# Patient Record
Sex: FEMALE | Race: WHITE | Employment: OTHER | URBAN - METROPOLITAN AREA
[De-identification: names, ages, dates, MRNs, and addresses within clinical notes are randomized per-mention and may not be internally consistent; named-entity substitution may affect disease eponyms.]

---

## 2017-03-25 ENCOUNTER — APPOINTMENT (OUTPATIENT)
Dept: GENERAL RADIOLOGY | Age: 77
End: 2017-03-25
Payer: COMMERCIAL

## 2017-03-25 ENCOUNTER — HOSPITAL ENCOUNTER (EMERGENCY)
Age: 77
Discharge: HOME | End: 2017-03-25
Attending: EMERGENCY MEDICINE
Payer: COMMERCIAL

## 2017-03-25 VITALS
RESPIRATION RATE: 18 BRPM | HEIGHT: 69 IN | WEIGHT: 200 LBS | OXYGEN SATURATION: 97 % | DIASTOLIC BLOOD PRESSURE: 83 MMHG | TEMPERATURE: 97.9 F | HEART RATE: 56 BPM | BODY MASS INDEX: 29.62 KG/M2 | SYSTOLIC BLOOD PRESSURE: 213 MMHG

## 2017-03-25 PROCEDURE — 2500000003 HC RX 250 WO HCPCS: Performed by: NURSE PRACTITIONER

## 2017-03-25 PROCEDURE — 12001 RPR S/N/AX/GEN/TRNK 2.5CM/<: CPT

## 2017-03-25 PROCEDURE — 99283 EMERGENCY DEPT VISIT LOW MDM: CPT

## 2017-03-25 PROCEDURE — 90471 IMMUNIZATION ADMIN: CPT | Performed by: NURSE PRACTITIONER

## 2017-03-25 PROCEDURE — 90715 TDAP VACCINE 7 YRS/> IM: CPT | Performed by: NURSE PRACTITIONER

## 2017-03-25 PROCEDURE — 6360000002 HC RX W HCPCS: Performed by: NURSE PRACTITIONER

## 2017-03-25 PROCEDURE — 73130 X-RAY EXAM OF HAND: CPT

## 2017-03-25 RX ORDER — CHLORAL HYDRATE 500 MG
3000 CAPSULE ORAL DAILY
COMMUNITY

## 2017-03-25 RX ORDER — AMLODIPINE BESYLATE 5 MG/1
5 TABLET ORAL DAILY
Status: ON HOLD | COMMUNITY
End: 2018-03-17 | Stop reason: HOSPADM

## 2017-03-25 RX ORDER — LOSARTAN POTASSIUM 100 MG/1
100 TABLET ORAL DAILY
Status: ON HOLD | COMMUNITY
End: 2018-03-17 | Stop reason: HOSPADM

## 2017-03-25 RX ORDER — ATENOLOL 50 MG/1
50 TABLET ORAL DAILY
Status: ON HOLD | COMMUNITY
End: 2018-03-17 | Stop reason: HOSPADM

## 2017-03-25 RX ORDER — FENOFIBRATE 48 MG/1
48 TABLET, COATED ORAL DAILY
COMMUNITY

## 2017-03-25 RX ORDER — LIDOCAINE HYDROCHLORIDE 10 MG/ML
20 INJECTION, SOLUTION INFILTRATION; PERINEURAL ONCE
Status: COMPLETED | OUTPATIENT
Start: 2017-03-25 | End: 2017-03-25

## 2017-03-25 RX ORDER — GLIMEPIRIDE 4 MG/1
4 TABLET ORAL 2 TIMES DAILY
COMMUNITY

## 2017-03-25 RX ADMIN — LIDOCAINE HYDROCHLORIDE 20 ML: 10 INJECTION, SOLUTION INFILTRATION; PERINEURAL at 17:34

## 2017-03-25 RX ADMIN — TETANUS TOXOID, REDUCED DIPHTHERIA TOXOID AND ACELLULAR PERTUSSIS VACCINE, ADSORBED 0.5 ML: 5; 2.5; 8; 8; 2.5 SUSPENSION INTRAMUSCULAR at 17:08

## 2017-03-25 ASSESSMENT — ENCOUNTER SYMPTOMS
NAUSEA: 0
TROUBLE SWALLOWING: 0
SHORTNESS OF BREATH: 0
VOMITING: 0
COUGH: 0

## 2017-03-25 ASSESSMENT — PAIN SCALES - GENERAL: PAINLEVEL_OUTOF10: 0

## 2017-10-21 ENCOUNTER — HOSPITAL ENCOUNTER (OUTPATIENT)
Age: 77
Discharge: HOME | End: 2017-10-21
Payer: MEDICARE

## 2017-10-21 LAB
-: ABNORMAL
ALBUMIN SERPL-MCNC: 3.7 G/DL (ref 3.5–5.2)
ALBUMIN/GLOBULIN RATIO: 1.3 (ref 1–2.5)
ALP BLD-CCNC: 36 U/L (ref 35–104)
ALT SERPL-CCNC: 12 U/L (ref 5–33)
AMORPHOUS: ABNORMAL
ANION GAP SERPL CALCULATED.3IONS-SCNC: 12 MMOL/L (ref 9–17)
AST SERPL-CCNC: 12 U/L
BACTERIA: ABNORMAL
BILIRUB SERPL-MCNC: 0.33 MG/DL (ref 0.3–1.2)
BILIRUBIN URINE: NEGATIVE
BUN BLDV-MCNC: 11 MG/DL (ref 8–23)
BUN/CREAT BLD: ABNORMAL (ref 9–20)
CALCIUM SERPL-MCNC: 9 MG/DL (ref 8.6–10.4)
CASTS UA: ABNORMAL /LPF (ref 0–8)
CHLORIDE BLD-SCNC: 105 MMOL/L (ref 98–107)
CHOLESTEROL/HDL RATIO: 7.4
CHOLESTEROL: 185 MG/DL
CO2: 25 MMOL/L (ref 20–31)
COLOR: YELLOW
COMMENT UA: ABNORMAL
CREAT SERPL-MCNC: 0.52 MG/DL (ref 0.5–0.9)
CREATININE URINE: 12.5 MG/DL (ref 28–217)
CRYSTALS, UA: ABNORMAL /HPF
EPITHELIAL CELLS UA: ABNORMAL /HPF (ref 0–5)
ESTIMATED AVERAGE GLUCOSE: 151 MG/DL
GFR AFRICAN AMERICAN: >60 ML/MIN
GFR NON-AFRICAN AMERICAN: >60 ML/MIN
GFR SERPL CREATININE-BSD FRML MDRD: ABNORMAL ML/MIN/{1.73_M2}
GFR SERPL CREATININE-BSD FRML MDRD: ABNORMAL ML/MIN/{1.73_M2}
GLUCOSE BLD-MCNC: 129 MG/DL (ref 70–99)
GLUCOSE URINE: NEGATIVE
HBA1C MFR BLD: 6.9 % (ref 4–6)
HDLC SERPL-MCNC: 25 MG/DL
KETONES, URINE: NEGATIVE
LDL CHOLESTEROL: 113 MG/DL (ref 0–130)
LEUKOCYTE ESTERASE, URINE: ABNORMAL
MICROALBUMIN/CREAT 24H UR: <12 MG/L
MICROALBUMIN/CREAT UR-RTO: 96 MCG/MG CREAT
MUCUS: ABNORMAL
NITRITE, URINE: POSITIVE
OTHER OBSERVATIONS UA: ABNORMAL
PH UA: 8.5 (ref 5–8)
POTASSIUM SERPL-SCNC: 4 MMOL/L (ref 3.7–5.3)
PROTEIN UA: NEGATIVE
RBC UA: ABNORMAL /HPF (ref 0–4)
RENAL EPITHELIAL, UA: ABNORMAL /HPF
SODIUM BLD-SCNC: 142 MMOL/L (ref 135–144)
SPECIFIC GRAVITY UA: 1.01 (ref 1–1.03)
TOTAL PROTEIN: 6.6 G/DL (ref 6.4–8.3)
TRICHOMONAS: ABNORMAL
TRIGL SERPL-MCNC: 235 MG/DL
TURBIDITY: CLEAR
URINE HGB: NEGATIVE
UROBILINOGEN, URINE: NORMAL
VLDLC SERPL CALC-MCNC: ABNORMAL MG/DL (ref 1–30)
WBC UA: ABNORMAL /HPF (ref 0–5)
YEAST: ABNORMAL

## 2017-10-21 PROCEDURE — 87086 URINE CULTURE/COLONY COUNT: CPT

## 2017-10-21 PROCEDURE — 82570 ASSAY OF URINE CREATININE: CPT

## 2017-10-21 PROCEDURE — 81001 URINALYSIS AUTO W/SCOPE: CPT

## 2017-10-21 PROCEDURE — 36415 COLL VENOUS BLD VENIPUNCTURE: CPT

## 2017-10-21 PROCEDURE — 82043 UR ALBUMIN QUANTITATIVE: CPT

## 2017-10-21 PROCEDURE — 80061 LIPID PANEL: CPT

## 2017-10-21 PROCEDURE — 80053 COMPREHEN METABOLIC PANEL: CPT

## 2017-10-21 PROCEDURE — 87077 CULTURE AEROBIC IDENTIFY: CPT

## 2017-10-21 PROCEDURE — 83036 HEMOGLOBIN GLYCOSYLATED A1C: CPT

## 2017-10-21 PROCEDURE — 87186 SC STD MICRODIL/AGAR DIL: CPT

## 2017-10-22 LAB
CULTURE: ABNORMAL
CULTURE: ABNORMAL
Lab: ABNORMAL
ORGANISM: ABNORMAL
SPECIMEN DESCRIPTION: ABNORMAL
STATUS: ABNORMAL

## 2018-01-10 ENCOUNTER — APPOINTMENT (OUTPATIENT)
Dept: CT IMAGING | Age: 78
End: 2018-01-10
Payer: MEDICARE

## 2018-01-10 ENCOUNTER — ANESTHESIA (OUTPATIENT)
Dept: INTERVENTIONAL RADIOLOGY/VASCULAR | Age: 78
DRG: 023 | End: 2018-01-10
Payer: MEDICARE

## 2018-01-10 ENCOUNTER — HOSPITAL ENCOUNTER (INPATIENT)
Dept: INTERVENTIONAL RADIOLOGY/VASCULAR | Age: 78
Discharge: HOME | DRG: 023 | End: 2018-01-10
Payer: MEDICARE

## 2018-01-10 ENCOUNTER — HOSPITAL ENCOUNTER (EMERGENCY)
Age: 78
Discharge: ANOTHER HOSPITAL | End: 2018-01-10
Attending: EMERGENCY MEDICINE
Payer: MEDICARE

## 2018-01-10 ENCOUNTER — APPOINTMENT (OUTPATIENT)
Dept: INTERVENTIONAL RADIOLOGY/VASCULAR | Age: 78
DRG: 023 | End: 2018-01-10
Payer: MEDICARE

## 2018-01-10 ENCOUNTER — HOSPITAL ENCOUNTER (INPATIENT)
Age: 78
LOS: 9 days | Discharge: NURSING, REHAB OR LONG-TERM CARE | DRG: 023 | End: 2018-01-19
Attending: EMERGENCY MEDICINE | Admitting: PSYCHIATRY & NEUROLOGY
Payer: MEDICARE

## 2018-01-10 ENCOUNTER — APPOINTMENT (OUTPATIENT)
Dept: CT IMAGING | Age: 78
DRG: 023 | End: 2018-01-10
Payer: MEDICARE

## 2018-01-10 ENCOUNTER — HOSPITAL ENCOUNTER (EMERGENCY)
Age: 78
Discharge: ANOTHER HOSPITAL | End: 2018-01-10
Payer: MEDICARE

## 2018-01-10 ENCOUNTER — ANESTHESIA EVENT (OUTPATIENT)
Dept: INTERVENTIONAL RADIOLOGY/VASCULAR | Age: 78
DRG: 023 | End: 2018-01-10
Payer: MEDICARE

## 2018-01-10 ENCOUNTER — APPOINTMENT (OUTPATIENT)
Dept: GENERAL RADIOLOGY | Age: 78
End: 2018-01-10
Payer: MEDICARE

## 2018-01-10 VITALS
DIASTOLIC BLOOD PRESSURE: 65 MMHG | HEIGHT: 68 IN | RESPIRATION RATE: 16 BRPM | TEMPERATURE: 97.7 F | WEIGHT: 190 LBS | BODY MASS INDEX: 28.79 KG/M2 | SYSTOLIC BLOOD PRESSURE: 160 MMHG | OXYGEN SATURATION: 95 % | HEART RATE: 61 BPM

## 2018-01-10 VITALS
OXYGEN SATURATION: 97 % | SYSTOLIC BLOOD PRESSURE: 199 MMHG | RESPIRATION RATE: 23 BRPM | DIASTOLIC BLOOD PRESSURE: 92 MMHG

## 2018-01-10 VITALS
HEART RATE: 72 BPM | RESPIRATION RATE: 20 BRPM | OXYGEN SATURATION: 100 % | SYSTOLIC BLOOD PRESSURE: 179 MMHG | DIASTOLIC BLOOD PRESSURE: 68 MMHG | TEMPERATURE: 98.2 F

## 2018-01-10 DIAGNOSIS — I63.9 CEREBROVASCULAR ACCIDENT (CVA), UNSPECIFIED MECHANISM (HCC): Primary | ICD-10-CM

## 2018-01-10 DIAGNOSIS — I63.511 ACUTE RIGHT MCA STROKE (HCC): Primary | ICD-10-CM

## 2018-01-10 DIAGNOSIS — R47.9 SPEECH DISTURBANCE, UNSPECIFIED TYPE: ICD-10-CM

## 2018-01-10 LAB
-: ABNORMAL
ABSOLUTE EOS #: 0.23 K/UL (ref 0–0.4)
ABSOLUTE IMMATURE GRANULOCYTE: ABNORMAL K/UL (ref 0–0.3)
ABSOLUTE LYMPH #: 4.68 K/UL (ref 1–4.8)
ABSOLUTE MONO #: 0.47 K/UL (ref 0.1–1.3)
ACTIVATED CLOTTING TIME: 135 SEC (ref 79–149)
ALBUMIN SERPL-MCNC: 4.4 G/DL (ref 3.5–5.2)
ALBUMIN/GLOBULIN RATIO: ABNORMAL (ref 1–2.5)
ALP BLD-CCNC: 46 U/L (ref 35–104)
ALT SERPL-CCNC: 13 U/L (ref 5–33)
AMORPHOUS: ABNORMAL
ANION GAP SERPL CALCULATED.3IONS-SCNC: 13 MMOL/L (ref 9–17)
AST SERPL-CCNC: 14 U/L
BACTERIA: ABNORMAL
BASOPHILS # BLD: 0 % (ref 0–2)
BASOPHILS ABSOLUTE: 0 K/UL (ref 0–0.2)
BILIRUB SERPL-MCNC: 0.46 MG/DL (ref 0.3–1.2)
BILIRUBIN URINE: NEGATIVE
BUN BLDV-MCNC: 14 MG/DL (ref 8–23)
BUN/CREAT BLD: ABNORMAL (ref 9–20)
CALCIUM SERPL-MCNC: 9.5 MG/DL (ref 8.6–10.4)
CASTS UA: ABNORMAL /LPF
CHLORIDE BLD-SCNC: 101 MMOL/L (ref 98–107)
CO2: 26 MMOL/L (ref 20–31)
COLOR: YELLOW
COMMENT UA: ABNORMAL
CREAT SERPL-MCNC: 0.55 MG/DL (ref 0.5–0.9)
CRYSTALS, UA: ABNORMAL /HPF
DIFFERENTIAL TYPE: ABNORMAL
EKG ATRIAL RATE: 65 BPM
EKG P-R INTERVAL: 184 MS
EKG Q-T INTERVAL: 440 MS
EKG QRS DURATION: 98 MS
EKG QTC CALCULATION (BAZETT): 457 MS
EKG R AXIS: -2 DEGREES
EKG T AXIS: -10 DEGREES
EKG VENTRICULAR RATE: 65 BPM
EOSINOPHILS RELATIVE PERCENT: 2 % (ref 0–4)
EPITHELIAL CELLS UA: ABNORMAL /HPF
GFR AFRICAN AMERICAN: >60 ML/MIN
GFR NON-AFRICAN AMERICAN: >60 ML/MIN
GFR SERPL CREATININE-BSD FRML MDRD: ABNORMAL ML/MIN/{1.73_M2}
GFR SERPL CREATININE-BSD FRML MDRD: ABNORMAL ML/MIN/{1.73_M2}
GLUCOSE BLD-MCNC: 232 MG/DL (ref 70–99)
GLUCOSE URINE: ABNORMAL
HCT VFR BLD CALC: 42.5 % (ref 36–46)
HEMOGLOBIN: 13.8 G/DL (ref 12–16)
IMMATURE GRANULOCYTES: ABNORMAL %
INR BLD: 1
KETONES, URINE: NEGATIVE
LEUKOCYTE ESTERASE, URINE: ABNORMAL
LIPASE: 25 U/L (ref 13–60)
LYMPHOCYTES # BLD: 40 % (ref 24–44)
MAGNESIUM: 2.1 MG/DL (ref 1.6–2.6)
MCH RBC QN AUTO: 27.9 PG (ref 26–34)
MCHC RBC AUTO-ENTMCNC: 32.5 G/DL (ref 31–37)
MCV RBC AUTO: 85.6 FL (ref 80–100)
MONOCYTES # BLD: 4 % (ref 1–7)
MORPHOLOGY: NORMAL
MUCUS: ABNORMAL
NITRITE, URINE: NEGATIVE
OTHER OBSERVATIONS UA: ABNORMAL
PARTIAL THROMBOPLASTIN TIME: 21.8 SEC (ref 23–31)
PDW BLD-RTO: 14.3 % (ref 11.5–14.9)
PH UA: 7.5 (ref 5–8)
PLATELET # BLD: 231 K/UL (ref 150–450)
PLATELET ESTIMATE: ABNORMAL
PMV BLD AUTO: 10.3 FL (ref 6–12)
POTASSIUM SERPL-SCNC: 3.7 MMOL/L (ref 3.7–5.3)
PROTEIN UA: NEGATIVE
PROTHROMBIN TIME: 10.3 SEC (ref 9.7–12)
RBC # BLD: 4.96 M/UL (ref 4–5.2)
RBC # BLD: ABNORMAL 10*6/UL
RBC UA: ABNORMAL /HPF
RENAL EPITHELIAL, UA: ABNORMAL /HPF
SEG NEUTROPHILS: 54 % (ref 36–66)
SEGMENTED NEUTROPHILS ABSOLUTE COUNT: 6.32 K/UL (ref 1.3–9.1)
SODIUM BLD-SCNC: 140 MMOL/L (ref 135–144)
SPECIFIC GRAVITY UA: 1.01 (ref 1–1.03)
TOTAL PROTEIN: 7.5 G/DL (ref 6.4–8.3)
TRICHOMONAS: ABNORMAL
TROPONIN INTERP: NORMAL
TROPONIN INTERP: NORMAL
TROPONIN T: <0.03 NG/ML
TROPONIN T: <0.03 NG/ML
TURBIDITY: CLEAR
URINE HGB: NEGATIVE
UROBILINOGEN, URINE: NORMAL
WBC # BLD: 11.7 K/UL (ref 3.5–11)
WBC # BLD: ABNORMAL 10*3/UL
WBC UA: ABNORMAL /HPF
YEAST: ABNORMAL

## 2018-01-10 PROCEDURE — B313YZZ FLUOROSCOPY OF RIGHT COMMON CAROTID ARTERY USING OTHER CONTRAST: ICD-10-PCS | Performed by: PSYCHIATRY & NEUROLOGY

## 2018-01-10 PROCEDURE — 2580000003 HC RX 258: Performed by: NURSE ANESTHETIST, CERTIFIED REGISTERED

## 2018-01-10 PROCEDURE — C1769 GUIDE WIRE: HCPCS

## 2018-01-10 PROCEDURE — 87086 URINE CULTURE/COLONY COUNT: CPT

## 2018-01-10 PROCEDURE — 99223 1ST HOSP IP/OBS HIGH 75: CPT | Performed by: PSYCHIATRY & NEUROLOGY

## 2018-01-10 PROCEDURE — 85730 THROMBOPLASTIN TIME PARTIAL: CPT

## 2018-01-10 PROCEDURE — 87077 CULTURE AEROBIC IDENTIFY: CPT

## 2018-01-10 PROCEDURE — 2580000003 HC RX 258: Performed by: EMERGENCY MEDICINE

## 2018-01-10 PROCEDURE — B316YZZ FLUOROSCOPY OF RIGHT INTERNAL CAROTID ARTERY USING OTHER CONTRAST: ICD-10-PCS | Performed by: PSYCHIATRY & NEUROLOGY

## 2018-01-10 PROCEDURE — 2500000003 HC RX 250 WO HCPCS: Performed by: PSYCHIATRY & NEUROLOGY

## 2018-01-10 PROCEDURE — 83735 ASSAY OF MAGNESIUM: CPT

## 2018-01-10 PROCEDURE — 70498 CT ANGIOGRAPHY NECK: CPT

## 2018-01-10 PROCEDURE — 70450 CT HEAD/BRAIN W/O DYE: CPT

## 2018-01-10 PROCEDURE — 85610 PROTHROMBIN TIME: CPT

## 2018-01-10 PROCEDURE — 96374 THER/PROPH/DIAG INJ IV PUSH: CPT

## 2018-01-10 PROCEDURE — 3700000000 HC ANESTHESIA ATTENDED CARE

## 2018-01-10 PROCEDURE — 71045 X-RAY EXAM CHEST 1 VIEW: CPT

## 2018-01-10 PROCEDURE — 80053 COMPREHEN METABOLIC PANEL: CPT

## 2018-01-10 PROCEDURE — 99291 CRITICAL CARE FIRST HOUR: CPT | Performed by: PSYCHIATRY & NEUROLOGY

## 2018-01-10 PROCEDURE — 87186 SC STD MICRODIL/AGAR DIL: CPT

## 2018-01-10 PROCEDURE — 93005 ELECTROCARDIOGRAM TRACING: CPT

## 2018-01-10 PROCEDURE — 6360000002 HC RX W HCPCS: Performed by: EMERGENCY MEDICINE

## 2018-01-10 PROCEDURE — 3700000001 HC ADD 15 MINUTES (ANESTHESIA)

## 2018-01-10 PROCEDURE — 85347 COAGULATION TIME ACTIVATED: CPT

## 2018-01-10 PROCEDURE — 84484 ASSAY OF TROPONIN QUANT: CPT

## 2018-01-10 PROCEDURE — 6360000004 HC RX CONTRAST MEDICATION: Performed by: EMERGENCY MEDICINE

## 2018-01-10 PROCEDURE — 70496 CT ANGIOGRAPHY HEAD: CPT

## 2018-01-10 PROCEDURE — B41FYZZ FLUOROSCOPY OF RIGHT LOWER EXTREMITY ARTERIES USING OTHER CONTRAST: ICD-10-PCS | Performed by: PSYCHIATRY & NEUROLOGY

## 2018-01-10 PROCEDURE — 99285 EMERGENCY DEPT VISIT HI MDM: CPT

## 2018-01-10 PROCEDURE — 83690 ASSAY OF LIPASE: CPT

## 2018-01-10 PROCEDURE — 03CG3ZZ EXTIRPATION OF MATTER FROM INTRACRANIAL ARTERY, PERCUTANEOUS APPROACH: ICD-10-PCS | Performed by: PSYCHIATRY & NEUROLOGY

## 2018-01-10 PROCEDURE — 61645 PERQ ART M-THROMBECT &/NFS: CPT | Performed by: PSYCHIATRY & NEUROLOGY

## 2018-01-10 PROCEDURE — B31RYZZ FLUOROSCOPY OF INTRACRANIAL ARTERIES USING OTHER CONTRAST: ICD-10-PCS | Performed by: PSYCHIATRY & NEUROLOGY

## 2018-01-10 PROCEDURE — 2000000003 HC NEURO ICU R&B

## 2018-01-10 PROCEDURE — 85025 COMPLETE CBC W/AUTO DIFF WBC: CPT

## 2018-01-10 PROCEDURE — 6360000002 HC RX W HCPCS: Performed by: NURSE ANESTHETIST, CERTIFIED REGISTERED

## 2018-01-10 PROCEDURE — 6360000002 HC RX W HCPCS

## 2018-01-10 PROCEDURE — 36415 COLL VENOUS BLD VENIPUNCTURE: CPT

## 2018-01-10 PROCEDURE — 81001 URINALYSIS AUTO W/SCOPE: CPT

## 2018-01-10 PROCEDURE — 6370000000 HC RX 637 (ALT 250 FOR IP): Performed by: EMERGENCY MEDICINE

## 2018-01-10 PROCEDURE — 6360000004 HC RX CONTRAST MEDICATION: Performed by: PSYCHIATRY & NEUROLOGY

## 2018-01-10 RX ORDER — HYDRALAZINE HYDROCHLORIDE 20 MG/ML
10 INJECTION INTRAMUSCULAR; INTRAVENOUS ONCE
Status: COMPLETED | OUTPATIENT
Start: 2018-01-10 | End: 2018-01-10

## 2018-01-10 RX ORDER — ASPIRIN 81 MG/1
324 TABLET, CHEWABLE ORAL ONCE
Status: COMPLETED | OUTPATIENT
Start: 2018-01-10 | End: 2018-01-10

## 2018-01-10 RX ORDER — IODIXANOL 270 MG/ML
200 INJECTION, SOLUTION INTRAVASCULAR
Status: COMPLETED | OUTPATIENT
Start: 2018-01-10 | End: 2018-01-10

## 2018-01-10 RX ORDER — ONDANSETRON 2 MG/ML
4 INJECTION INTRAMUSCULAR; INTRAVENOUS EVERY 6 HOURS PRN
Status: DISCONTINUED | OUTPATIENT
Start: 2018-01-10 | End: 2018-01-19 | Stop reason: HOSPADM

## 2018-01-10 RX ORDER — SODIUM CHLORIDE 0.9 % (FLUSH) 0.9 %
10 SYRINGE (ML) INJECTION EVERY 12 HOURS SCHEDULED
Status: DISCONTINUED | OUTPATIENT
Start: 2018-01-11 | End: 2018-01-19 | Stop reason: HOSPADM

## 2018-01-10 RX ORDER — SODIUM CHLORIDE 9 MG/ML
INJECTION, SOLUTION INTRAVENOUS CONTINUOUS
Status: DISCONTINUED | OUTPATIENT
Start: 2018-01-11 | End: 2018-01-15

## 2018-01-10 RX ORDER — CEFAZOLIN SODIUM 1 G/3ML
INJECTION, POWDER, FOR SOLUTION INTRAMUSCULAR; INTRAVENOUS PRN
Status: DISCONTINUED | OUTPATIENT
Start: 2018-01-10 | End: 2018-01-10 | Stop reason: SDUPTHER

## 2018-01-10 RX ORDER — HEPARIN SODIUM 1000 [USP'U]/ML
2000 INJECTION, SOLUTION INTRAVENOUS; SUBCUTANEOUS PRN
Status: DISCONTINUED | OUTPATIENT
Start: 2018-01-10 | End: 2018-01-10 | Stop reason: HOSPADM

## 2018-01-10 RX ORDER — LABETALOL HYDROCHLORIDE 5 MG/ML
10 INJECTION, SOLUTION INTRAVENOUS ONCE
Status: COMPLETED | OUTPATIENT
Start: 2018-01-10 | End: 2018-01-10

## 2018-01-10 RX ORDER — ACETAMINOPHEN 325 MG/1
650 TABLET ORAL EVERY 4 HOURS PRN
Status: DISCONTINUED | OUTPATIENT
Start: 2018-01-10 | End: 2018-01-19 | Stop reason: HOSPADM

## 2018-01-10 RX ORDER — ASPIRIN 81 MG/1
81 TABLET ORAL DAILY
Status: DISCONTINUED | OUTPATIENT
Start: 2018-01-11 | End: 2018-01-11

## 2018-01-10 RX ORDER — HEPARIN SODIUM 10000 [USP'U]/100ML
1000 INJECTION, SOLUTION INTRAVENOUS CONTINUOUS
Status: DISCONTINUED | OUTPATIENT
Start: 2018-01-10 | End: 2018-01-10 | Stop reason: HOSPADM

## 2018-01-10 RX ORDER — SODIUM CHLORIDE 9 MG/ML
INJECTION, SOLUTION INTRAVENOUS CONTINUOUS PRN
Status: DISCONTINUED | OUTPATIENT
Start: 2018-01-10 | End: 2018-01-10 | Stop reason: SDUPTHER

## 2018-01-10 RX ORDER — 0.9 % SODIUM CHLORIDE 0.9 %
100 INTRAVENOUS SOLUTION INTRAVENOUS ONCE
Status: COMPLETED | OUTPATIENT
Start: 2018-01-10 | End: 2018-01-10

## 2018-01-10 RX ORDER — HEPARIN SODIUM 1000 [USP'U]/ML
INJECTION, SOLUTION INTRAVENOUS; SUBCUTANEOUS PRN
Status: DISCONTINUED | OUTPATIENT
Start: 2018-01-10 | End: 2018-01-10 | Stop reason: SDUPTHER

## 2018-01-10 RX ORDER — SODIUM CHLORIDE 0.9 % (FLUSH) 0.9 %
10 SYRINGE (ML) INJECTION PRN
Status: DISCONTINUED | OUTPATIENT
Start: 2018-01-10 | End: 2018-01-10 | Stop reason: HOSPADM

## 2018-01-10 RX ORDER — HEPARIN SODIUM 1000 [USP'U]/ML
INJECTION, SOLUTION INTRAVENOUS; SUBCUTANEOUS
Status: COMPLETED
Start: 2018-01-10 | End: 2018-01-10

## 2018-01-10 RX ORDER — HEPARIN SODIUM 1000 [USP'U]/ML
4000 INJECTION, SOLUTION INTRAVENOUS; SUBCUTANEOUS ONCE
Status: COMPLETED | OUTPATIENT
Start: 2018-01-10 | End: 2018-01-10

## 2018-01-10 RX ORDER — FENTANYL CITRATE 50 UG/ML
25 INJECTION, SOLUTION INTRAMUSCULAR; INTRAVENOUS
Status: DISCONTINUED | OUTPATIENT
Start: 2018-01-10 | End: 2018-01-19 | Stop reason: HOSPADM

## 2018-01-10 RX ORDER — SIMVASTATIN 20 MG
20 TABLET ORAL NIGHTLY
Status: DISCONTINUED | OUTPATIENT
Start: 2018-01-11 | End: 2018-01-19 | Stop reason: HOSPADM

## 2018-01-10 RX ORDER — HEPARIN SODIUM 1000 [USP'U]/ML
4000 INJECTION, SOLUTION INTRAVENOUS; SUBCUTANEOUS PRN
Status: DISCONTINUED | OUTPATIENT
Start: 2018-01-10 | End: 2018-01-10 | Stop reason: HOSPADM

## 2018-01-10 RX ORDER — SODIUM CHLORIDE 0.9 % (FLUSH) 0.9 %
10 SYRINGE (ML) INJECTION PRN
Status: DISCONTINUED | OUTPATIENT
Start: 2018-01-10 | End: 2018-01-19 | Stop reason: HOSPADM

## 2018-01-10 RX ADMIN — LABETALOL HYDROCHLORIDE 10 MG: 5 INJECTION, SOLUTION INTRAVENOUS at 11:25

## 2018-01-10 RX ADMIN — HEPARIN SODIUM 2000 UNITS: 1000 INJECTION, SOLUTION INTRAVENOUS; SUBCUTANEOUS at 22:18

## 2018-01-10 RX ADMIN — IODIXANOL 160 ML: 270 INJECTION, SOLUTION INTRAVASCULAR at 23:15

## 2018-01-10 RX ADMIN — ASPIRIN 81 MG 324 MG: 81 TABLET ORAL at 16:11

## 2018-01-10 RX ADMIN — SODIUM CHLORIDE 100 ML: 9 INJECTION, SOLUTION INTRAVENOUS at 14:18

## 2018-01-10 RX ADMIN — HEPARIN SODIUM AND DEXTROSE 1000 UNITS/HR: 10000; 5 INJECTION INTRAVENOUS at 16:14

## 2018-01-10 RX ADMIN — IOPAMIDOL 100 ML: 755 INJECTION, SOLUTION INTRAVENOUS at 14:18

## 2018-01-10 RX ADMIN — SODIUM CHLORIDE: 9 INJECTION, SOLUTION INTRAVENOUS at 19:30

## 2018-01-10 RX ADMIN — HEPARIN SODIUM 4000 UNITS: 1000 INJECTION, SOLUTION INTRAVENOUS; SUBCUTANEOUS at 16:10

## 2018-01-10 RX ADMIN — CEFAZOLIN 2000 MG: 1 INJECTION, POWDER, FOR SOLUTION INTRAMUSCULAR; INTRAVENOUS at 19:50

## 2018-01-10 RX ADMIN — HYDRALAZINE HYDROCHLORIDE 10 MG: 20 INJECTION INTRAMUSCULAR; INTRAVENOUS at 13:05

## 2018-01-10 RX ADMIN — Medication 10 ML: at 14:18

## 2018-01-10 ASSESSMENT — PULMONARY FUNCTION TESTS
PIF_VALUE: 1
PIF_VALUE: 0
PIF_VALUE: 1
PIF_VALUE: 0
PIF_VALUE: 1
PIF_VALUE: 0
PIF_VALUE: 1
PIF_VALUE: 0
PIF_VALUE: 1

## 2018-01-10 ASSESSMENT — LIFESTYLE VARIABLES: SMOKING_STATUS: 0

## 2018-01-10 ASSESSMENT — ENCOUNTER SYMPTOMS
VOMITING: 0
NAUSEA: 0
SHORTNESS OF BREATH: 0
ABDOMINAL PAIN: 0

## 2018-01-10 NOTE — ED NOTES
weakness in legs. No gaze noted, pt follows commands. Slight drift noted to left leg. Slight left side facial droop noted. Decision to scan pt was made. Patient received the following medications prior to MSU arrival:NA  Patient has the following lines prior to [facility] arrival:[facility]  Patient has the following airway placed prior to MSU arrival:NA    Patient was transferred to cot via 6 person assist and secured with straps x3. Zoll ECG, SpO2, and NIBP applied and monitored throughout encounter. HOB maintained at 30 degrees. Patient was transferred to ambulance, cot secured in scan position. Non contrast CT scan preformed. After scan cot secured in transport position. Patient is being transported to [facility] . During transport patient rests comfortably. Cabin temp maintained at 70-72 °F throughout transport. Patient was transferred to bed 17 via 4 person sheet lift. Report, care, and CT disk turned over to  RN at bedside. Physical assessment performed:    Neuro: Alert and oriented, slight drifted noted to left leg, slight left side facial droop.     Resp: lungs clear to ascultation bilaterally, normal effort  Cardiac: regular rate, no murmur, 12 lead ECG shows SB  Muscular/skeletal/peripheral vascular: no edema, no redness or tenderness in the calves, pulses present in 4 extremities   Abd/GI/: abd round, soft, non tender, bowel sounds present x 4 quadrants   Skin: normal color, warm, dry      IV Lines:  Time Type Site/Route Size # Attempts Performed By   pta infusion R AC 18  ems                             Total Amount of IV Fluids Infused: 0    Meds / Electrical rx   Time Therapy Dosage Route Response Preformed By   1125 labetalol 10mg IV  Aubrey Mylar Administration   Time Bolus Dose Infusion Dose                 aIRWAY  Procedures   Time Size Oral Nasal # Attempts Preformed By      []  []        []  []        []  []        []  []

## 2018-01-10 NOTE — PROGRESS NOTES
Alegre inserted per Olamide Plate and Shanice GRIGGS. Hematuria noted. resident notified. Pedal pulses marked. Labs drawn and others reviewed.   Awaiting to go to lab

## 2018-01-10 NOTE — ED PROVIDER NOTES
right le - no drift; leg holds 30 degree position for full 5 seconds  7. Limb Ataxia:  0 - absent  8. Sensory:  0 - normal; no sensory loss  9. Best Language:  1 - mild to moderate aphasia; some obvious loss of fluency or facility of comprehension without significant limitation on ideas expressed or form of expression. Reduction of speech and/or comprehension, however, makes conversation about provided materials difficult or impossible. For example, in conversation about provided materials, examiner can identify picture or naming card content from patient's response. 10.  Dysarthria:  0 - normal  11. Extinction and Inattention:  0 - no abnormality    TOTAL:  2            DIFFERENTIAL DIAGNOSIS/MDM:   12:00 PM-patient seen and examined. Patient arrived via mobile stroke unit. NIH is 2 on my exam.  Patient does have stuttering and almost slurred speech and borderline left-sided facial droop however no other neurologic deficits on my exam.  Both of her lower extremities appear weak however she states they are chronically weak and they do not seem to show any lateralizing signs. She is afebrile and nontoxic in appearance. She actually appears very comfortable. NIH score seems to have improved without intervention since original presentation to first responders. Mobile stroke did give dose of antihypertensives as patient was initially hypertensive over 200. Spoke with Dr. Loretta Blum who requested CTA head and neck. Stroke panel labs ordered. Cardiac workup ordered as well.       DIAGNOSTIC RESULTS     EKG: All EKG's are interpreted by the Emergency Department Physician who either signs or Co-signs this chart in the absence of a cardiologist.    EKG Interpretation    Interpreted by me-12:21 PM    Rhythm: normal sinus   Rate: normal  Axis: normal  Ectopy: none  Conduction: normal  ST Segments: Nonspecific  T Waves: Nonspecific  Q Waves: none    Clinical Impression: Nonspecific EKG    RADIOLOGY:   I 1.  Further evaluation by thyroid Ultrasound recommended for these incidental   nodules:      Patient Age 25 years or less - Any nodule. Patient Age 21-27 years old - Nodule 1 cm in size or greater      Patient Age 28 years or more - Nodule 1.5 cm in size or greater      2. Follow up thyroid ultrasound also recommend in these scenarios      -Solitary nodule with high risk imaging features (locally invasive nodule or   suspicious lymph nodes)      -Any nodule in a heterogeneous enlarged thyroid gland      3. NO further imaging is recommended in the following scenarios      -No f/u imaging is recommended for ITNs not meeting the above criteria.      -No US or f/u recommended for ITNs without high risk features      in pts. with limited life expectancy or significant      co-morbidities, unless clinically warranted. Note: These recommendations do not apply to pts. w/ increased risk      for thyroid cancer or pts. with symptomatic thyroid disease.      ________________________________________________________________      Recommendations for f/u of Incidental Thyroid Nodules (ITN)      found on CT, MR, NM and Extrathyroidal US are based upon the ACR      white paper and [facility] 3-tiered system for managing ITNs:      J Am Artur Radiol. 2015 Feb;12(2): 143-50         XR CHEST PORTABLE   Final Result   No prior study available for comparison. No acute cardiopulmonary disease.                  ED BEDSIDE ULTRASOUND:      LABS:  Labs Reviewed   UA W/REFLEX CULTURE - Abnormal; Notable for the following:        Result Value    Glucose, Ur 1+ (*)     Leukocyte Esterase, Urine SMALL (*)     All other components within normal limits   COMPREHENSIVE METABOLIC PANEL - Abnormal; Notable for the following:     Glucose 232 (*)     All other components within normal limits   APTT - Abnormal; Notable for the following:     PTT 21.8 (*)     All other components within normal limits   CBC WITH AUTO DIFFERENTIAL - Abnormal;

## 2018-01-10 NOTE — ED NOTES
Bed: 17  Expected date: 1/10/18  Expected time:   Means of arrival:   Comments:     Maggie Phoenix RN  01/10/18 0089

## 2018-01-10 NOTE — ED NOTES
Pt arrives per [facility] transferred from [facility] with CVA. Pt with slurred speech and left sided facial droop that started earlier today. Pt LKW 1040. Pts with equal strength to BUE and BLE. Pt with clear speech, left sided facial droop. CT performed pta showing right MCA occlusion. Pt received 4000 unit heparin bolus, 324mg asa and heparin gtt at 10mL/hr. Pt denies any complaints at this time.       Pascale Matute RN  01/10/18 3536

## 2018-01-11 ENCOUNTER — APPOINTMENT (OUTPATIENT)
Dept: CT IMAGING | Age: 78
DRG: 023 | End: 2018-01-11
Payer: MEDICARE

## 2018-01-11 LAB
-: NORMAL
ABSOLUTE EOS #: <0.03 K/UL (ref 0–0.44)
ABSOLUTE IMMATURE GRANULOCYTE: 0.05 K/UL (ref 0–0.3)
ABSOLUTE LYMPH #: 1.25 K/UL (ref 1.1–3.7)
ABSOLUTE MONO #: 0.26 K/UL (ref 0.1–1.2)
AMORPHOUS: NORMAL
ANION GAP SERPL CALCULATED.3IONS-SCNC: 14 MMOL/L (ref 9–17)
BACTERIA: NORMAL
BASOPHILS # BLD: 0 % (ref 0–2)
BASOPHILS ABSOLUTE: 0.03 K/UL (ref 0–0.2)
BILIRUBIN URINE: NEGATIVE
BUN BLDV-MCNC: 10 MG/DL (ref 8–23)
BUN/CREAT BLD: ABNORMAL (ref 9–20)
CALCIUM IONIZED: 1.13 MMOL/L (ref 1.13–1.33)
CALCIUM SERPL-MCNC: 8 MG/DL (ref 8.6–10.4)
CASTS UA: NORMAL /LPF (ref 0–8)
CHLORIDE BLD-SCNC: 102 MMOL/L (ref 98–107)
CHOLESTEROL/HDL RATIO: 6.8
CHOLESTEROL: 171 MG/DL
CO2: 21 MMOL/L (ref 20–31)
COLOR: YELLOW
COMMENT UA: ABNORMAL
CREAT SERPL-MCNC: 0.53 MG/DL (ref 0.5–0.9)
CRYSTALS, UA: NORMAL /HPF
CULTURE: NORMAL
CULTURE: NORMAL
DIFFERENTIAL TYPE: ABNORMAL
EOSINOPHILS RELATIVE PERCENT: 0 % (ref 1–4)
EPITHELIAL CELLS UA: NORMAL /HPF (ref 0–5)
GFR AFRICAN AMERICAN: >60 ML/MIN
GFR NON-AFRICAN AMERICAN: >60 ML/MIN
GFR SERPL CREATININE-BSD FRML MDRD: ABNORMAL ML/MIN/{1.73_M2}
GFR SERPL CREATININE-BSD FRML MDRD: ABNORMAL ML/MIN/{1.73_M2}
GLUCOSE BLD-MCNC: 192 MG/DL (ref 65–105)
GLUCOSE BLD-MCNC: 205 MG/DL (ref 65–105)
GLUCOSE BLD-MCNC: 220 MG/DL (ref 65–105)
GLUCOSE BLD-MCNC: 273 MG/DL (ref 65–105)
GLUCOSE BLD-MCNC: 278 MG/DL (ref 65–105)
GLUCOSE BLD-MCNC: 288 MG/DL (ref 70–99)
GLUCOSE URINE: ABNORMAL
HCT VFR BLD CALC: 38.3 % (ref 36.3–47.1)
HDLC SERPL-MCNC: 25 MG/DL
HEMOGLOBIN: 12.6 G/DL (ref 11.9–15.1)
IMMATURE GRANULOCYTES: 0 %
KETONES, URINE: ABNORMAL
LDL CHOLESTEROL: 97 MG/DL (ref 0–130)
LEUKOCYTE ESTERASE, URINE: NEGATIVE
LYMPHOCYTES # BLD: 9 % (ref 24–43)
Lab: NORMAL
MAGNESIUM: 1.8 MG/DL (ref 1.6–2.6)
MCH RBC QN AUTO: 27.9 PG (ref 25.2–33.5)
MCHC RBC AUTO-ENTMCNC: 32.9 G/DL (ref 28.4–34.8)
MCV RBC AUTO: 84.9 FL (ref 82.6–102.9)
MONOCYTES # BLD: 2 % (ref 3–12)
MRSA, DNA, NASAL: NORMAL
MUCUS: NORMAL
NITRITE, URINE: NEGATIVE
OTHER OBSERVATIONS UA: NORMAL
PDW BLD-RTO: 13.2 % (ref 11.8–14.4)
PH UA: 5 (ref 5–8)
PHOSPHORUS: 2.7 MG/DL (ref 2.6–4.5)
PLATELET # BLD: 279 K/UL (ref 138–453)
PLATELET ESTIMATE: ABNORMAL
PMV BLD AUTO: 10.4 FL (ref 8.1–13.5)
POTASSIUM SERPL-SCNC: 3.2 MMOL/L (ref 3.7–5.3)
PROTEIN UA: NEGATIVE
RBC # BLD: 4.51 M/UL (ref 3.95–5.11)
RBC # BLD: ABNORMAL 10*6/UL
RBC UA: NORMAL /HPF (ref 0–4)
RENAL EPITHELIAL, UA: NORMAL /HPF
SEG NEUTROPHILS: 89 % (ref 36–65)
SEGMENTED NEUTROPHILS ABSOLUTE COUNT: 12.39 K/UL (ref 1.5–8.1)
SODIUM BLD-SCNC: 137 MMOL/L (ref 135–144)
SPECIFIC GRAVITY UA: 1.03 (ref 1–1.03)
SPECIMEN DESCRIPTION: NORMAL
SPECIMEN DESCRIPTION: NORMAL
STATUS: NORMAL
TRICHOMONAS: NORMAL
TRIGL SERPL-MCNC: 243 MG/DL
TSH SERPL DL<=0.05 MIU/L-ACNC: 0.97 MIU/L (ref 0.3–5)
TURBIDITY: CLEAR
URINE HGB: ABNORMAL
UROBILINOGEN, URINE: NORMAL
VLDLC SERPL CALC-MCNC: ABNORMAL MG/DL (ref 1–30)
WBC # BLD: 14 K/UL (ref 3.5–11.3)
WBC # BLD: ABNORMAL 10*3/UL
WBC UA: NORMAL /HPF (ref 0–5)
YEAST: NORMAL

## 2018-01-11 PROCEDURE — 99222 1ST HOSP IP/OBS MODERATE 55: CPT | Performed by: PSYCHIATRY & NEUROLOGY

## 2018-01-11 PROCEDURE — 99211 OFF/OP EST MAY X REQ PHY/QHP: CPT | Performed by: NURSE PRACTITIONER

## 2018-01-11 PROCEDURE — 84443 ASSAY THYROID STIM HORMONE: CPT

## 2018-01-11 PROCEDURE — 80048 BASIC METABOLIC PNL TOTAL CA: CPT

## 2018-01-11 PROCEDURE — 82330 ASSAY OF CALCIUM: CPT

## 2018-01-11 PROCEDURE — 2500000003 HC RX 250 WO HCPCS: Performed by: EMERGENCY MEDICINE

## 2018-01-11 PROCEDURE — 51701 INSERT BLADDER CATHETER: CPT

## 2018-01-11 PROCEDURE — 2580000003 HC RX 258: Performed by: EMERGENCY MEDICINE

## 2018-01-11 PROCEDURE — 6370000000 HC RX 637 (ALT 250 FOR IP): Performed by: EMERGENCY MEDICINE

## 2018-01-11 PROCEDURE — 82947 ASSAY GLUCOSE BLOOD QUANT: CPT

## 2018-01-11 PROCEDURE — G8988 SELF CARE GOAL STATUS: HCPCS

## 2018-01-11 PROCEDURE — 51798 US URINE CAPACITY MEASURE: CPT

## 2018-01-11 PROCEDURE — 36415 COLL VENOUS BLD VENIPUNCTURE: CPT

## 2018-01-11 PROCEDURE — 70450 CT HEAD/BRAIN W/O DYE: CPT

## 2018-01-11 PROCEDURE — G8978 MOBILITY CURRENT STATUS: HCPCS

## 2018-01-11 PROCEDURE — 97530 THERAPEUTIC ACTIVITIES: CPT

## 2018-01-11 PROCEDURE — 2580000003 HC RX 258: Performed by: PSYCHIATRY & NEUROLOGY

## 2018-01-11 PROCEDURE — 94762 N-INVAS EAR/PLS OXIMTRY CONT: CPT

## 2018-01-11 PROCEDURE — 80061 LIPID PANEL: CPT

## 2018-01-11 PROCEDURE — 6370000000 HC RX 637 (ALT 250 FOR IP): Performed by: PSYCHIATRY & NEUROLOGY

## 2018-01-11 PROCEDURE — 99233 SBSQ HOSP IP/OBS HIGH 50: CPT | Performed by: PSYCHIATRY & NEUROLOGY

## 2018-01-11 PROCEDURE — 87641 MR-STAPH DNA AMP PROBE: CPT

## 2018-01-11 PROCEDURE — 81001 URINALYSIS AUTO W/SCOPE: CPT

## 2018-01-11 PROCEDURE — 2000000003 HC NEURO ICU R&B

## 2018-01-11 PROCEDURE — 99221 1ST HOSP IP/OBS SF/LOW 40: CPT | Performed by: PHYSICAL MEDICINE & REHABILITATION

## 2018-01-11 PROCEDURE — 84100 ASSAY OF PHOSPHORUS: CPT

## 2018-01-11 PROCEDURE — 6360000002 HC RX W HCPCS: Performed by: EMERGENCY MEDICINE

## 2018-01-11 PROCEDURE — 85025 COMPLETE CBC W/AUTO DIFF WBC: CPT

## 2018-01-11 PROCEDURE — G8987 SELF CARE CURRENT STATUS: HCPCS

## 2018-01-11 PROCEDURE — 97166 OT EVAL MOD COMPLEX 45 MIN: CPT

## 2018-01-11 PROCEDURE — 83735 ASSAY OF MAGNESIUM: CPT

## 2018-01-11 PROCEDURE — G8979 MOBILITY GOAL STATUS: HCPCS

## 2018-01-11 PROCEDURE — 97162 PT EVAL MOD COMPLEX 30 MIN: CPT

## 2018-01-11 RX ORDER — DEXTROSE MONOHYDRATE 50 MG/ML
100 INJECTION, SOLUTION INTRAVENOUS PRN
Status: DISCONTINUED | OUTPATIENT
Start: 2018-01-11 | End: 2018-01-19 | Stop reason: HOSPADM

## 2018-01-11 RX ORDER — FLUOXETINE 10 MG/1
10 CAPSULE ORAL DAILY
Status: DISCONTINUED | OUTPATIENT
Start: 2018-01-11 | End: 2018-01-19 | Stop reason: HOSPADM

## 2018-01-11 RX ORDER — POTASSIUM CHLORIDE 7.45 MG/ML
40 INJECTION INTRAVENOUS ONCE
Status: DISCONTINUED | OUTPATIENT
Start: 2018-01-11 | End: 2018-01-11

## 2018-01-11 RX ORDER — NICOTINE POLACRILEX 4 MG
15 LOZENGE BUCCAL PRN
Status: DISCONTINUED | OUTPATIENT
Start: 2018-01-11 | End: 2018-01-19 | Stop reason: HOSPADM

## 2018-01-11 RX ORDER — DEXTROSE MONOHYDRATE 25 G/50ML
12.5 INJECTION, SOLUTION INTRAVENOUS PRN
Status: DISCONTINUED | OUTPATIENT
Start: 2018-01-11 | End: 2018-01-19 | Stop reason: HOSPADM

## 2018-01-11 RX ORDER — ASPIRIN 81 MG/1
81 TABLET ORAL DAILY
Status: ON HOLD | COMMUNITY
End: 2018-01-17 | Stop reason: HOSPADM

## 2018-01-11 RX ORDER — ASPIRIN 300 MG/1
300 SUPPOSITORY RECTAL DAILY
Status: DISCONTINUED | OUTPATIENT
Start: 2018-01-11 | End: 2018-01-11

## 2018-01-11 RX ORDER — LOSARTAN POTASSIUM 100 MG/1
100 TABLET ORAL DAILY
Status: ON HOLD | COMMUNITY
End: 2018-03-17 | Stop reason: HOSPADM

## 2018-01-11 RX ORDER — CHLORAL HYDRATE 500 MG
3000 CAPSULE ORAL 3 TIMES DAILY
Status: ON HOLD | COMMUNITY
End: 2018-03-17 | Stop reason: HOSPADM

## 2018-01-11 RX ORDER — FENOFIBRATE 48 MG/1
48 TABLET, COATED ORAL DAILY
Status: ON HOLD | COMMUNITY
End: 2018-01-17 | Stop reason: HOSPADM

## 2018-01-11 RX ORDER — ATENOLOL 50 MG/1
50 TABLET ORAL DAILY
Status: ON HOLD | COMMUNITY
End: 2018-01-17 | Stop reason: HOSPADM

## 2018-01-11 RX ORDER — AMLODIPINE BESYLATE 5 MG/1
5 TABLET ORAL DAILY
Status: ON HOLD | COMMUNITY
End: 2018-01-17 | Stop reason: HOSPADM

## 2018-01-11 RX ADMIN — SODIUM CHLORIDE: 9 INJECTION, SOLUTION INTRAVENOUS at 08:48

## 2018-01-11 RX ADMIN — NICARDIPINE HYDROCHLORIDE 10 MG/HR: 0.1 INJECTION, SOLUTION INTRAVENOUS at 22:09

## 2018-01-11 RX ADMIN — POTASSIUM CHLORIDE 40 MEQ: 149 INJECTION, SOLUTION, CONCENTRATE INTRAVENOUS at 08:48

## 2018-01-11 RX ADMIN — NICARDIPINE HYDROCHLORIDE 5 MG/HR: 0.1 INJECTION, SOLUTION INTRAVENOUS at 03:44

## 2018-01-11 RX ADMIN — NICARDIPINE HYDROCHLORIDE 7.5 MG/HR: 0.1 INJECTION, SOLUTION INTRAVENOUS at 11:08

## 2018-01-11 RX ADMIN — SODIUM CHLORIDE: 9 INJECTION, SOLUTION INTRAVENOUS at 01:03

## 2018-01-11 RX ADMIN — NICARDIPINE HYDROCHLORIDE 7.5 MG/HR: 0.1 INJECTION, SOLUTION INTRAVENOUS at 08:09

## 2018-01-11 RX ADMIN — INSULIN LISPRO 2 UNITS: 100 INJECTION, SOLUTION INTRAVENOUS; SUBCUTANEOUS at 13:13

## 2018-01-11 RX ADMIN — INSULIN LISPRO 2 UNITS: 100 INJECTION, SOLUTION INTRAVENOUS; SUBCUTANEOUS at 17:01

## 2018-01-11 RX ADMIN — NICARDIPINE HYDROCHLORIDE 2.5 MG/HR: 0.1 INJECTION, SOLUTION INTRAVENOUS at 01:54

## 2018-01-11 RX ADMIN — Medication 10 ML: at 10:31

## 2018-01-11 RX ADMIN — INSULIN LISPRO 1 UNITS: 100 INJECTION, SOLUTION INTRAVENOUS; SUBCUTANEOUS at 19:54

## 2018-01-11 RX ADMIN — INSULIN LISPRO 3 UNITS: 100 INJECTION, SOLUTION INTRAVENOUS; SUBCUTANEOUS at 09:24

## 2018-01-11 RX ADMIN — NICARDIPINE HYDROCHLORIDE 10 MG/HR: 0.1 INJECTION, SOLUTION INTRAVENOUS at 20:14

## 2018-01-11 RX ADMIN — NICARDIPINE HYDROCHLORIDE 5 MG/HR: 0.1 INJECTION, SOLUTION INTRAVENOUS at 15:26

## 2018-01-11 ASSESSMENT — PAIN SCALES - GENERAL: PAINLEVEL_OUTOF10: 0

## 2018-01-11 NOTE — SEDATION DOCUMENTATION
Dr Caitlin Pelayo  Anesthesiologist in attendance  [place] and [place], [address] in attendance

## 2018-01-11 NOTE — CARE COORDINATION
Case Management Initial Discharge Plan  Emily Jim,         Readmission Risk              Readmission Risk:        1       Age 72 or Greater:  1    Admitted from SNF or Requires Paid or Family Care:      Currently has CHF,COPD,ARF,CRI,or is on dialysis:      Takes more than 5 Prescription Medications:      Takes Digoxin,Insulin,Anticoagulants,Narcotics or ASA/Plavix:      Hospital Admit in Past 12 Months:      On Disability:      Patient Considers own Health:              Met with:family member patient, daughter and niece to discuss discharge plans. Information verified: address, contacts, phone number, , insurance Yes  PCP: Jamar Infante MD  Date of last visit:     Insurance Provider: [facility] Medicare    Discharge Planning  Current Residence:  Private Residence  Living Arrangements:  Children (daughter and TONI)   Home has 1 stories/ stairs to climb  Support Systems:  Children, Family Members  Current Services PTA:  Durable Medical Equipment Supplier:   Patient able to perform ADL's:Dependent  DME used to aid ambulation prior to admission: walker/during admission bed    Potential Assistance Needed:  [address], [facility]: Peggy on [employer]   Potential Assistance Purchasing Medications:     Does patient want to participate in local refill/ meds to beds program?       Patient agreeable to home care: TBD  [place] of choice provided:  n/a      Type of Home Care Services:  OT, PT, Nursing Services, [address]  Patient expects to be discharged to:  SNF vs [facility].    Prior SNF/Rehab Placement and Facility: none  Agreeable to SNF/Rehab: TBD  [place] of choice provided: n/a   Evaluation: no    Expected Discharge date: Follow Up Appointment: Best Day/ Time:      Transportation provider: family/[facility]  Transportation arrangements needed for discharge: TBD    Discharge Plan: Home w/HC vs SNF. Pt lives w/daughter Himanshu Grimm [facility]) and TONI.   Strong family

## 2018-01-11 NOTE — PROGRESS NOTES
Physical Therapy    Facility/Department: [address]  Initial Assessment    NAME: Allan Burr  : 1940  MRN: [id number]    Date of Service: 2018    Chief Complaint   Patient presents with    Cerebrovascular Accident     Pt transferred from [facility] with R MCA occlusion. Pt received 4000units heparin bolus, 10mL/hr heparin infusing and also given 324mg asa. LKW 1040 today. Pt with slurred speech and left sided facial droop pta to [facility], symptoms resolved upon arrival to [facility]. Patient Diagnosis(es): The encounter diagnosis was Cerebrovascular accident (CVA), unspecified mechanism ([facility] [address] has a past medical history of CIDP (chronic inflammatory demyelinating polyneuropathy) ([facility] [facility] Diabetes mellitus ([facility] [address] [facility] Hyperlipidemia; and Hypertension. has a past surgical history that includes Tonsillectomy; Hysterectomy; and Cholecystectomy. Restrictions  Restrictions/Precautions  Restrictions/Precautions: Fall Risk  Required Braces or Orthoses?: No  Position Activity Restriction  Other position/activity restrictions: Act as tolerated  Vision/Hearing  Vision: Within Functional Limits  Hearing: Within functional limits     Subjective  General  Patient assessed for rehabilitation services?: Yes  Family / Caregiver Present: No  Follows Commands: Impaired  Other (Comment): Pt squeezing hand on command, difficulty following more complex commands  Subjective  Subjective: Pt resting in bed, very lethargic and barely opening eyes throughout. Pain Screening  Patient Currently in Pain: Denies  Vital Signs  Patient Currently in Pain: Denies       Orientation  Orientation  Overall Orientation Status: Impaired  Orientation Level: Unable to assess (pt is non verbal this date)    Social/Functional History  Social/Functional History  Lives With: Daughter  Additional Comments: Unable to gather any home information d/t pt non verbal and not consistently following commands.  Pt lives with dtr per  notes  Objective          AROM RLE (degrees)  RLE AROM: WFL  PROM LLE (degrees)  LLE PROM: WFL  AROM LLE (degrees)  LLE AROM : WFL  PROM LUE (degrees)  LUE PROM: WFL  Strength RLE  Comment: Grossly 4-/5, difficulty following commands of MMT  Strength LLE  Comment: 0/5, no active movement observed  Strength RUE  Comment: Grossly 4-/5, difficulty following commands of MMT  Strength LUE  Comment: 0/5, L shoulder ext 3/5-observed     Sensation  Overall Sensation Status:  (DASHAWN)  Bed mobility  Supine to Sit: Maximum assistance (x2)  Sit to Supine: Maximum assistance (x2)           Balance  Posture: Poor  Sitting - Static: Poor  Sitting - Dynamic: Poor  Comments: Pt sat EOB approx 10', requiring Asuncion when able to wt bear through L elbow, however MaxA when pt sitting upright in midline. Pt with a R gaze preference throughout sitting. Assessment   Body structures, Functions, Activity limitations: Decreased functional mobility ; Decreased strength;Decreased ROM; Decreased balance;Decreased endurance  Assessment: Pt performed bed mobiltiy with MaxAx2, tolerated sitting on EOB with MaxA and difficulty remaining in midline d/t LUE and LLE weakness. Pt will benefit from continued acute PT and post acute PT; pt unsafe to return home at this time.  (co-eval with OT)  Prognosis: Good  Decision Making: Medium Complexity  Patient Education: PT POC  REQUIRES PT FOLLOW UP: Yes  Activity Tolerance  Activity Tolerance: Patient limited by fatigue;Patient limited by cognitive status       Plan   Plan  Times per week: 4-5x/wk  Current Treatment Recommendations: Strengthening, ROM, Functional Mobility Training, Balance Training, Endurance Training, Transfer Training, Neuromuscular Re-education  Safety Devices  Type of devices: Left in bed, Call light within reach, Nurse notified, Bed alarm in place    G-Code  PT G-Codes  Functional Assessment Tool Used: [place]  Score: CM  Functional Limitation: Mobility: Walking and moving around  Mobility: Walking and Moving Around Current Status (): At least 80 percent but less than 100 percent impaired, limited or restricted  Mobility: Walking and Moving Around Goal Status ():  At least 40 percent but less than 60 percent impaired, limited or restricted                                        AM-PAC Score     AM-PAC Inpatient Mobility without Stair Climbing Raw Score : 7  AM-PAC Inpatient without Stair Climbing T-Scale Score : 28.66  Mobility Inpatient CMS 0-100% Score: 86.29  Mobility Inpatient without Stair CMS G-Code Modifier : CM       Goals  Short term goals  Time Frame for Short term goals: 14 visits  Short term goal 1: Pt to perform bed mobilty with Asuncion  Short term goal 2: Pt to tolerate sitting on EOB with Asuncion for at least 10'  Short term goal 3: Pt to perform sit to stand with ModA  Short term goal 4: pt to tolerate standing for 5' with ModA  Short term goal 5: Pt to perform sit pivot transfer with ModA       Therapy Time   Individual Concurrent Group Co-treatment   Time In 0826         Time Out 0850         Minutes [address] [facility] [address] [address], PT

## 2018-01-11 NOTE — PROGRESS NOTES
Occupational Therapy   Occupational Therapy Initial Assessment  Date: 2018   Patient Name: Lidia Capone  MRN: [id number]     : 1940    Patient Diagnosis(es): The encounter diagnosis was Cerebrovascular accident (CVA), unspecified mechanism ([facility] [address] has a past medical history of CIDP (chronic inflammatory demyelinating polyneuropathy) ([facility] [address] [facility] Diabetes mellitus ([facility] Hyperlipidemia; and Hypertension. has a past surgical history that includes Tonsillectomy; Hysterectomy; and Cholecystectomy. Restrictions  Restrictions/Precautions  Restrictions/Precautions: Fall Risk  Required Braces or Orthoses?: No  Position Activity Restriction  Other position/activity restrictions: Act as tolerated    Subjective   General  Patient assessed for rehabilitation services?: Yes  Family / Caregiver Present: No  Diagnosis: R CVA  Pain Assessment  Patient Currently in Pain: Denies     Social/Functional History  Social/Functional History  Lives With: Daughter  Additional Comments: Unable to gather any home information d/t pt non verbal and not consistently following commands.  Pt lives with dtr per  notes       Objective   Vision: Impaired (L neglect)  Hearing: Within functional limits    Orientation  Overall Orientation Status: Impaired (DASHAWN however likely disoriented)     Balance  Sitting Balance: Maximum assistance (However min A at times, pt able to lean on L elbow and use RUE)  Standing Balance: Unable to assess(comment) (D/t fatigue, L deficits, cognition)  Standing Balance  Sit to stand: Unable to assess  Stand to sit: Unable to assess  ADL  Feeding: Dependent/Total  Grooming: Dependent/Total  UE Bathing: Dependent/Total  LE Bathing: Dependent/Total  UE Dressing: Dependent/Total  LE Dressing: Dependent/Total  Toileting: Dependent/Total  Tone RUE  RUE Tone: Normotonic  Tone LUE  LUE Tone: Hypotonic  LUE Modified Dhiraj Scale  LUE Modified Dhiraj Scale Completed: No  Coordination  Movements Are Fluid

## 2018-01-11 NOTE — CONSULTS
CKMB, CKMBINDEX, TROPONINT in the last 72 hours. Invalid input(s): CKTOTAL;3  FASTING LIPID PANEL:  Lab Results   Component Value Date    CHOL 171 01/11/2018    HDL 25 (L) 01/11/2018    TRIG 243 (H) 01/11/2018     LIVER PROFILE: No results for input(s): AST, ALT, ALB, BILIDIR, BILITOT, ALKPHOS in the last 72 hours. Physical Exam:  BP (!) 163/60   Pulse 64   Temp 97.7 °F (36.5 °C) (Oral)   Resp 18   Ht 5' 9\" (1.753 m)   Wt 190 lb 4.1 oz (86.3 kg)   SpO2 94%   BMI 28.10 kg/m²   Alert, no distress. Lethargic, arousable. Follows simple commands. Lungs clear, no wheezing. Heart regular. Abdomen non-distended, non-tender. No calf tenderness or edema. MSR- unable to elicit in all 4 extremities. Sensory: Unable to determine secondary to dysarthria  Motor: Muscle tone and bulk are normal bilaterally. MSunable to do manual muscle testing the patient moving right upper and lower extremity spontaneously and antigravity. Impression:  Patient is a 43-year-old female status post right MCA territory infarct status post thrombectomy    Recommendations:  1. The patient is on bed rest. The rehab team will continue to follow the patient once initiated in PT, OT, and ST will be able to make a determination for discharge. 2.  Neuro- status post thrombectomy. On aspirin and Zocor. 3.  DVT prophylaxisSCDs and ABDI hose in place. 4.  Discharge dispositionwill need to clarify if patient has support at home. Patient lives with daughter. It was my pleasure to evaluate Sandra Murphy today. Please call with questions. Malka Sebastian MD        This note was completed by using EMR and the assistance of a speech-recognition program. However, inadvertent computerized transcription errors may be present.  Although every effort was made to ensure accuracy, no guarantees can be provided that every mistake has been identified and corrected by editing

## 2018-01-11 NOTE — PROGRESS NOTES
BRAIN/VENTRICLES:  There is evolution of the hypodensity involving the right frontal, parietal and temporal lobes with disruption of the gray-white matter interface compatible with MCA territory infarction. There is similar degree of hyperdense blood products within the area of infarction. There is no new acute intracranial hemorrhage. No mass effect or midline shift. The remainder of the gray-white differentiation is maintained without evidence of a new acute infarct. There is no evidence of hydrocephalus. ORBITS: The visualized portion of the orbits demonstrate no acute abnormality. SINUSES: The visualized paranasal sinuses and mastoid air cells demonstrate no acute abnormality. SOFT TISSUES/SKULL:  No acute abnormality of the visualized skull or soft tissues. Evolution of the right MCA territory infarct with similar degree of blood products within the area of infarction. No additional area of acute infarction or acute intracranial blood products appreciated. Ct Head Wo Contrast    Result Date: 1/11/2018  EXAMINATION: CT OF THE HEAD WITHOUT CONTRAST  1/10/2018 11:45 pm TECHNIQUE: CT of the head was performed without the administration of intravenous contrast. Dose modulation, iterative reconstruction, and/or weight based adjustment of the mA/kV was utilized to reduce the radiation dose to as low as reasonably achievable. COMPARISON: None. HISTORY: ORDERING SYSTEM PROVIDED HISTORY: ro bleed post angio TECHNOLOGIST PROVIDED HISTORY: Has a \"code stroke\" or \"stroke alert\" been called? ->No FINDINGS: BRAIN/VENTRICLES: Subarachnoid hemorrhage throughout the right hemisphere, predominantly within the right sylvian fissure present. Loss of gray-white differentiation within right MCA territory. Hyperdensity involving the right MCA, M2 origin. Prominent vascular calcifications are present. No midline shift. No hydrocephalus. Patchy hypodensity within the mili is nonspecific.  ORBITS: The visualized portion of the orbits demonstrate no acute abnormality. SINUSES: Left maxillary sinus 1.5 cm mucous retention cyst or polyp. Trace ethmoid sinus mucosal thickening. SOFT TISSUES/SKULL:  No acute abnormality of the visualized skull or soft tissues. Acute right hemispheric subarachnoid hemorrhage, moderate volume. Right MCA territory acute infarction. Hyperdense right MCA concerning for thrombus or contrast staining. Pontine hypodensity likely representing age-indeterminate ischemia. Labs and Images reviewed with:  [x] Hong Wallace  [] Aracelis Will MD      [] [facility]  [] [facility]  [] there are no new interval images to review. ASSESSMENT AND PLAN:     ASSESSMENT:     This is a 69 y.o. female transferred from [facility] after presenting with left facial droop and aphasia, found to have a R MCA infarct. Taken to angio on 1/10 for thrombectomy, but post-treatment angio showed persistent occlusion. Subsequent decline in mentation and CT head showed a SAH        PLAN/MEDICAL DECISION MAKING:    NEUROLOGIC:  - CT head with SAH  - Repeat CT head on 1/12  - F/u MRI brain   - Endovascular recommends  mg rectally daily, will hold at this time. May start after CT head tomorrow  - Zocor 20 mg nightly   - Tylenol 650 mg q4h prn   - Fentanyl 25 mcg q2h prn   - Neuro checks per protocol     CARDIOVASCULAR:  - Goal -170  - Cardene gtt    PULMONARY:  - Maintaining well on room air  - Monitor    RENAL/FLUID/ELECTROLYTE:  - IVFs: NS @ 100 cc/hr  - I/O: 1783/2135 (-352)  - UOP: 1.88 cc/kg/hr  - Hypokalemic, potassium replacement ordered  - Monitor electrolytes, replace PRN     GI/NUTRITION:  - Diet:Diet NPO Effective Now  - GI PPx: None, will start today if no tolerating diet  - Bowel regimen: Milk of Magnesia  - Speech therapy, swallow study    HEME/ID:  - Tmax: 37.1  - Mild leukocytosis at 14  - UA at [facility] under previous MRN, questionable UTI.   - Send repeat UA  - Urine culture pending     ENDOCRINE:  - Recommend glucose <180  - Low dose ISS     OPTHALMOLOGY   - exophthalmos of the right eye  - MRI orbits w/ and w/o contrast to examine for structural abnormalities     OTHER:  - PT/OT Eval   - Speech    DVT Prophylaxis:  - SCD sleeves - Thigh High   - ABDI stockings - Thigh High      CODE STATUS: FULL    DISPOSITION:  [x] To remain ICU:  [] OK for out of ICU from Neuro Critical Care standpoint    For any changes in exam or patient status please contact Neuro Critical Care.       Konrad Olszewski, MD  Emergency Medicine Resident PGY2  Neuro Critical Care  Pager [phone]  1/11/2018     8:18 AM

## 2018-01-11 NOTE — SEDATION DOCUMENTATION
R groin closed with 6F Perclose of the 8 F sheath. R groin ccovered with antibiotic cream and 4 by 4 with Tegaderm per Dr. Krupa Figueroa.

## 2018-01-11 NOTE — ANESTHESIA PRE PROCEDURE
Department of Anesthesiology  Preprocedure Note       Name:  Emily Jim   Age:  69 y.o.  :  1940                                          MRN:  [id number]         Date:  1/10/2018      Procedure:     Department of Anesthesiology  Pre-Anesthesia Evaluation/Consultation         Name:  Emily Jim                                         Age:  69 y.o. MRN:  [id number]             Medications  No current outpatient prescriptions on file. No current facility-administered medications for this encounter. Allergies   Allergen Reactions    Tetracyclines & Related Shortness Of Breath     There is no problem list on file for this patient. Past Medical History:   Diagnosis Date    [facility]P [facility]ch[facility]uropathy) [facility])     Diabetes mellitus ([facility] [address]     Hyperlipidemia     Hypertension      Past Surgical History:   Procedure Laterality Date    CHOLECYSTECTOMY      HYSTERECTOMY      TONSILLECTOMY       Social History   Substance Use Topics    Smoking status: Never Smoker    Smokeless tobacco: Never Used    Alcohol use No         Vital Signs (Current) There were no vitals filed for this visit. Vital Signs Statistics (for past 48 hrs)     Temp  Av.8 °F (37.1 °C)  Min: 98.8 °F (37.1 °C)   Min taken time: 01/10/18 1744  Max: 98.8 °F (37.1 °C)   Max taken time: 01/10/18 1744  Pulse  Av.3  Min: 61   Min taken time: 01/10/18 1801  Max: 61   Max taken time: 01/10/18 1847  Resp  Av  Min: 14   Min taken time: 01/10/18 1817  Max: 18   Max taken time: 01/10/18 1801  BP  Min: 140/104   Min taken time: 01/10/18 1747  Max: 223/98   Max taken time: 01/10/18 1801  SpO2  Av.3 %  Min: 96 %   Min taken time: 01/10/18 1817  Max: 99 %   Max taken time: 01/10/18 1744  BP Readings from Last 3 Encounters:   01/10/18 (!) 196/81       BMI  There is no height or weight on file to calculate BMI.     CBC No results found for: WBC, RBC, HGB, HCT, MCV, RDW, PLT    CMP  No results found for: NA, K, CL, CO2, BUN, CREATININE, GFRAA, AGRATIO, LABGLOM, GLUCOSE, PROT, CALCIUM, BILITOT, ALKPHOS, AST, ALT    BMP  No results found for: NA, K, CL, CO2, BUN, CREATININE, CALCIUM, GFRAA, LABGLOM, GLUCOSE    POC Testing  No results for input(s): POCGLU, POCNA, POCK, POCCL, POCBUN, POCHEMO, POCHCT in the last 72 hours. Coags  No results found for: PROTIME, INR, APTT    HCG (If Applicable) No results found for: PREGTESTUR, PREGSERUM, HCG, HCGQUANT     ABGs No results found for: PHART, PO2ART, ALG4FXL, LHV3NZY, BEART, Q8UKTYOD     Type & Screen (If Applicable)  No results found for: [facility]    Radiology (If Applicable)    Cardiac Testing (If Applicable)     EKG (If Applicable)           Medications prior to admission:   Prior to Admission medications    Not on File       Current medications:    No current outpatient prescriptions on file. No current facility-administered medications for this encounter. Allergies: Allergies   Allergen Reactions    Tetracyclines & Related Shortness Of Breath       Problem List:  There is no problem list on file for this patient. Past Medical History:        Diagnosis Date    CIDP (chronic inflammatory demyelinating polyneuropathy) (HCC)     Diabetes mellitus ([facility] [address]     Hyperlipidemia     Hypertension        Past Surgical History:        Procedure Laterality Date    CHOLECYSTECTOMY      HYSTERECTOMY      TONSILLECTOMY         Social History:    Social History   Substance Use Topics    Smoking status: Never Smoker    Smokeless tobacco: Never Used    Alcohol use No                                Counseling given: Not Answered      Vital Signs (Current): There were no vitals filed for this visit.                                            BP Readings from Last 3 Encounters:   01/10/18 (!) 196/81       NPO Status:  11                                                                               BMI:   Wt Readings from Last 3 Encounters:   01/10/18 190 lb (86.2 kg)     There is no height or weight on file to calculate BMI.    CBC: No results found for: WBC, RBC, HGB, HCT, MCV, RDW, PLT    CMP: No results found for: NA, K, CL, CO2, BUN, CREATININE, GFRAA, AGRATIO, LABGLOM, GLUCOSE, PROT, CALCIUM, BILITOT, ALKPHOS, AST, ALT    POC Tests: No results for input(s): POCGLU, POCNA, POCK, POCCL, POCBUN, POCHEMO, POCHCT in the last 72 hours. Coags: No results found for: PROTIME, INR, APTT    HCG (If Applicable): No results found for: PREGTESTUR, PREGSERUM, HCG, HCGQUANT     ABGs: No results found for: PHART, PO2ART, PIP3MND, SGT1DBI, BEART, H1CENJLR     Type & Screen (If Applicable):  No results found for: LABABO, LABRH    Anesthesia Evaluation   no history of anesthetic complications:   Airway: Mallampati: III     Neck ROM: full   Dental:          Pulmonary:       (-) not a current smoker                           Cardiovascular:Negative CV ROS    (+) hypertension:,                   Neuro/Psych:   (+) CVA:,              ROS comment: CIDP GI/Hepatic/Renal:             Endo/Other:    (+) Type II DM, , .                 Abdominal:           Vascular:                                      Anesthesia Plan      MAC     ASA 4 - emergent     (ASA 4 E CVA)  Induction: intravenous. Anesthetic plan and risks discussed with Unable to obtain due to emergent nature.                       Neeta Hunt MD   1/10/2018

## 2018-01-11 NOTE — PROGRESS NOTES
not open to painful stimulation. Follow simple central commands. Pupils 2 mm reactive to light bilaterally  Right eye pterygium   Left facial droop  Intact cough/gag/corneals  Normal tone  Purposeful with right UE  Flicker to pain in LUE  Bilateral weakness of LLEs(chronic in nature secondary to CIDP)    NIH Stroke Scale Total:14 at 10:15 am(bilateral LE weakness are chronic in nature) (if not done complete detailed one below):    1a.  Level of consciousness:  1 - not alert but arousable by minor stimulation to obey, answer or respond  1b. Level of consciousness questions:  1 - answers one question correctly  1c. Level of consciousness questions:  0 - performs both tasks correctly  2. Best Gaze:  0 - normal  3. Visual:  1 - partial hemianopia  4. Facial Palsy:  2 - partial paralysis (total or near total paralysis of the lower face)  5a. Motor left arm:  3 - no effort against gravity, limb falls  5b. Motor right arm:  0 - no drift, limb holds 90 (or 45) degrees for full 10 seconds  6a. Motor left le - some effort against gravity; leg falls to bed by 5 seconds but has some effort against gravity  6b. Motor right le - some effort against gravity; leg falls to bed by 5 seconds but has some effort against gravity  7. Limb Ataxia:  0 - absent  8. Sensory:  1 - mild to moderate sensory loss; patient feels pinprick is less sharp or is dull on the affected side; there is a loss of superficial pain with pinprick but patient is aware of being touched   9. Best Language:  1 - mild to moderate aphasia; some obvious loss of fluency or facility of comprehension without significant limitation on ideas expressed or form of expression. Reduction of speech and/or comprehension, however, makes conversation about provided materials difficult or impossible. For example, in conversation about provided materials, examiner can identify picture or naming card content from patient's response.    10.

## 2018-01-11 NOTE — PROGRESS NOTES
[facility] Nurse  Consult Note       NAME:  Yan Jarrett [place] RECORD NUMBER:  [id number]  AGE: 69 y.o. GENDER: female  : 1940  TODAY'S DATE:  2018    Subjective   Reason for WOCN Evaluation and Assessment: redness to right buttock      Kiran Scott is a 69 y.o. female referred by:   [] Physician  [] Nursing  [] Other:     Wound Identification:  Wound Type: pressure. intertriginous skin breakdown to right groin  Contributing Factors: chronic pressure, decreased mobility, shear force, decreased tissue oxygenation, diabetes, and anticoagulation therapy      Patient Goal of Care:  [] Wound Healing  [] Odor Control  [] Palliative Care  [] Pain Control   [] Other:         PAST MEDICAL HISTORY        Diagnosis Date    C[facility]inating polyneuropathy) ([facility] [address]     Diabetes mellitus ([facility] [address]     Hyperlipidemia     Hypertension        PAST SURGICAL HISTORY    Past Surgical History:   Procedure Laterality Date    CHOLECYSTECTOMY      HYSTERECTOMY      TONSILLECTOMY         FAMILY HISTORY    History reviewed. No pertinent family history. SOCIAL HISTORY    Social History   Substance Use Topics    Smoking status: Never Smoker    Smokeless tobacco: Never Used    Alcohol use No       ALLERGIES    Allergies   Allergen Reactions    Tetracyclines & Related Shortness Of Breath       MEDICATIONS    No current facility-administered medications on file prior to encounter. No current outpatient prescriptions on file prior to encounter.        Objective    BP (!) 163/60   Pulse 64   Temp 97.7 °F (36.5 °C) (Oral)   Resp 18   Ht 5' 9\" (1.753 m)   Wt 190 lb 4.1 oz (86.3 kg)   SpO2 94%   BMI 28.10 kg/m²     LABS:  WBC:    Lab Results   Component Value Date    WBC 14.0 2018     H/H:    Lab Results   Component Value Date    HGB 12.6 2018    HCT 38.3 2018     PTT:  No results found for: APTT, PTT[APTT}  PT/INR:  No results found for: PROTIME, INR  HgBA1c:  No results found for: LABA1C    Assessment   Louie Risk Score: Louie Scale Score: 13    Patient Active Problem List   Diagnosis Code    Cerebral infarct ([facility]) I63.9    Acute ischemic right middle cerebral artery (MCA) stroke ([facility]) I63.511    Left arm weakness R29.898    Facial droop R29.810    Dysarthria R47.1    Cerebral edema ([facility]) G93.6       Measurements:  Wound 01/10/18 Catheter entry/exit site Groin Right (Active)   Wound Type Wound 1/10/2018 11:15 PM   Dressing Status Clean;Dry; Intact 1/10/2018 11:15 PM   Dressing/Treatment Dry dressing 1/10/2018 11:15 PM   Wound Assessment Clean;Dry; Intact 1/10/2018 11:15 PM   Drainage Amount None 1/10/2018 11:15 PM   Odor None 1/10/2018 11:15 PM   Tosin-wound Assessment DASHAWN 1/10/2018 11:15 PM   Time out Yes 1/10/2018  9:05 PM   Number of days: 0       Response to treatment:  Well tolerated by patient. Plan   Plan of Care: Wound 01/10/18 Catheter entry/exit site Groin Right-Dressing/Treatment: Dry dressing    · Encourage/assist with repositioning every 2 hours  · Float heels off of bed surface with pillows  · Interdry or dry pillow cases between skin folds. If Interdry used, leave 2 inches of fabric exposed to wick away moisture  · Mepilex sacrum dressing to sacrococcygeal area. Peel back dressing, inspect skin beneath, re-secure. Change every 72 hours and prn wrinkles, soilage. Discontinue Sacral Mepilex if repeatedly soiled by incontinence. · Routine incontinence care with Alex barrier cloths and zinc oxide cream. Apply zinc oxide cream BID and prn incontinence. · Covidien moisture wicking under pads vs cloth backed briefs  · When transferred out of critical care. Static air overlay. Check inflation each shift by sliding hand under the air overlay. Feel for the patient's heaviest/ most dependant body part. Ideally 1/2 to 1\" of air will be between your hand and the patient's body.  Add air prn.         Specialty Bed Required : Yes   [] Low

## 2018-01-11 NOTE — CONSULTS
Resp 18   Ht 5' 9\" (1.753 m)   Wt 190 lb 4.1 oz (86.3 kg)   SpO2 94%   BMI 28.10 kg/m²      PHYSICAL EXAMINATION    General appearance: partially cooperate,   Skin: red on back of trunk. HEENT: normocephalic  Optic Fundi: deferred  Neck: supple, no cervcical adenopathy or carotid bruit  Lungs: clear to auscultation  Heart: Regular rate and rhythm, normal S1, S2. No murmurs, clicks or gallops. Peripheral pulses: radial pulses palpable  Abdominal: BS present, soft, NT, ND    NEUROLOGICAL EXAMINATION    MS: drowsy, arousable briefly, oriented time/person/place, mild dysarthria  CNs: both eyes right preference, no nystagmus, VF could not be tested, sensation intact, face droop on left. hearing intact, soft palate rises on phonation, sternocleidomastoid and trapezius intact. Tongue midline, no fasciculations. Motor: spontaneous move right arm only. Reflexes: could not induced in LEs, trace on UEs. babinski not present. Coordination: not tested. Gait: not testable. Sensory: withdrawal to pains in all exts. Labs and Tests  Lab Results   Component Value Date    WBC 14.0 (H) 01/11/2018    HGB 12.6 01/11/2018    HCT 38.3 01/11/2018    MCV 84.9 01/11/2018     01/11/2018     CTH 1/11/2018  Evolution of the right MCA territory infarct with similar degree of blood   products within the area of infarction.  No additional area of acute   infarction or acute intracranial blood products appreciated. [facility] 1/10/2018  Acute right hemispheric subarachnoid hemorrhage, moderate volume. Right MCA territory acute infarction. Hyperdense right MCA concerning for thrombus or contrast staining. Pontine hypodensity likely representing age-indeterminate ischemia.     LDL 97  TSH WNL  UA 3+ glucose    A1c NA    ASSESSMENT/PLAN:  // Right MCA ischemic stroke  - shown on CT, pt was s/p thrombectomy, not success  - repeated CTH shown above, I think contrast postprocedure, not blood, continue monitor, pt has eyes

## 2018-01-11 NOTE — H&P
[facility]    Neurocritical Care Progress Note  Neuro ICU History & Physical      History Obtained From:  EMR, endovascular neuro     CHIEF COMPLAINT: aphasia, left facial droop    HISTORY OF PRESENT ILLNESS:    The patient is a 69 y.o. female with past medical history of hypertension, diabetes presented to the ED earlier today as a transfer from [facility] with CT showing right MCA occlusion. Patient received heparin bolus and infusion and was given aspirin prior to transfer. On arrival to the ED, patient reported difficulty swallowing, had slurred speech and left-sided facial droop, was taken to the angio lab by endovascular neurology. Thrombectomy was performed but posttreatment angiogram demonstrated persistent occlusion of the right distal MCA. Full and the procedure, patient appears more drowsy, with new gaze preference and left upper extremity weakness, getting stat head CT. Past Medical History:        Diagnosis Date    [facility]P [facility]ch[facility]inating [facility]urop[facility])     Diabetes mellitus ([facility] [address]     Hyperlipidemia     Hypertension        Past Surgical History:        Procedure Laterality Date    CHOLECYSTECTOMY      HYSTERECTOMY      TONSILLECTOMY         Medications Prior to Admission:    No prescriptions prior to admission. Allergies:    Tetracyclines & related    Social History:   TOBACCO:    ETOH:   DRUGS:     Family History:   History reviewed. No pertinent family history.     REVIEW OF SYSTEMS:  Unobtainable secondary to patient's clinical condition    Vitals:  BP (!) 196/81   Pulse 61   Temp 98.8 °F (37.1 °C) (Oral)   Resp 16   Wt 190 lb (86.2 kg)   SpO2 98%     PHYSICAL EXAM:  CONSTITUTIONAL:  awake, alert, cooperative, no apparent distress, and appears stated age  EYES:  Lids and lashes normal, pupils equal, round and reactive to light, extra ocular muscles intact, sclera clear, conjunctiva normal  NECK:  Supple, symmetrical, trachea

## 2018-01-12 ENCOUNTER — APPOINTMENT (OUTPATIENT)
Dept: CT IMAGING | Age: 78
DRG: 023 | End: 2018-01-12
Payer: MEDICARE

## 2018-01-12 ENCOUNTER — APPOINTMENT (OUTPATIENT)
Dept: MRI IMAGING | Age: 78
DRG: 023 | End: 2018-01-12
Payer: MEDICARE

## 2018-01-12 PROBLEM — G81.94 LEFT HEMIPARESIS (HCC): Status: ACTIVE | Noted: 2018-01-12

## 2018-01-12 PROBLEM — I63.311 CEREBRAL INFARCTION DUE TO THROMBOSIS OF RIGHT MIDDLE CEREBRAL ARTERY (HCC): Status: ACTIVE | Noted: 2018-01-10

## 2018-01-12 LAB
ABSOLUTE EOS #: <0.03 K/UL (ref 0–0.44)
ABSOLUTE IMMATURE GRANULOCYTE: 0.1 K/UL (ref 0–0.3)
ABSOLUTE LYMPH #: 2.24 K/UL (ref 1.1–3.7)
ABSOLUTE MONO #: 0.84 K/UL (ref 0.1–1.2)
ANION GAP SERPL CALCULATED.3IONS-SCNC: 19 MMOL/L (ref 9–17)
BASOPHILS # BLD: 0 % (ref 0–2)
BASOPHILS ABSOLUTE: 0.04 K/UL (ref 0–0.2)
BUN BLDV-MCNC: 15 MG/DL (ref 8–23)
BUN/CREAT BLD: ABNORMAL (ref 9–20)
CALCIUM SERPL-MCNC: 8 MG/DL (ref 8.6–10.4)
CHLORIDE BLD-SCNC: 109 MMOL/L (ref 98–107)
CO2: 18 MMOL/L (ref 20–31)
CREAT SERPL-MCNC: 0.58 MG/DL (ref 0.5–0.9)
CULTURE: ABNORMAL
CULTURE: ABNORMAL
DIFFERENTIAL TYPE: ABNORMAL
EOSINOPHILS RELATIVE PERCENT: 0 % (ref 1–4)
ESTIMATED AVERAGE GLUCOSE: 157 MG/DL
GFR AFRICAN AMERICAN: >60 ML/MIN
GFR NON-AFRICAN AMERICAN: >60 ML/MIN
GFR SERPL CREATININE-BSD FRML MDRD: ABNORMAL ML/MIN/{1.73_M2}
GFR SERPL CREATININE-BSD FRML MDRD: ABNORMAL ML/MIN/{1.73_M2}
GLUCOSE BLD-MCNC: 210 MG/DL (ref 65–105)
GLUCOSE BLD-MCNC: 217 MG/DL (ref 65–105)
GLUCOSE BLD-MCNC: 219 MG/DL (ref 65–105)
GLUCOSE BLD-MCNC: 227 MG/DL (ref 65–105)
GLUCOSE BLD-MCNC: 248 MG/DL (ref 70–99)
HBA1C MFR BLD: 7.1 % (ref 4–6)
HCT VFR BLD CALC: 36.2 % (ref 36.3–47.1)
HEMOGLOBIN: 11.3 G/DL (ref 11.9–15.1)
IMMATURE GRANULOCYTES: 1 %
LV EF: 55 %
LVEF MODALITY: NORMAL
LYMPHOCYTES # BLD: 16 % (ref 24–43)
Lab: ABNORMAL
MCH RBC QN AUTO: 27.5 PG (ref 25.2–33.5)
MCHC RBC AUTO-ENTMCNC: 31.2 G/DL (ref 28.4–34.8)
MCV RBC AUTO: 88.1 FL (ref 82.6–102.9)
MONOCYTES # BLD: 6 % (ref 3–12)
ORGANISM: ABNORMAL
PDW BLD-RTO: 13.7 % (ref 11.8–14.4)
PLATELET # BLD: 266 K/UL (ref 138–453)
PLATELET ESTIMATE: ABNORMAL
PMV BLD AUTO: 10.5 FL (ref 8.1–13.5)
POTASSIUM SERPL-SCNC: 3.7 MMOL/L (ref 3.7–5.3)
RBC # BLD: 4.11 M/UL (ref 3.95–5.11)
RBC # BLD: ABNORMAL 10*6/UL
SEG NEUTROPHILS: 77 % (ref 36–65)
SEGMENTED NEUTROPHILS ABSOLUTE COUNT: 10.78 K/UL (ref 1.5–8.1)
SODIUM BLD-SCNC: 146 MMOL/L (ref 135–144)
SPECIMEN DESCRIPTION: ABNORMAL
SPECIMEN DESCRIPTION: ABNORMAL
STATUS: ABNORMAL
WBC # BLD: 14 K/UL (ref 3.5–11.3)
WBC # BLD: ABNORMAL 10*3/UL

## 2018-01-12 PROCEDURE — 99233 SBSQ HOSP IP/OBS HIGH 50: CPT | Performed by: PSYCHIATRY & NEUROLOGY

## 2018-01-12 PROCEDURE — 6360000002 HC RX W HCPCS: Performed by: EMERGENCY MEDICINE

## 2018-01-12 PROCEDURE — 80048 BASIC METABOLIC PNL TOTAL CA: CPT

## 2018-01-12 PROCEDURE — 82947 ASSAY GLUCOSE BLOOD QUANT: CPT

## 2018-01-12 PROCEDURE — 2500000003 HC RX 250 WO HCPCS: Performed by: EMERGENCY MEDICINE

## 2018-01-12 PROCEDURE — 92610 EVALUATE SWALLOWING FUNCTION: CPT

## 2018-01-12 PROCEDURE — 92523 SPEECH SOUND LANG COMPREHEN: CPT

## 2018-01-12 PROCEDURE — G8997 SWALLOW GOAL STATUS: HCPCS

## 2018-01-12 PROCEDURE — 6370000000 HC RX 637 (ALT 250 FOR IP): Performed by: PSYCHIATRY & NEUROLOGY

## 2018-01-12 PROCEDURE — 94762 N-INVAS EAR/PLS OXIMTRY CONT: CPT

## 2018-01-12 PROCEDURE — 2580000003 HC RX 258: Performed by: EMERGENCY MEDICINE

## 2018-01-12 PROCEDURE — 70450 CT HEAD/BRAIN W/O DYE: CPT

## 2018-01-12 PROCEDURE — 6360000002 HC RX W HCPCS

## 2018-01-12 PROCEDURE — S0028 INJECTION, FAMOTIDINE, 20 MG: HCPCS | Performed by: EMERGENCY MEDICINE

## 2018-01-12 PROCEDURE — 70543 MRI ORBT/FAC/NCK W/O &W/DYE: CPT

## 2018-01-12 PROCEDURE — 6360000004 HC RX CONTRAST MEDICATION: Performed by: EMERGENCY MEDICINE

## 2018-01-12 PROCEDURE — 85025 COMPLETE CBC W/AUTO DIFF WBC: CPT

## 2018-01-12 PROCEDURE — 51701 INSERT BLADDER CATHETER: CPT

## 2018-01-12 PROCEDURE — A9579 GAD-BASE MR CONTRAST NOS,1ML: HCPCS | Performed by: EMERGENCY MEDICINE

## 2018-01-12 PROCEDURE — 93306 TTE W/DOPPLER COMPLETE: CPT

## 2018-01-12 PROCEDURE — 6370000000 HC RX 637 (ALT 250 FOR IP): Performed by: EMERGENCY MEDICINE

## 2018-01-12 PROCEDURE — 51798 US URINE CAPACITY MEASURE: CPT

## 2018-01-12 PROCEDURE — 2000000003 HC NEURO ICU R&B

## 2018-01-12 PROCEDURE — 36415 COLL VENOUS BLD VENIPUNCTURE: CPT

## 2018-01-12 PROCEDURE — 97530 THERAPEUTIC ACTIVITIES: CPT

## 2018-01-12 PROCEDURE — G8996 SWALLOW CURRENT STATUS: HCPCS

## 2018-01-12 PROCEDURE — 70551 MRI BRAIN STEM W/O DYE: CPT

## 2018-01-12 PROCEDURE — 97110 THERAPEUTIC EXERCISES: CPT

## 2018-01-12 PROCEDURE — 83036 HEMOGLOBIN GLYCOSYLATED A1C: CPT

## 2018-01-12 PROCEDURE — 99291 CRITICAL CARE FIRST HOUR: CPT | Performed by: PSYCHIATRY & NEUROLOGY

## 2018-01-12 RX ORDER — ASPIRIN 81 MG/1
81 TABLET ORAL DAILY
Status: DISCONTINUED | OUTPATIENT
Start: 2018-01-12 | End: 2018-01-15

## 2018-01-12 RX ORDER — PROPARACAINE HYDROCHLORIDE 5 MG/ML
1 SOLUTION/ DROPS OPHTHALMIC ONCE
Status: COMPLETED | OUTPATIENT
Start: 2018-01-12 | End: 2018-01-12

## 2018-01-12 RX ORDER — FENTANYL CITRATE 50 UG/ML
25 INJECTION, SOLUTION INTRAMUSCULAR; INTRAVENOUS
Status: DISCONTINUED | OUTPATIENT
Start: 2018-01-12 | End: 2018-01-12

## 2018-01-12 RX ORDER — POLYETHYLENE GLYCOL 3350 17 G/17G
17 POWDER, FOR SOLUTION ORAL DAILY
Status: DISCONTINUED | OUTPATIENT
Start: 2018-01-12 | End: 2018-01-19 | Stop reason: HOSPADM

## 2018-01-12 RX ORDER — SODIUM CHLORIDE 0.9 % (FLUSH) 0.9 %
10 SYRINGE (ML) INJECTION 2 TIMES DAILY
Status: DISCONTINUED | OUTPATIENT
Start: 2018-01-12 | End: 2018-01-19 | Stop reason: HOSPADM

## 2018-01-12 RX ORDER — LORAZEPAM 2 MG/ML
INJECTION INTRAMUSCULAR
Status: COMPLETED
Start: 2018-01-12 | End: 2018-01-12

## 2018-01-12 RX ORDER — LOSARTAN POTASSIUM 50 MG/1
100 TABLET ORAL DAILY
Status: DISCONTINUED | OUTPATIENT
Start: 2018-01-12 | End: 2018-01-19 | Stop reason: HOSPADM

## 2018-01-12 RX ORDER — TAMSULOSIN HYDROCHLORIDE 0.4 MG/1
0.4 CAPSULE ORAL DAILY
Status: DISCONTINUED | OUTPATIENT
Start: 2018-01-12 | End: 2018-01-19 | Stop reason: HOSPADM

## 2018-01-12 RX ORDER — HEPARIN SODIUM 5000 [USP'U]/ML
5000 INJECTION, SOLUTION INTRAVENOUS; SUBCUTANEOUS EVERY 8 HOURS SCHEDULED
Status: DISCONTINUED | OUTPATIENT
Start: 2018-01-12 | End: 2018-01-19 | Stop reason: HOSPADM

## 2018-01-12 RX ORDER — LORAZEPAM 2 MG/ML
0.5 INJECTION INTRAMUSCULAR ONCE
Status: DISCONTINUED | OUTPATIENT
Start: 2018-01-12 | End: 2018-01-14

## 2018-01-12 RX ORDER — INSULIN GLARGINE 100 [IU]/ML
10 INJECTION, SOLUTION SUBCUTANEOUS NIGHTLY
Status: DISCONTINUED | OUTPATIENT
Start: 2018-01-12 | End: 2018-01-19 | Stop reason: HOSPADM

## 2018-01-12 RX ORDER — DOCUSATE SODIUM 100 MG/1
100 CAPSULE, LIQUID FILLED ORAL DAILY
Status: DISCONTINUED | OUTPATIENT
Start: 2018-01-12 | End: 2018-01-19 | Stop reason: HOSPADM

## 2018-01-12 RX ORDER — AMLODIPINE BESYLATE 5 MG/1
5 TABLET ORAL DAILY
Status: DISCONTINUED | OUTPATIENT
Start: 2018-01-12 | End: 2018-01-14

## 2018-01-12 RX ORDER — FENOFIBRATE 54 MG/1
54 TABLET ORAL DAILY
Status: DISCONTINUED | OUTPATIENT
Start: 2018-01-12 | End: 2018-01-19 | Stop reason: HOSPADM

## 2018-01-12 RX ADMIN — FENOFIBRATE 54 MG: 54 TABLET ORAL at 10:42

## 2018-01-12 RX ADMIN — HEPARIN SODIUM 5000 UNITS: 5000 INJECTION, SOLUTION INTRAVENOUS; SUBCUTANEOUS at 16:12

## 2018-01-12 RX ADMIN — SIMVASTATIN 20 MG: 20 TABLET, FILM COATED ORAL at 19:59

## 2018-01-12 RX ADMIN — INSULIN LISPRO 2 UNITS: 100 INJECTION, SOLUTION INTRAVENOUS; SUBCUTANEOUS at 21:22

## 2018-01-12 RX ADMIN — FAMOTIDINE 20 MG: 10 INJECTION, SOLUTION INTRAVENOUS at 19:59

## 2018-01-12 RX ADMIN — FLUORESCEIN SODIUM 2 MG: 1 STRIP OPHTHALMIC at 12:13

## 2018-01-12 RX ADMIN — NICARDIPINE HYDROCHLORIDE 10 MG/HR: 0.1 INJECTION, SOLUTION INTRAVENOUS at 04:34

## 2018-01-12 RX ADMIN — HEPARIN SODIUM 5000 UNITS: 5000 INJECTION, SOLUTION INTRAVENOUS; SUBCUTANEOUS at 21:22

## 2018-01-12 RX ADMIN — INSULIN GLARGINE 10 UNITS: 100 INJECTION, SOLUTION SUBCUTANEOUS at 21:22

## 2018-01-12 RX ADMIN — LOSARTAN POTASSIUM 100 MG: 50 TABLET, FILM COATED ORAL at 10:41

## 2018-01-12 RX ADMIN — NICARDIPINE HYDROCHLORIDE 10 MG/HR: 0.1 INJECTION, SOLUTION INTRAVENOUS at 02:23

## 2018-01-12 RX ADMIN — Medication 10 ML: at 10:43

## 2018-01-12 RX ADMIN — POLYETHYLENE GLYCOL 3350 17 G: 17 POWDER, FOR SOLUTION ORAL at 16:12

## 2018-01-12 RX ADMIN — DOCUSATE SODIUM 100 MG: 100 CAPSULE ORAL at 16:12

## 2018-01-12 RX ADMIN — LORAZEPAM 0.5 MG: 2 INJECTION INTRAMUSCULAR at 06:30

## 2018-01-12 RX ADMIN — INSULIN LISPRO 4 UNITS: 100 INJECTION, SOLUTION INTRAVENOUS; SUBCUTANEOUS at 12:16

## 2018-01-12 RX ADMIN — AMLODIPINE BESYLATE 5 MG: 5 TABLET ORAL at 10:41

## 2018-01-12 RX ADMIN — INSULIN LISPRO 4 UNITS: 100 INJECTION, SOLUTION INTRAVENOUS; SUBCUTANEOUS at 08:57

## 2018-01-12 RX ADMIN — PROPARACAINE HYDROCHLORIDE 1 DROP: 5 SOLUTION/ DROPS OPHTHALMIC at 12:13

## 2018-01-12 RX ADMIN — NICARDIPINE HYDROCHLORIDE 10 MG/HR: 0.1 INJECTION, SOLUTION INTRAVENOUS at 09:06

## 2018-01-12 RX ADMIN — NICARDIPINE HYDROCHLORIDE 10 MG/HR: 0.1 INJECTION, SOLUTION INTRAVENOUS at 00:13

## 2018-01-12 RX ADMIN — FAMOTIDINE 20 MG: 10 INJECTION, SOLUTION INTRAVENOUS at 10:42

## 2018-01-12 RX ADMIN — TAMSULOSIN HYDROCHLORIDE 0.4 MG: 0.4 CAPSULE ORAL at 10:42

## 2018-01-12 RX ADMIN — GADOPENTETATE DIMEGLUMINE 17 ML: 469.01 INJECTION INTRAVENOUS at 06:48

## 2018-01-12 RX ADMIN — ASPIRIN 81 MG: 81 TABLET, COATED ORAL at 10:41

## 2018-01-12 RX ADMIN — NICARDIPINE HYDROCHLORIDE 7.5 MG/HR: 0.1 INJECTION, SOLUTION INTRAVENOUS at 11:10

## 2018-01-12 RX ADMIN — FLUOXETINE HYDROCHLORIDE 10 MG: 10 CAPSULE ORAL at 10:42

## 2018-01-12 RX ADMIN — INSULIN LISPRO 4 UNITS: 100 INJECTION, SOLUTION INTRAVENOUS; SUBCUTANEOUS at 17:12

## 2018-01-12 ASSESSMENT — PAIN SCALES - GENERAL: PAINLEVEL_OUTOF10: 0

## 2018-01-12 NOTE — PROGRESS NOTES
Active Problem right cerebral infarction with right MCA thrombosis undergoing thrombectomy . The condition is blood pressure parameter < 170 . She is stuporous with simple verbal response with only mild withdrawal of left arm and leg with right gaze preference with absent left visual threat . MRI of Head with acute  right frontal temporal parietal infarction with tiny left cerebellar lacune. Significant medications zocor 20 mg po qd, aspirin 81 mg po qd  . Testing CTA head and neck demonstrating a right distal M1 MCA occlusion with good collaterals. MRI of Head with acute right frontal temporal parietal infarction with tiny left cerebellar lacune . Cholesterol 171 , LDL 97 ,  , Hga1c 7,1      Past Medical History:   Diagnosis Date    [facility]P [facility]uropathy) ([facility])     [facility] mellitus ([facility] [address]     Hyperlipidemia     Hypertension        Past Surgical History:   Procedure Laterality Date    CHOLECYSTECTOMY      HYSTERECTOMY      TONSILLECTOMY         History reviewed. No pertinent family history. Social History     Social History    Marital status:       Spouse name: N/A    Number of children: N/A    Years of education: N/A     Social History Main Topics    Smoking status: Never Smoker    Smokeless tobacco: Never Used    Alcohol use No    Drug use: No    Sexual activity: Not Asked     Other Topics Concern    None     Social History Narrative    None       Current Facility-Administered Medications   Medication Dose Route Frequency Provider Last Rate Last Dose    LORazepam (ATIVAN) injection 0.5 mg  0.5 mg Intravenous Once Yaneth Gardner MD        sodium chloride flush 0.9 % injection 10 mL  10 mL Intravenous BID Yaneth Gardner MD   10 mL at 01/12/18 1043    insulin lispro (HUMALOG) injection vial 0-6 Units  0-6 Units Subcutaneous Nightly Yaneth Gardner MD        famotidine (PEPCID) injection 20 mg  20 mg Intravenous BID Yaneth Gardner MD   20 mg at 01/12/18 1042    aspirin EC tablet 81 mg  81 mg Oral Daily Neva Barajas MD   81 mg at 01/12/18 1041    amLODIPine (NORVASC) tablet 5 mg  5 mg Oral Daily Neva Barajas MD   5 mg at 01/12/18 1041    losartan (COZAAR) tablet 100 mg  100 mg Oral Daily Neva Barajas MD   100 mg at 01/12/18 1041    fenofibrate (TRICOR) tablet 54 mg  54 mg Oral Daily Essence Everett MD   54 mg at 01/12/18 1042    tamsulosin (FLOMAX) capsule 0.4 mg  0.4 mg Oral Daily Essence Everett MD   0.4 mg at 01/12/18 1042    insulin glargine (LANTUS) injection vial 10 Units  10 Units Subcutaneous Nightly Neva Barajas MD        insulin lispro (HUMALOG) injection vial 0-12 Units  0-12 Units Subcutaneous TID WC Neva Barajas MD   4 Units at 01/12/18 1216    niCARdipine (CARDENE) 20 mg in 0.9 % sodium chloride 200 mL infusion  5 mg/hr Intravenous Continuous Alissa Alamin, DO   Stopped at 01/12/18 1222    glucose (GLUTOSE) 40 % oral gel 15 g  15 g Oral PRN Alissa Alamin, DO        dextrose 50 % solution 12.5 g  12.5 g Intravenous PRN Parsih Jonathon, DO        glucagon (rDNA) injection 1 mg  1 mg Intramuscular PRN Parish Jonathon, DO        dextrose 5 % solution  100 mL/hr Intravenous PRN Parish Jonathon, DO        FLUoxetine (PROZAC) capsule 10 mg  10 mg Oral Daily Clay Snell MD   10 mg at 01/12/18 1042    sodium chloride flush 0.9 % injection 10 mL  10 mL Intravenous 2 times per day Essence Everett MD   10 mL at 01/11/18 1031    sodium chloride flush 0.9 % injection 10 mL  10 mL Intravenous PRN Essence Everett MD        acetaminophen (TYLENOL) tablet 650 mg  650 mg Oral Q4H PRN Essence Everett MD        magnesium hydroxide (MILK OF MAGNESIA) 400 MG/5ML suspension 30 mL  30 mL Oral Daily PRN Essence Everett MD        ondansetron [facility]) injection 4 mg  4 mg Intravenous Q6H PRN Essence Everett MD        0.9 % sodium chloride infusion   Intravenous Continuous Parish Jonathon,  mL/hr at 01/11/18 0848      simvastatin (ZOCOR) tablet 20 mg  20 mg Oral Nightly Apoorva Hensley MD        fentaNYL (SUBLIMAZE) injection 25 mcg  25 mcg Intravenous Q2H PRN Apoorva Hensley MD           Allergies   Allergen Reactions    Tetracyclines & Related Shortness Of Breath       ROS:   Constitutional                  Negative for fever and chills   HEENT                            Negative for ear discharge, ear pain, nosebleed  Eyes                                Negative for photophobia, pain and discharge  Respiratory                      Negative for hemoptysis and sputum  Cardiovascular                Negative for orthopnea, claudication and PND  Gastrointestinal               Negative for abdominal pain, diarrhea, blood in stool  Musculoskeletal               Negative for joint pain, negative for myalgia  Skin                                 Negative for rash or itching  Endo/heme/allergies       Negative for polydipsia, environmental allergy  Psychiatric                       Negative for suicidal ideation. Patient is not anxious    Vitals:    01/12/18 1041   BP: (!) 130/52   Pulse:    Resp:    Temp:    SpO2:      Admission weight: 190 lb (86.2 kg)    Neurological Examination  Constitutional .General exam well groomed   Head/ Ears /Nose/Throat/external ear . Normal exam  Neck and thyroid . Normal size. No bruits  Cardiovascular: Auscultation of heart with regular rate and rhythm   Musculoskeletal. Muscle tone reduced left arm and leg                                                          Muscle strength left arm and leg with mild withdrawal . Good withdrawal right arm and leg                                                                                 No dysmetria or dysdiadokinesis  No tremor   No left fine motor  Orientation Stuporous simple verbal response    Attention and concentration reduced  Language process simple verbal response .  Following commands   Cranial nerve 2 no visual threat on left   Cranial nerve 3, 4 and 6

## 2018-01-12 NOTE — PROGRESS NOTES
Speech Language Pathology  [address]   BEDSIDE SWALLOW EVALUATION    NAME: Ken Raza  : 1940  MRN: [id number]    ADMISSION DATE: 1/10/2018  ADMITTING DIAGNOSIS: has Cerebral infarct ([address] Acute ischemic right middle cerebral artery (MCA) stroke ([address] Left arm weakness; Facial droop; Dysarthria; and Cerebral edema ([address] on her problem list.      Date of Eval: 2018  Evaluating Therapist: Mary Noel    Current Diet level:  Current Diet : NPO  Current Liquid Diet : NPO      Primary Complaint: The patient is a 69 y.o. female with past medical history of hypertension, diabetes presented to the ED earlier today as a transfer from [facility] with CT showing right MCA occlusion. Patient received heparin bolus and infusion and was given aspirin prior to transfer. On arrival to the ED, patient reported difficulty swallowing, had slurred speech and left-sided facial droop, was taken to the angio lab by endovascular neurology. Thrombectomy was performed but posttreatment angiogram demonstrated persistent occlusion of the right distal MCA. Full and the procedure, patient appears more drowsy, with new gaze preference and left upper extremity weakness, getting stat head CT. Pain:  Pain Assessment  Patient Currently in Pain: No  Pain Assessment: 0-10  Pain Level: 0    Reason for Referral  Ken Raza was referred for a bedside swallow evaluation to assess the efficiency of her swallow function, identify signs and symptoms of aspiration and make recommendations regarding safe dietary consistencies, effective compensatory strategies, and safe eating environment. Impression: . Patient presents with probable safe swallow for Puree Level I diet with Nectar thick liquids as evidenced by no overt s/s of aspiration noted with consistencies tested.  + overt s/s of aspiration noted with thin.  Recommend small sips and bites, only feed when alert and awake and upright at 90 degrees for all PO intake. Recommend close monitoring for overt/clinical s/s of aspiration and D/C PO intake and complete Modified Barium Swallow Study should they occur. Results and recommendations reported to RN. Dysphagia Outcome Severity Scale: Level 4: Mild moderate dysphagia- Intermittent supervision/cueing. One - two diet consistencies restricted     Treatment Plan  Requires SLP Intervention: Yes  Duration/Frequency of Treatment: 3-5X/week          Recommended Diet and Intervention  Diet Solids Recommendation: Dysphagia I Pureed  Liquid Consistency Recommendation: Nectar  Recommended Form of Meds: Meds in puree       Compensatory Swallowing Strategies  Compensatory Swallowing Strategies: Alternate solids and liquids;Eat/Feed slowly;Upright as possible for all oral intake;Small bites/sips    Treatment/Goals  Short-term Goals  Goal 1: Repeat as appropriate for diet upgrade  Dysphagia Goals: The patient will tolerate recommended diet without observed clinical signs of aspiration    General  Chart Reviewed: Yes  Behavior/Cognition: Alert; Cooperative  Respiratory Status: Room air  Communication Observation: Functional  Dentition: Adequate  Patient Positioning: Upright in bed  Baseline Vocal Quality: Normal  Volitional Cough: Strong  Consistencies Administered: Puree;Nectar - teaspoon;Nectar - straw; Thin - straw           Vision/Hearing  Vision  Vision: Impaired    Oral Motor Deficits  Oral/Motor  Oral Motor: Exceptions to [facility]  Labial ROM: Reduced left  Labial Symmetry: Abnormal symmetry left  Lingual ROM: Reduced left  Lingual Symmetry: Abnormal symmetry left  Lingual Strength: Reduced  Lingual Coordination: Reduced    Oral Phase Dysfunction  Oral Phase  Oral Phase: Exceptions  Oral Phase  Oral Phase - Comment: Able to suck from straw.  + mild oral stasis and loss with Puree     Indicators of Pharyngeal Phase Dysfunction   Pharyngeal Phase  Pharyngeal Phase: WFL  Pharyngeal Phase   Pharyngeal: + overt s/s

## 2018-01-12 NOTE — PROGRESS NOTES
11.3 k/uL    RBC 4.11 3.95 - 5.11 m/uL    Hemoglobin 11.3 (L) 11.9 - 15.1 g/dL    Hematocrit 36.2 (L) 36.3 - 47.1 %    MCV 88.1 82.6 - 102.9 fL    MCH 27.5 25.2 - 33.5 pg    MCHC 31.2 28.4 - 34.8 g/dL    RDW 13.7 11.8 - 14.4 %    Platelets 522 841 - 043 k/uL    MPV 10.5 8.1 - 13.5 fL    Differential Type NOT REPORTED     WBC Morphology NOT REPORTED     RBC Morphology NOT REPORTED     Platelet Estimate NOT REPORTED     Seg Neutrophils 77 (H) 36 - 65 %    Lymphocytes 16 (L) 24 - 43 %    Monocytes 6 3 - 12 %    Eosinophils % 0 (L) 1 - 4 %    Basophils 0 0 - 2 %    Immature Granulocytes 1 (H) 0 %    Segs Absolute 10.78 (H) 1.50 - 8.10 k/uL    Absolute Lymph # 2.24 1.10 - 3.70 k/uL    Absolute Mono # 0.84 0.10 - 1.20 k/uL    Absolute Eos # <0.03 0.00 - 0.44 k/uL    Basophils # 0.04 0.00 - 0.20 k/uL    Absolute Immature Granulocyte 0.10 0.00 - 0.30 k/uL   BASIC METABOLIC PANEL    Collection Time: 01/12/18  7:09 AM   Result Value Ref Range    Glucose 248 (H) 70 - 99 mg/dL    BUN 15 8 - 23 mg/dL    CREATININE 0.58 0.50 - 0.90 mg/dL    Bun/Cre Ratio NOT REPORTED 9 - 20    Calcium 8.0 (L) 8.6 - 10.4 mg/dL    Sodium 146 (H) 135 - 144 mmol/L    Potassium 3.7 3.7 - 5.3 mmol/L    Chloride 109 (H) 98 - 107 mmol/L    CO2 18 (L) 20 - 31 mmol/L    Anion Gap 19 (H) 9 - 17 mmol/L    GFR Non-African American >60 >60 mL/min    GFR African American >60 >60 mL/min    GFR Comment          GFR Staging NOT REPORTED    Hemoglobin A1C    Collection Time: 01/12/18  7:09 AM   Result Value Ref Range    Hemoglobin A1C 7.1 (H) 4.0 - 6.0 %    Estimated Avg Glucose 157 mg/dL   POC Glucose Fingerstick    Collection Time: 01/12/18  8:38 AM   Result Value Ref Range    POC Glucose 227 (H) 65 - 105 mg/dL   POC Glucose Fingerstick    Collection Time: 01/12/18 12:02 PM   Result Value Ref Range    POC Glucose 217 (H) 65 - 105 mg/dL           RADIOLOGY   Ct Head Wo Contrast    Result Date: 1/11/2018  EXAMINATION: CT OF THE HEAD WITHOUT CONTRAST  1/11/2018 6:11 am TECHNIQUE: CT of the head was performed without the administration of intravenous contrast. Dose modulation, iterative reconstruction, and/or weight based adjustment of the mA/kV was utilized to reduce the radiation dose to as low as reasonably achievable. COMPARISON: Head CT from 01/10/2018 HISTORY: ORDERING SYSTEM PROVIDED HISTORY: r/o ICH TECHNOLOGIST PROVIDED HISTORY: Has a \"code stroke\" or \"stroke alert\" been called? ->No FINDINGS: BRAIN/VENTRICLES:  There is evolution of the hypodensity involving the right frontal, parietal and temporal lobes with disruption of the gray-white matter interface compatible with MCA territory infarction. There is similar degree of hyperdense blood products within the area of infarction. There is no new acute intracranial hemorrhage. No mass effect or midline shift. The remainder of the gray-white differentiation is maintained without evidence of a new acute infarct. There is no evidence of hydrocephalus. ORBITS: The visualized portion of the orbits demonstrate no acute abnormality. SINUSES: The visualized paranasal sinuses and mastoid air cells demonstrate no acute abnormality. SOFT TISSUES/SKULL:  No acute abnormality of the visualized skull or soft tissues. Evolution of the right MCA territory infarct with similar degree of blood products within the area of infarction. No additional area of acute infarction or acute intracranial blood products appreciated. Ct Head Wo Contrast    Result Date: 1/11/2018  EXAMINATION: CT OF THE HEAD WITHOUT CONTRAST  1/10/2018 11:45 pm TECHNIQUE: CT of the head was performed without the administration of intravenous contrast. Dose modulation, iterative reconstruction, and/or weight based adjustment of the mA/kV was utilized to reduce the radiation dose to as low as reasonably achievable. COMPARISON: None.  HISTORY: ORDERING SYSTEM PROVIDED HISTORY: ro bleed post angio TECHNOLOGIST PROVIDED HISTORY: Has a \"code stroke\" or \"stroke Monitor    RENAL/FLUID/ELECTROLYTE:  - IVFs: NS @ 100 cc/hr  - I/O: 3918/1275  - UOP: 0.62 cc/kg/hr  - Urinary retention, continue prn bladder scans and straight cath prn  - Start Flomax 0.4 mg qd   - Hypernatremia  - Monitor electrolytes, replace PRN     GI/NUTRITION:  - Diet:DIET CARB CONTROL; Carb Control: 4 carbs/meal (approximate 1800 kcals/day); Dysphagia I Pureed; Nectar Thick  - GI PPx: None, will start today if no tolerating diet  - Bowel regimen: Milk of Magnesia prn, start colace daily and glycolax daily       HEME/ID:  - Tmax: 37.1  - Mild leukocytosis at 14  - UA at [facility] under previous MRN, questionable UTI. - Send repeat UA  - Urine culture pending     ENDOCRINE:  - Recommend glucose <180  - Hyperglycemic  - Continue to hold home metformin  - Increase ISS to medium dose   - Start Lantus 10 units qHS   - TSH w/n normal limits    OPTHALMOLOGY   - exophthalmos of the right eye  - MRI orbits show proptosis but no other structural abnormalities  - IOP normal with R 18 and L 17  - Ocular US normal with no signs of lens dislocation, retinal detachment & with normal optic nerve diameter    OTHER:  - PT/OT Eval   - Speech    DVT Prophylaxis:  - SCD sleeves - Thigh High   - ABDI stockings - Thigh High  - Start Heparin 5000 units TID      CODE STATUS: FULL    DISPOSITION:  [x] To remain ICU:  [] OK for out of ICU from Neuro Critical Care standpoint    For any changes in exam or patient status please contact Neuro Critical Care.       Dagmar Yuan MD  Emergency Medicine Resident PGY2  Neuro Critical Care  Pager [phone]  1/12/2018     1:09 PM

## 2018-01-12 NOTE — PROGRESS NOTES
Speech Language Pathology  Facility/Department: [address]  Initial Speech/Language/Cognitive Assessment    NAME: Sahil Leiva  : 1940   MRN: [id number]  ADMISSION DATE: 1/10/2018  ADMITTING DIAGNOSIS: has Cerebral infarct ([address] Acute ischemic right middle cerebral artery (MCA) stroke ([address] Left arm weakness; Facial droop; Dysarthria; and Cerebral edema ([address] on her problem list.      Date of Eval: 2018   Evaluating Therapist: Rasta Solano SLP    Primary Complaint: The patient is a 69 y.o. female with past medical history of hypertension, diabetes presented to the ED earlier today as a transfer from [facility] with CT showing right MCA occlusion. Patient received heparin bolus and infusion and was given aspirin prior to transfer. On arrival to the ED, patient reported difficulty swallowing, had slurred speech and left-sided facial droop, was taken to the angio lab by endovascular neurology. Thrombectomy was performed but posttreatment angiogram demonstrated persistent occlusion of the right distal MCA. Full and the procedure, patient appears more drowsy, with new gaze preference and left upper extremity weakness, getting stat head CT. Pain:  Pain Assessment  Patient Currently in Pain: No  Pain Assessment: 0-10  Pain Level: 0    Assessment:   Pt presents with mild-moderate cognitive deficits characterized by impaired recall and attention. Further assessment needed of reasoning and problem solving   ST to follow up and provide treatment to address noted deficits. Education provided. Recommendations:  Requires SLP Intervention: Yes  Duration/Frequency of Treatment: 3-5X          Plan:   Goals:  Short-term Goals  Goal 1: Recall 3-5 units with and without disctraction with 90% accuracy  Goal 2: Attend to task for 3-5 minutes  Goal 3: Further assess reasoning and problem solving.    Patient/family involved in developing goals and treatment plan: yes    Subjective:   Previous level

## 2018-01-12 NOTE — PROGRESS NOTES
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  Level of consciousness:  1 - not alert but arousable by minor stimulation to obey, answer or respond  1b. Level of consciousness questions:  0  1c. Level of consciousness questions:  0 - performs both tasks correctly  2. Best Gaze:  0 - normal  3. Visual:  1 - partial hemianopia  4. Facial Palsy:  2 - partial paralysis (total or near total paralysis of the lower face)  5a. Motor left arm:  3 - no effort against gravity, limb falls  5b. Motor right arm:  0 - no drift, limb holds 90 (or 45) degrees for full 10 seconds  6a. Motor left le - some effort against gravity; leg falls to bed by 5 seconds but has some effort against gravity  6b. Motor right le - some effort against gravity; leg falls to bed by 5 seconds but has some effort against gravity  7. Limb Ataxia:  0 - absent  8. Sensory:  1 - mild to moderate sensory loss; patient feels pinprick is less sharp or is dull on the affected side; there is a loss of superficial pain with pinprick but patient is aware of being touched   9. Best Language:  1 - mild to moderate aphasia; some obvious loss of fluency or facility of comprehension without significant limitation on ideas expressed or form of expression. Reduction of speech and/or comprehension, however, makes conversation about provided materials difficult or impossible. For example, in conversation about provided materials, examiner can identify picture or naming card content from patient's response. 10.  Dysarthria:  1 - mild to moderate, patient slurs at least some words and at worst, can be understood with some difficulty  11.   Extinction and Inattention:  1 - visual, tactile, auditory, spatial or personal inattention or extinction to bilateral simultaneous stimulation in one of the sensory modalities     Data:  CBC:   Lab Results   Component Value Date    WBC 14.0 01/12/2018    RBC 4.11 01/12/2018    HGB 11.3 01/12/2018    HCT 36.2 01/12/2018    MCV 88.1 01/12/2018    RDW 13.7 01/12/2018     01/12/2018     BMP:    Lab Results   Component Value Date     01/11/2018    K 3.2 01/11/2018     01/11/2018    CO2 21 01/11/2018    BUN 10 01/11/2018    CREATININE 0.53 01/11/2018    CALCIUM 8.0 01/11/2018    GFRAA >60 01/11/2018    LABGLOM >60 01/11/2018    GLUCOSE 288 01/11/2018       ICU guidelines/prophylaxis active for the following:    head above bed above 30 degrees    ASSESSMENT:  Patricia Dupree is a 69 y.o. female with PMH of CIDP and DM who presented with an episode of aphasia and left facial droop. She was found to have right M1 MCA occlusion and underwent unsuccessful emergent mechanical thrombectomy on 1/10/18 with TICI 1 recanalization. PLAN:    SBP goal   Aspirin 300 mg daily  Continue zocor  Stroke work up and management per NICU team.  MRI brain this am: right MCA subacute stroke with No MLS  Repeat CT brain in am(ordered)  From our prospective, patient can be transferred to stepdown unit. Plan discussed with Dr Tung Sheth.    Jaqueline Mccoy MD  Neuroendovascular Fellow  Stroke, [facility]  [address]

## 2018-01-12 NOTE — PLAN OF CARE
Problem: HEMODYNAMIC STATUS  Goal: Patient has stable vital signs and fluid balance  Outcome: Ongoing  Neuro assessment completed, fall precautions in place, aspirations precautions in place, assess for barriers in communication and mobility, interventions to assist in communication and mobility in place, encouraged to call for assistance, adaptive devices used as needed, assess emotional state and support offered, encouraged patient to communicate by available means, and support systems included in patient care. Will continue to monitor. Problem: Falls - Risk of  Goal: Absence of falls  Outcome: Ongoing  Patient assessed for fall risk and fall precautions initiated as needed. Patient and family instructed about safety devices and allowed to make decisions related to safey. Environment kept free of clutter and adequate lighting provided. Bed in lowest position with brakes locked. Call light in reach. Patient ID band correct and in place. Patient transferred with appropriate methods. Will continue to monitor. Problem: Risk for Impaired Skin Integrity  Goal: Tissue integrity - skin and mucous membranes  Structural intactness and normal physiological function of skin and  mucous membranes. Outcome: Ongoing  Skin assessed for breakdown and redness, patient turned regularly [id number], mepilex intact on coccyx, small breakdown noted on buttocks, and heels elevated off of bed on pillows. Will continue to monitor.

## 2018-01-13 ENCOUNTER — APPOINTMENT (OUTPATIENT)
Dept: CT IMAGING | Age: 78
DRG: 023 | End: 2018-01-13
Payer: MEDICARE

## 2018-01-13 LAB
ABSOLUTE EOS #: 0.04 K/UL (ref 0–0.44)
ABSOLUTE IMMATURE GRANULOCYTE: 0.03 K/UL (ref 0–0.3)
ABSOLUTE LYMPH #: 2.92 K/UL (ref 1.1–3.7)
ABSOLUTE MONO #: 0.74 K/UL (ref 0.1–1.2)
ANION GAP SERPL CALCULATED.3IONS-SCNC: 13 MMOL/L (ref 9–17)
BASOPHILS # BLD: 1 % (ref 0–2)
BASOPHILS ABSOLUTE: 0.05 K/UL (ref 0–0.2)
BUN BLDV-MCNC: 12 MG/DL (ref 8–23)
BUN/CREAT BLD: ABNORMAL (ref 9–20)
CALCIUM SERPL-MCNC: 7.8 MG/DL (ref 8.6–10.4)
CHLORIDE BLD-SCNC: 108 MMOL/L (ref 98–107)
CO2: 23 MMOL/L (ref 20–31)
CREAT SERPL-MCNC: 0.38 MG/DL (ref 0.5–0.9)
DIFFERENTIAL TYPE: ABNORMAL
EOSINOPHILS RELATIVE PERCENT: 0 % (ref 1–4)
GFR AFRICAN AMERICAN: >60 ML/MIN
GFR NON-AFRICAN AMERICAN: >60 ML/MIN
GFR SERPL CREATININE-BSD FRML MDRD: ABNORMAL ML/MIN/{1.73_M2}
GFR SERPL CREATININE-BSD FRML MDRD: ABNORMAL ML/MIN/{1.73_M2}
GLUCOSE BLD-MCNC: 144 MG/DL (ref 65–105)
GLUCOSE BLD-MCNC: 150 MG/DL (ref 65–105)
GLUCOSE BLD-MCNC: 162 MG/DL (ref 65–105)
GLUCOSE BLD-MCNC: 171 MG/DL (ref 70–99)
GLUCOSE BLD-MCNC: 183 MG/DL (ref 65–105)
HCT VFR BLD CALC: 35.2 % (ref 36.3–47.1)
HEMOGLOBIN: 11.1 G/DL (ref 11.9–15.1)
IMMATURE GRANULOCYTES: 0 %
LYMPHOCYTES # BLD: 29 % (ref 24–43)
MCH RBC QN AUTO: 27.9 PG (ref 25.2–33.5)
MCHC RBC AUTO-ENTMCNC: 31.5 G/DL (ref 28.4–34.8)
MCV RBC AUTO: 88.4 FL (ref 82.6–102.9)
MONOCYTES # BLD: 7 % (ref 3–12)
PDW BLD-RTO: 13.7 % (ref 11.8–14.4)
PLATELET # BLD: 230 K/UL (ref 138–453)
PLATELET ESTIMATE: ABNORMAL
PMV BLD AUTO: 10.6 FL (ref 8.1–13.5)
POTASSIUM SERPL-SCNC: 3.1 MMOL/L (ref 3.7–5.3)
RBC # BLD: 3.98 M/UL (ref 3.95–5.11)
RBC # BLD: ABNORMAL 10*6/UL
SEG NEUTROPHILS: 63 % (ref 36–65)
SEGMENTED NEUTROPHILS ABSOLUTE COUNT: 6.46 K/UL (ref 1.5–8.1)
SODIUM BLD-SCNC: 144 MMOL/L (ref 135–144)
WBC # BLD: 10.2 K/UL (ref 3.5–11.3)
WBC # BLD: ABNORMAL 10*3/UL

## 2018-01-13 PROCEDURE — 51798 US URINE CAPACITY MEASURE: CPT

## 2018-01-13 PROCEDURE — 2500000003 HC RX 250 WO HCPCS: Performed by: EMERGENCY MEDICINE

## 2018-01-13 PROCEDURE — 82947 ASSAY GLUCOSE BLOOD QUANT: CPT

## 2018-01-13 PROCEDURE — 6370000000 HC RX 637 (ALT 250 FOR IP): Performed by: PSYCHIATRY & NEUROLOGY

## 2018-01-13 PROCEDURE — 99233 SBSQ HOSP IP/OBS HIGH 50: CPT | Performed by: PSYCHIATRY & NEUROLOGY

## 2018-01-13 PROCEDURE — 6360000002 HC RX W HCPCS: Performed by: EMERGENCY MEDICINE

## 2018-01-13 PROCEDURE — S0028 INJECTION, FAMOTIDINE, 20 MG: HCPCS | Performed by: EMERGENCY MEDICINE

## 2018-01-13 PROCEDURE — 6370000000 HC RX 637 (ALT 250 FOR IP): Performed by: EMERGENCY MEDICINE

## 2018-01-13 PROCEDURE — 51701 INSERT BLADDER CATHETER: CPT

## 2018-01-13 PROCEDURE — 85025 COMPLETE CBC W/AUTO DIFF WBC: CPT

## 2018-01-13 PROCEDURE — 2580000003 HC RX 258: Performed by: EMERGENCY MEDICINE

## 2018-01-13 PROCEDURE — 70450 CT HEAD/BRAIN W/O DYE: CPT

## 2018-01-13 PROCEDURE — 80048 BASIC METABOLIC PNL TOTAL CA: CPT

## 2018-01-13 PROCEDURE — 51702 INSERT TEMP BLADDER CATH: CPT

## 2018-01-13 PROCEDURE — 94762 N-INVAS EAR/PLS OXIMTRY CONT: CPT

## 2018-01-13 PROCEDURE — 36415 COLL VENOUS BLD VENIPUNCTURE: CPT

## 2018-01-13 PROCEDURE — 2000000003 HC NEURO ICU R&B

## 2018-01-13 RX ADMIN — INSULIN GLARGINE 10 UNITS: 100 INJECTION, SOLUTION SUBCUTANEOUS at 20:11

## 2018-01-13 RX ADMIN — FENOFIBRATE 54 MG: 54 TABLET ORAL at 08:07

## 2018-01-13 RX ADMIN — FAMOTIDINE 20 MG: 10 INJECTION, SOLUTION INTRAVENOUS at 20:06

## 2018-01-13 RX ADMIN — TAMSULOSIN HYDROCHLORIDE 0.4 MG: 0.4 CAPSULE ORAL at 08:07

## 2018-01-13 RX ADMIN — HEPARIN SODIUM 5000 UNITS: 5000 INJECTION, SOLUTION INTRAVENOUS; SUBCUTANEOUS at 23:03

## 2018-01-13 RX ADMIN — INSULIN LISPRO 2 UNITS: 100 INJECTION, SOLUTION INTRAVENOUS; SUBCUTANEOUS at 11:31

## 2018-01-13 RX ADMIN — POLYETHYLENE GLYCOL 3350 17 G: 17 POWDER, FOR SOLUTION ORAL at 08:06

## 2018-01-13 RX ADMIN — INSULIN LISPRO 1 UNITS: 100 INJECTION, SOLUTION INTRAVENOUS; SUBCUTANEOUS at 20:11

## 2018-01-13 RX ADMIN — LOSARTAN POTASSIUM 100 MG: 50 TABLET, FILM COATED ORAL at 08:07

## 2018-01-13 RX ADMIN — DOCUSATE SODIUM 100 MG: 100 CAPSULE ORAL at 08:07

## 2018-01-13 RX ADMIN — HEPARIN SODIUM 5000 UNITS: 5000 INJECTION, SOLUTION INTRAVENOUS; SUBCUTANEOUS at 06:44

## 2018-01-13 RX ADMIN — Medication 10 ML: at 08:10

## 2018-01-13 RX ADMIN — HEPARIN SODIUM 5000 UNITS: 5000 INJECTION, SOLUTION INTRAVENOUS; SUBCUTANEOUS at 14:27

## 2018-01-13 RX ADMIN — ASPIRIN 81 MG: 81 TABLET, COATED ORAL at 08:07

## 2018-01-13 RX ADMIN — INSULIN LISPRO 2 UNITS: 100 INJECTION, SOLUTION INTRAVENOUS; SUBCUTANEOUS at 08:12

## 2018-01-13 RX ADMIN — INSULIN LISPRO 2 UNITS: 100 INJECTION, SOLUTION INTRAVENOUS; SUBCUTANEOUS at 16:33

## 2018-01-13 RX ADMIN — FAMOTIDINE 20 MG: 10 INJECTION, SOLUTION INTRAVENOUS at 08:11

## 2018-01-13 RX ADMIN — AMLODIPINE BESYLATE 5 MG: 5 TABLET ORAL at 08:07

## 2018-01-13 RX ADMIN — SIMVASTATIN 20 MG: 20 TABLET, FILM COATED ORAL at 20:06

## 2018-01-13 RX ADMIN — FLUOXETINE HYDROCHLORIDE 10 MG: 10 CAPSULE ORAL at 08:10

## 2018-01-13 NOTE — PROGRESS NOTES
Daily Progress Note  Neuro Critical Care    Patient Name: Krystyna Brown  Patient : 1940  Room/Bed: 3888/8920-83  Allergies: Allergies   Allergen Reactions    Tetracyclines & Related Shortness Of Breath     Problem List:   Patient Active Problem List   Diagnosis    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 (HCC)    Acute ischemic right middle cerebral artery (MCA) stroke (HCC)    Left arm weakness    Facial droop    Dysarthria    Cerebral edema (HCC)    Left hemiparesis (HCC)       INTERVAL HISTORY    Initial Presentation (date of admission: 1/10)  The patient is a 69 y.o. female with past medical history of hypertension, diabetes presented to the ED earlier today as a transfer from [facility] with CT showing right MCA occlusion. Patient received heparin bolus and infusion and was given aspirin prior to transfer. On arrival to the ED, patient reported difficulty swallowing, had slurred speech and left-sided facial droop, was taken to the angio lab by endovascular neurology    Hospital Course:  1/10: Thrombectomy but posttreatment angio showed persistent R distal MCA occlusion. After the procedure patient was drowsy with new gaze preference and LUE. Stat CT head showed acute right hemispheric SAH, R MCA territory infarct, hyperdensity R MCA concerning for thrombus  : CT head w/ evolution of the R MCA infarct with similar degree of blood product within the area of infarction   : CTH ordered in early AM for difficulty waking. Repeat CTH is stable. Last 24 hours:  Overnight, the patient remained drowsy but responded to questions appropriately and followed all commands. CTH was obtained due to somnolence which was unremarkable. Patient has been tolerating PO intake.      MEDICATIONS:     CURRENT MEDICATIONS:  SCHEDULED MEDICATIONS:   LORazepam  0.5 mg Intravenous Once    sodium chloride flush  10 mL Intravenous BID    insulin lispro  0-6 Units Subcutaneous Nightly    famotidine (PEPCID) injection  20 mg Intravenous BID    aspirin  81 mg Oral Daily    amLODIPine  5 mg Oral Daily    losartan  100 mg Oral Daily    fenofibrate  54 mg Oral Daily    tamsulosin  0.4 mg Oral Daily    insulin glargine  10 Units Subcutaneous Nightly    insulin lispro  0-12 Units Subcutaneous TID WC    polyethylene glycol  17 g Oral Daily    docusate sodium  100 mg Oral Daily    heparin (porcine)  5,000 Units Subcutaneous 3 times per day    FLUoxetine  10 mg Oral Daily    sodium chloride flush  10 mL Intravenous 2 times per day    simvastatin  20 mg Oral Nightly     CONTINUOUS INFUSIONS:   niCARdipine Stopped (18 1222)    dextrose      sodium chloride 100 mL/hr at 18 0848     PRN MEDICATIONS:   glucose, dextrose, glucagon (rDNA), dextrose, sodium chloride flush, acetaminophen, magnesium hydroxide, ondansetron, fentanNYL    VITALS 24 Hours     Temperature Range: Temp: 98.6 °F (37 °C) Temp  Av.2 °F (37.3 °C)  Min: 98.6 °F (37 °C)  Max: 99.5 °F (37.5 °C)  BP Range: Systolic (08JWQ), KNR:363 , Min:115 , CRC:682     Diastolic (40UCA), XMP:79, Min:40, Max:107    Pulse Range: Pulse  Av.1  Min: 55  Max: 77  Respiration Range: Resp  Av.1  Min: 17  Max: 35  Current Pulse Ox: SpO2: 94 %  24HR Pulse Ox Range: SpO2  Av.7 %  Min: 92 %  Max: 97 %  Patient Vitals for the past 12 hrs:   BP Temp Temp src Pulse Resp   18 0603 (!) 154/54 - - 55 17   18 0013 - - - 56 20   18 0008 - - - 57 21   18 0003 (!) 132/46 98.6 °F (37 °C) Oral 56 (!) 35   18 2218 - - - 58 19   183 (!) 136/43 - - 56 21     Estimated body mass index is 28.1 kg/m² as calculated from the following:    Height as of this encounter: 5' 9\" (1.753 m).     Weight as of this encounter: 190 lb 4.1 oz (86.3 kg).  []<16 Severe malnutrition  []1616.99 Moderate malnutrition  []1718.49 Mild malnutrition  []18.524.9 Normal  [x]2529.9 Overweight (not \"stroke alert\" been called? ->No FINDINGS: BRAIN/VENTRICLES:  There is evolution of the hypodensity involving the right frontal, parietal and temporal lobes with disruption of the gray-white matter interface compatible with MCA territory infarction. There is similar degree of hyperdense blood products within the area of infarction. There is no new acute intracranial hemorrhage. No mass effect or midline shift. The remainder of the gray-white differentiation is maintained without evidence of a new acute infarct. There is no evidence of hydrocephalus. ORBITS: The visualized portion of the orbits demonstrate no acute abnormality. SINUSES: The visualized paranasal sinuses and mastoid air cells demonstrate no acute abnormality. SOFT TISSUES/SKULL:  No acute abnormality of the visualized skull or soft tissues. Evolution of the right MCA territory infarct with similar degree of blood products within the area of infarction. No additional area of acute infarction or acute intracranial blood products appreciated. Ct Head Wo Contrast    Result Date: 1/11/2018  EXAMINATION: CT OF THE HEAD WITHOUT CONTRAST  1/10/2018 11:45 pm TECHNIQUE: CT of the head was performed without the administration of intravenous contrast. Dose modulation, iterative reconstruction, and/or weight based adjustment of the mA/kV was utilized to reduce the radiation dose to as low as reasonably achievable. COMPARISON: None. HISTORY: ORDERING SYSTEM PROVIDED HISTORY: ro bleed post angio TECHNOLOGIST PROVIDED HISTORY: Has a \"code stroke\" or \"stroke alert\" been called? ->No FINDINGS: BRAIN/VENTRICLES: Subarachnoid hemorrhage throughout the right hemisphere, predominantly within the right sylvian fissure present. Loss of gray-white differentiation within right MCA territory. Hyperdensity involving the right MCA, M2 origin. Prominent vascular calcifications are present. No midline shift. No hydrocephalus.   Patchy hypodensity within the mili is

## 2018-01-13 NOTE — PROGRESS NOTES
injection 4 mg, 4 mg, Intravenous, Q6H PRN  0.9 % sodium chloride infusion, , Intravenous, Continuous  simvastatin (ZOCOR) tablet 20 mg, 20 mg, Oral, Nightly  fentaNYL (SUBLIMAZE) injection 25 mcg, 25 mcg, Intravenous, Q2H PRN    Physical:   VITALS:  BP (!) 154/54   Pulse 55   Temp 98.6 °F (37 °C) (Oral)   Resp 17   Ht 5' 9\" (1.753 m)   Wt 190 lb 4.1 oz (86.3 kg)   SpO2 94%   BMI 28.10 kg/m²       On exam today:   Sitting up eating, eye opening apraxia. She is oriented to month and year. Follows simple central commands. Left facial droop  Intact cough/gag/corneals  Purposeful with right UE; shoulder shrug with LUE  Loss of LT throughout the left side. 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  Level of consciousness:  0 - alert; keenly responsive  1b. Level of consciousness questions:  0  1c. Level of consciousness questions:  0 - performs both tasks correctly  2. Best Gaze:  0 - normal  3. Visual:  1 - partial hemianopia  4. Facial Palsy:  2 - partial paralysis (total or near total paralysis of the lower face)  5a. Motor left arm:  3 - no effort against gravity, limb falls  5b. Motor right arm:  0 - no drift, limb holds 90 (or 45) degrees for full 10 seconds  6a. Motor left le - some effort against gravity; leg falls to bed by 5 seconds but has some effort against gravity  6b. Motor right le - some effort against gravity; leg falls to bed by 5 seconds but has some effort against gravity  7. Limb Ataxia:  0 - absent  8. Sensory:  1 - mild to moderate sensory loss; patient feels pinprick is less sharp or is dull on the affected side; there is a loss of superficial pain with pinprick but patient is aware of being touched   9.     Best Language:  1 - mild to moderate aphasia; some obvious loss of fluency or facility of

## 2018-01-13 NOTE — PLAN OF CARE
Problem: Falls - Risk of  Goal: Absence of falls  Outcome: Met This Shift  Fall risk precautions in place. Bed in lowest position with wheels locked, bed alarm in place and activated,non-skid socks on pt, fall risk id on pt, call light in reach, pt encouraged to call before getting out of bed and for any other needs or c/o.

## 2018-01-14 ENCOUNTER — APPOINTMENT (OUTPATIENT)
Dept: CT IMAGING | Age: 78
DRG: 023 | End: 2018-01-14
Payer: MEDICARE

## 2018-01-14 LAB
ABSOLUTE EOS #: 0.15 K/UL (ref 0–0.44)
ABSOLUTE IMMATURE GRANULOCYTE: <0.03 K/UL (ref 0–0.3)
ABSOLUTE LYMPH #: 1.99 K/UL (ref 1.1–3.7)
ABSOLUTE MONO #: 0.61 K/UL (ref 0.1–1.2)
ANION GAP SERPL CALCULATED.3IONS-SCNC: 15 MMOL/L (ref 9–17)
BASOPHILS # BLD: 1 % (ref 0–2)
BASOPHILS ABSOLUTE: 0.05 K/UL (ref 0–0.2)
BUN BLDV-MCNC: 10 MG/DL (ref 8–23)
BUN/CREAT BLD: ABNORMAL (ref 9–20)
CALCIUM SERPL-MCNC: 7.8 MG/DL (ref 8.6–10.4)
CHLORIDE BLD-SCNC: 108 MMOL/L (ref 98–107)
CO2: 23 MMOL/L (ref 20–31)
CREAT SERPL-MCNC: 0.46 MG/DL (ref 0.5–0.9)
DIFFERENTIAL TYPE: ABNORMAL
EOSINOPHILS RELATIVE PERCENT: 2 % (ref 1–4)
GFR AFRICAN AMERICAN: >60 ML/MIN
GFR NON-AFRICAN AMERICAN: >60 ML/MIN
GFR SERPL CREATININE-BSD FRML MDRD: ABNORMAL ML/MIN/{1.73_M2}
GFR SERPL CREATININE-BSD FRML MDRD: ABNORMAL ML/MIN/{1.73_M2}
GLUCOSE BLD-MCNC: 124 MG/DL (ref 65–105)
GLUCOSE BLD-MCNC: 145 MG/DL (ref 65–105)
GLUCOSE BLD-MCNC: 151 MG/DL (ref 65–105)
GLUCOSE BLD-MCNC: 156 MG/DL (ref 70–99)
GLUCOSE BLD-MCNC: 190 MG/DL (ref 65–105)
HCT VFR BLD CALC: 34.7 % (ref 36.3–47.1)
HEMOGLOBIN: 10.8 G/DL (ref 11.9–15.1)
IMMATURE GRANULOCYTES: 0 %
LYMPHOCYTES # BLD: 24 % (ref 24–43)
MCH RBC QN AUTO: 27.3 PG (ref 25.2–33.5)
MCHC RBC AUTO-ENTMCNC: 31.1 G/DL (ref 28.4–34.8)
MCV RBC AUTO: 87.6 FL (ref 82.6–102.9)
MONOCYTES # BLD: 7 % (ref 3–12)
PDW BLD-RTO: 13.6 % (ref 11.8–14.4)
PLATELET # BLD: 222 K/UL (ref 138–453)
PLATELET ESTIMATE: ABNORMAL
PMV BLD AUTO: 10.8 FL (ref 8.1–13.5)
POTASSIUM SERPL-SCNC: 2.9 MMOL/L (ref 3.7–5.3)
POTASSIUM SERPL-SCNC: 3.5 MMOL/L (ref 3.7–5.3)
RBC # BLD: 3.96 M/UL (ref 3.95–5.11)
RBC # BLD: ABNORMAL 10*6/UL
SEG NEUTROPHILS: 66 % (ref 36–65)
SEGMENTED NEUTROPHILS ABSOLUTE COUNT: 5.66 K/UL (ref 1.5–8.1)
SODIUM BLD-SCNC: 146 MMOL/L (ref 135–144)
WBC # BLD: 8.5 K/UL (ref 3.5–11.3)
WBC # BLD: ABNORMAL 10*3/UL

## 2018-01-14 PROCEDURE — 2000000003 HC NEURO ICU R&B

## 2018-01-14 PROCEDURE — 2580000003 HC RX 258: Performed by: PSYCHIATRY & NEUROLOGY

## 2018-01-14 PROCEDURE — 70450 CT HEAD/BRAIN W/O DYE: CPT

## 2018-01-14 PROCEDURE — 36415 COLL VENOUS BLD VENIPUNCTURE: CPT

## 2018-01-14 PROCEDURE — 6360000002 HC RX W HCPCS: Performed by: EMERGENCY MEDICINE

## 2018-01-14 PROCEDURE — 2500000003 HC RX 250 WO HCPCS: Performed by: EMERGENCY MEDICINE

## 2018-01-14 PROCEDURE — 85025 COMPLETE CBC W/AUTO DIFF WBC: CPT

## 2018-01-14 PROCEDURE — 6370000000 HC RX 637 (ALT 250 FOR IP): Performed by: PSYCHIATRY & NEUROLOGY

## 2018-01-14 PROCEDURE — 6370000000 HC RX 637 (ALT 250 FOR IP): Performed by: EMERGENCY MEDICINE

## 2018-01-14 PROCEDURE — 99233 SBSQ HOSP IP/OBS HIGH 50: CPT | Performed by: PSYCHIATRY & NEUROLOGY

## 2018-01-14 PROCEDURE — 82947 ASSAY GLUCOSE BLOOD QUANT: CPT

## 2018-01-14 PROCEDURE — 2060000000 HC ICU INTERMEDIATE R&B

## 2018-01-14 PROCEDURE — 80048 BASIC METABOLIC PNL TOTAL CA: CPT

## 2018-01-14 PROCEDURE — 99291 CRITICAL CARE FIRST HOUR: CPT | Performed by: PSYCHIATRY & NEUROLOGY

## 2018-01-14 PROCEDURE — 94762 N-INVAS EAR/PLS OXIMTRY CONT: CPT

## 2018-01-14 PROCEDURE — 99232 SBSQ HOSP IP/OBS MODERATE 35: CPT | Performed by: PSYCHIATRY & NEUROLOGY

## 2018-01-14 PROCEDURE — 84132 ASSAY OF SERUM POTASSIUM: CPT

## 2018-01-14 PROCEDURE — S0028 INJECTION, FAMOTIDINE, 20 MG: HCPCS | Performed by: EMERGENCY MEDICINE

## 2018-01-14 RX ORDER — POTASSIUM CHLORIDE 20 MEQ/1
40 TABLET, EXTENDED RELEASE ORAL PRN
Status: DISCONTINUED | OUTPATIENT
Start: 2018-01-14 | End: 2018-01-19 | Stop reason: HOSPADM

## 2018-01-14 RX ORDER — MAGNESIUM SULFATE 1 G/100ML
1 INJECTION INTRAVENOUS ONCE
Status: COMPLETED | OUTPATIENT
Start: 2018-01-14 | End: 2018-01-14

## 2018-01-14 RX ORDER — POTASSIUM CHLORIDE 20MEQ/15ML
40 LIQUID (ML) ORAL PRN
Status: DISCONTINUED | OUTPATIENT
Start: 2018-01-14 | End: 2018-01-19 | Stop reason: HOSPADM

## 2018-01-14 RX ORDER — AMLODIPINE BESYLATE 10 MG/1
10 TABLET ORAL DAILY
Status: DISCONTINUED | OUTPATIENT
Start: 2018-01-15 | End: 2018-01-19 | Stop reason: HOSPADM

## 2018-01-14 RX ORDER — HYDRALAZINE HYDROCHLORIDE 20 MG/ML
10 INJECTION INTRAMUSCULAR; INTRAVENOUS EVERY 4 HOURS PRN
Status: DISCONTINUED | OUTPATIENT
Start: 2018-01-14 | End: 2018-01-19 | Stop reason: HOSPADM

## 2018-01-14 RX ORDER — POTASSIUM CHLORIDE 7.45 MG/ML
10 INJECTION INTRAVENOUS PRN
Status: DISCONTINUED | OUTPATIENT
Start: 2018-01-14 | End: 2018-01-19 | Stop reason: HOSPADM

## 2018-01-14 RX ORDER — METOPROLOL TARTRATE 5 MG/5ML
10 INJECTION INTRAVENOUS EVERY 6 HOURS
Status: DISCONTINUED | OUTPATIENT
Start: 2018-01-14 | End: 2018-01-14

## 2018-01-14 RX ORDER — POTASSIUM CHLORIDE 20 MEQ/1
40 TABLET, EXTENDED RELEASE ORAL ONCE
Status: COMPLETED | OUTPATIENT
Start: 2018-01-14 | End: 2018-01-14

## 2018-01-14 RX ORDER — POTASSIUM CHLORIDE 20 MEQ/1
40 TABLET, EXTENDED RELEASE ORAL 2 TIMES DAILY
Status: DISCONTINUED | OUTPATIENT
Start: 2018-01-14 | End: 2018-01-19

## 2018-01-14 RX ORDER — HYDRALAZINE HYDROCHLORIDE 25 MG/1
25 TABLET, FILM COATED ORAL EVERY 8 HOURS SCHEDULED
Status: DISCONTINUED | OUTPATIENT
Start: 2018-01-14 | End: 2018-01-19 | Stop reason: HOSPADM

## 2018-01-14 RX ADMIN — AMLODIPINE BESYLATE 5 MG: 5 TABLET ORAL at 09:58

## 2018-01-14 RX ADMIN — MAGNESIUM SULFATE HEPTAHYDRATE 1 G: 1 INJECTION, SOLUTION INTRAVENOUS at 10:10

## 2018-01-14 RX ADMIN — FLUOXETINE HYDROCHLORIDE 10 MG: 10 CAPSULE ORAL at 09:58

## 2018-01-14 RX ADMIN — SIMVASTATIN 20 MG: 20 TABLET, FILM COATED ORAL at 21:12

## 2018-01-14 RX ADMIN — INSULIN LISPRO 1 UNITS: 100 INJECTION, SOLUTION INTRAVENOUS; SUBCUTANEOUS at 21:11

## 2018-01-14 RX ADMIN — HEPARIN SODIUM 5000 UNITS: 5000 INJECTION, SOLUTION INTRAVENOUS; SUBCUTANEOUS at 21:11

## 2018-01-14 RX ADMIN — INSULIN LISPRO 2 UNITS: 100 INJECTION, SOLUTION INTRAVENOUS; SUBCUTANEOUS at 10:11

## 2018-01-14 RX ADMIN — HEPARIN SODIUM 5000 UNITS: 5000 INJECTION, SOLUTION INTRAVENOUS; SUBCUTANEOUS at 16:14

## 2018-01-14 RX ADMIN — DOCUSATE SODIUM 100 MG: 100 CAPSULE ORAL at 09:58

## 2018-01-14 RX ADMIN — LOSARTAN POTASSIUM 100 MG: 50 TABLET, FILM COATED ORAL at 09:58

## 2018-01-14 RX ADMIN — ASPIRIN 81 MG: 81 TABLET, COATED ORAL at 09:58

## 2018-01-14 RX ADMIN — TAMSULOSIN HYDROCHLORIDE 0.4 MG: 0.4 CAPSULE ORAL at 09:58

## 2018-01-14 RX ADMIN — FAMOTIDINE 20 MG: 10 INJECTION, SOLUTION INTRAVENOUS at 21:13

## 2018-01-14 RX ADMIN — POTASSIUM CHLORIDE 40 MEQ: 1500 TABLET, EXTENDED RELEASE ORAL at 09:58

## 2018-01-14 RX ADMIN — POTASSIUM CHLORIDE 40 MEQ: 1500 TABLET, EXTENDED RELEASE ORAL at 21:40

## 2018-01-14 RX ADMIN — HEPARIN SODIUM 5000 UNITS: 5000 INJECTION, SOLUTION INTRAVENOUS; SUBCUTANEOUS at 06:02

## 2018-01-14 RX ADMIN — INSULIN LISPRO 2 UNITS: 100 INJECTION, SOLUTION INTRAVENOUS; SUBCUTANEOUS at 13:18

## 2018-01-14 RX ADMIN — INSULIN GLARGINE 10 UNITS: 100 INJECTION, SOLUTION SUBCUTANEOUS at 21:11

## 2018-01-14 RX ADMIN — POLYETHYLENE GLYCOL 3350 17 G: 17 POWDER, FOR SOLUTION ORAL at 10:06

## 2018-01-14 RX ADMIN — POTASSIUM CHLORIDE 40 MEQ: 1500 TABLET, EXTENDED RELEASE ORAL at 10:33

## 2018-01-14 RX ADMIN — Medication 10 ML: at 10:15

## 2018-01-14 RX ADMIN — HYDRALAZINE HYDROCHLORIDE 25 MG: 25 TABLET, FILM COATED ORAL at 22:52

## 2018-01-14 RX ADMIN — HYDRALAZINE HYDROCHLORIDE 10 MG: 20 INJECTION INTRAMUSCULAR; INTRAVENOUS at 16:13

## 2018-01-14 RX ADMIN — FENOFIBRATE 54 MG: 54 TABLET ORAL at 09:58

## 2018-01-14 RX ADMIN — FAMOTIDINE 20 MG: 10 INJECTION, SOLUTION INTRAVENOUS at 09:58

## 2018-01-14 ASSESSMENT — PAIN SCALES - GENERAL: PAINLEVEL_OUTOF10: 0

## 2018-01-14 NOTE — PROGRESS NOTES
Hematocrit 34.7 (L) 36.3 - 47.1 %    MCV 87.6 82.6 - 102.9 fL    MCH 27.3 25.2 - 33.5 pg    MCHC 31.1 28.4 - 34.8 g/dL    RDW 13.6 11.8 - 14.4 %    Platelets 174 799 - 665 k/uL    MPV 10.8 8.1 - 13.5 fL    Differential Type NOT REPORTED     Seg Neutrophils 66 (H) 36 - 65 %    Lymphocytes 24 24 - 43 %    Monocytes 7 3 - 12 %    Eosinophils % 2 1 - 4 %    Basophils 1 0 - 2 %    Immature Granulocytes 0 0 %    Segs Absolute 5.66 1.50 - 8.10 k/uL    Absolute Lymph # 1.99 1.10 - 3.70 k/uL    Absolute Mono # 0.61 0.10 - 1.20 k/uL    Absolute Eos # 0.15 0.00 - 0.44 k/uL    Basophils # 0.05 0.00 - 0.20 k/uL    Absolute Immature Granulocyte <0.03 0.00 - 0.30 k/uL    WBC Morphology NOT REPORTED     RBC Morphology NOT REPORTED     Platelet Estimate NOT REPORTED    BASIC METABOLIC PANEL    Collection Time: 01/14/18  7:00 AM   Result Value Ref Range    Glucose 156 (H) 70 - 99 mg/dL    BUN 10 8 - 23 mg/dL    CREATININE 0.46 (L) 0.50 - 0.90 mg/dL    Bun/Cre Ratio NOT REPORTED 9 - 20    Calcium 7.8 (L) 8.6 - 10.4 mg/dL    Sodium 146 (H) 135 - 144 mmol/L    Potassium 2.9 (LL) 3.7 - 5.3 mmol/L    Chloride 108 (H) 98 - 107 mmol/L    CO2 23 20 - 31 mmol/L    Anion Gap 15 9 - 17 mmol/L    GFR Non-African American >60 >60 mL/min    GFR African American >60 >60 mL/min    GFR Comment          GFR Staging NOT REPORTED            RADIOLOGY     CTH 1/14:   Stable large subacute right MCA infarct not significantly changed.  There   remains a small amount of stable hemorrhage within the right temporal lobe. Labs and Images reviewed with:  [x] Hong Wallace  [] Zhane Thayer MD      [] [facility]  [] Tracy Shetty  [] there are no new interval images to review. ASSESSMENT AND PLAN:     ASSESSMENT:     This is a 69 y.o. female transferred from [facility] after presenting with left facial droop and aphasia, found to have a R MCA infarct.   Taken to angio on 1/10 for thrombectomy, but post-treatment angio showed persistent please contact Neuro Critical Care.       Serafin Alex MD  Emergency Medicine Resident PGY2  Neuro Critical Care  Pager [phone]  1/14/2018     8:24 AM

## 2018-01-14 NOTE — PLAN OF CARE
Problem: Falls - Risk of  Goal: Absence of falls  Outcome: Met This Shift  Fall risk precautions in place. Bed in lowest position with wheels locked, bed alarm in place and activated, non-skid socks on pt, fall risk id on pt, call light in reach, pt encouraged to call before getting out of bed and for any other needs or c/o.

## 2018-01-14 NOTE — PROGRESS NOTES
insulin lispro (HUMALOG) injection vial 0-6 Units  0-6 Units Subcutaneous Nightly Tiarra Poole MD   1 Units at 01/13/18 2011    famotidine (PEPCID) injection 20 mg  20 mg Intravenous BID Tiarra Poole MD   20 mg at 01/13/18 2006    aspirin EC tablet 81 mg  81 mg Oral Daily Tiarra Poole MD   81 mg at 01/13/18 0807    amLODIPine (NORVASC) tablet 5 mg  5 mg Oral Daily Tiarra Poole MD   5 mg at 01/13/18 9044    losartan (COZAAR) tablet 100 mg  100 mg Oral Daily Tiarra Poole MD   100 mg at 01/13/18 1913    fenofibrate (TRICOR) tablet 54 mg  54 mg Oral Daily Duy Al MD   54 mg at 01/13/18 0807    tamsulosin (FLOMAX) capsule 0.4 mg  0.4 mg Oral Daily Duy Al MD   0.4 mg at 01/13/18 0807    insulin glargine (LANTUS) injection vial 10 Units  10 Units Subcutaneous Nightly Tiarra Poole MD   10 Units at 01/13/18 2011    insulin lispro (HUMALOG) injection vial 0-12 Units  0-12 Units Subcutaneous TID WC Tiarra Poole MD   2 Units at 01/13/18 1633    polyethylene glycol (GLYCOLAX) packet 17 g  17 g Oral Daily Tiarra Poole MD   17 g at 01/13/18 0806    docusate sodium (COLACE) capsule 100 mg  100 mg Oral Daily Tiarra Poole MD   100 mg at 01/13/18 3678    heparin (porcine) injection 5,000 Units  5,000 Units Subcutaneous 3 times per day Tiarra Poole MD   5,000 Units at 01/13/18 1427    niCARdipine (CARDENE) 20 mg in 0.9 % sodium chloride 200 mL infusion  5 mg/hr Intravenous Continuous Mardelle Ok, DO   Stopped at 01/12/18 1222    glucose (GLUTOSE) 40 % oral gel 15 g  15 g Oral PRN Parish Jonathon, DO        dextrose 50 % solution 12.5 g  12.5 g Intravenous PRN Parish Jonathon, DO        glucagon (rDNA) injection 1 mg  1 mg Intramuscular PRN Parish Jonathon, DO        dextrose 5 % solution  100 mL/hr Intravenous PRN Parish Jonathon, DO        FLUoxetine (PROZAC) capsule 10 mg  10 mg Oral Daily Sarah Oquendo MD   10 mg at 01/13/18 0810    sodium chloride flush 0.9 % injection 10 mL  10 mL Intravenous 2 times per day Jazmín Villalpando MD   10 mL at 01/11/18 1031    sodium chloride flush 0.9 % injection 10 mL  10 mL Intravenous PRN Jazmín Villalpando MD        acetaminophen (TYLENOL) tablet 650 mg  650 mg Oral Q4H PRN Jazmín Villalpando MD        magnesium hydroxide (MILK OF MAGNESIA) 400 MG/5ML suspension 30 mL  30 mL Oral Daily PRN Jazmín Villalpando MD        ondansetron [facility]) injection 4 mg  4 mg Intravenous Q6H PRN Jazmín Villalpando MD        0.9 % sodium chloride infusion   Intravenous Continuous Parish Jonathon,  mL/hr at 01/11/18 0848      simvastatin (ZOCOR) tablet 20 mg  20 mg Oral Nightly Jazmín Villalpando MD   20 mg at 01/13/18 2006    fentaNYL (SUBLIMAZE) injection 25 mcg  25 mcg Intravenous Q2H PRN Jazmín Villalpando MD           Allergies   Allergen Reactions    Tetracyclines & Related Shortness Of Breath       ROS:   Constitutional                  Negative for fever and chills   HEENT                            Negative for ear discharge, ear pain, nosebleed  Eyes                                Negative for photophobia, pain and discharge  Respiratory                      Negative for hemoptysis and sputum  Cardiovascular                Negative for orthopnea, claudication and PND  Gastrointestinal               Negative for abdominal pain, diarrhea, blood in stool  Musculoskeletal               Negative for joint pain, negative for myalgia  Skin                                 Negative for rash or itching  Endo/heme/allergies       Negative for polydipsia, environmental allergy  Psychiatric                       Negative for suicidal ideation. Patient is not anxious    Vitals:    01/13/18 1802   BP: (!) 151/65   Pulse: 60   Resp: 23   Temp:    SpO2: 97%     Admission weight: 190 lb (86.2 kg)    Neurological Examination  Constitutional .General exam well groomed   Head/ Ears /Nose/Throat/external ear . Normal exam  Neck and thyroid . Normal size.  No

## 2018-01-15 PROBLEM — R53.1 LEFT-SIDED WEAKNESS: Status: ACTIVE | Noted: 2018-01-12

## 2018-01-15 LAB
ABSOLUTE EOS #: 0.3 K/UL (ref 0–0.44)
ABSOLUTE IMMATURE GRANULOCYTE: 0.03 K/UL (ref 0–0.3)
ABSOLUTE LYMPH #: 2.18 K/UL (ref 1.1–3.7)
ABSOLUTE MONO #: 0.56 K/UL (ref 0.1–1.2)
ANION GAP SERPL CALCULATED.3IONS-SCNC: 9 MMOL/L (ref 9–17)
BASOPHILS # BLD: 1 % (ref 0–2)
BASOPHILS ABSOLUTE: 0.04 K/UL (ref 0–0.2)
BUN BLDV-MCNC: 13 MG/DL (ref 8–23)
BUN/CREAT BLD: ABNORMAL (ref 9–20)
CALCIUM SERPL-MCNC: 8.8 MG/DL (ref 8.6–10.4)
CHLORIDE BLD-SCNC: 110 MMOL/L (ref 98–107)
CO2: 24 MMOL/L (ref 20–31)
CREAT SERPL-MCNC: 0.46 MG/DL (ref 0.5–0.9)
DIFFERENTIAL TYPE: ABNORMAL
EOSINOPHILS RELATIVE PERCENT: 4 % (ref 1–4)
GFR AFRICAN AMERICAN: >60 ML/MIN
GFR NON-AFRICAN AMERICAN: >60 ML/MIN
GFR SERPL CREATININE-BSD FRML MDRD: ABNORMAL ML/MIN/{1.73_M2}
GFR SERPL CREATININE-BSD FRML MDRD: ABNORMAL ML/MIN/{1.73_M2}
GLUCOSE BLD-MCNC: 142 MG/DL (ref 65–105)
GLUCOSE BLD-MCNC: 144 MG/DL (ref 70–99)
GLUCOSE BLD-MCNC: 149 MG/DL (ref 65–105)
GLUCOSE BLD-MCNC: 151 MG/DL (ref 65–105)
GLUCOSE BLD-MCNC: 156 MG/DL (ref 65–105)
HCT VFR BLD CALC: 35.9 % (ref 36.3–47.1)
HEMOGLOBIN: 11.2 G/DL (ref 11.9–15.1)
IMMATURE GRANULOCYTES: 0 %
LYMPHOCYTES # BLD: 28 % (ref 24–43)
MCH RBC QN AUTO: 27.2 PG (ref 25.2–33.5)
MCHC RBC AUTO-ENTMCNC: 31.2 G/DL (ref 28.4–34.8)
MCV RBC AUTO: 87.1 FL (ref 82.6–102.9)
MONOCYTES # BLD: 7 % (ref 3–12)
PDW BLD-RTO: 13.5 % (ref 11.8–14.4)
PLATELET # BLD: 256 K/UL (ref 138–453)
PLATELET ESTIMATE: ABNORMAL
PMV BLD AUTO: 10.8 FL (ref 8.1–13.5)
POTASSIUM SERPL-SCNC: 3.7 MMOL/L (ref 3.7–5.3)
RBC # BLD: 4.12 M/UL (ref 3.95–5.11)
RBC # BLD: ABNORMAL 10*6/UL
SEG NEUTROPHILS: 60 % (ref 36–65)
SEGMENTED NEUTROPHILS ABSOLUTE COUNT: 4.67 K/UL (ref 1.5–8.1)
SODIUM BLD-SCNC: 143 MMOL/L (ref 135–144)
WBC # BLD: 7.8 K/UL (ref 3.5–11.3)
WBC # BLD: ABNORMAL 10*3/UL

## 2018-01-15 PROCEDURE — 6370000000 HC RX 637 (ALT 250 FOR IP): Performed by: PSYCHIATRY & NEUROLOGY

## 2018-01-15 PROCEDURE — 36415 COLL VENOUS BLD VENIPUNCTURE: CPT

## 2018-01-15 PROCEDURE — 6370000000 HC RX 637 (ALT 250 FOR IP): Performed by: EMERGENCY MEDICINE

## 2018-01-15 PROCEDURE — 97127 HC SP THER IVNTJ W/FOCUS COG FUNCJ: CPT

## 2018-01-15 PROCEDURE — 6360000002 HC RX W HCPCS: Performed by: EMERGENCY MEDICINE

## 2018-01-15 PROCEDURE — 97110 THERAPEUTIC EXERCISES: CPT

## 2018-01-15 PROCEDURE — 99233 SBSQ HOSP IP/OBS HIGH 50: CPT | Performed by: PSYCHIATRY & NEUROLOGY

## 2018-01-15 PROCEDURE — 97530 THERAPEUTIC ACTIVITIES: CPT

## 2018-01-15 PROCEDURE — 2580000003 HC RX 258: Performed by: PSYCHIATRY & NEUROLOGY

## 2018-01-15 PROCEDURE — 2580000003 HC RX 258: Performed by: NURSE PRACTITIONER

## 2018-01-15 PROCEDURE — 1200000000 HC SEMI PRIVATE

## 2018-01-15 PROCEDURE — 6370000000 HC RX 637 (ALT 250 FOR IP): Performed by: NURSE PRACTITIONER

## 2018-01-15 PROCEDURE — 2500000003 HC RX 250 WO HCPCS: Performed by: EMERGENCY MEDICINE

## 2018-01-15 PROCEDURE — 80048 BASIC METABOLIC PNL TOTAL CA: CPT

## 2018-01-15 PROCEDURE — 92526 ORAL FUNCTION THERAPY: CPT

## 2018-01-15 PROCEDURE — S0028 INJECTION, FAMOTIDINE, 20 MG: HCPCS | Performed by: EMERGENCY MEDICINE

## 2018-01-15 PROCEDURE — 2580000003 HC RX 258: Performed by: EMERGENCY MEDICINE

## 2018-01-15 PROCEDURE — 82947 ASSAY GLUCOSE BLOOD QUANT: CPT

## 2018-01-15 PROCEDURE — 94762 N-INVAS EAR/PLS OXIMTRY CONT: CPT

## 2018-01-15 PROCEDURE — 85025 COMPLETE CBC W/AUTO DIFF WBC: CPT

## 2018-01-15 RX ORDER — CLOPIDOGREL BISULFATE 75 MG/1
75 TABLET ORAL DAILY
Status: DISCONTINUED | OUTPATIENT
Start: 2018-01-15 | End: 2018-01-19 | Stop reason: HOSPADM

## 2018-01-15 RX ADMIN — ASPIRIN 81 MG: 81 TABLET, COATED ORAL at 08:52

## 2018-01-15 RX ADMIN — INSULIN LISPRO 1 UNITS: 100 INJECTION, SOLUTION INTRAVENOUS; SUBCUTANEOUS at 21:28

## 2018-01-15 RX ADMIN — HEPARIN SODIUM 5000 UNITS: 5000 INJECTION, SOLUTION INTRAVENOUS; SUBCUTANEOUS at 13:07

## 2018-01-15 RX ADMIN — CLOPIDOGREL 75 MG: 75 TABLET, FILM COATED ORAL at 21:21

## 2018-01-15 RX ADMIN — HYDRALAZINE HYDROCHLORIDE 25 MG: 25 TABLET, FILM COATED ORAL at 12:56

## 2018-01-15 RX ADMIN — POLYETHYLENE GLYCOL 3350 17 G: 17 POWDER, FOR SOLUTION ORAL at 08:54

## 2018-01-15 RX ADMIN — INSULIN LISPRO 2 UNITS: 100 INJECTION, SOLUTION INTRAVENOUS; SUBCUTANEOUS at 17:56

## 2018-01-15 RX ADMIN — HYDRALAZINE HYDROCHLORIDE 10 MG: 20 INJECTION INTRAMUSCULAR; INTRAVENOUS at 21:21

## 2018-01-15 RX ADMIN — INSULIN LISPRO 2 UNITS: 100 INJECTION, SOLUTION INTRAVENOUS; SUBCUTANEOUS at 13:07

## 2018-01-15 RX ADMIN — POTASSIUM CHLORIDE 40 MEQ: 1500 TABLET, EXTENDED RELEASE ORAL at 21:21

## 2018-01-15 RX ADMIN — SODIUM CHLORIDE: 9 INJECTION, SOLUTION INTRAVENOUS at 04:09

## 2018-01-15 RX ADMIN — FLUOXETINE HYDROCHLORIDE 10 MG: 10 CAPSULE ORAL at 08:54

## 2018-01-15 RX ADMIN — Medication 10 ML: at 21:38

## 2018-01-15 RX ADMIN — LOSARTAN POTASSIUM 100 MG: 50 TABLET, FILM COATED ORAL at 08:53

## 2018-01-15 RX ADMIN — Medication 10 ML: at 08:57

## 2018-01-15 RX ADMIN — AMLODIPINE BESYLATE 10 MG: 10 TABLET ORAL at 09:03

## 2018-01-15 RX ADMIN — HYDRALAZINE HYDROCHLORIDE 25 MG: 25 TABLET, FILM COATED ORAL at 08:53

## 2018-01-15 RX ADMIN — HEPARIN SODIUM 5000 UNITS: 5000 INJECTION, SOLUTION INTRAVENOUS; SUBCUTANEOUS at 06:13

## 2018-01-15 RX ADMIN — FENOFIBRATE 54 MG: 54 TABLET ORAL at 08:54

## 2018-01-15 RX ADMIN — TAMSULOSIN HYDROCHLORIDE 0.4 MG: 0.4 CAPSULE ORAL at 08:54

## 2018-01-15 RX ADMIN — POTASSIUM CHLORIDE 40 MEQ: 1500 TABLET, EXTENDED RELEASE ORAL at 08:52

## 2018-01-15 RX ADMIN — SIMVASTATIN 20 MG: 20 TABLET, FILM COATED ORAL at 21:21

## 2018-01-15 RX ADMIN — INSULIN LISPRO 2 UNITS: 100 INJECTION, SOLUTION INTRAVENOUS; SUBCUTANEOUS at 10:02

## 2018-01-15 RX ADMIN — DOCUSATE SODIUM 100 MG: 100 CAPSULE ORAL at 08:53

## 2018-01-15 RX ADMIN — HEPARIN SODIUM 5000 UNITS: 5000 INJECTION, SOLUTION INTRAVENOUS; SUBCUTANEOUS at 21:21

## 2018-01-15 RX ADMIN — HYDRALAZINE HYDROCHLORIDE 10 MG: 20 INJECTION INTRAMUSCULAR; INTRAVENOUS at 04:11

## 2018-01-15 RX ADMIN — FAMOTIDINE 20 MG: 10 INJECTION, SOLUTION INTRAVENOUS at 08:55

## 2018-01-15 RX ADMIN — FAMOTIDINE 20 MG: 10 INJECTION, SOLUTION INTRAVENOUS at 21:21

## 2018-01-15 RX ADMIN — INSULIN GLARGINE 10 UNITS: 100 INJECTION, SOLUTION SUBCUTANEOUS at 22:01

## 2018-01-15 ASSESSMENT — PAIN SCALES - GENERAL: PAINLEVEL_OUTOF10: 0

## 2018-01-15 NOTE — PROGRESS NOTES
Speech Language Pathology  Speech Language Pathology  [address] [facility]    Dysphagia Treatment Note    Date: 1/15/2018  Patients Name: Emily Jim  MRN: [id number]  Diagnosis: Dysphagia  Patient Active Problem List   Diagnosis Code    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 [facility]) I63.311    Cerebrovascular accident (CVA) ([facility] [address] I63.9    Left arm weakness R29.898    Facial droop R29.810    Dysarthria R47.1    Cerebral edema (HCC) G93.6    Left hemiparesis (HCC) G81.94       Pain: 0/10    Dysphagia Treatment  Treatment time: 6863-0914    Subjective: [x] Alert [x] Cooperative     [] Confused     [] Agitated    [] Lethargic    Objective/Assessment:    Pt. Seen for diet upgrade. Pt. Given trials of thin liquids via straw and peaches. Pt. With no overt s/s of aspiration noted with thin.  + difficulty with mastication with peaches due to no upper plate and severe L sided weakness. + oral loss noted with the peach. Pt. Reports her daughter is binging in her upper plate. Patient presents with probable safe swallow for Level 2 diet ( if daughter brings in her upper denture plate) with thin liquids as evidenced by no overt s/s of aspiration noted with consistencies tested. Recommend small sips and bites, only feed when alert and awake and upright at 90 degrees for all PO intake. Recommend close monitoring for overt/clinical s/s of aspiration and D/C PO intake and complete Modified Barium Swallow Study should they occur. Results and recommendations reported to RN. Recall 3 units with distraction with 66% increased to 100% with repetition and min cues. Task insight: 4/6 increased to 6/6 with mod cues. Divergent thinking task with 5/8, 6/8, 5/8 increased to 8/8 with mod cues. Plan:  [x] Continue  services    [] Discharge from :            Treatment completed by:  TISHA Roberts

## 2018-01-15 NOTE — PROGRESS NOTES
BRAIN/VENTRICLES: Right MCA territory evolving acute infarction with increasing hypodensity. Minimal intraparenchymal hemorrhage is stable. No midline shift. No hydrocephalus. No new hemorrhage or new region of acute infarction. ORBITS: The visualized portion of the orbits demonstrate no acute abnormality. SINUSES: The visualized paranasal sinuses and mastoid air cells demonstrate no acute abnormality. SOFT TISSUES/SKULL:  No acute abnormality of the visualized skull or soft tissues. Right MCA territory evolving acute infarct with minimal hemorrhagic transformation is stable. Otherwise stable exam.     Ct Head Wo Contrast    Result Date: 1/11/2018  EXAMINATION: CT OF THE HEAD WITHOUT CONTRAST  1/11/2018 6:11 am TECHNIQUE: CT of the head was performed without the administration of intravenous contrast. Dose modulation, iterative reconstruction, and/or weight based adjustment of the mA/kV was utilized to reduce the radiation dose to as low as reasonably achievable. COMPARISON: Head CT from 01/10/2018 HISTORY: ORDERING SYSTEM PROVIDED HISTORY: r/o ICH TECHNOLOGIST PROVIDED HISTORY: Has a \"code stroke\" or \"stroke alert\" been called? ->No FINDINGS: BRAIN/VENTRICLES:  There is evolution of the hypodensity involving the right frontal, parietal and temporal lobes with disruption of the gray-white matter interface compatible with MCA territory infarction. There is similar degree of hyperdense blood products within the area of infarction. There is no new acute intracranial hemorrhage. No mass effect or midline shift. The remainder of the gray-white differentiation is maintained without evidence of a new acute infarct. There is no evidence of hydrocephalus. ORBITS: The visualized portion of the orbits demonstrate no acute abnormality. SINUSES: The visualized paranasal sinuses and mastoid air cells demonstrate no acute abnormality. SOFT TISSUES/SKULL:  No acute abnormality of the visualized skull or soft tissues. Evolution of the right MCA territory infarct with similar degree of blood products within the area of infarction. No additional area of acute infarction or acute intracranial blood products appreciated. Ct Head Wo Contrast    Result Date: 1/11/2018  EXAMINATION: CT OF THE HEAD WITHOUT CONTRAST  1/10/2018 11:45 pm TECHNIQUE: CT of the head was performed without the administration of intravenous contrast. Dose modulation, iterative reconstruction, and/or weight based adjustment of the mA/kV was utilized to reduce the radiation dose to as low as reasonably achievable. COMPARISON: None. HISTORY: ORDERING SYSTEM PROVIDED HISTORY: ro bleed post angio TECHNOLOGIST PROVIDED HISTORY: Has a \"code stroke\" or \"stroke alert\" been called? ->No FINDINGS: BRAIN/VENTRICLES: Subarachnoid hemorrhage throughout the right hemisphere, predominantly within the right sylvian fissure present. Loss of gray-white differentiation within right MCA territory. Hyperdensity involving the right MCA, M2 origin. Prominent vascular calcifications are present. No midline shift. No hydrocephalus. Patchy hypodensity within the mili is nonspecific. ORBITS: The visualized portion of the orbits demonstrate no acute abnormality. SINUSES: Left maxillary sinus 1.5 cm mucous retention cyst or polyp. Trace ethmoid sinus mucosal thickening. SOFT TISSUES/SKULL:  No acute abnormality of the visualized skull or soft tissues. Acute right hemispheric subarachnoid hemorrhage, moderate volume. Right MCA territory acute infarction. Hyperdense right MCA concerning for thrombus or contrast staining. Pontine hypodensity likely representing age-indeterminate ischemia.      Mri Orbits Face Neck W Wo Contrast    Result Date: 1/14/2018  EXAMINATION: MRI OF THE BRAIN AND MRI OF THE ORBITS WITH AND WITHOUT CONTRAST 1/12/2018 6:49 am TECHNIQUE: Multiplanar multisequence MRI of the brain and MRI of the orbits was performed with and without intravenous contrast. COMPARISON: None. HISTORY: ORDERING SYSTEM PROVIDED HISTORY: Exophthalmos of the right eye, spefically only need MRI Orbits FINDINGS: Asymmetric mild right globe proptosis. Otherwise, the globes are intact bilaterally. Status post bilateral lens surgeries. No ocular or orbital masses bilaterally. The optic nerve complexes are normal bilaterally. Optic chiasm is normal. The extraocular muscles are normal bilaterally. The intraconal and extraconal fat is normal. The lacrimal glands are normal bilaterally. There is mild mucosal thickening of the paranasal sinuses. Small left maxillary sinus mucosal retention cyst or polyp. Evolving acute/early subacute right MCA territory infarction with resultant T2 hyperintense signal in the right MCA territory. Please refer to dedicated MRI brain report for more details. 1. Mild asymmetric right globe proptosis. No intraorbital mass or abnormal enhancement. 2. Evolving right MCA territory acute/early subacute infarction. Please refer to dedicated MRI brain report for more details. Mri Brain Wo Contrast    Result Date: 1/14/2018  EXAMINATION: MRI OF THE BRAIN WITHOUT CONTRAST  1/12/2018 6:49 am TECHNIQUE: Multiplanar multisequence MRI of the brain was performed without the administration of intravenous contrast. COMPARISON: Head CT 01/12/2018, 01/11/2018, 01/10/2018. HISTORY: ORDERING SYSTEM PROVIDED HISTORY: stroke FINDINGS: INTRACRANIAL STRUCTURES/VENTRICLES: Large area of diffusion restriction involving the right caudate head, right frontal lobe, and posterior right temporal lobe consistent with evolving subacute right MCA territory infarction. There is gyral swelling with sulcal effacement. Partial effacement of the right lateral ventricle. No significant midline shift. No hydrocephalus.   There is only minimal blooming artifact on the T2 GRE sequence within the right MCA territory infarction which is consistent with a small amount of hemorrhagic mg daily  - Continue home prozac 10 mg daily  - Fentanyl 25 mcg q2h prn  - Neuro checks per protocol    CARDIOVASCULAR:  - SBP goal 130-170  - Losartan 100 mg daily  - Norvasc increased from 5 mg to 10 mg daily  - Hydralazine 25 mg q8h added last night  - Tricor 54 mg daily  - Zocor 20 mg daily  - MARISSA tomorrow, if negative may need LOOP recorder    PULMONARY:  - Maintaining O2 saturations on room air  - Continue to monitor    RENAL/FLUID/ELECTROLYTE:  - Remove iniguez today; flomax 0.4 mg daily started yesterday for acute urinary retention  - Monitor for urinary retention s/p iniguez removal  - I&Os  - Maintenance fluids discontinued - tolerating PO diet  - BUN 13 / Creatinine 0.46  - Replace electrolytes prn  - Daily BMP    GI/NUTRITION:  NUTRITION:  DIET CARB CONTROL; Carb Control: 4 carbs/meal (approximate 1800 kcals/day); Dysphagia I Pureed; Nectar Thick  Dietary Nutrition Supplements: Standard High Calorie Oral Supplement  Diet NPO, After Midnight  - Colace and Miralax daily  - Pepcid 20 mg BID    ID:  - Tmax 100  - No leukocytosis, WBC 7.8    HEMATOLOGIC:  - Hemoglobin 11.2 / Hematocrit 35.9  - Platelets 978  - Daily CBC    ENDOCRINE:  PROBLEMS:  - hyperglycemia  PLAN:   - monitor blood glucose  - insulin therapy -  Continue medium dose ISS  - Lantus 10 units nightly    OTHER:  - PT/OT    PROPHYLAXIS:  Stress ulcer: PPI    DVT PROPHYLAXIS:  - SCD sleeves - Thigh High   - ABDI stockings - Thigh High  - Heparin 5,000 units q8h    DISPOSITION:  [] To remain ICU: **  [x] OK for out of ICU from Neuro Critical Care standpoint    Please contact neuro critical care with any changes in exam or patient status. Laya Thomas CNP  Neuro Critical Care  Phone [phone]  1/15/2018     1:08 PM     Stroke and Neuroscience Intensive Care Attending:    I obtained brief history, examined the patient,reviewed the nurse practitioner's note and agree with the documented findings and plan of care.  Any areas of disagreement

## 2018-01-15 NOTE — PROGRESS NOTES
brisker on left   Plantar response extensor on left      Assessment :      Data:  EXAMINATION:   CT OF THE HEAD WITHOUT CONTRAST  1/14/2018 2:21 am       TECHNIQUE:   CT of the head was performed without the administration of intravenous   contrast. Dose modulation, iterative reconstruction, and/or weight based   adjustment of the mA/kV was utilized to reduce the radiation dose to as low   as reasonably achievable.       COMPARISON:   01/13/2018       HISTORY:   ORDERING SYSTEM PROVIDED HISTORY: bleed   TECHNOLOGIST PROVIDED HISTORY:   Has a \"code stroke\" or \"stroke alert\" been called? ->No       FINDINGS:   BRAIN/VENTRICLES:       Large subacute right MCA infarct is identified.  Small amount of hemorrhage   remains within the infarct within the right temporal lobe.  No new hemorrhage   is detected.       There is no evidence for hydrocephalus.  No extra-axial collection is seen.    There is no evidence of midline shift.       Severe intracranial atherosclerosis is identified involving the carotid   arteries.       ORBITS: The visualized portion of the orbits demonstrate no acute abnormality.       SINUSES: The visualized paranasal sinuses and mastoid air cells demonstrate   no acute abnormality.       SOFT TISSUES/SKULL:  No acute abnormality of the visualized skull or soft   tissues.           Impression   Stable large subacute right MCA infarct not significantly changed.  There   remains a small amount of stable hemorrhage within the right temporal lobe.             Lab Results:   CBC:   Recent Labs      01/13/18   0649  01/14/18   0700  01/15/18   0717   WBC  10.2  8.5  7.8   HGB  11.1*  10.8*  11.2*   PLT  230  222  256     BMP:    Recent Labs      01/13/18   0649  01/14/18   0700  01/14/18   1408  01/15/18   0717   NA  144  146*   --   143   K  3.1*  2.9*  3.5*  3.7   CL  108*  108*   --   110*   CO2  23  23   --   24   BUN  12  10   --   13   CREATININE  0.38*  0.46*   --   0.46*   GLUCOSE  171*  156*   -- 144*         Lab Results   Component Value Date    CHOL 171 01/11/2018    LDLCHOLESTEROL 97 01/11/2018    HDL 25 (L) 01/11/2018    TRIG 243 (H) 01/11/2018    TSH 0.97 01/11/2018    LABA1C 7.1 (H) 01/12/2018     Assessment :     Thrombotic right MCA distribution infarction with stable substantial deficits as described above.   Only antithrombotic medication from stroke prevention standpoint is aspirin at this time.     Plan:     Push PT/OT

## 2018-01-15 NOTE — PROGRESS NOTES
Active Problem right cerebral infarction with right MCA thrombosis undergoing thrombectomy . The condition is FU Head CT right frontal temporal parietal infarction with hemorrhagic transformation . She is alert responding verbally oriented x 3 with no arm withdrawal mild withdrawal of left leg with right gaze preference with absent left visual threat. Blood pressure parameter is < 180 . Significant medications zocor 20 mg po qd, aspirin 81 mg po qd  . Testing CTA head and neck demonstrating a right distal M1 MCA occlusion with good collaterals. MRI of Head with acute right frontal temporal parietal infarction with tiny left cerebellar lacune . Cholesterol 171 , LDL 97 ,  , Hga1c 7,1 . Cardiac 2 D echo normal LVF , EF 55 % . Evidence of diastolic dysfunction . Trivial TR . 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 No pertinent family history. Social History     Social History    Marital status:       Spouse name: N/A    Number of children: N/A    Years of education: N/A     Social History Main Topics    Smoking status: Never Smoker    Smokeless tobacco: Never Used    Alcohol use No    Drug use: No    Sexual activity: Not Asked     Other Topics Concern    None     Social History Narrative    None       Current Facility-Administered Medications   Medication Dose Route Frequency Provider Last Rate Last Dose    potassium chloride (KLOR-CON M) extended release tablet 40 mEq  40 mEq Oral BID Lc Dunaway MD   40 mEq at 01/14/18 0958    potassium chloride (KLOR-CON M) extended release tablet 40 mEq  40 mEq Oral PRN Mariane Severin

## 2018-01-16 ENCOUNTER — APPOINTMENT (OUTPATIENT)
Dept: CARDIAC CATH/INVASIVE PROCEDURES | Age: 78
DRG: 023 | End: 2018-01-16
Payer: MEDICARE

## 2018-01-16 PROBLEM — E11.65 CONTROLLED TYPE 2 DIABETES MELLITUS WITH HYPERGLYCEMIA, WITHOUT LONG-TERM CURRENT USE OF INSULIN (HCC): Status: ACTIVE | Noted: 2018-01-16

## 2018-01-16 LAB
ABSOLUTE EOS #: 0.12 K/UL (ref 0–0.44)
ABSOLUTE IMMATURE GRANULOCYTE: 0.06 K/UL (ref 0–0.3)
ABSOLUTE LYMPH #: 1.72 K/UL (ref 1.1–3.7)
ABSOLUTE MONO #: 0.47 K/UL (ref 0.1–1.2)
ANION GAP SERPL CALCULATED.3IONS-SCNC: 11 MMOL/L (ref 9–17)
BASOPHILS # BLD: 0 % (ref 0–2)
BASOPHILS ABSOLUTE: 0.03 K/UL (ref 0–0.2)
BUN BLDV-MCNC: 11 MG/DL (ref 8–23)
BUN/CREAT BLD: ABNORMAL (ref 9–20)
CALCIUM SERPL-MCNC: 8.7 MG/DL (ref 8.6–10.4)
CHLORIDE BLD-SCNC: 108 MMOL/L (ref 98–107)
CO2: 24 MMOL/L (ref 20–31)
CREAT SERPL-MCNC: 0.4 MG/DL (ref 0.5–0.9)
DIFFERENTIAL TYPE: ABNORMAL
EOSINOPHILS RELATIVE PERCENT: 2 % (ref 1–4)
GFR AFRICAN AMERICAN: >60 ML/MIN
GFR NON-AFRICAN AMERICAN: >60 ML/MIN
GFR SERPL CREATININE-BSD FRML MDRD: ABNORMAL ML/MIN/{1.73_M2}
GFR SERPL CREATININE-BSD FRML MDRD: ABNORMAL ML/MIN/{1.73_M2}
GLUCOSE BLD-MCNC: 163 MG/DL (ref 65–105)
GLUCOSE BLD-MCNC: 164 MG/DL (ref 65–105)
GLUCOSE BLD-MCNC: 169 MG/DL (ref 70–99)
GLUCOSE BLD-MCNC: 176 MG/DL (ref 65–105)
GLUCOSE BLD-MCNC: 180 MG/DL (ref 65–105)
HCT VFR BLD CALC: 35.1 % (ref 36.3–47.1)
HEMOGLOBIN: 11 G/DL (ref 11.9–15.1)
IMMATURE GRANULOCYTES: 1 %
LYMPHOCYTES # BLD: 22 % (ref 24–43)
MCH RBC QN AUTO: 27.1 PG (ref 25.2–33.5)
MCHC RBC AUTO-ENTMCNC: 31.3 G/DL (ref 28.4–34.8)
MCV RBC AUTO: 86.5 FL (ref 82.6–102.9)
MONOCYTES # BLD: 6 % (ref 3–12)
NRBC AUTOMATED: 0 PER 100 WBC
PDW BLD-RTO: 13.6 % (ref 11.8–14.4)
PLATELET # BLD: 293 K/UL (ref 138–453)
PLATELET ESTIMATE: ABNORMAL
PMV BLD AUTO: 11 FL (ref 8.1–13.5)
POTASSIUM SERPL-SCNC: 4.3 MMOL/L (ref 3.7–5.3)
RBC # BLD: 4.06 M/UL (ref 3.95–5.11)
RBC # BLD: ABNORMAL 10*6/UL
SEG NEUTROPHILS: 69 % (ref 36–65)
SEGMENTED NEUTROPHILS ABSOLUTE COUNT: 5.51 K/UL (ref 1.5–8.1)
SODIUM BLD-SCNC: 143 MMOL/L (ref 135–144)
WBC # BLD: 7.9 K/UL (ref 3.5–11.3)
WBC # BLD: ABNORMAL 10*3/UL

## 2018-01-16 PROCEDURE — 6370000000 HC RX 637 (ALT 250 FOR IP): Performed by: PSYCHIATRY & NEUROLOGY

## 2018-01-16 PROCEDURE — 6370000000 HC RX 637 (ALT 250 FOR IP): Performed by: NURSE PRACTITIONER

## 2018-01-16 PROCEDURE — 82947 ASSAY GLUCOSE BLOOD QUANT: CPT

## 2018-01-16 PROCEDURE — 99232 SBSQ HOSP IP/OBS MODERATE 35: CPT | Performed by: PSYCHIATRY & NEUROLOGY

## 2018-01-16 PROCEDURE — 6370000000 HC RX 637 (ALT 250 FOR IP): Performed by: EMERGENCY MEDICINE

## 2018-01-16 PROCEDURE — 99233 SBSQ HOSP IP/OBS HIGH 50: CPT | Performed by: PSYCHIATRY & NEUROLOGY

## 2018-01-16 PROCEDURE — 85025 COMPLETE CBC W/AUTO DIFF WBC: CPT

## 2018-01-16 PROCEDURE — 1200000000 HC SEMI PRIVATE

## 2018-01-16 PROCEDURE — 94762 N-INVAS EAR/PLS OXIMTRY CONT: CPT

## 2018-01-16 PROCEDURE — 76937 US GUIDE VASCULAR ACCESS: CPT

## 2018-01-16 PROCEDURE — 2580000003 HC RX 258: Performed by: PSYCHIATRY & NEUROLOGY

## 2018-01-16 PROCEDURE — 93325 DOPPLER ECHO COLOR FLOW MAPG: CPT

## 2018-01-16 PROCEDURE — 2580000003 HC RX 258: Performed by: EMERGENCY MEDICINE

## 2018-01-16 PROCEDURE — 99232 SBSQ HOSP IP/OBS MODERATE 35: CPT | Performed by: FAMILY MEDICINE

## 2018-01-16 PROCEDURE — 80048 BASIC METABOLIC PNL TOTAL CA: CPT

## 2018-01-16 PROCEDURE — 92507 TX SP LANG VOICE COMM INDIV: CPT

## 2018-01-16 PROCEDURE — B24BZZ4 ULTRASONOGRAPHY OF HEART WITH AORTA, TRANSESOPHAGEAL: ICD-10-PCS | Performed by: INTERNAL MEDICINE

## 2018-01-16 PROCEDURE — 99232 SBSQ HOSP IP/OBS MODERATE 35: CPT | Performed by: PHYSICAL MEDICINE & REHABILITATION

## 2018-01-16 PROCEDURE — 93312 ECHO TRANSESOPHAGEAL: CPT

## 2018-01-16 PROCEDURE — 6360000002 HC RX W HCPCS: Performed by: EMERGENCY MEDICINE

## 2018-01-16 PROCEDURE — 36415 COLL VENOUS BLD VENIPUNCTURE: CPT

## 2018-01-16 RX ORDER — FAMOTIDINE 20 MG/1
20 TABLET, FILM COATED ORAL 2 TIMES DAILY
Status: DISCONTINUED | OUTPATIENT
Start: 2018-01-16 | End: 2018-01-19 | Stop reason: HOSPADM

## 2018-01-16 RX ADMIN — LOSARTAN POTASSIUM 100 MG: 50 TABLET, FILM COATED ORAL at 15:37

## 2018-01-16 RX ADMIN — SIMVASTATIN 20 MG: 20 TABLET, FILM COATED ORAL at 20:50

## 2018-01-16 RX ADMIN — POTASSIUM CHLORIDE 40 MEQ: 1500 TABLET, EXTENDED RELEASE ORAL at 15:37

## 2018-01-16 RX ADMIN — Medication 10 ML: at 20:50

## 2018-01-16 RX ADMIN — TAMSULOSIN HYDROCHLORIDE 0.4 MG: 0.4 CAPSULE ORAL at 15:36

## 2018-01-16 RX ADMIN — HEPARIN SODIUM 5000 UNITS: 5000 INJECTION, SOLUTION INTRAVENOUS; SUBCUTANEOUS at 15:37

## 2018-01-16 RX ADMIN — FENOFIBRATE 54 MG: 54 TABLET ORAL at 15:36

## 2018-01-16 RX ADMIN — HYDRALAZINE HYDROCHLORIDE 25 MG: 25 TABLET, FILM COATED ORAL at 15:37

## 2018-01-16 RX ADMIN — INSULIN GLARGINE 10 UNITS: 100 INJECTION, SOLUTION SUBCUTANEOUS at 20:49

## 2018-01-16 RX ADMIN — HEPARIN SODIUM 5000 UNITS: 5000 INJECTION, SOLUTION INTRAVENOUS; SUBCUTANEOUS at 20:50

## 2018-01-16 RX ADMIN — HYDRALAZINE HYDROCHLORIDE 25 MG: 25 TABLET, FILM COATED ORAL at 07:15

## 2018-01-16 RX ADMIN — POTASSIUM CHLORIDE 40 MEQ: 1500 TABLET, EXTENDED RELEASE ORAL at 20:51

## 2018-01-16 RX ADMIN — Medication 10 ML: at 21:06

## 2018-01-16 RX ADMIN — AMLODIPINE BESYLATE 10 MG: 10 TABLET ORAL at 15:36

## 2018-01-16 RX ADMIN — HYDRALAZINE HYDROCHLORIDE 25 MG: 25 TABLET, FILM COATED ORAL at 20:50

## 2018-01-16 RX ADMIN — INSULIN LISPRO 1 UNITS: 100 INJECTION, SOLUTION INTRAVENOUS; SUBCUTANEOUS at 20:50

## 2018-01-16 RX ADMIN — FLUOXETINE HYDROCHLORIDE 10 MG: 10 CAPSULE ORAL at 15:37

## 2018-01-16 RX ADMIN — CLOPIDOGREL 75 MG: 75 TABLET, FILM COATED ORAL at 15:36

## 2018-01-16 RX ADMIN — INSULIN LISPRO 2 UNITS: 100 INJECTION, SOLUTION INTRAVENOUS; SUBCUTANEOUS at 16:39

## 2018-01-16 ASSESSMENT — ENCOUNTER SYMPTOMS
WHEEZING: 0
DIARRHEA: 0
ABDOMINAL PAIN: 0
NAUSEA: 0
VOMITING: 0
SHORTNESS OF BREATH: 0

## 2018-01-16 NOTE — PROGRESS NOTES
Physical Therapy  DATE: 2018    NAME: Ken Raza  MRN: [id number]   : 1940    Patient not seen this date for Physical Therapy due to:  [] Blood transfusion in progress  [] Hemodialysis  []  Patient Declined  [] Spine Precautions   [] Strict Bedrest  [] Surgery/ Procedure  [x] Testing   (pt off the floor for MARISSA. Will check back tomorrow)      [] Other        [] PT being discontinued at this time. Patient independent. No further needs. [] PT being discontinued at this time as the patient has been transferred to palliative care. No further needs.     Owen Moore, PTA

## 2018-01-16 NOTE — PROGRESS NOTES
Physical Medicine & Rehabilitation  Progress Note        Admitting Physician: Karly Gerard MD    Primary Care Provider: Elise Dean MD     Chief Complaint: Left facial droop and difficulty speaking    Brief History: This is a follow up to the initial consult on Ms. Ashlee Hendrickson is a 69 y.o. left handed female who was admitted to [facility] on 1/10/2018 with Cerebrovascular Accident. Patient was found slumped in a chair with left facial droop and left arm weakness and difficulty speaking. In the ED CTA head and neck revealed right distal MCA occlusion with good collaterals. She was transferred from [facility] [place] [address] with R MCA occlusion. Pt received 4000units heparin bolus, 10mL/hr heparin infusing and also given 324mg asa. Patient underwent a mechanical thrombectomy by Dr. Amy Day on 1/10/18. She underwent a ECHO on 1/12 revealing an EF of 55%. Also on 1/12, patient was noted to have right exophthalmos with negative MRI orbits, normal pressures, and normal ocular ultrasound. Follow up [address] [place] [address] on 1/14 revealed stable large subacute right MCA infarct not significantly changed. There remains a small amount of stable hemorrhage within the right temporal lobe [facility]). Patient has past medical history of diabetes, CIDP, hyperlipidemia and hypertension. Subjective: The patient is resting comfortably. She answers questions appropriately, but is lethargic and falls asleep during exam.  She is arousable.        ROS:  Constitutional: negative for anorexia, chills, fatigue, fevers, sweats and weight loss  Eyes: negative for redness and visual disturbance  Ears, nose, mouth, throat, and face: negative for earaches, epistaxis, sore throat and tinnitus  Respiratory: negative for cough and shortness of breath  Cardiovascular: negative for chest pain, dyspnea and palpitations  Gastrointestinal: negative for abdominal pain, change in bowel habits, constipation, nausea and vomiting  Genitourinary:negative for dysuria,

## 2018-01-16 NOTE — PROGRESS NOTES
[facility]   [address]    Progress Note    1/16/2018    7:48 AM    Name:   Marichuy Vale  MRN:     [id number]     Acct:      [de-identified]   Room:   [address] [place] [address] Day:  6  Admit Date:  1/10/2018  5:35 PM    PCP:   Russ Fregoso MD  Code Status:  Full Code    Subjective:     C/C:   Chief Complaint   Patient presents with    Cerebrovascular Accident     Pt transferred from [address] [place] [address] with R MCA occlusion. Pt received 4000units heparin bolus, 10mL/hr heparin infusing and also given 324mg asa. LKW 1040 today. Pt with slurred speech and left sided facial droop pta to [address] [place] [address], symptoms resolved upon arrival to [address] [place] [address]. Interval History Status: Improving. The patient is a 69 y.o.  female who is admitted to the hospital for the management of ischemic stroke     Patient seen and examined at the bed side , no new acute events overnight except ,     Patient remained to have left facial droop and left-sided weakness    Pt denies any Chest pain , new pain, vomiting. Notes from nursing staff and Consults had been reviewed, and the overnight progress had been checked with the nursing staff as well. Brief History:     Marichuy Vale is a 69 y.o. female with PMH of CIDP and DM who presented with an episode of aphasia and left facial droop. She was found to have right M1 MCA occlusion and underwent emergent mechanical thrombectomy on 1/10/18 with TICI 1 recanalization. Review of Systems:   Review of Systems   Constitutional: Positive for activity change and fatigue. Negative for appetite change and fever. Respiratory: Negative for shortness of breath and wheezing. Cardiovascular: Negative for chest pain and leg swelling. Gastrointestinal: Negative for abdominal pain, diarrhea, nausea and vomiting. Neurological: Positive for facial asymmetry and weakness. Negative for syncope and headaches. Medications: Allergies:     Allergies Allergen Reactions    Tetracyclines & Related Shortness Of Breath       Current Meds:   Scheduled Meds:    clopidogrel  75 mg Oral Daily    potassium chloride  40 mEq Oral BID    amLODIPine  10 mg Oral Daily    hydrALAZINE  25 mg Oral 3 times per day    sodium chloride flush  10 mL Intravenous BID    insulin lispro  0-6 Units Subcutaneous Nightly    famotidine (PEPCID) injection  20 mg Intravenous BID    losartan  100 mg Oral Daily    fenofibrate  54 mg Oral Daily    tamsulosin  0.4 mg Oral Daily    insulin glargine  10 Units Subcutaneous Nightly    insulin lispro  0-12 Units Subcutaneous TID WC    polyethylene glycol  17 g Oral Daily    docusate sodium  100 mg Oral Daily    heparin (porcine)  5,000 Units Subcutaneous 3 times per day    FLUoxetine  10 mg Oral Daily    sodium chloride flush  10 mL Intravenous 2 times per day    simvastatin  20 mg Oral Nightly     Continuous Infusions:    dextrose       PRN Meds: potassium chloride **OR** potassium chloride **OR** potassium chloride, hydrALAZINE, glucose, dextrose, glucagon (rDNA), dextrose, sodium chloride flush, acetaminophen, magnesium hydroxide, ondansetron, fentanNYL    Data:     Past Medical History:   has a past medical history of CIDP (chronic inflammatory demyelinating polyneuropathy) ([address] Diabetes mellitus ([address] Hyperlipidemia; and Hypertension. Social History:   reports that she has never smoked. She has never used smokeless tobacco. She reports that she does not drink alcohol or use drugs. Family History: History reviewed. No pertinent family history.     Vitals:  Patient Vitals for the past 24 hrs:   BP Temp Temp src Pulse Resp SpO2   01/16/18 0747 (!) 173/73 98.7 °F (37.1 °C) - 73 16 91 %   01/15/18 2250 (!) 162/71 98.7 °F (37.1 °C) Oral 81 18 (!) 80 %   01/15/18 1928 (!) 144/59 99.3 °F (37.4 °C) Oral 60 16 93 %   01/15/18 1730 (!) 156/78 97.6 °F (36.4 °C) Oral 72 20 97 %   01/15/18 1256 (!) 158/63 - - - - -   01/15/18 1126 (!) 158/55 98.6 °F (37 °C) Oral 62 21 96 %     Temp (24hrs), Av.6 °F (37 °C), Min:97.6 °F (36.4 °C), Max:99.3 °F (37.4 °C)    Recent Labs      01/15/18   1250  01/15/18   1742  01/15/18   2013  01/16/18   0727   POCGLU  142*  156*  151*  164*       I/O (24Hr):     Intake/Output Summary (Last 24 hours) at 18 0748  Last data filed at 01/15/18 1500   Gross per 24 hour   Intake              360 ml   Output              400 ml   Net              -40 ml       Labs:  Recent Results (from the past 24 hour(s))   POC Glucose Fingerstick    Collection Time: 01/15/18  8:33 AM   Result Value Ref Range    POC Glucose 149 (H) 65 - 105 mg/dL   POC Glucose Fingerstick    Collection Time: 01/15/18 12:50 PM   Result Value Ref Range    POC Glucose 142 (H) 65 - 105 mg/dL   POC Glucose Fingerstick    Collection Time: 01/15/18  5:42 PM   Result Value Ref Range    POC Glucose 156 (H) 65 - 105 mg/dL   POC Glucose Fingerstick    Collection Time: 01/15/18  8:13 PM   Result Value Ref Range    POC Glucose 151 (H) 65 - 105 mg/dL   CBC WITH AUTO DIFFERENTIAL    Collection Time: 18  6:22 AM   Result Value Ref Range    WBC 7.9 3.5 - 11.3 k/uL    RBC 4.06 3.95 - 5.11 m/uL    Hemoglobin 11.0 (L) 11.9 - 15.1 g/dL    Hematocrit 35.1 (L) 36.3 - 47.1 %    MCV 86.5 82.6 - 102.9 fL    MCH 27.1 25.2 - 33.5 pg    MCHC 31.3 28.4 - 34.8 g/dL    RDW 13.6 11.8 - 14.4 %    Platelets 034 035 - 890 k/uL    MPV 11.0 8.1 - 13.5 fL    NRBC Automated 0.0 0.0 per 100 WBC    Differential Type NOT REPORTED     Seg Neutrophils 69 (H) 36 - 65 %    Lymphocytes 22 (L) 24 - 43 %    Monocytes 6 3 - 12 %    Eosinophils % 2 1 - 4 %    Basophils 0 0 - 2 %    Immature Granulocytes 1 (H) 0 %    Segs Absolute 5.51 1.50 - 8.10 k/uL    Absolute Lymph # 1.72 1.10 - 3.70 k/uL    Absolute Mono # 0.47 0.10 - 1.20 k/uL    Absolute Eos # 0.12 0.00 - 0.44 k/uL    Basophils # 0.03 0.00 - 0.20 k/uL    Absolute Immature Granulocyte 0.06 0.00 - 0.30 k/uL    WBC Morphology NOT REPORTED     RBC Morphology NOT REPORTED     Platelet Estimate NOT REPORTED    BASIC METABOLIC PANEL    Collection Time: 01/16/18  6:22 AM   Result Value Ref Range    Glucose 169 (H) 70 - 99 mg/dL    BUN 11 8 - 23 mg/dL    CREATININE 0.40 (L) 0.50 - 0.90 mg/dL    Bun/Cre Ratio NOT REPORTED 9 - 20    Calcium 8.7 8.6 - 10.4 mg/dL    Sodium 143 135 - 144 mmol/L    Potassium 4.3 3.7 - 5.3 mmol/L    Chloride 108 (H) 98 - 107 mmol/L    CO2 24 20 - 31 mmol/L    Anion Gap 11 9 - 17 mmol/L    GFR Non-African American >60 >60 mL/min    GFR African American >60 >60 mL/min    GFR Comment          GFR Staging NOT REPORTED    POC Glucose Fingerstick    Collection Time: 01/16/18  7:27 AM   Result Value Ref Range    POC Glucose 164 (H) 65 - 105 mg/dL     Lab Results   Component Value Date/Time    SPECIAL NOT REPORTED 01/10/2018 12:30 AM     Lab Results   Component Value Date/Time    CULTURE NO SIGNIFICANT GROWTH 01/10/2018 12:30 AM    CULTURE  01/10/2018 12:30 AM     [address] [phone]       Radiology:  Ct Head Wo Contrast    Result Date: 1/15/2018  EXAMINATION: CT OF THE HEAD WITHOUT CONTRAST  1/13/2018 1:26 am TECHNIQUE: CT of the head was performed without the administration of intravenous contrast. Dose modulation, iterative reconstruction, and/or weight based adjustment of the mA/kV was utilized to reduce the radiation dose to as low as reasonably achievable. COMPARISON: CT dated 11/12/2018 HISTORY: ORDERING SYSTEM PROVIDED HISTORY: STROKE TECHNOLOGIST PROVIDED HISTORY: Has a \"code stroke\" or \"stroke alert\" been called? ->No FINDINGS: BRAIN/VENTRICLES: Evolving right MCA infarct with areas of gyral hyperattenuation likely reflective of minimal hemorrhagic transformation. No appreciable change since comparison MR. No significant midline shift. No effacement of the suprasellar cistern. 4th ventricle remains patent.  ORBITS: The visualized portion of the orbits demonstrate no acute Partial effacement of the right lateral ventricle. No significant midline shift. No hydrocephalus. There is only minimal blooming artifact on the T2 GRE sequence within the right MCA territory infarction which is consistent with a small amount of hemorrhagic transformation. The sellar/suprasellar regions appear unremarkable. The normal signal voids within the major intracranial vessels appear maintained. ORBITS: The visualized portion of the orbits demonstrate no acute abnormality. SINUSES: The visualized paranasal sinuses and mastoid air cells are well aerated. BONES/SOFT TISSUES: The bone marrow signal intensity appears normal. The soft tissues demonstrate no acute abnormality. 1. Evolving early subacute infarction involving much of the right MCA territory with minimal hemorrhagic transformation. There is sulcal effacement in gyral swelling with minimal right lateral ventricle effacement but no significant midline shift. Physical Examination:      Physical Exam   Constitutional: She is oriented to person, place, and time. She appears well-developed and well-nourished. HENT:   Head: Normocephalic and atraumatic. Right Ear: External ear normal.   Left Ear: External ear normal.   Eyes: Conjunctivae and EOM are normal. Right eye exhibits no discharge. Left eye exhibits no discharge. No scleral icterus. Neck: Neck supple. No tracheal deviation present. Cardiovascular: Normal rate, regular rhythm and normal heart sounds. Pulmonary/Chest: Effort normal and breath sounds normal. She has no wheezes. Abdominal: Soft. Bowel sounds are normal. She exhibits no distension and no mass. There is no tenderness. Musculoskeletal: She exhibits no edema or tenderness. Lymphadenopathy:     She has no cervical adenopathy. Neurological: She is alert and oriented to person, place, and time. A cranial nerve deficit (Facial droop) is present.  She exhibits abnormal muscle tone (left side weakness  0/5 upper and lower extremity). Skin: Skin is warm and dry. No rash noted. Psychiatric: She has a normal mood and affect. Her behavior is normal.   Nursing note and vitals reviewed. Assessment:        Active Problems:    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 (HCC)    Cerebrovascular accident (CVA) ([address]    Left arm weakness    Facial droop    Dysarthria    Cerebral edema ([address]    Left-sided weakness    Controlled type 2 diabetes mellitus with hyperglycemia, without long-term current use of insulin ([address]      Plan:        1. Cerebral infarction, status post thrombectomy, aspirin, Plavix, statin, MARISSA today , neurology and neurosurgery intervention is following. 2. PT, OT , ST, PMNR consult  3. DM: Insulin sliding scale, Hypoglycemia protocol, Resume home doses of antidiabetic medications, diabetic diet once patient is not NPO.,  Hold metformin during admission, resume on discharge. IV Fluids: dextrose,    Electrolytes: Supp as needed  Nutrition:  Dietary Nutrition Supplements: Standard High Calorie Oral Supplement  Diet NPO, After Midnight    DVT prophylaxis: heparin (porcine) injection 5,000 TID      Consults: IP CONSULT TO PHYSICAL MEDICINE REHAB  IP CONSULT TO NEUROLOGY  IP CONSULT TO INTERNAL MEDICINE  IP CONSULT TO IV TEAM  IP CONSULT TO INTERNAL MEDICINE       Discussed care plan with nurse after getting input from the nurse.     Above plan discussed with the patient in room, who agreed to the above plan     Please call if any questions    Ramon Charles MD  [facility] Hospitalist  1/16/2018  7:48 AM     (Please note that this chart was generated using voice recognition Dragon dictation software program. Although every effort was made to ensure the accuracy of this automated transcription, some errors in transcription may have occurred.)

## 2018-01-16 NOTE — PROGRESS NOTES
Bariatric  [] Total Pressure Relief  [] Other:     Discharge Plan:  TBD    Patient/Caregiver Teaching:  Reviewed skin condition with patient and family.     [] Indicates understanding       [] Needs reinforcement  [] Unsuccessful      [x] Verbal Understanding  [] Demonstrated understanding       [] No evidence of learning  [] Refused teaching         [] N/A       Electronically signed by Vianca Rasmussen RN, CWON on 1/16/2018 at 12:02 PM

## 2018-01-16 NOTE — PLAN OF CARE
Problem: Falls - Risk of  Goal: Absence of falls  Outcome: Ongoing  Patient remains free from falls this shift. Bed alarm on and patient is on bedrest. Will continue to monitor.

## 2018-01-16 NOTE — PROGRESS NOTES
Intravenous, PRN  acetaminophen (TYLENOL) tablet 650 mg, 650 mg, Oral, Q4H PRN  magnesium hydroxide (MILK OF MAGNESIA) 400 MG/5ML suspension 30 mL, 30 mL, Oral, Daily PRN  ondansetron (ZOFRAN) injection 4 mg, 4 mg, Intravenous, Q6H PRN  simvastatin (ZOCOR) tablet 20 mg, 20 mg, Oral, Nightly  fentaNYL (SUBLIMAZE) injection 25 mcg, 25 mcg, Intravenous, Q2H PRN    Physical:   VITALS:  BP (!) 155/66   Pulse 67   Temp 97.8 °F (36.6 °C)   Resp 16   Ht 5' 9\" (1.753 m)   Wt 190 lb 4.1 oz (86.3 kg)   SpO2 92%   BMI 28.10 kg/m²       On exam today:   AAOx3 Follows simple commands. Left facial droop  Purposeful with right upper and lower extremity movement  Loss of LT throughout the left side. Left hemiplegia     Data:  CBC:   Lab Results   Component Value Date    WBC 7.9 01/16/2018    RBC 4.06 01/16/2018    HGB 11.0 01/16/2018    HCT 35.1 01/16/2018    MCV 86.5 01/16/2018    RDW 13.6 01/16/2018     01/16/2018     BMP:    Lab Results   Component Value Date     01/16/2018    K 4.3 01/16/2018     01/16/2018    CO2 24 01/16/2018    BUN 11 01/16/2018    CREATININE 0.40 01/16/2018    CALCIUM 8.7 01/16/2018    GFRAA >60 01/16/2018    LABGLOM >60 01/16/2018    GLUCOSE 169 01/16/2018       ICU guidelines/prophylaxis active for the following:    head above bed above 30 degrees    ASSESSMENT:  Sandra Murphy is a 69 y.o. female with PMH of CIDP and DM who presented with an episode of aphasia and left facial droop. She was found to have right M1 MCA occlusion and underwent emergent mechanical thrombectomy on 1/10/18 with TICI 1 recanalization. PLAN:  Aspirin daily  Continue zocor  Pending MARISSA today  Upon discharge, patient to follow up with Dr Ibrahima Oliva or Dr [place] in 2 weeks in the neuroendovascular clinic.     Dicussed with dr Claudia Ledezma Fellow  Stroke, [facility]  [address]

## 2018-01-16 NOTE — PROGRESS NOTES
Patient admitted, consent signed, all questions answered. Pt ready for procedure. Call light to reach with side rails up 2 of 2.  family at bedside with patient.

## 2018-01-16 NOTE — PROGRESS NOTES
Speech Language Pathology  Speech Language Pathology  [facility]    Speech Language Treatment Note    Date: 1/16/2018  Patients Name: Luna Brochure  MRN: [id number]  Diagnosis:   Patient Active Problem List   Diagnosis Code    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 [facility]) I63.311    Cerebrovascular accident (CVA) ([facility] I63.9    Left arm weakness R29.898    Facial droop R29.810    Dysarthria R47.1    Cerebral edema ([facility] G93.6    Left-sided weakness R53.1    Hyperglycemia R73.9       Pain: 0/10    Speech and Language Treatment  Treatment time: 518-048    Subjective: [x] Alert [x] Cooperative     [] Confused     [] Agitated    [] Lethargic      Objective/Assessment:      Other:     Pt. Seen for O/M treatment program.  Pt. Completed O/M exercises X 7 X 1 sets with max cues. Increased difficulty with labial retraction. Education provided re: O/M treatment program.  Pt. Is currently NPO for testing therefore swallow check could not be completed. Treatment completed by:  TISHA Casillas

## 2018-01-17 LAB
ABSOLUTE EOS #: 0.1 K/UL (ref 0–0.44)
ABSOLUTE IMMATURE GRANULOCYTE: 0.08 K/UL (ref 0–0.3)
ABSOLUTE LYMPH #: 2.35 K/UL (ref 1.1–3.7)
ABSOLUTE MONO #: 0.75 K/UL (ref 0.1–1.2)
ANION GAP SERPL CALCULATED.3IONS-SCNC: 15 MMOL/L (ref 9–17)
BASOPHILS # BLD: 0 % (ref 0–2)
BASOPHILS ABSOLUTE: 0.04 K/UL (ref 0–0.2)
BUN BLDV-MCNC: 15 MG/DL (ref 8–23)
BUN/CREAT BLD: ABNORMAL (ref 9–20)
CALCIUM SERPL-MCNC: 8.9 MG/DL (ref 8.6–10.4)
CHLORIDE BLD-SCNC: 103 MMOL/L (ref 98–107)
CO2: 21 MMOL/L (ref 20–31)
CREAT SERPL-MCNC: 0.42 MG/DL (ref 0.5–0.9)
DIFFERENTIAL TYPE: ABNORMAL
EOSINOPHILS RELATIVE PERCENT: 1 % (ref 1–4)
GFR AFRICAN AMERICAN: >60 ML/MIN
GFR NON-AFRICAN AMERICAN: >60 ML/MIN
GFR SERPL CREATININE-BSD FRML MDRD: ABNORMAL ML/MIN/{1.73_M2}
GFR SERPL CREATININE-BSD FRML MDRD: ABNORMAL ML/MIN/{1.73_M2}
GLUCOSE BLD-MCNC: 149 MG/DL (ref 65–105)
GLUCOSE BLD-MCNC: 174 MG/DL (ref 70–99)
GLUCOSE BLD-MCNC: 178 MG/DL (ref 65–105)
GLUCOSE BLD-MCNC: 186 MG/DL (ref 65–105)
GLUCOSE BLD-MCNC: 186 MG/DL (ref 65–105)
HCT VFR BLD CALC: 38.5 % (ref 36.3–47.1)
HEMOGLOBIN: 11.9 G/DL (ref 11.9–15.1)
IMMATURE GRANULOCYTES: 1 %
LYMPHOCYTES # BLD: 21 % (ref 24–43)
MCH RBC QN AUTO: 27.7 PG (ref 25.2–33.5)
MCHC RBC AUTO-ENTMCNC: 30.9 G/DL (ref 28.4–34.8)
MCV RBC AUTO: 89.5 FL (ref 82.6–102.9)
MONOCYTES # BLD: 7 % (ref 3–12)
NRBC AUTOMATED: 0 PER 100 WBC
PDW BLD-RTO: 13.5 % (ref 11.8–14.4)
PLATELET # BLD: 287 K/UL (ref 138–453)
PLATELET ESTIMATE: ABNORMAL
PMV BLD AUTO: 11.1 FL (ref 8.1–13.5)
POTASSIUM SERPL-SCNC: 3.7 MMOL/L (ref 3.7–5.3)
RBC # BLD: 4.3 M/UL (ref 3.95–5.11)
RBC # BLD: ABNORMAL 10*6/UL
SEG NEUTROPHILS: 70 % (ref 36–65)
SEGMENTED NEUTROPHILS ABSOLUTE COUNT: 7.73 K/UL (ref 1.5–8.1)
SODIUM BLD-SCNC: 139 MMOL/L (ref 135–144)
WBC # BLD: 11.1 K/UL (ref 3.5–11.3)
WBC # BLD: ABNORMAL 10*3/UL

## 2018-01-17 PROCEDURE — 36415 COLL VENOUS BLD VENIPUNCTURE: CPT

## 2018-01-17 PROCEDURE — 6370000000 HC RX 637 (ALT 250 FOR IP): Performed by: PSYCHIATRY & NEUROLOGY

## 2018-01-17 PROCEDURE — 2580000003 HC RX 258: Performed by: EMERGENCY MEDICINE

## 2018-01-17 PROCEDURE — 97127 HC SP THER IVNTJ W/FOCUS COG FUNCJ: CPT

## 2018-01-17 PROCEDURE — 99232 SBSQ HOSP IP/OBS MODERATE 35: CPT | Performed by: FAMILY MEDICINE

## 2018-01-17 PROCEDURE — 6370000000 HC RX 637 (ALT 250 FOR IP): Performed by: NURSE PRACTITIONER

## 2018-01-17 PROCEDURE — 6360000002 HC RX W HCPCS: Performed by: EMERGENCY MEDICINE

## 2018-01-17 PROCEDURE — 6370000000 HC RX 637 (ALT 250 FOR IP): Performed by: EMERGENCY MEDICINE

## 2018-01-17 PROCEDURE — 85025 COMPLETE CBC W/AUTO DIFF WBC: CPT

## 2018-01-17 PROCEDURE — 99232 SBSQ HOSP IP/OBS MODERATE 35: CPT | Performed by: PSYCHIATRY & NEUROLOGY

## 2018-01-17 PROCEDURE — 82947 ASSAY GLUCOSE BLOOD QUANT: CPT

## 2018-01-17 PROCEDURE — 80048 BASIC METABOLIC PNL TOTAL CA: CPT

## 2018-01-17 PROCEDURE — 92526 ORAL FUNCTION THERAPY: CPT

## 2018-01-17 PROCEDURE — 99232 SBSQ HOSP IP/OBS MODERATE 35: CPT | Performed by: NURSE PRACTITIONER

## 2018-01-17 PROCEDURE — 2580000003 HC RX 258: Performed by: PSYCHIATRY & NEUROLOGY

## 2018-01-17 PROCEDURE — 1200000000 HC SEMI PRIVATE

## 2018-01-17 PROCEDURE — 97530 THERAPEUTIC ACTIVITIES: CPT

## 2018-01-17 PROCEDURE — 94762 N-INVAS EAR/PLS OXIMTRY CONT: CPT

## 2018-01-17 PROCEDURE — 97110 THERAPEUTIC EXERCISES: CPT

## 2018-01-17 RX ORDER — TAMSULOSIN HYDROCHLORIDE 0.4 MG/1
0.4 CAPSULE ORAL DAILY
Qty: 30 CAPSULE | Refills: 3 | Status: SHIPPED | OUTPATIENT
Start: 2018-01-18

## 2018-01-17 RX ORDER — SIMVASTATIN 20 MG
20 TABLET ORAL NIGHTLY
Qty: 30 TABLET | Refills: 3 | Status: SHIPPED | OUTPATIENT
Start: 2018-01-17

## 2018-01-17 RX ORDER — AMLODIPINE BESYLATE 10 MG/1
10 TABLET ORAL DAILY
Qty: 30 TABLET | Refills: 3 | Status: ON HOLD | OUTPATIENT
Start: 2018-01-18 | End: 2018-03-17 | Stop reason: HOSPADM

## 2018-01-17 RX ORDER — CLOPIDOGREL BISULFATE 75 MG/1
75 TABLET ORAL DAILY
Qty: 30 TABLET | Refills: 3 | Status: SHIPPED | OUTPATIENT
Start: 2018-01-18

## 2018-01-17 RX ORDER — FLUOXETINE 10 MG/1
10 CAPSULE ORAL DAILY
Qty: 30 CAPSULE | Refills: 3 | Status: ON HOLD | OUTPATIENT
Start: 2018-01-18 | End: 2018-03-17 | Stop reason: HOSPADM

## 2018-01-17 RX ORDER — HYDRALAZINE HYDROCHLORIDE 25 MG/1
25 TABLET, FILM COATED ORAL EVERY 8 HOURS SCHEDULED
Qty: 90 TABLET | Refills: 3 | Status: SHIPPED | OUTPATIENT
Start: 2018-01-17

## 2018-01-17 RX ADMIN — INSULIN LISPRO 1 UNITS: 100 INJECTION, SOLUTION INTRAVENOUS; SUBCUTANEOUS at 22:02

## 2018-01-17 RX ADMIN — FLUOXETINE HYDROCHLORIDE 10 MG: 10 CAPSULE ORAL at 09:01

## 2018-01-17 RX ADMIN — FAMOTIDINE 20 MG: 20 TABLET, FILM COATED ORAL at 22:01

## 2018-01-17 RX ADMIN — Medication 10 ML: at 08:59

## 2018-01-17 RX ADMIN — POTASSIUM CHLORIDE 40 MEQ: 1500 TABLET, EXTENDED RELEASE ORAL at 22:01

## 2018-01-17 RX ADMIN — LOSARTAN POTASSIUM 100 MG: 50 TABLET, FILM COATED ORAL at 09:01

## 2018-01-17 RX ADMIN — POTASSIUM CHLORIDE 40 MEQ: 1500 TABLET, EXTENDED RELEASE ORAL at 08:56

## 2018-01-17 RX ADMIN — HEPARIN SODIUM 5000 UNITS: 5000 INJECTION, SOLUTION INTRAVENOUS; SUBCUTANEOUS at 14:27

## 2018-01-17 RX ADMIN — HEPARIN SODIUM 5000 UNITS: 5000 INJECTION, SOLUTION INTRAVENOUS; SUBCUTANEOUS at 22:01

## 2018-01-17 RX ADMIN — HEPARIN SODIUM 5000 UNITS: 5000 INJECTION, SOLUTION INTRAVENOUS; SUBCUTANEOUS at 05:45

## 2018-01-17 RX ADMIN — HYDRALAZINE HYDROCHLORIDE 25 MG: 25 TABLET, FILM COATED ORAL at 22:01

## 2018-01-17 RX ADMIN — AMLODIPINE BESYLATE 10 MG: 10 TABLET ORAL at 09:02

## 2018-01-17 RX ADMIN — TAMSULOSIN HYDROCHLORIDE 0.4 MG: 0.4 CAPSULE ORAL at 09:00

## 2018-01-17 RX ADMIN — Medication 10 ML: at 08:56

## 2018-01-17 RX ADMIN — FAMOTIDINE 20 MG: 20 TABLET, FILM COATED ORAL at 09:02

## 2018-01-17 RX ADMIN — INSULIN GLARGINE 10 UNITS: 100 INJECTION, SOLUTION SUBCUTANEOUS at 22:02

## 2018-01-17 RX ADMIN — SIMVASTATIN 20 MG: 20 TABLET, FILM COATED ORAL at 22:01

## 2018-01-17 RX ADMIN — HYDRALAZINE HYDROCHLORIDE 25 MG: 25 TABLET, FILM COATED ORAL at 14:27

## 2018-01-17 RX ADMIN — Medication 10 ML: at 22:01

## 2018-01-17 RX ADMIN — HYDRALAZINE HYDROCHLORIDE 25 MG: 25 TABLET, FILM COATED ORAL at 05:47

## 2018-01-17 RX ADMIN — FENOFIBRATE 54 MG: 54 TABLET ORAL at 09:01

## 2018-01-17 RX ADMIN — CLOPIDOGREL 75 MG: 75 TABLET, FILM COATED ORAL at 10:48

## 2018-01-17 RX ADMIN — HYDRALAZINE HYDROCHLORIDE 10 MG: 20 INJECTION INTRAMUSCULAR; INTRAVENOUS at 04:12

## 2018-01-17 ASSESSMENT — ENCOUNTER SYMPTOMS
NAUSEA: 0
SHORTNESS OF BREATH: 0
WHEEZING: 0
VOMITING: 0
DIARRHEA: 0
ABDOMINAL PAIN: 0

## 2018-01-17 NOTE — DISCHARGE SUMMARY
discharge.       Significant Diagnostic Studies:   Labs / Micro:  Recent Results (from the past 168 hour(s))   POC Glucose Fingerstick    Collection Time: 01/12/18 12:02 PM   Result Value Ref Range    POC Glucose 217 (H) 65 - 105 mg/dL   POC Glucose Fingerstick    Collection Time: 01/12/18  5:01 PM   Result Value Ref Range    POC Glucose 210 (H) 65 - 105 mg/dL   POC Glucose Fingerstick    Collection Time: 01/12/18  7:58 PM   Result Value Ref Range    POC Glucose 219 (H) 65 - 105 mg/dL   CBC WITH AUTO DIFFERENTIAL    Collection Time: 01/13/18  6:49 AM   Result Value Ref Range    WBC 10.2 3.5 - 11.3 k/uL    RBC 3.98 3.95 - 5.11 m/uL    Hemoglobin 11.1 (L) 11.9 - 15.1 g/dL    Hematocrit 35.2 (L) 36.3 - 47.1 %    MCV 88.4 82.6 - 102.9 fL    MCH 27.9 25.2 - 33.5 pg    MCHC 31.5 28.4 - 34.8 g/dL    RDW 13.7 11.8 - 14.4 %    Platelets 833 246 - 874 k/uL    MPV 10.6 8.1 - 13.5 fL    Differential Type NOT REPORTED     Seg Neutrophils 63 36 - 65 %    Lymphocytes 29 24 - 43 %    Monocytes 7 3 - 12 %    Eosinophils % 0 (L) 1 - 4 %    Basophils 1 0 - 2 %    Immature Granulocytes 0 0 %    Segs Absolute 6.46 1.50 - 8.10 k/uL    Absolute Lymph # 2.92 1.10 - 3.70 k/uL    Absolute Mono # 0.74 0.10 - 1.20 k/uL    Absolute Eos # 0.04 0.00 - 0.44 k/uL    Basophils # 0.05 0.00 - 0.20 k/uL    Absolute Immature Granulocyte 0.03 0.00 - 0.30 k/uL    WBC Morphology NOT REPORTED     RBC Morphology NOT REPORTED     Platelet Estimate NOT REPORTED    BASIC METABOLIC PANEL    Collection Time: 01/13/18  6:49 AM   Result Value Ref Range    Glucose 171 (H) 70 - 99 mg/dL    BUN 12 8 - 23 mg/dL    CREATININE 0.38 (L) 0.50 - 0.90 mg/dL    Bun/Cre Ratio NOT REPORTED 9 - 20    Calcium 7.8 (L) 8.6 - 10.4 mg/dL    Sodium 144 135 - 144 mmol/L    Potassium 3.1 (L) 3.7 - 5.3 mmol/L    Chloride 108 (H) 98 - 107 mmol/L    CO2 23 20 - 31 mmol/L    Anion Gap 13 9 - 17 mmol/L    GFR Non-African American >60 >60 mL/min    GFR African American >60 >60 mL/min    GFR American >60 >60 mL/min    GFR African American >60 >60 mL/min    GFR Comment          GFR Staging NOT REPORTED    POC Glucose Fingerstick    Collection Time: 01/14/18  9:49 AM   Result Value Ref Range    POC Glucose 151 (H) 65 - 105 mg/dL   POC Glucose Fingerstick    Collection Time: 01/14/18 11:51 AM   Result Value Ref Range    POC Glucose 190 (H) 65 - 105 mg/dL   POTASSIUM    Collection Time: 01/14/18  2:08 PM   Result Value Ref Range    Potassium 3.5 (L) 3.7 - 5.3 mmol/L   POC Glucose Fingerstick    Collection Time: 01/14/18  4:08 PM   Result Value Ref Range    POC Glucose 124 (H) 65 - 105 mg/dL   POC Glucose Fingerstick    Collection Time: 01/14/18  7:19 PM   Result Value Ref Range    POC Glucose 145 (H) 65 - 105 mg/dL   CBC WITH AUTO DIFFERENTIAL    Collection Time: 01/15/18  7:17 AM   Result Value Ref Range    WBC 7.8 3.5 - 11.3 k/uL    RBC 4.12 3.95 - 5.11 m/uL    Hemoglobin 11.2 (L) 11.9 - 15.1 g/dL    Hematocrit 35.9 (L) 36.3 - 47.1 %    MCV 87.1 82.6 - 102.9 fL    MCH 27.2 25.2 - 33.5 pg    MCHC 31.2 28.4 - 34.8 g/dL    RDW 13.5 11.8 - 14.4 %    Platelets 197 171 - 311 k/uL    MPV 10.8 8.1 - 13.5 fL    Differential Type NOT REPORTED     Seg Neutrophils 60 36 - 65 %    Lymphocytes 28 24 - 43 %    Monocytes 7 3 - 12 %    Eosinophils % 4 1 - 4 %    Basophils 1 0 - 2 %    Immature Granulocytes 0 0 %    Segs Absolute 4.67 1.50 - 8.10 k/uL    Absolute Lymph # 2.18 1.10 - 3.70 k/uL    Absolute Mono # 0.56 0.10 - 1.20 k/uL    Absolute Eos # 0.30 0.00 - 0.44 k/uL    Basophils # 0.04 0.00 - 0.20 k/uL    Absolute Immature Granulocyte 0.03 0.00 - 0.30 k/uL    WBC Morphology NOT REPORTED     RBC Morphology NOT REPORTED     Platelet Estimate NOT REPORTED    BASIC METABOLIC PANEL    Collection Time: 01/15/18  7:17 AM   Result Value Ref Range    Glucose 144 (H) 70 - 99 mg/dL    BUN 13 8 - 23 mg/dL    CREATININE 0.46 (L) 0.50 - 0.90 mg/dL    Bun/Cre Ratio NOT REPORTED 9 - 20    Calcium 8.8 8.6 - 10.4 mg/dL    Sodium 143 - 0.90 mg/dL    Bun/Cre Ratio NOT REPORTED 9 - 20    Calcium 8.7 8.6 - 10.4 mg/dL    Sodium 143 135 - 144 mmol/L    Potassium 4.3 3.7 - 5.3 mmol/L    Chloride 108 (H) 98 - 107 mmol/L    CO2 24 20 - 31 mmol/L    Anion Gap 11 9 - 17 mmol/L    GFR Non-African American >60 >60 mL/min    GFR African American >60 >60 mL/min    GFR Comment          GFR Staging NOT REPORTED    POC Glucose Fingerstick    Collection Time: 01/16/18  7:27 AM   Result Value Ref Range    POC Glucose 164 (H) 65 - 105 mg/dL   POC Glucose Fingerstick    Collection Time: 01/16/18 11:12 AM   Result Value Ref Range    POC Glucose 176 (H) 65 - 105 mg/dL   POC Glucose Fingerstick    Collection Time: 01/16/18  3:37 PM   Result Value Ref Range    POC Glucose 180 (H) 65 - 105 mg/dL   POC Glucose Fingerstick    Collection Time: 01/16/18  8:17 PM   Result Value Ref Range    POC Glucose 163 (H) 65 - 105 mg/dL   CBC WITH AUTO DIFFERENTIAL    Collection Time: 01/17/18  6:09 AM   Result Value Ref Range    WBC 11.1 3.5 - 11.3 k/uL    RBC 4.30 3.95 - 5.11 m/uL    Hemoglobin 11.9 11.9 - 15.1 g/dL    Hematocrit 38.5 36.3 - 47.1 %    MCV 89.5 82.6 - 102.9 fL    MCH 27.7 25.2 - 33.5 pg    MCHC 30.9 28.4 - 34.8 g/dL    RDW 13.5 11.8 - 14.4 %    Platelets 461 891 - 280 k/uL    MPV 11.1 8.1 - 13.5 fL    NRBC Automated 0.0 0.0 per 100 WBC    Differential Type NOT REPORTED     Seg Neutrophils 70 (H) 36 - 65 %    Lymphocytes 21 (L) 24 - 43 %    Monocytes 7 3 - 12 %    Eosinophils % 1 1 - 4 %    Basophils 0 0 - 2 %    Immature Granulocytes 1 (H) 0 %    Segs Absolute 7.73 1.50 - 8.10 k/uL    Absolute Lymph # 2.35 1.10 - 3.70 k/uL    Absolute Mono # 0.75 0.10 - 1.20 k/uL    Absolute Eos # 0.10 0.00 - 0.44 k/uL    Basophils # 0.04 0.00 - 0.20 k/uL    Absolute Immature Granulocyte 0.08 0.00 - 0.30 k/uL    WBC Morphology NOT REPORTED     RBC Morphology NOT REPORTED     Platelet Estimate NOT REPORTED    BASIC METABOLIC PANEL    Collection Time: 01/17/18  6:09 AM   Result Value Ref Range    Glucose 174 (H) 70 - 99 mg/dL    BUN 15 8 - 23 mg/dL    CREATININE 0.42 (L) 0.50 - 0.90 mg/dL    Bun/Cre Ratio NOT REPORTED 9 - 20    Calcium 8.9 8.6 - 10.4 mg/dL    Sodium 139 135 - 144 mmol/L    Potassium 3.7 3.7 - 5.3 mmol/L    Chloride 103 98 - 107 mmol/L    CO2 21 20 - 31 mmol/L    Anion Gap 15 9 - 17 mmol/L    GFR Non-African American >60 >60 mL/min    GFR African American >60 >60 mL/min    GFR Comment          GFR Staging NOT REPORTED    POC Glucose Fingerstick    Collection Time: 01/17/18  7:09 AM   Result Value Ref Range    POC Glucose 186 (H) 65 - 105 mg/dL   POC Glucose Fingerstick    Collection Time: 01/17/18 11:19 AM   Result Value Ref Range    POC Glucose 186 (H) 65 - 105 mg/dL   POC Glucose Fingerstick    Collection Time: 01/17/18  3:13 PM   Result Value Ref Range    POC Glucose 178 (H) 65 - 105 mg/dL   POC Glucose Fingerstick    Collection Time: 01/17/18 10:03 PM   Result Value Ref Range    POC Glucose 149 (H) 65 - 105 mg/dL   CBC WITH AUTO DIFFERENTIAL    Collection Time: 01/18/18  7:07 AM   Result Value Ref Range    WBC 10.5 3.5 - 11.3 k/uL    RBC 4.21 3.95 - 5.11 m/uL    Hemoglobin 11.6 (L) 11.9 - 15.1 g/dL    Hematocrit 37.5 36.3 - 47.1 %    MCV 89.1 82.6 - 102.9 fL    MCH 27.6 25.2 - 33.5 pg    MCHC 30.9 28.4 - 34.8 g/dL    RDW 14.0 11.8 - 14.4 %    Platelets 158 770 - 321 k/uL    MPV 10.6 8.1 - 13.5 fL    NRBC Automated 0.0 0.0 per 100 WBC    Differential Type NOT REPORTED     Seg Neutrophils 54 36 - 65 %    Lymphocytes 32 24 - 43 %    Monocytes 9 3 - 12 %    Eosinophils % 3 1 - 4 %    Basophils 1 0 - 2 %    Immature Granulocytes 1 (H) 0 %    Segs Absolute 5.79 1.50 - 8.10 k/uL    Absolute Lymph # 3.34 1.10 - 3.70 k/uL    Absolute Mono # 0.92 0.10 - 1.20 k/uL    Absolute Eos # 0.34 0.00 - 0.44 k/uL    Basophils # 0.05 0.00 - 0.20 k/uL    Absolute Immature Granulocyte 0.06 0.00 - 0.30 k/uL    WBC Morphology NOT REPORTED     RBC Morphology NOT REPORTED     Platelet Estimate NOT REPORTED BASIC METABOLIC PANEL    Collection Time: 01/18/18  7:07 AM   Result Value Ref Range    Glucose 146 (H) 70 - 99 mg/dL    BUN 22 8 - 23 mg/dL    CREATININE 0.54 0.50 - 0.90 mg/dL    Bun/Cre Ratio NOT REPORTED 9 - 20    Calcium 9.4 8.6 - 10.4 mg/dL    Sodium 144 135 - 144 mmol/L    Potassium 3.9 3.7 - 5.3 mmol/L    Chloride 109 (H) 98 - 107 mmol/L    CO2 23 20 - 31 mmol/L    Anion Gap 12 9 - 17 mmol/L    GFR Non-African American >60 >60 mL/min    GFR African American >60 >60 mL/min    GFR Comment          GFR Staging NOT REPORTED    POC Glucose Fingerstick    Collection Time: 01/18/18  7:15 AM   Result Value Ref Range    POC Glucose 142 (H) 65 - 105 mg/dL   POC Glucose Fingerstick    Collection Time: 01/18/18  1:40 PM   Result Value Ref Range    POC Glucose 154 (H) 65 - 105 mg/dL   POC Glucose Fingerstick    Collection Time: 01/18/18  4:54 PM   Result Value Ref Range    POC Glucose 147 (H) 65 - 105 mg/dL   POC Glucose Fingerstick    Collection Time: 01/18/18  8:36 PM   Result Value Ref Range    POC Glucose 149 (H) 65 - 105 mg/dL   CBC WITH AUTO DIFFERENTIAL    Collection Time: 01/19/18  6:17 AM   Result Value Ref Range    WBC 9.5 3.5 - 11.3 k/uL    RBC 4.28 3.95 - 5.11 m/uL    Hemoglobin 12.2 11.9 - 15.1 g/dL    Hematocrit 39.1 36.3 - 47.1 %    MCV 91.4 82.6 - 102.9 fL    MCH 28.5 25.2 - 33.5 pg    MCHC 31.2 28.4 - 34.8 g/dL    RDW 14.3 11.8 - 14.4 %    Platelets 990 505 - 230 k/uL    MPV 11.2 8.1 - 13.5 fL    NRBC Automated 0.0 0.0 per 100 WBC    Differential Type NOT REPORTED     Seg Neutrophils 60 36 - 65 %    Lymphocytes 27 24 - 43 %    Monocytes 7 3 - 12 %    Eosinophils % 4 1 - 4 %    Basophils 1 0 - 2 %    Immature Granulocytes 1 (H) 0 %    Segs Absolute 5.80 1.50 - 8.10 k/uL    Absolute Lymph # 2.53 1.10 - 3.70 k/uL    Absolute Mono # 0.65 0.10 - 1.20 k/uL    Absolute Eos # 0.38 0.00 - 0.44 k/uL    Basophils # 0.05 0.00 - 0.20 k/uL    Absolute Immature Granulocyte 0.06 0.00 - 0.30 k/uL    WBC Morphology NOT TECHNIQUE: CT of the head was performed without the administration of intravenous contrast. Dose modulation, iterative reconstruction, and/or weight based adjustment of the mA/kV was utilized to reduce the radiation dose to as low as reasonably achievable. COMPARISON: 01/13/2018 HISTORY: ORDERING SYSTEM PROVIDED HISTORY: bleed TECHNOLOGIST PROVIDED HISTORY: Has a \"code stroke\" or \"stroke alert\" been called? ->No FINDINGS: BRAIN/VENTRICLES: Large subacute right MCA infarct is identified. Small amount of hemorrhage remains within the infarct within the right temporal lobe. No new hemorrhage is detected. There is no evidence for hydrocephalus. No extra-axial collection is seen. There is no evidence of midline shift. Severe intracranial atherosclerosis is identified involving the carotid arteries. ORBITS: The visualized portion of the orbits demonstrate no acute abnormality. SINUSES: The visualized paranasal sinuses and mastoid air cells demonstrate no acute abnormality. SOFT TISSUES/SKULL:  No acute abnormality of the visualized skull or soft tissues. Stable large subacute right MCA infarct not significantly changed. There remains a small amount of stable hemorrhage within the right temporal lobe. Ct Head Wo Contrast    Result Date: 1/12/2018  EXAMINATION: CT OF THE HEAD WITHOUT CONTRAST  1/12/2018 5:39 am TECHNIQUE: CT of the head was performed without the administration of intravenous contrast. Dose modulation, iterative reconstruction, and/or weight based adjustment of the mA/kV was utilized to reduce the radiation dose to as low as reasonably achievable. COMPARISON: 01/11/2018 HISTORY: ORDERING SYSTEM PROVIDED HISTORY: Dual scan protocol if possible, if not regular CT head TECHNOLOGIST PROVIDED HISTORY: Has a \"code stroke\" or \"stroke alert\" been called? ->No FINDINGS: BRAIN/VENTRICLES: Right MCA territory evolving acute infarction with increasing hypodensity.   Minimal intraparenchymal hemorrhage is area of acute infarction or acute intracranial blood products appreciated. Ct Head Wo Contrast    Result Date: 1/11/2018  EXAMINATION: CT OF THE HEAD WITHOUT CONTRAST  1/10/2018 11:45 pm TECHNIQUE: CT of the head was performed without the administration of intravenous contrast. Dose modulation, iterative reconstruction, and/or weight based adjustment of the mA/kV was utilized to reduce the radiation dose to as low as reasonably achievable. COMPARISON: None. HISTORY: ORDERING SYSTEM PROVIDED HISTORY: ro bleed post angio TECHNOLOGIST PROVIDED HISTORY: Has a \"code stroke\" or \"stroke alert\" been called? ->No FINDINGS: BRAIN/VENTRICLES: Subarachnoid hemorrhage throughout the right hemisphere, predominantly within the right sylvian fissure present. Loss of gray-white differentiation within right MCA territory. Hyperdensity involving the right MCA, M2 origin. Prominent vascular calcifications are present. No midline shift. No hydrocephalus. Patchy hypodensity within the mili is nonspecific. ORBITS: The visualized portion of the orbits demonstrate no acute abnormality. SINUSES: Left maxillary sinus 1.5 cm mucous retention cyst or polyp. Trace ethmoid sinus mucosal thickening. SOFT TISSUES/SKULL:  No acute abnormality of the visualized skull or soft tissues. Acute right hemispheric subarachnoid hemorrhage, moderate volume. Right MCA territory acute infarction. Hyperdense right MCA concerning for thrombus or contrast staining. Pontine hypodensity likely representing age-indeterminate ischemia. Mri Orbits Face Neck W Wo Contrast    Result Date: 1/14/2018  EXAMINATION: MRI OF THE BRAIN AND MRI OF THE ORBITS WITH AND WITHOUT CONTRAST 1/12/2018 6:49 am TECHNIQUE: Multiplanar multisequence MRI of the brain and MRI of the orbits was performed with and without intravenous contrast. COMPARISON: None.  HISTORY: ORDERING SYSTEM PROVIDED HISTORY: Exophthalmos of the right eye, spefically only need MRI Orbits FINDINGS: mouth nightly  Qty: 30 tablet, Refills: 3      hydrALAZINE (APRESOLINE) 25 MG tablet Take 1 tablet by mouth every 8 hours  Qty: 90 tablet, Refills: 3      tamsulosin (FLOMAX) 0.4 MG capsule Take 1 capsule by mouth daily  Qty: 30 capsule, Refills: 3      clopidogrel (PLAVIX) 75 MG tablet Take 1 tablet by mouth daily  Qty: 30 tablet, Refills: 3             Current Discharge Medication List      CONTINUE these medications which have CHANGED    Details   amLODIPine (NORVASC) 10 MG tablet Take 1 tablet by mouth daily  Qty: 30 tablet, Refills: 3             Current Discharge Medication List      CONTINUE these medications which have NOT CHANGED    Details   metFORMIN (GLUCOPHAGE) 1000 MG tablet Take 1,000 mg by mouth 2 times daily (with meals)      losartan (COZAAR) 100 MG tablet Take 100 mg by mouth daily      Omega-3 Fatty Acids (FISH OIL) 1000 MG CAPS Take 3,000 mg by mouth 3 times daily             Current Discharge Medication List      STOP taking these medications       atenolol (TENORMIN) 50 MG tablet Comments:   Reason for Stopping:         fenofibrate (TRICOR) 48 MG tablet Comments:   Reason for Stopping:         aspirin 81 MG EC tablet Comments:   Reason for Stopping:                 Time Spent on discharge is  37 mins in patient examination, evaluation, counseling as well as medication reconciliation, prescriptions for required medications, discharge plan and follow up. Electronically signed by   Eben Braswell MD  1/19/2018  11:21 AM      Thank you Dr. Chalino Rodríguez MD for the opportunity to be involved in this patient's care.     (Please note that this chart was generated using voice recognition Dragon dictation software program. Although every effort was made to ensure the accuracy of this automated transcription, some errors in transcription may have occurred.)

## 2018-01-17 NOTE — PROGRESS NOTES
Neurology Nurse Practitioner Progress Note      INTERVAL HISTORY: This is a 69 y.o.  female admitted 1/10/2018 for Cerebral infarct ([facility] [address] [I63.9]. This is a follow-up neurology progress note. The patient was examined and the chart was reviewed. Discussed with the pt & RN. There were no acute events overnight. No new motor, sensory, visual or bulbar symptoms. HPI: Diego Kelley is a 69 y.o. L handed female with H/O HTN, HLD, CIDP, DM, who was admitted as a transfer from [facility] on 1/10/2018 where she was taken due to c/o L facial droop, L sided weakness, slurred speech & difficulty swallowing. CT head - R MCA occlusion with acute stroke; got Heparin bolus & infusion along with ASA & then got transferred to [facility]. CTA head & neck - R M1 distal occlusion with good collaterals. Pt underwent thrombectomy with partial re-canalization. Post-procedure she was stuporous, mild withdrawal of L limbs on painful stimuli with R gaze & L sided neglect with absent L visual threat. MRI brain - acute R [id number] infarction along with tiny L cerebellar lacune. F/U CT head - evolving right MCA infarct with mild hemorrhagic transformation; no mid line shift. Eventually, on 1/14, pt was more alert, awake, responding verbally, being oriented x 3; L side remained flaccid with mild L LE withdrawal. Pt is L handed. She has H/O CIDP; used electrical wheel chair but she was able to take 1-2 steps with a walker & pivot herself onto the toilet seat.             famotidine  20 mg Oral BID    clopidogrel  75 mg Oral Daily    potassium chloride  40 mEq Oral BID    amLODIPine  10 mg Oral Daily    hydrALAZINE  25 mg Oral 3 times per day    sodium chloride flush  10 mL Intravenous BID    insulin lispro  0-6 Units Subcutaneous Nightly    losartan  100 mg Oral Daily    fenofibrate  54 mg Oral Daily    tamsulosin  0.4 mg Oral Daily    insulin glargine  10 Units Subcutaneous Nightly    insulin lispro  0-12 Units Subcutaneous TID WC    polyethylene glycol  17 g Oral Daily    docusate sodium  100 mg Oral Daily    heparin (porcine)  5,000 Units Subcutaneous 3 times per day    FLUoxetine  10 mg Oral Daily    sodium chloride flush  10 mL Intravenous 2 times per day    simvastatin  20 mg Oral Nightly       Past Medical History:   Diagnosis Date    Cerebral edema ([facility] 01/10/2018    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 ([facility] 01/10/2018    CIDP (chronic inflammatory demyelinating polyneuropathy) ([facility]     Diabetes mellitus ([facility]     Hyperlipidemia     Hypertension     Left arm weakness 01/10/2018    Left-sided weakness 01/10/2018       Past Surgical History:   Procedure Laterality Date    CHOLECYSTECTOMY      HYSTERECTOMY      THROMBECTOMY  01/10/2018    EMERGENT WITH CEREBRAL ANGIOGRAM    TONSILLECTOMY      TRANSESOPHAGEAL ECHOCARDIOGRAM  01/16/2018       PHYSICAL EXAM:      Blood pressure (!) 144/61, pulse 67, temperature 98.9 °F (37.2 °C), resp. rate 16, height 5' 9\" (1.753 m), weight 190 lb 4.1 oz (86.3 kg), SpO2 92 %.       Neurological Examination:  Mental status   Alert and oriented; intact memory with no confusion, normal speech & language problems; no hallucinations or delusions   Cranial nerves   II - visual fields intact to confrontation                                        III, IV, VI  extra-ocular muscles full: no JOEY, no nystagmus, no ptosis                                                     V - normal facial sensation                                                        VII - L lower facial droop  VIII - intact hearing                                                                     IX, X - symmetrical palate                                                          XI - asymmetrical shoulder shrug                                                 XII - midline tongue without atrophy or fasciculation   Motor & Sensory function  L UE - flaccid with no sensation & no

## 2018-01-17 NOTE — PLAN OF CARE
Problem: Falls - Risk of  Goal: Absence of falls  Outcome: Ongoing  Pt remains free of falls at this time. Bed locked in lowest position, siderails x2, call light in reach. Encouraged pt to call for assistance as needed for safety. Falling star posted outside of room. Will continue to monitor. Problem: Risk for Impaired Skin Integrity  Goal: Tissue integrity - skin and mucous membranes  Structural intactness and normal physiological function of skin and  mucous membranes. Outcome: Ongoing  Pts skin maintains structural intactness and physiologic function. Assisting pt with turns every 2 hours and heels elevated off bed. Linens remain clean and dry. Will continue to monitor.

## 2018-01-17 NOTE — PROGRESS NOTES
Physical Therapy  Facility/Department: [address] NEURO  Daily Treatment Note  NAME: Anthony Trinidad  : 1940  MRN: [id number]    Date of Service: 2018    Patient Diagnosis(es):   Patient Active Problem List    Diagnosis Date Noted    Controlled type 2 diabetes mellitus with hyperglycemia, without long-term current use of insulin ([address] 2018    Hyperglycemia     Left-sided weakness 2018    Cerebrovascular accident (CVA) ([address]     Left arm weakness     Facial droop     Dysarthria     Cerebral edema ([address]     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 ([address] 01/10/2018       Past Medical History:   Diagnosis Date    Cerebral edema ([address] 01/10/2018    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 ([address] 01/10/2018    CIDP (chronic inflammatory demyelinating polyneuropathy) ([address]     Diabetes mellitus ([address]     Hyperlipidemia     Hypertension     Left arm weakness 01/10/2018    Left-sided weakness 01/10/2018     Past Surgical History:   Procedure Laterality Date    CHOLECYSTECTOMY      HYSTERECTOMY      THROMBECTOMY  01/10/2018    EMERGENT WITH CEREBRAL ANGIOGRAM    TONSILLECTOMY      TRANSESOPHAGEAL ECHOCARDIOGRAM  2018       Restrictions  Restrictions/Precautions  Restrictions/Precautions: Fall Risk, General Precautions  Required Braces or Orthoses?: No  Position Activity Restriction  Other position/activity restrictions: Activity as tolerated  Subjective   General  Response To Previous Treatment: Patient with no complaints from previous session. Family / Caregiver Present: Yes  Subjective  Subjective: Pt lethargic initally during session but improved alertness once move to EOB.  Pt denies any pain this am.   Pain Screening  Patient Currently in Pain: Denies  Vital Signs  Patient Currently in Pain: Denies       Orientation  Orientation  Overall Orientation Status: Impaired  Orientation Level: Disoriented to time  Objective   Bed

## 2018-01-17 NOTE — PROGRESS NOTES
Allergen Reactions    Tetracyclines & Related Shortness Of Breath       Current Meds:   Scheduled Meds:    famotidine  20 mg Oral BID    clopidogrel  75 mg Oral Daily    potassium chloride  40 mEq Oral BID    amLODIPine  10 mg Oral Daily    hydrALAZINE  25 mg Oral 3 times per day    sodium chloride flush  10 mL Intravenous BID    insulin lispro  0-6 Units Subcutaneous Nightly    losartan  100 mg Oral Daily    fenofibrate  54 mg Oral Daily    tamsulosin  0.4 mg Oral Daily    insulin glargine  10 Units Subcutaneous Nightly    insulin lispro  0-12 Units Subcutaneous TID WC    polyethylene glycol  17 g Oral Daily    docusate sodium  100 mg Oral Daily    heparin (porcine)  5,000 Units Subcutaneous 3 times per day    FLUoxetine  10 mg Oral Daily    sodium chloride flush  10 mL Intravenous 2 times per day    simvastatin  20 mg Oral Nightly     Continuous Infusions:    dextrose       PRN Meds: potassium chloride **OR** potassium chloride **OR** potassium chloride, hydrALAZINE, glucose, dextrose, glucagon (rDNA), dextrose, sodium chloride flush, acetaminophen, magnesium hydroxide, ondansetron, fentanNYL    Data:     Past Medical History:   has a past medical history of Cerebral edema ([address] 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 (HCC); CIDP (chronic inflammatory demyelinating polyneuropathy) ([address] Diabetes mellitus ([address] Hyperlipidemia; Hypertension; Left arm weakness; and Left-sided weakness. Social History:   reports that she has never smoked. She has never used smokeless tobacco. She reports that she does not drink alcohol or use drugs. Family History: History reviewed. No pertinent family history.     Vitals:  Patient Vitals for the past 24 hrs:   BP Temp Temp src Pulse Resp SpO2   01/17/18 0716 (!) 144/61 98.9 °F (37.2 °C) - 67 16 92 %   01/17/18 0545 (!) 167/80 - - - - -   01/17/18 0408 (!) 181/86 99.6 °F (37.6 °C) Axillary 65 14 94 %   01/16/18 2350 (!) 166/75 98.9 mA/kV was utilized to reduce the radiation dose to as low as reasonably achievable. COMPARISON: 01/11/2018 HISTORY: ORDERING SYSTEM PROVIDED HISTORY: Dual scan protocol if possible, if not regular CT head TECHNOLOGIST PROVIDED HISTORY: Has a \"code stroke\" or \"stroke alert\" been called? ->No FINDINGS: BRAIN/VENTRICLES: Right MCA territory evolving acute infarction with increasing hypodensity. Minimal intraparenchymal hemorrhage is stable. No midline shift. No hydrocephalus. No new hemorrhage or new region of acute infarction. ORBITS: The visualized portion of the orbits demonstrate no acute abnormality. SINUSES: The visualized paranasal sinuses and mastoid air cells demonstrate no acute abnormality. SOFT TISSUES/SKULL:  No acute abnormality of the visualized skull or soft tissues. Right MCA territory evolving acute infarct with minimal hemorrhagic transformation is stable. Otherwise stable exam.     Ct Head Wo Contrast    Result Date: 1/11/2018  EXAMINATION: CT OF THE HEAD WITHOUT CONTRAST  1/11/2018 6:11 am TECHNIQUE: CT of the head was performed without the administration of intravenous contrast. Dose modulation, iterative reconstruction, and/or weight based adjustment of the mA/kV was utilized to reduce the radiation dose to as low as reasonably achievable. COMPARISON: Head CT from 01/10/2018 HISTORY: ORDERING SYSTEM PROVIDED HISTORY: r/o ICH TECHNOLOGIST PROVIDED HISTORY: Has a \"code stroke\" or \"stroke alert\" been called? ->No FINDINGS: BRAIN/VENTRICLES:  There is evolution of the hypodensity involving the right frontal, parietal and temporal lobes with disruption of the gray-white matter interface compatible with MCA territory infarction. There is similar degree of hyperdense blood products within the area of infarction. There is no new acute intracranial hemorrhage. No mass effect or midline shift. The remainder of the gray-white differentiation is maintained without evidence of a new acute infarct.

## 2018-01-18 LAB
ABSOLUTE EOS #: 0.34 K/UL (ref 0–0.44)
ABSOLUTE IMMATURE GRANULOCYTE: 0.06 K/UL (ref 0–0.3)
ABSOLUTE LYMPH #: 3.34 K/UL (ref 1.1–3.7)
ABSOLUTE MONO #: 0.92 K/UL (ref 0.1–1.2)
ANION GAP SERPL CALCULATED.3IONS-SCNC: 12 MMOL/L (ref 9–17)
BASOPHILS # BLD: 1 % (ref 0–2)
BASOPHILS ABSOLUTE: 0.05 K/UL (ref 0–0.2)
BUN BLDV-MCNC: 22 MG/DL (ref 8–23)
BUN/CREAT BLD: ABNORMAL (ref 9–20)
CALCIUM SERPL-MCNC: 9.4 MG/DL (ref 8.6–10.4)
CHLORIDE BLD-SCNC: 109 MMOL/L (ref 98–107)
CO2: 23 MMOL/L (ref 20–31)
CREAT SERPL-MCNC: 0.54 MG/DL (ref 0.5–0.9)
DIFFERENTIAL TYPE: ABNORMAL
EOSINOPHILS RELATIVE PERCENT: 3 % (ref 1–4)
GFR AFRICAN AMERICAN: >60 ML/MIN
GFR NON-AFRICAN AMERICAN: >60 ML/MIN
GFR SERPL CREATININE-BSD FRML MDRD: ABNORMAL ML/MIN/{1.73_M2}
GFR SERPL CREATININE-BSD FRML MDRD: ABNORMAL ML/MIN/{1.73_M2}
GLUCOSE BLD-MCNC: 142 MG/DL (ref 65–105)
GLUCOSE BLD-MCNC: 146 MG/DL (ref 70–99)
GLUCOSE BLD-MCNC: 147 MG/DL (ref 65–105)
GLUCOSE BLD-MCNC: 149 MG/DL (ref 65–105)
GLUCOSE BLD-MCNC: 154 MG/DL (ref 65–105)
HCT VFR BLD CALC: 37.5 % (ref 36.3–47.1)
HEMOGLOBIN: 11.6 G/DL (ref 11.9–15.1)
IMMATURE GRANULOCYTES: 1 %
LYMPHOCYTES # BLD: 32 % (ref 24–43)
MCH RBC QN AUTO: 27.6 PG (ref 25.2–33.5)
MCHC RBC AUTO-ENTMCNC: 30.9 G/DL (ref 28.4–34.8)
MCV RBC AUTO: 89.1 FL (ref 82.6–102.9)
MONOCYTES # BLD: 9 % (ref 3–12)
NRBC AUTOMATED: 0 PER 100 WBC
PDW BLD-RTO: 14 % (ref 11.8–14.4)
PLATELET # BLD: 323 K/UL (ref 138–453)
PLATELET ESTIMATE: ABNORMAL
PMV BLD AUTO: 10.6 FL (ref 8.1–13.5)
POTASSIUM SERPL-SCNC: 3.9 MMOL/L (ref 3.7–5.3)
RBC # BLD: 4.21 M/UL (ref 3.95–5.11)
RBC # BLD: ABNORMAL 10*6/UL
SEG NEUTROPHILS: 54 % (ref 36–65)
SEGMENTED NEUTROPHILS ABSOLUTE COUNT: 5.79 K/UL (ref 1.5–8.1)
SODIUM BLD-SCNC: 144 MMOL/L (ref 135–144)
WBC # BLD: 10.5 K/UL (ref 3.5–11.3)
WBC # BLD: ABNORMAL 10*3/UL

## 2018-01-18 PROCEDURE — 6370000000 HC RX 637 (ALT 250 FOR IP): Performed by: EMERGENCY MEDICINE

## 2018-01-18 PROCEDURE — 85025 COMPLETE CBC W/AUTO DIFF WBC: CPT

## 2018-01-18 PROCEDURE — 6370000000 HC RX 637 (ALT 250 FOR IP): Performed by: PSYCHIATRY & NEUROLOGY

## 2018-01-18 PROCEDURE — 82947 ASSAY GLUCOSE BLOOD QUANT: CPT

## 2018-01-18 PROCEDURE — 1200000000 HC SEMI PRIVATE

## 2018-01-18 PROCEDURE — 6360000002 HC RX W HCPCS: Performed by: EMERGENCY MEDICINE

## 2018-01-18 PROCEDURE — 97110 THERAPEUTIC EXERCISES: CPT

## 2018-01-18 PROCEDURE — 80048 BASIC METABOLIC PNL TOTAL CA: CPT

## 2018-01-18 PROCEDURE — 36415 COLL VENOUS BLD VENIPUNCTURE: CPT

## 2018-01-18 PROCEDURE — 97530 THERAPEUTIC ACTIVITIES: CPT

## 2018-01-18 PROCEDURE — 99232 SBSQ HOSP IP/OBS MODERATE 35: CPT | Performed by: PSYCHIATRY & NEUROLOGY

## 2018-01-18 PROCEDURE — 97535 SELF CARE MNGMENT TRAINING: CPT

## 2018-01-18 PROCEDURE — 6370000000 HC RX 637 (ALT 250 FOR IP): Performed by: NURSE PRACTITIONER

## 2018-01-18 PROCEDURE — 2580000003 HC RX 258: Performed by: PSYCHIATRY & NEUROLOGY

## 2018-01-18 PROCEDURE — 2580000003 HC RX 258: Performed by: EMERGENCY MEDICINE

## 2018-01-18 PROCEDURE — 99232 SBSQ HOSP IP/OBS MODERATE 35: CPT | Performed by: FAMILY MEDICINE

## 2018-01-18 PROCEDURE — 99232 SBSQ HOSP IP/OBS MODERATE 35: CPT | Performed by: NURSE PRACTITIONER

## 2018-01-18 RX ADMIN — CLOPIDOGREL 75 MG: 75 TABLET, FILM COATED ORAL at 10:02

## 2018-01-18 RX ADMIN — Medication 10 ML: at 21:35

## 2018-01-18 RX ADMIN — FAMOTIDINE 20 MG: 20 TABLET, FILM COATED ORAL at 10:03

## 2018-01-18 RX ADMIN — AMLODIPINE BESYLATE 10 MG: 10 TABLET ORAL at 10:02

## 2018-01-18 RX ADMIN — INSULIN GLARGINE 10 UNITS: 100 INJECTION, SOLUTION SUBCUTANEOUS at 21:10

## 2018-01-18 RX ADMIN — SIMVASTATIN 20 MG: 20 TABLET, FILM COATED ORAL at 21:09

## 2018-01-18 RX ADMIN — DOCUSATE SODIUM 100 MG: 100 CAPSULE ORAL at 10:03

## 2018-01-18 RX ADMIN — TAMSULOSIN HYDROCHLORIDE 0.4 MG: 0.4 CAPSULE ORAL at 11:43

## 2018-01-18 RX ADMIN — Medication 10 ML: at 22:43

## 2018-01-18 RX ADMIN — HYDRALAZINE HYDROCHLORIDE 25 MG: 25 TABLET, FILM COATED ORAL at 10:03

## 2018-01-18 RX ADMIN — HYDRALAZINE HYDROCHLORIDE 25 MG: 25 TABLET, FILM COATED ORAL at 22:13

## 2018-01-18 RX ADMIN — LOSARTAN POTASSIUM 100 MG: 50 TABLET, FILM COATED ORAL at 10:04

## 2018-01-18 RX ADMIN — HEPARIN SODIUM 5000 UNITS: 5000 INJECTION, SOLUTION INTRAVENOUS; SUBCUTANEOUS at 14:07

## 2018-01-18 RX ADMIN — FLUOXETINE HYDROCHLORIDE 10 MG: 10 CAPSULE ORAL at 10:03

## 2018-01-18 RX ADMIN — HEPARIN SODIUM 5000 UNITS: 5000 INJECTION, SOLUTION INTRAVENOUS; SUBCUTANEOUS at 05:59

## 2018-01-18 RX ADMIN — POTASSIUM CHLORIDE 40 MEQ: 1500 TABLET, EXTENDED RELEASE ORAL at 10:05

## 2018-01-18 RX ADMIN — INSULIN LISPRO 1 UNITS: 100 INJECTION, SOLUTION INTRAVENOUS; SUBCUTANEOUS at 21:10

## 2018-01-18 RX ADMIN — FENOFIBRATE 54 MG: 54 TABLET ORAL at 10:03

## 2018-01-18 RX ADMIN — HEPARIN SODIUM 5000 UNITS: 5000 INJECTION, SOLUTION INTRAVENOUS; SUBCUTANEOUS at 22:13

## 2018-01-18 RX ADMIN — FAMOTIDINE 20 MG: 20 TABLET, FILM COATED ORAL at 21:09

## 2018-01-18 RX ADMIN — Medication 10 ML: at 09:00

## 2018-01-18 RX ADMIN — POTASSIUM CHLORIDE 40 MEQ: 1500 TABLET, EXTENDED RELEASE ORAL at 21:09

## 2018-01-18 ASSESSMENT — ENCOUNTER SYMPTOMS
NAUSEA: 0
WHEEZING: 0
SHORTNESS OF BREATH: 0
DIARRHEA: 0
ABDOMINAL PAIN: 0
VOMITING: 0

## 2018-01-18 ASSESSMENT — PAIN SCALES - GENERAL
PAINLEVEL_OUTOF10: 0
PAINLEVEL_OUTOF10: 0

## 2018-01-18 NOTE — PLAN OF CARE
[facility]   [address]    Second Visit Note  For more detailed information please refer to the progress note of the day      1/18/2018    6:03 PM    Name:   Shyam Mcintosh  MRN:     [id number]     Acct:      [de-identified]   Room:   7707/0411-10   Day:  8  Admit Date:  1/10/2018  5:35 PM    PCP:   Sudarshan Thomas MD  Code Status:  Full Code        Pt vitals were reviewed   New labs were reviewed   Patient was seen    Updated plan :     1.  Pending placment        Chris Neumann MD  1/18/2018  6:03 PM

## 2018-01-18 NOTE — PROGRESS NOTES
with mild withdrawal; L side with reduced tone; otherwise normal muscle bulk; R side with normal tone & power 5/5   Cerebellar No visible involuntary movements or tremors   Reflex function L UE reflexes - brisk; R knee reflex +1; plantars - mute   Gait                  Not tested       DATA      Lab Results   Component Value Date    WBC 10.5 01/18/2018    HGB 11.6 (L) 01/18/2018    HCT 37.5 01/18/2018     01/18/2018    CHOL 171 01/11/2018    TRIG 243 (H) 01/11/2018    HDL 25 (L) 01/11/2018     01/18/2018    K 3.9 01/18/2018     (H) 01/18/2018    CREATININE 0.54 01/18/2018    BUN 22 01/18/2018    CO2 23 01/18/2018    TSH 0.97 01/11/2018    LABA1C 7.1 (H) 01/12/2018             CT HEAD (1/10/2018): Acute R hemispheric SAH, moderate volume. Right MCA territory acute infarction. Hyperdense R MCA concerning for thrombus or contrast staining. Pontine hypodensity likely representing age-indeterminate ischemia. F/U CT HEAD (1/12/2018): Evolving right MCA infarct with mild hemorrhagic transformation; no mid line shift      F/U CT HEAD (1/14/2018): Large subacute R frontal temporal parietal infarction with small stable hemorrhage within R temporal lobe, with no shif      MRI BRAIN (1/12/2018): Evolving early subacute infarction involving much of the R MCA   territory with minimal hemorrhagic transformation. There is sulcal   effacement in gyral swelling with minimal right lateral ventricle effacement   but no significant midline shift       CTA HEAD & NECK (1/10/2018): R distal M1 occlusion with good collaterals      MARISSA (1/16/2018): Concentric LVH with normal systolic function. No clots. Negative bubble study. Mild AI & MR                            IMPRESSION & PLAN: 69 y.o.  female admitted  R MCA infarct; s/p thrombectomy (1/10) with partial recanalization. Etiology in-situ thrombosis vs thrombo-embolism. Neuro-endovascular recommended long term cardiac monitoring.  Pt with residual L hemiplegia     H/O CIDP; was using electrical wheel chair but she was able to take 1-2 steps with a walker & pivot herself onto the toilet seat       Continue Plavix 75 mg QD & Zocor 20 mg HS    Continue PT/OT; pt is a max assist    Comorbid conditions - HTN, HLD, DM    Neurologically stable for D/C.  Needs to F/U with Sabino Mata CNP on 2/28/2018

## 2018-01-18 NOTE — PROGRESS NOTES
[address]  Speech Language Pathology    Date: 1/18/2018  Patient Name: Denzel Gallagher  YOB: 1940   AGE: 69 y.o. MRN: [id number]        Patient Not Available for Speech Therapy     Due to:  [] Testing  [] Hemodialysis  [] Cancelled by RN  [] Surgery    [] Intubation/Sedation/Pain Medication  [] Medical instability  [x] Other: Pt. Sleeping. Daughter asked not to wake at this time. Next scheduled treatment: 1/19  Completed by: Roman Roberts M.A.  CCC-SLP

## 2018-01-18 NOTE — PROGRESS NOTES
administration of intravenous contrast. Dose modulation, iterative reconstruction, and/or weight based adjustment of the mA/kV was utilized to reduce the radiation dose to as low as reasonably achievable. COMPARISON: 01/13/2018 HISTORY: ORDERING SYSTEM PROVIDED HISTORY: bleed TECHNOLOGIST PROVIDED HISTORY: Has a \"code stroke\" or \"stroke alert\" been called? ->No FINDINGS: BRAIN/VENTRICLES: Large subacute right MCA infarct is identified. Small amount of hemorrhage remains within the infarct within the right temporal lobe. No new hemorrhage is detected. There is no evidence for hydrocephalus. No extra-axial collection is seen. There is no evidence of midline shift. Severe intracranial atherosclerosis is identified involving the carotid arteries. ORBITS: The visualized portion of the orbits demonstrate no acute abnormality. SINUSES: The visualized paranasal sinuses and mastoid air cells demonstrate no acute abnormality. SOFT TISSUES/SKULL:  No acute abnormality of the visualized skull or soft tissues. Stable large subacute right MCA infarct not significantly changed. There remains a small amount of stable hemorrhage within the right temporal lobe. Ct Head Wo Contrast    Result Date: 1/12/2018  EXAMINATION: CT OF THE HEAD WITHOUT CONTRAST  1/12/2018 5:39 am TECHNIQUE: CT of the head was performed without the administration of intravenous contrast. Dose modulation, iterative reconstruction, and/or weight based adjustment of the mA/kV was utilized to reduce the radiation dose to as low as reasonably achievable. COMPARISON: 01/11/2018 HISTORY: ORDERING SYSTEM PROVIDED HISTORY: Dual scan protocol if possible, if not regular CT head TECHNOLOGIST PROVIDED HISTORY: Has a \"code stroke\" or \"stroke alert\" been called? ->No FINDINGS: BRAIN/VENTRICLES: Right MCA territory evolving acute infarction with increasing hypodensity. Minimal intraparenchymal hemorrhage is stable. No midline shift. No hydrocephalus.   No new

## 2018-01-18 NOTE — PROGRESS NOTES
Physical Therapy  Facility/Department: [address] NEURO  Daily Treatment Note  NAME: Sandra Murphy  : 1940  MRN: [id number]    Date of Service: 2018    Patient Diagnosis(es):   Patient Active Problem List    Diagnosis Date Noted    Controlled type 2 diabetes mellitus with hyperglycemia, without long-term current use of insulin ([address] 2018    Hyperglycemia     Left-sided weakness 2018    Cerebrovascular accident (CVA) ([address]     Left arm weakness     Facial droop     Dysarthria     Cerebral edema ([address]     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 ([address] 01/10/2018       Past Medical History:   Diagnosis Date    Cerebral edema ([address] 01/10/2018    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 ([address] 01/10/2018    CIDP (chronic inflammatory demyelinating polyneuropathy) ([address]     Diabetes mellitus ([address]     Hyperlipidemia     Hypertension     Left arm weakness 01/10/2018    Left-sided weakness 01/10/2018     Past Surgical History:   Procedure Laterality Date    CHOLECYSTECTOMY      HYSTERECTOMY      THROMBECTOMY  01/10/2018    EMERGENT WITH CEREBRAL ANGIOGRAM    TONSILLECTOMY      TRANSESOPHAGEAL ECHOCARDIOGRAM  2018       Restrictions  Restrictions/Precautions  Restrictions/Precautions: Fall Risk, General Precautions  Required Braces or Orthoses?: No  Position Activity Restriction  Other position/activity restrictions: Activity as tolerated  Subjective   General  Response To Previous Treatment: Patient with no complaints from previous session. Family / Caregiver Present: Yes (Daughter came in at end of session)  Subjective  Subjective: Pt lethargic upon arrival, but agreeable to PT. States she is uncomfortable laying on her side.    Pain Screening  Patient Currently in Pain: Denies  Vital Signs  Patient Currently in Pain: Denies       Orientation  Orientation  Overall Orientation Status: Impaired  Orientation Level: Disoriented to time  Objective   Bed mobility  Rolling to Left: Maximum assistance  Rolling to Right: Maximum assistance  Supine to Sit: Maximum assistance (x1 with HOB raised prior. )  Sit to Supine: Maximum assistance  Scooting: Maximal assistance (x2)  Transfers  Sit to Stand: Unable to assess  Ambulation  Ambulation?: No     Balance  Posture: Poor  Sitting - Static: Fair;-  Sitting - Dynamic: Fair;-  Comments: Pt sat EOB x ~ 8 min with MOD A to maintain due to post lean. Demo poor posture with significant trunk flex and forward head. Exercise    Seated LE exercise program: Long Arc Quads, hip abduction/adduction, heel/toe raises, and marches. Reps: 5x, AAROM                         Assessment   Body structures, Functions, Activity limitations: Decreased ROM; Decreased strength;Decreased cognition;Decreased balance;Decreased functional mobility ; Decreased endurance  Assessment: In my opinion pt unsafe to return home at this time, despite assistance from family. Pt requires MAX A to sit EOB due to poor balance.    Prognosis: Fair  REQUIRES PT FOLLOW UP: Yes  Activity Tolerance  Activity Tolerance: Patient limited by fatigue         Goals  Short term goals  Time Frame for Short term goals: 14 visits  Short term goal 1: Pt to perform bed mobilty with Asuncion  Short term goal 2: Pt to tolerate sitting on EOB with Asuncion for at least 10'  Short term goal 3: Pt to perform sit to stand with ModA  Short term goal 4: pt to tolerate standing for 5' with ModA  Short term goal 5: Pt to perform sit pivot transfer with [address]  Times per week: 5x  Times per day: Daily  Current Treatment Recommendations: Strengthening, Balance Training, Functional Mobility Training, Transfer Training, ROM, Neuromuscular Re-education, Safety Education & Training, Patient/Caregiver Education & Training  Safety Devices  Type of devices: Call light within reach, Gait belt, Left in bed, Nurse notified  Restraints  Initially in place: No     Therapy Time

## 2018-01-18 NOTE — FLOWSHEET NOTE
· Subjective: Patient and daughter were in the room together. Patient was very drowsy but said she was doing better and was being transferred to a nursing home hopefully today. She said her family has been a great help to her. She said she has no Hoahaoism. · Objective: Patient lying in bed, daughter sitting in chair beside the bed    · Assessment: Patient is hopeful of recovery and looking forward to going to the nursing home. Although she does not have a Hoahaoism she was very open to prayer    · Plan:  prayed with the patient and daughter and told them that chaplains were always available to them. They expressed gratitude for the visit.        01/18/18 4323   Encounter Summary   Services provided to: Patient and family together   Referral/Consult From: [facility] Family members   Continue Visiting (01/18/18)   Complexity of Encounter Low   Length of Encounter 15 minutes   Spiritual Assessment Completed Yes   Routine   Type Initial   Spiritual/Sikhism   Type Spiritual support   Assessment Calm   Intervention Active listening;Explored feelings, thoughts, concerns;Nurtured hope;Prayer;Sustaining presence/ Ministry of presence   Outcome Expressed gratitude

## 2018-01-18 NOTE — CARE COORDINATION
Received call from [facility] patient requires a level of care completion for pre-cert. Also requested updated PT/OT notes be faxed to facility for pre-cert.

## 2018-01-18 NOTE — PROGRESS NOTES
Occupational Therapy  Facility/Department: [address] NEURO  Daily Treatment Note  NAME: Roberto Rowland  : 1940  MRN: [id number]    Date of Service: 2018    Patient Diagnosis(es): The encounter diagnosis was Cerebrovascular accident (CVA), unspecified mechanism ([facility] has a past medical history of Cerebral edema ([facility] 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 (HCC); CIDP (chronic inflammatory demyelinating polyneuropathy) ([facility] Diabetes mellitus ([facility] Hyperlipidemia; Hypertension; Left arm weakness; and Left-sided weakness. has a past surgical history that includes Tonsillectomy; Hysterectomy; Cholecystectomy; transesophageal echocardiogram (2018); and thrombectomy (01/10/2018). Restrictions  Restrictions/Precautions  Restrictions/Precautions: Fall Risk, General Precautions  Required Braces or Orthoses?: No  Position Activity Restriction  Other position/activity restrictions: Activity as tolerated  Subjective   General  Chart Reviewed: Yes  Patient assessed for rehabilitation services?: Yes  Family / Caregiver Present: Yes (daughter)  Diagnosis: R CVA  Pain Assessment  Patient Currently in Pain: Denies  Vital Signs  Patient Currently in Pain: Denies    Objective    ADL  Pt with aide upon entering room, pt was receiving ADL care upon entry. Pt rolled side to side sup in bed for brief/ linen change. Pt participated in ADL/self care task seated in bed w/HOB elevated on table top. Pt remained in bed at end of session. Grooming:  Moderate assistance- increased time to complete for denture mgt/oral care, comb hair  UE Bathing: Dependent/Total (Aide observed and reported all bathing and dressing statuses)  LE Bathing: Dependent/Total  UE Dressing: Dependent/Total  LE Dressing: Dependent/Total  Toileting: Dependent/Total  ( brief mgt)- increased time   Bed mobility  Rolling to Left: Maximum assistance  Rolling to Right: Maximum assistance  Pt continued to not use L UE during

## 2018-01-19 VITALS
WEIGHT: 190.26 LBS | HEART RATE: 73 BPM | OXYGEN SATURATION: 94 % | TEMPERATURE: 97.2 F | BODY MASS INDEX: 28.18 KG/M2 | DIASTOLIC BLOOD PRESSURE: 69 MMHG | HEIGHT: 69 IN | SYSTOLIC BLOOD PRESSURE: 149 MMHG | RESPIRATION RATE: 18 BRPM

## 2018-01-19 LAB
ABSOLUTE EOS #: 0.38 K/UL (ref 0–0.44)
ABSOLUTE IMMATURE GRANULOCYTE: 0.06 K/UL (ref 0–0.3)
ABSOLUTE LYMPH #: 2.53 K/UL (ref 1.1–3.7)
ABSOLUTE MONO #: 0.65 K/UL (ref 0.1–1.2)
ANION GAP SERPL CALCULATED.3IONS-SCNC: 15 MMOL/L (ref 9–17)
BASOPHILS # BLD: 1 % (ref 0–2)
BASOPHILS ABSOLUTE: 0.05 K/UL (ref 0–0.2)
BUN BLDV-MCNC: 24 MG/DL (ref 8–23)
BUN/CREAT BLD: ABNORMAL (ref 9–20)
CALCIUM SERPL-MCNC: 8.8 MG/DL (ref 8.6–10.4)
CHLORIDE BLD-SCNC: 107 MMOL/L (ref 98–107)
CO2: 22 MMOL/L (ref 20–31)
CREAT SERPL-MCNC: 0.56 MG/DL (ref 0.5–0.9)
DIFFERENTIAL TYPE: ABNORMAL
EOSINOPHILS RELATIVE PERCENT: 4 % (ref 1–4)
GFR AFRICAN AMERICAN: >60 ML/MIN
GFR NON-AFRICAN AMERICAN: >60 ML/MIN
GFR SERPL CREATININE-BSD FRML MDRD: ABNORMAL ML/MIN/{1.73_M2}
GFR SERPL CREATININE-BSD FRML MDRD: ABNORMAL ML/MIN/{1.73_M2}
GLUCOSE BLD-MCNC: 140 MG/DL (ref 65–105)
GLUCOSE BLD-MCNC: 145 MG/DL (ref 70–99)
GLUCOSE BLD-MCNC: 156 MG/DL (ref 65–105)
GLUCOSE BLD-MCNC: 187 MG/DL (ref 65–105)
HCT VFR BLD CALC: 39.1 % (ref 36.3–47.1)
HEMOGLOBIN: 12.2 G/DL (ref 11.9–15.1)
IMMATURE GRANULOCYTES: 1 %
LYMPHOCYTES # BLD: 27 % (ref 24–43)
MCH RBC QN AUTO: 28.5 PG (ref 25.2–33.5)
MCHC RBC AUTO-ENTMCNC: 31.2 G/DL (ref 28.4–34.8)
MCV RBC AUTO: 91.4 FL (ref 82.6–102.9)
MONOCYTES # BLD: 7 % (ref 3–12)
NRBC AUTOMATED: 0 PER 100 WBC
PDW BLD-RTO: 14.3 % (ref 11.8–14.4)
PLATELET # BLD: 385 K/UL (ref 138–453)
PLATELET ESTIMATE: ABNORMAL
PMV BLD AUTO: 11.2 FL (ref 8.1–13.5)
POTASSIUM SERPL-SCNC: 4.2 MMOL/L (ref 3.7–5.3)
RBC # BLD: 4.28 M/UL (ref 3.95–5.11)
RBC # BLD: ABNORMAL 10*6/UL
SEG NEUTROPHILS: 60 % (ref 36–65)
SEGMENTED NEUTROPHILS ABSOLUTE COUNT: 5.8 K/UL (ref 1.5–8.1)
SODIUM BLD-SCNC: 144 MMOL/L (ref 135–144)
WBC # BLD: 9.5 K/UL (ref 3.5–11.3)
WBC # BLD: ABNORMAL 10*3/UL

## 2018-01-19 PROCEDURE — 2580000003 HC RX 258: Performed by: EMERGENCY MEDICINE

## 2018-01-19 PROCEDURE — 6370000000 HC RX 637 (ALT 250 FOR IP): Performed by: PSYCHIATRY & NEUROLOGY

## 2018-01-19 PROCEDURE — 82947 ASSAY GLUCOSE BLOOD QUANT: CPT

## 2018-01-19 PROCEDURE — 6370000000 HC RX 637 (ALT 250 FOR IP): Performed by: EMERGENCY MEDICINE

## 2018-01-19 PROCEDURE — 36415 COLL VENOUS BLD VENIPUNCTURE: CPT

## 2018-01-19 PROCEDURE — 99239 HOSP IP/OBS DSCHRG MGMT >30: CPT | Performed by: FAMILY MEDICINE

## 2018-01-19 PROCEDURE — 92526 ORAL FUNCTION THERAPY: CPT

## 2018-01-19 PROCEDURE — 97127 HC SP THER IVNTJ W/FOCUS COG FUNCJ: CPT

## 2018-01-19 PROCEDURE — 6360000002 HC RX W HCPCS: Performed by: EMERGENCY MEDICINE

## 2018-01-19 PROCEDURE — 85025 COMPLETE CBC W/AUTO DIFF WBC: CPT

## 2018-01-19 PROCEDURE — 6370000000 HC RX 637 (ALT 250 FOR IP): Performed by: FAMILY MEDICINE

## 2018-01-19 PROCEDURE — 99232 SBSQ HOSP IP/OBS MODERATE 35: CPT | Performed by: PSYCHIATRY & NEUROLOGY

## 2018-01-19 PROCEDURE — 99231 SBSQ HOSP IP/OBS SF/LOW 25: CPT | Performed by: NURSE PRACTITIONER

## 2018-01-19 PROCEDURE — 80048 BASIC METABOLIC PNL TOTAL CA: CPT

## 2018-01-19 PROCEDURE — 6370000000 HC RX 637 (ALT 250 FOR IP): Performed by: NURSE PRACTITIONER

## 2018-01-19 PROCEDURE — 92507 TX SP LANG VOICE COMM INDIV: CPT

## 2018-01-19 PROCEDURE — 94762 N-INVAS EAR/PLS OXIMTRY CONT: CPT

## 2018-01-19 RX ORDER — POTASSIUM CHLORIDE 20MEQ/15ML
40 LIQUID (ML) ORAL 2 TIMES DAILY
Status: DISCONTINUED | OUTPATIENT
Start: 2018-01-19 | End: 2018-01-19 | Stop reason: HOSPADM

## 2018-01-19 RX ADMIN — POTASSIUM CHLORIDE 40 MEQ: 40 SOLUTION ORAL at 13:13

## 2018-01-19 RX ADMIN — HYDRALAZINE HYDROCHLORIDE 25 MG: 25 TABLET, FILM COATED ORAL at 06:19

## 2018-01-19 RX ADMIN — INSULIN LISPRO 2 UNITS: 100 INJECTION, SOLUTION INTRAVENOUS; SUBCUTANEOUS at 13:15

## 2018-01-19 RX ADMIN — POTASSIUM CHLORIDE 40 MEQ: 1500 TABLET, EXTENDED RELEASE ORAL at 10:33

## 2018-01-19 RX ADMIN — Medication 10 ML: at 10:34

## 2018-01-19 RX ADMIN — FLUOXETINE HYDROCHLORIDE 10 MG: 10 CAPSULE ORAL at 10:32

## 2018-01-19 RX ADMIN — FAMOTIDINE 20 MG: 20 TABLET, FILM COATED ORAL at 10:33

## 2018-01-19 RX ADMIN — POLYETHYLENE GLYCOL 3350 17 G: 17 POWDER, FOR SOLUTION ORAL at 10:32

## 2018-01-19 RX ADMIN — FENOFIBRATE 54 MG: 54 TABLET ORAL at 10:32

## 2018-01-19 RX ADMIN — HEPARIN SODIUM 5000 UNITS: 5000 INJECTION, SOLUTION INTRAVENOUS; SUBCUTANEOUS at 13:20

## 2018-01-19 RX ADMIN — INSULIN LISPRO 2 UNITS: 100 INJECTION, SOLUTION INTRAVENOUS; SUBCUTANEOUS at 10:15

## 2018-01-19 RX ADMIN — HYDRALAZINE HYDROCHLORIDE 25 MG: 25 TABLET, FILM COATED ORAL at 13:19

## 2018-01-19 RX ADMIN — AMLODIPINE BESYLATE 10 MG: 10 TABLET ORAL at 10:33

## 2018-01-19 RX ADMIN — DOCUSATE SODIUM 100 MG: 100 CAPSULE ORAL at 10:33

## 2018-01-19 RX ADMIN — LOSARTAN POTASSIUM 100 MG: 50 TABLET, FILM COATED ORAL at 10:32

## 2018-01-19 RX ADMIN — CLOPIDOGREL 75 MG: 75 TABLET, FILM COATED ORAL at 10:33

## 2018-01-19 RX ADMIN — TAMSULOSIN HYDROCHLORIDE 0.4 MG: 0.4 CAPSULE ORAL at 10:32

## 2018-01-19 RX ADMIN — HEPARIN SODIUM 5000 UNITS: 5000 INJECTION, SOLUTION INTRAVENOUS; SUBCUTANEOUS at 05:40

## 2018-01-19 ASSESSMENT — PAIN SCALES - GENERAL: PAINLEVEL_OUTOF10: 0

## 2018-01-19 ASSESSMENT — ENCOUNTER SYMPTOMS
SHORTNESS OF BREATH: 0
WHEEZING: 0
NAUSEA: 0
ABDOMINAL PAIN: 0
VOMITING: 0
DIARRHEA: 0

## 2018-01-19 NOTE — PROGRESS NOTES
Speech Language Pathology  Speech Language Pathology  [address]    Cognitive Treatment Note    Date: 1/19/2018  Patients Name: Diego Kelley  MRN: [id number]  Diagnosis:   Patient Active Problem List   Diagnosis Code    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 [facility]) I63.311    Cerebrovascular accident (CVA) ([facility] I63.9    Left arm weakness R29.898    Facial droop R29.810    Dysarthria R47.1    Cerebral edema ([facility] G93.6    Left-sided weakness R53.1    Hyperglycemia R73.9    Controlled type 2 diabetes mellitus with hyperglycemia, without long-term current use of insulin (HCC) E11.65       Pain: 0/10    Cognitive Treatment    Treatment time: 950-1028      Subjective: [] Alert [x] Cooperative     [] Confused     [] Agitated    [] Lethargic      Objective/Assessment:  Recall: Delayed with Pictures: 100% (3/3, 3/3)    Organization: Naming Abstract Category Members (6-8): 80% increased to 100% given minimal verbal cues    Dysphagia: Spoke with RN who reports that pt. Continues on a Dysphagia I diet with nectar thick liquids. Spoke with daughter who reports her mom chokes with large sips of thin, however someone from the family is with her 24/7 and they have been only giving her tsp size thin bolus and pt. has not been having any difficulty. Pt. Given tsp thin trials with no overt s/s of aspiration. Spoke with RN about changing order in chart to Dysphagia I with thin liquids via tsp ONLY. Other: Pt gradually became lethargic toward the end of treatment. Speech:  Pt completed O/M treatment program for Dysarthria X 5 exercises X2 set max cues. Plan:  [x] Continue  services    [] Discharge from :          Treatment completed by:  Jocelyn Chery, Graduate Clinician    Co-signed by: Katlyn Connolly MS, CCC/SLP

## 2018-01-19 NOTE — PROGRESS NOTES
Physical Therapy  DATE: 2018    NAME: Rebecca Avalos  MRN: [id number]   : 1940    Patient not seen this date for Physical Therapy due to:  [] Blood transfusion in progress  [] Hemodialysis  []  Patient Declined  [] Spine Precautions   [] Strict Bedrest  [] Surgery/ Procedure  [] Testing      [x] Other: Per RN just got pt clean and positioned in bed, pt tired. Per RN to leave at 4:30.        [] PT being discontinued at this time. Patient independent. No further needs. [] PT being discontinued at this time as the patient has been transferred to palliative care. No further needs.     Sarina Hernandez, PT

## 2018-01-19 NOTE — PROGRESS NOTES
WC    polyethylene glycol  17 g Oral Daily    docusate sodium  100 mg Oral Daily    heparin (porcine)  5,000 Units Subcutaneous 3 times per day    FLUoxetine  10 mg Oral Daily    sodium chloride flush  10 mL Intravenous 2 times per day    simvastatin  20 mg Oral Nightly       Past Medical History:   Diagnosis Date    Cerebral edema ([facility] 01/10/2018    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 ([facility] 01/10/2018    CIDP (chronic inflammatory demyelinating polyneuropathy) ([facility]     Diabetes mellitus ([facility]     Hyperlipidemia     Hypertension     Left arm weakness 01/10/2018    Left-sided weakness 01/10/2018       Past Surgical History:   Procedure Laterality Date    CHOLECYSTECTOMY      HYSTERECTOMY      THROMBECTOMY  01/10/2018    EMERGENT WITH CEREBRAL ANGIOGRAM    TONSILLECTOMY      TRANSESOPHAGEAL ECHOCARDIOGRAM  01/16/2018       PHYSICAL EXAM:      Blood pressure (!) 156/77, pulse 74, temperature 97.7 °F (36.5 °C), temperature source Oral, resp. rate 16, height 5' 9\" (1.753 m), weight 190 lb 4.1 oz (86.3 kg), SpO2 95 %.       Neurological Examination:  Mental status   Alert and oriented; intact memory with no confusion, normal speech & language problems; no hallucinations or delusions   Cranial nerves   II - visual fields intact to confrontation                                        III, IV, VI  extra-ocular muscles full: no JOEY, no nystagmus, no ptosis                                                     V - normal facial sensation                                                        VII - L lower facial droop  VIII - intact hearing                                                                     IX, X - symmetrical palate                                                          XI - asymmetrical shoulder shrug                                                 XII - midline tongue without atrophy or fasciculation   Motor & Sensory function  L UE - flaccid with no sensation

## 2018-01-19 NOTE — PROGRESS NOTES
Allergen Reactions    Tetracyclines & Related Shortness Of Breath       Current Meds:   Scheduled Meds:    potassium chloride  40 mEq Oral BID    famotidine  20 mg Oral BID    clopidogrel  75 mg Oral Daily    amLODIPine  10 mg Oral Daily    hydrALAZINE  25 mg Oral 3 times per day    sodium chloride flush  10 mL Intravenous BID    insulin lispro  0-6 Units Subcutaneous Nightly    losartan  100 mg Oral Daily    fenofibrate  54 mg Oral Daily    tamsulosin  0.4 mg Oral Daily    insulin glargine  10 Units Subcutaneous Nightly    insulin lispro  0-12 Units Subcutaneous TID WC    polyethylene glycol  17 g Oral Daily    docusate sodium  100 mg Oral Daily    heparin (porcine)  5,000 Units Subcutaneous 3 times per day    FLUoxetine  10 mg Oral Daily    sodium chloride flush  10 mL Intravenous 2 times per day    simvastatin  20 mg Oral Nightly     Continuous Infusions:    dextrose       PRN Meds: potassium chloride **OR** potassium chloride **OR** potassium chloride, hydrALAZINE, glucose, dextrose, glucagon (rDNA), dextrose, sodium chloride flush, acetaminophen, magnesium hydroxide, ondansetron, fentanNYL    Data:     Past Medical History:   has a past medical history of Cerebral edema ([facility] [address] 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 (HCC); CIDP (chronic inflammatory demyelinating polyneuropathy) ([facility] [address] Diabetes mellitus ([facility] [address] Hyperlipidemia; Hypertension; Left arm weakness; and Left-sided weakness. Social History:   reports that she has never smoked. She has never used smokeless tobacco. She reports that she does not drink alcohol or use drugs. Family History: History reviewed. No pertinent family history.     Vitals:  Patient Vitals for the past 24 hrs:   BP Temp Temp src Pulse Resp SpO2   01/19/18 0800 (!) 152/70 98 °F (36.7 °C) Oral 70 18 94 %   01/19/18 0619 (!) 156/77 - - - - -   01/19/18 0345 (!) 150/69 97.7 °F (36.5 °C) Oral 74 16 95 %   01/18/18 2000 134/80 97.5 °F (36.4 °C) Oral 85 16 95 %   18 1509 112/63 98.6 °F (37 °C) - 76 16 94 %     Temp (24hrs), Av °F (36.7 °C), Min:97.5 °F (36.4 °C), Max:98.6 °F (37 °C)    Recent Labs      18   0715  18   1340  18   1654  18   2036   POCGLU  142*  154*  147*  149*       I/O (24Hr):   No intake or output data in the 24 hours ending 18 1107    Labs:  Recent Results (from the past 24 hour(s))   POC Glucose Fingerstick    Collection Time: 18  1:40 PM   Result Value Ref Range    POC Glucose 154 (H) 65 - 105 mg/dL   POC Glucose Fingerstick    Collection Time: 18  4:54 PM   Result Value Ref Range    POC Glucose 147 (H) 65 - 105 mg/dL   POC Glucose Fingerstick    Collection Time: 18  8:36 PM   Result Value Ref Range    POC Glucose 149 (H) 65 - 105 mg/dL   CBC WITH AUTO DIFFERENTIAL    Collection Time: 18  6:17 AM   Result Value Ref Range    WBC 9.5 3.5 - 11.3 k/uL    RBC 4.28 3.95 - 5.11 m/uL    Hemoglobin 12.2 11.9 - 15.1 g/dL    Hematocrit 39.1 36.3 - 47.1 %    MCV 91.4 82.6 - 102.9 fL    MCH 28.5 25.2 - 33.5 pg    MCHC 31.2 28.4 - 34.8 g/dL    RDW 14.3 11.8 - 14.4 %    Platelets 933 886 - 725 k/uL    MPV 11.2 8.1 - 13.5 fL    NRBC Automated 0.0 0.0 per 100 WBC    Differential Type NOT REPORTED     Seg Neutrophils 60 36 - 65 %    Lymphocytes 27 24 - 43 %    Monocytes 7 3 - 12 %    Eosinophils % 4 1 - 4 %    Basophils 1 0 - 2 %    Immature Granulocytes 1 (H) 0 %    Segs Absolute 5.80 1.50 - 8.10 k/uL    Absolute Lymph # 2.53 1.10 - 3.70 k/uL    Absolute Mono # 0.65 0.10 - 1.20 k/uL    Absolute Eos # 0.38 0.00 - 0.44 k/uL    Basophils # 0.05 0.00 - 0.20 k/uL    Absolute Immature Granulocyte 0.06 0.00 - 0.30 k/uL    WBC Morphology NOT REPORTED     RBC Morphology NOT REPORTED     Platelet Estimate NOT REPORTED    BASIC METABOLIC PANEL    Collection Time: 18  6:17 AM   Result Value Ref Range    Glucose 145 (H) 70 - 99 mg/dL    BUN 24 (H) 8 - 23 mg/dL    CREATININE 0.56 0.50 - intracranial vessels appear maintained. ORBITS: The visualized portion of the orbits demonstrate no acute abnormality. SINUSES: The visualized paranasal sinuses and mastoid air cells are well aerated. BONES/SOFT TISSUES: The bone marrow signal intensity appears normal. The soft tissues demonstrate no acute abnormality. 1. Evolving early subacute infarction involving much of the right MCA territory with minimal hemorrhagic transformation. There is sulcal effacement in gyral swelling with minimal right lateral ventricle effacement but no significant midline shift. MARISSA  Summary:      1. A MARISSA was performed without complications. 2. LVEF 55%. 3. No thrombus or valvular vegetation identified. 4. Mild MR, Mild TR, Mild AI     There were no complications encountered. Physical Examination:      Physical Exam   Constitutional: She is oriented to person, place, and time. She appears well-developed and well-nourished. HENT:   Head: Normocephalic and atraumatic. Right Ear: External ear normal.   Left Ear: External ear normal.   Eyes: Conjunctivae and EOM are normal. Right eye exhibits no discharge. Left eye exhibits no discharge. No scleral icterus. Neck: Neck supple. No tracheal deviation present. Cardiovascular: Normal rate, regular rhythm and normal heart sounds. Pulmonary/Chest: Effort normal and breath sounds normal. She has no wheezes. Abdominal: Soft. Bowel sounds are normal. She exhibits no distension and no mass. There is no tenderness. Musculoskeletal: She exhibits no edema or tenderness. Lymphadenopathy:     She has no cervical adenopathy. Neurological: She is alert and oriented to person, place, and time. A cranial nerve deficit (Facial droop) is present. She exhibits abnormal muscle tone (left side weakness  0/5 upper and lower extremity). Skin: Skin is warm and dry. No rash noted. Psychiatric: She has a normal mood and affect.  Her behavior is normal.   Nursing note and occurred.)

## 2018-02-07 ENCOUNTER — HOSPITAL ENCOUNTER (OUTPATIENT)
Age: 78
Setting detail: SPECIMEN
Discharge: HOME | End: 2018-02-07
Payer: MEDICARE

## 2018-02-08 ENCOUNTER — HOSPITAL ENCOUNTER (OUTPATIENT)
Age: 78
Setting detail: SPECIMEN
Discharge: HOME | End: 2018-02-08
Payer: MEDICARE

## 2018-02-08 LAB
-: ABNORMAL
AMORPHOUS: ABNORMAL
BACTERIA: ABNORMAL
BILIRUBIN URINE: NEGATIVE
CASTS UA: ABNORMAL /LPF
COLOR: YELLOW
COMMENT UA: ABNORMAL
CRYSTALS, UA: ABNORMAL /HPF
EPITHELIAL CELLS UA: ABNORMAL /HPF (ref 0–5)
GLUCOSE URINE: NEGATIVE
KETONES, URINE: ABNORMAL
LEUKOCYTE ESTERASE, URINE: ABNORMAL
MUCUS: ABNORMAL
NITRITE, URINE: POSITIVE
OTHER OBSERVATIONS UA: ABNORMAL
PH UA: 6 (ref 5–8)
PROTEIN UA: ABNORMAL
RBC UA: ABNORMAL /HPF (ref 0–2)
RENAL EPITHELIAL, UA: ABNORMAL /HPF
SPECIFIC GRAVITY UA: 1.01 (ref 1–1.03)
TRICHOMONAS: ABNORMAL
TURBIDITY: ABNORMAL
URINE HGB: NEGATIVE
UROBILINOGEN, URINE: NORMAL
WBC UA: ABNORMAL /HPF (ref 0–5)
YEAST: ABNORMAL

## 2018-02-08 PROCEDURE — 87086 URINE CULTURE/COLONY COUNT: CPT

## 2018-02-08 PROCEDURE — 87186 SC STD MICRODIL/AGAR DIL: CPT

## 2018-02-08 PROCEDURE — 81001 URINALYSIS AUTO W/SCOPE: CPT

## 2018-02-08 PROCEDURE — 87088 URINE BACTERIA CULTURE: CPT

## 2018-02-08 PROCEDURE — 87077 CULTURE AEROBIC IDENTIFY: CPT

## 2018-02-09 LAB
CULTURE: ABNORMAL
Lab: ABNORMAL
ORGANISM: ABNORMAL
ORGANISM: ABNORMAL
SPECIMEN DESCRIPTION: ABNORMAL
SPECIMEN DESCRIPTION: ABNORMAL
STATUS: ABNORMAL

## 2018-03-01 ENCOUNTER — APPOINTMENT (OUTPATIENT)
Dept: CT IMAGING | Age: 78
DRG: 853 | End: 2018-03-01
Payer: MEDICARE

## 2018-03-01 ENCOUNTER — APPOINTMENT (OUTPATIENT)
Dept: GENERAL RADIOLOGY | Age: 78
DRG: 853 | End: 2018-03-01
Payer: MEDICARE

## 2018-03-01 ENCOUNTER — HOSPITAL ENCOUNTER (INPATIENT)
Age: 78
LOS: 18 days | Discharge: HOME HEALTH | DRG: 853 | End: 2018-03-19
Attending: EMERGENCY MEDICINE | Admitting: INTERNAL MEDICINE
Payer: MEDICARE

## 2018-03-01 DIAGNOSIS — N17.9 AKI (ACUTE KIDNEY INJURY) (HCC): ICD-10-CM

## 2018-03-01 DIAGNOSIS — E87.0 HYPERNATREMIA: ICD-10-CM

## 2018-03-01 DIAGNOSIS — A41.9 SEPSIS, DUE TO UNSPECIFIED ORGANISM: Primary | ICD-10-CM

## 2018-03-01 DIAGNOSIS — N39.0 ACUTE UTI: ICD-10-CM

## 2018-03-01 DIAGNOSIS — E11.10 DIABETIC KETOACIDOSIS WITHOUT COMA ASSOCIATED WITH TYPE 2 DIABETES MELLITUS (HCC): ICD-10-CM

## 2018-03-01 PROBLEM — E83.51 HYPOCALCEMIA: Status: ACTIVE | Noted: 2018-03-01

## 2018-03-01 PROBLEM — E87.6 HYPOKALEMIA: Status: ACTIVE | Noted: 2018-03-01

## 2018-03-01 PROBLEM — E87.29 INCREASED ANION GAP METABOLIC ACIDOSIS: Status: ACTIVE | Noted: 2018-03-01

## 2018-03-01 PROBLEM — D72.829 LEUKOCYTOSIS: Status: ACTIVE | Noted: 2018-03-01

## 2018-03-01 LAB
-: ABNORMAL
ABSOLUTE EOS #: 0 K/UL (ref 0–0.4)
ABSOLUTE IMMATURE GRANULOCYTE: ABNORMAL K/UL (ref 0–0.3)
ABSOLUTE LYMPH #: 1.1 K/UL (ref 1–4.8)
ABSOLUTE MONO #: 0.82 K/UL (ref 0.1–1.3)
ALBUMIN SERPL-MCNC: 1.8 G/DL (ref 3.5–5.2)
ALBUMIN/GLOBULIN RATIO: ABNORMAL (ref 1–2.5)
ALLEN TEST: ABNORMAL
ALP BLD-CCNC: 38 U/L (ref 35–104)
ALT SERPL-CCNC: 37 U/L (ref 5–33)
AMORPHOUS: ABNORMAL
ANION GAP SERPL CALCULATED.3IONS-SCNC: 20 MMOL/L (ref 9–17)
ANION GAP SERPL CALCULATED.3IONS-SCNC: 21 MMOL/L (ref 9–17)
ANION GAP SERPL CALCULATED.3IONS-SCNC: 21 MMOL/L (ref 9–17)
AST SERPL-CCNC: 19 U/L
BACTERIA: ABNORMAL
BASOPHILS # BLD: 0 % (ref 0–2)
BASOPHILS ABSOLUTE: 0 K/UL (ref 0–0.2)
BETA-HYDROXYBUTYRATE: 2.05 MMOL/L (ref 0.02–0.27)
BILIRUB SERPL-MCNC: 0.65 MG/DL (ref 0.3–1.2)
BILIRUBIN DIRECT: 0.39 MG/DL
BILIRUBIN URINE: ABNORMAL
BILIRUBIN, INDIRECT: 0.26 MG/DL (ref 0–1)
BUN BLDV-MCNC: 103 MG/DL (ref 8–23)
BUN BLDV-MCNC: 85 MG/DL (ref 8–23)
BUN BLDV-MCNC: 97 MG/DL (ref 8–23)
BUN/CREAT BLD: ABNORMAL (ref 9–20)
CALCIUM SERPL-MCNC: 4.7 MG/DL (ref 8.6–10.4)
CALCIUM SERPL-MCNC: 6.8 MG/DL (ref 8.6–10.4)
CALCIUM SERPL-MCNC: 7.5 MG/DL (ref 8.6–10.4)
CARBOXYHEMOGLOBIN: 2.1 % (ref 0–5)
CARBOXYHEMOGLOBIN: 2.5 %
CARBOXYHEMOGLOBIN: 3.1 % (ref 0–5)
CASTS UA: ABNORMAL /LPF
CHLORIDE BLD-SCNC: 113 MMOL/L (ref 98–107)
CHLORIDE BLD-SCNC: 116 MMOL/L (ref 98–107)
CHLORIDE BLD-SCNC: 123 MMOL/L (ref 98–107)
CHP ED QC CHECK: NORMAL
CO2: 10 MMOL/L (ref 20–31)
CO2: 12 MMOL/L (ref 20–31)
CO2: 7 MMOL/L (ref 20–31)
COLOR: ABNORMAL
COMMENT UA: ABNORMAL
CREAT SERPL-MCNC: 1.79 MG/DL (ref 0.5–0.9)
CREAT SERPL-MCNC: 2.41 MG/DL (ref 0.5–0.9)
CREAT SERPL-MCNC: 2.71 MG/DL (ref 0.5–0.9)
CREATININE URINE: 38.5 MG/DL (ref 28–217)
CRYSTALS, UA: ABNORMAL /HPF
DIFFERENTIAL TYPE: ABNORMAL
DIRECT EXAM: NORMAL
EKG ATRIAL RATE: 70 BPM
EKG P AXIS: -3 DEGREES
EKG P-R INTERVAL: 140 MS
EKG Q-T INTERVAL: 446 MS
EKG QRS DURATION: 100 MS
EKG QTC CALCULATION (BAZETT): 481 MS
EKG R AXIS: -28 DEGREES
EKG T AXIS: 53 DEGREES
EKG VENTRICULAR RATE: 70 BPM
EOSINOPHILS RELATIVE PERCENT: 0 % (ref 0–4)
EPITHELIAL CELLS UA: ABNORMAL /HPF
FIO2: ABNORMAL
GFR AFRICAN AMERICAN: 21 ML/MIN
GFR AFRICAN AMERICAN: 24 ML/MIN
GFR AFRICAN AMERICAN: 33 ML/MIN
GFR NON-AFRICAN AMERICAN: 17 ML/MIN
GFR NON-AFRICAN AMERICAN: 20 ML/MIN
GFR NON-AFRICAN AMERICAN: 28 ML/MIN
GFR SERPL CREATININE-BSD FRML MDRD: ABNORMAL ML/MIN/{1.73_M2}
GLOBULIN: ABNORMAL G/DL (ref 1.5–3.8)
GLUCOSE BLD-MCNC: 222 MG/DL (ref 65–105)
GLUCOSE BLD-MCNC: 292 MG/DL (ref 70–99)
GLUCOSE BLD-MCNC: 302 MG/DL (ref 65–105)
GLUCOSE BLD-MCNC: 311 MG/DL (ref 65–105)
GLUCOSE BLD-MCNC: 320 MG/DL (ref 65–105)
GLUCOSE BLD-MCNC: 378 MG/DL
GLUCOSE BLD-MCNC: 378 MG/DL (ref 65–105)
GLUCOSE BLD-MCNC: 391 MG/DL (ref 70–99)
GLUCOSE BLD-MCNC: 403 MG/DL (ref 70–99)
GLUCOSE BLD-MCNC: 446 MG/DL (ref 65–105)
GLUCOSE URINE: NEGATIVE
HCO3 VENOUS: 7.5 MMOL/L (ref 24–30)
HCO3 VENOUS: 9.3 MMOL/L (ref 24–30)
HCO3 VENOUS: 9.8 MMOL/L (ref 24–30)
HCT VFR BLD CALC: 48.4 % (ref 36–46)
HEMOGLOBIN: 14.6 G/DL (ref 12–16)
IMMATURE GRANULOCYTES: ABNORMAL %
KETONES, URINE: ABNORMAL
LACTIC ACID: 3.2 MMOL/L (ref 0.5–2.2)
LACTIC ACID: 3.7 MMOL/L (ref 0.5–2.2)
LACTIC ACID: 3.9 MMOL/L (ref 0.5–2.2)
LACTOFERRIN, QUAL: ABNORMAL
LEUKOCYTE ESTERASE, URINE: ABNORMAL
LYMPHOCYTES # BLD: 8 % (ref 24–44)
Lab: NORMAL
MAGNESIUM: 1.6 MG/DL (ref 1.6–2.6)
MAGNESIUM: 1.8 MG/DL (ref 1.6–2.6)
MCH RBC QN AUTO: 28.1 PG (ref 26–34)
MCHC RBC AUTO-ENTMCNC: 30.2 G/DL (ref 31–37)
MCV RBC AUTO: 93.2 FL (ref 80–100)
METHEMOGLOBIN: 0.5 % (ref 0–1.9)
METHEMOGLOBIN: 0.6 %
METHEMOGLOBIN: 0.7 % (ref 0–1.9)
MODE: ABNORMAL
MONOCYTES # BLD: 6 % (ref 1–7)
MORPHOLOGY: ABNORMAL
MUCUS: ABNORMAL
NEGATIVE BASE EXCESS, VEN: 19.2 MMOL/L (ref 0–2)
NEGATIVE BASE EXCESS, VEN: 19.7 MMOL/L (ref 0–2)
NEGATIVE BASE EXCESS, VEN: 19.7 MMOL/L (ref 0–2)
NITRITE, URINE: POSITIVE
NOTIFICATION TIME: ABNORMAL
NOTIFICATION: ABNORMAL
NRBC AUTOMATED: ABNORMAL PER 100 WBC
O2 DEVICE/FLOW/%: ABNORMAL
O2 SAT, VEN: 84 % (ref 60–85)
O2 SAT, VEN: 86.2 % (ref 60–85)
O2 SAT, VEN: 95.5 %
OTHER OBSERVATIONS UA: ABNORMAL
OXYHEMOGLOBIN: ABNORMAL % (ref 95–98)
PATIENT TEMP: 37
PCO2, VEN, TEMP ADJ: ABNORMAL MMHG (ref 39–55)
PCO2, VEN: 15.9 (ref 39–55)
PCO2, VEN: 27.3 (ref 39–55)
PCO2, VEN: 30.5 (ref 39–55)
PDW BLD-RTO: 17.7 % (ref 11.5–14.9)
PEEP/CPAP: ABNORMAL
PH UA: 7 (ref 5–8)
PH VENOUS: 7.11 (ref 7.32–7.42)
PH VENOUS: 7.14 (ref 7.32–7.42)
PH VENOUS: 7.28 (ref 7.32–7.42)
PH, VEN, TEMP ADJ: ABNORMAL (ref 7.32–7.42)
PHOSPHORUS: 3.6 MG/DL (ref 2.6–4.5)
PHOSPHORUS: 4.2 MG/DL (ref 2.6–4.5)
PLATELET # BLD: 98 K/UL (ref 150–450)
PLATELET ESTIMATE: ABNORMAL
PMV BLD AUTO: 12.3 FL (ref 6–12)
PO2, VEN, TEMP ADJ: ABNORMAL MMHG (ref 30–50)
PO2, VEN: 132 (ref 30–50)
PO2, VEN: 55.9 (ref 30–50)
PO2, VEN: 70.8 (ref 30–50)
POSITIVE BASE EXCESS, VEN: ABNORMAL MMOL/L (ref 0–2)
POTASSIUM SERPL-SCNC: 3.6 MMOL/L (ref 3.7–5.3)
POTASSIUM SERPL-SCNC: 3.6 MMOL/L (ref 3.7–5.3)
POTASSIUM SERPL-SCNC: 4 MMOL/L (ref 3.7–5.3)
PROTEIN UA: ABNORMAL
PSV: ABNORMAL
PT. POSITION: ABNORMAL
RBC # BLD: 5.2 M/UL (ref 4–5.2)
RBC # BLD: ABNORMAL 10*6/UL
RBC UA: ABNORMAL /HPF
RENAL EPITHELIAL, UA: ABNORMAL /HPF
RESPIRATORY RATE: ABNORMAL
SAMPLE SITE: ABNORMAL
SEG NEUTROPHILS: 86 % (ref 36–66)
SEGMENTED NEUTROPHILS ABSOLUTE COUNT: 11.78 K/UL (ref 1.3–9.1)
SERUM OSMOLALITY: 384 MOSM/KG (ref 275–295)
SET RATE: ABNORMAL
SODIUM BLD-SCNC: 144 MMOL/L (ref 135–144)
SODIUM BLD-SCNC: 149 MMOL/L (ref 135–144)
SODIUM BLD-SCNC: 150 MMOL/L (ref 135–144)
SODIUM,UR: 53 MMOL/L
SPECIFIC GRAVITY UA: 1.02 (ref 1–1.03)
SPECIMEN DESCRIPTION: NORMAL
SPECIMEN DESCRIPTION: NORMAL
STATUS: NORMAL
TEXT FOR RESPIRATORY: ABNORMAL
TOTAL CK: 136 U/L (ref 26–192)
TOTAL HB: ABNORMAL G/DL (ref 12–16)
TOTAL PROTEIN: 3.3 G/DL (ref 6.4–8.3)
TOTAL RATE: ABNORMAL
TRICHOMONAS: ABNORMAL
TROPONIN INTERP: ABNORMAL
TROPONIN INTERP: ABNORMAL
TROPONIN T: 0.05 NG/ML
TROPONIN T: 0.07 NG/ML
TURBIDITY: ABNORMAL
URINE HGB: ABNORMAL
UROBILINOGEN, URINE: NORMAL
VT: ABNORMAL
WBC # BLD: 13.7 K/UL (ref 3.5–11)
WBC # BLD: ABNORMAL 10*3/UL
WBC UA: ABNORMAL /HPF
YEAST: ABNORMAL

## 2018-03-01 PROCEDURE — C9113 INJ PANTOPRAZOLE SODIUM, VIA: HCPCS | Performed by: RADIOLOGY

## 2018-03-01 PROCEDURE — 2580000003 HC RX 258: Performed by: EMERGENCY MEDICINE

## 2018-03-01 PROCEDURE — 2580000003 HC RX 258: Performed by: FAMILY MEDICINE

## 2018-03-01 PROCEDURE — 87505 NFCT AGENT DETECTION GI: CPT

## 2018-03-01 PROCEDURE — 87077 CULTURE AEROBIC IDENTIFY: CPT

## 2018-03-01 PROCEDURE — 36556 INSERT NON-TUNNEL CV CATH: CPT

## 2018-03-01 PROCEDURE — 2580000003 HC RX 258: Performed by: RADIOLOGY

## 2018-03-01 PROCEDURE — 85025 COMPLETE CBC W/AUTO DIFF WBC: CPT

## 2018-03-01 PROCEDURE — 83930 ASSAY OF BLOOD OSMOLALITY: CPT

## 2018-03-01 PROCEDURE — 96368 THER/DIAG CONCURRENT INF: CPT

## 2018-03-01 PROCEDURE — 93005 ELECTROCARDIOGRAM TRACING: CPT

## 2018-03-01 PROCEDURE — 82010 KETONE BODYS QUAN: CPT

## 2018-03-01 PROCEDURE — 2500000003 HC RX 250 WO HCPCS: Performed by: EMERGENCY MEDICINE

## 2018-03-01 PROCEDURE — 82805 BLOOD GASES W/O2 SATURATION: CPT

## 2018-03-01 PROCEDURE — 71045 X-RAY EXAM CHEST 1 VIEW: CPT

## 2018-03-01 PROCEDURE — 87186 SC STD MICRODIL/AGAR DIL: CPT

## 2018-03-01 PROCEDURE — 99285 EMERGENCY DEPT VISIT HI MDM: CPT

## 2018-03-01 PROCEDURE — 82550 ASSAY OF CK (CPK): CPT

## 2018-03-01 PROCEDURE — 87328 CRYPTOSPORIDIUM AG IA: CPT

## 2018-03-01 PROCEDURE — 87804 INFLUENZA ASSAY W/OPTIC: CPT

## 2018-03-01 PROCEDURE — 83735 ASSAY OF MAGNESIUM: CPT

## 2018-03-01 PROCEDURE — 81001 URINALYSIS AUTO W/SCOPE: CPT

## 2018-03-01 PROCEDURE — 84300 ASSAY OF URINE SODIUM: CPT

## 2018-03-01 PROCEDURE — 2000000000 HC ICU R&B

## 2018-03-01 PROCEDURE — 36600 WITHDRAWAL OF ARTERIAL BLOOD: CPT

## 2018-03-01 PROCEDURE — 83605 ASSAY OF LACTIC ACID: CPT

## 2018-03-01 PROCEDURE — 83630 LACTOFERRIN FECAL (QUAL): CPT

## 2018-03-01 PROCEDURE — 82800 BLOOD PH: CPT

## 2018-03-01 PROCEDURE — 70450 CT HEAD/BRAIN W/O DYE: CPT

## 2018-03-01 PROCEDURE — 36415 COLL VENOUS BLD VENIPUNCTURE: CPT

## 2018-03-01 PROCEDURE — 87088 URINE BACTERIA CULTURE: CPT

## 2018-03-01 PROCEDURE — 84484 ASSAY OF TROPONIN QUANT: CPT

## 2018-03-01 PROCEDURE — 6360000002 HC RX W HCPCS: Performed by: RADIOLOGY

## 2018-03-01 PROCEDURE — 6370000000 HC RX 637 (ALT 250 FOR IP): Performed by: RADIOLOGY

## 2018-03-01 PROCEDURE — 80076 HEPATIC FUNCTION PANEL: CPT

## 2018-03-01 PROCEDURE — 6370000000 HC RX 637 (ALT 250 FOR IP): Performed by: EMERGENCY MEDICINE

## 2018-03-01 PROCEDURE — 94762 N-INVAS EAR/PLS OXIMTRY CONT: CPT

## 2018-03-01 PROCEDURE — 87329 GIARDIA AG IA: CPT

## 2018-03-01 PROCEDURE — 96375 TX/PRO/DX INJ NEW DRUG ADDON: CPT

## 2018-03-01 PROCEDURE — 6360000002 HC RX W HCPCS: Performed by: EMERGENCY MEDICINE

## 2018-03-01 PROCEDURE — 84100 ASSAY OF PHOSPHORUS: CPT

## 2018-03-01 PROCEDURE — 82947 ASSAY GLUCOSE BLOOD QUANT: CPT

## 2018-03-01 PROCEDURE — 80048 BASIC METABOLIC PNL TOTAL CA: CPT

## 2018-03-01 PROCEDURE — 87040 BLOOD CULTURE FOR BACTERIA: CPT

## 2018-03-01 PROCEDURE — 2580000003 HC RX 258: Performed by: INTERNAL MEDICINE

## 2018-03-01 PROCEDURE — 99291 CRITICAL CARE FIRST HOUR: CPT | Performed by: INTERNAL MEDICINE

## 2018-03-01 PROCEDURE — 87086 URINE CULTURE/COLONY COUNT: CPT

## 2018-03-01 PROCEDURE — 87493 C DIFF AMPLIFIED PROBE: CPT

## 2018-03-01 PROCEDURE — 82570 ASSAY OF URINE CREATININE: CPT

## 2018-03-01 PROCEDURE — 96365 THER/PROPH/DIAG IV INF INIT: CPT

## 2018-03-01 RX ORDER — PANTOPRAZOLE SODIUM 40 MG/10ML
40 INJECTION, POWDER, LYOPHILIZED, FOR SOLUTION INTRAVENOUS DAILY
Status: DISCONTINUED | OUTPATIENT
Start: 2018-03-01 | End: 2018-03-03

## 2018-03-01 RX ORDER — POTASSIUM CHLORIDE 7.45 MG/ML
20 INJECTION INTRAVENOUS ONCE
Status: DISCONTINUED | OUTPATIENT
Start: 2018-03-01 | End: 2018-03-01 | Stop reason: SDUPTHER

## 2018-03-01 RX ORDER — FAMOTIDINE 20 MG/1
20 TABLET, FILM COATED ORAL DAILY
COMMUNITY

## 2018-03-01 RX ORDER — 0.9 % SODIUM CHLORIDE 0.9 %
2000 INTRAVENOUS SOLUTION INTRAVENOUS ONCE
Status: COMPLETED | OUTPATIENT
Start: 2018-03-01 | End: 2018-03-01

## 2018-03-01 RX ORDER — 0.9 % SODIUM CHLORIDE 0.9 %
3000 INTRAVENOUS SOLUTION INTRAVENOUS ONCE
Status: COMPLETED | OUTPATIENT
Start: 2018-03-01 | End: 2018-03-01

## 2018-03-01 RX ORDER — POTASSIUM CHLORIDE 7.45 MG/ML
10 INJECTION INTRAVENOUS PRN
Status: DISCONTINUED | OUTPATIENT
Start: 2018-03-01 | End: 2018-03-11 | Stop reason: ALTCHOICE

## 2018-03-01 RX ORDER — MAGNESIUM SULFATE 1 G/100ML
1 INJECTION INTRAVENOUS PRN
Status: DISCONTINUED | OUTPATIENT
Start: 2018-03-01 | End: 2018-03-19 | Stop reason: HOSPADM

## 2018-03-01 RX ORDER — ATORVASTATIN CALCIUM 10 MG/1
10 TABLET, FILM COATED ORAL DAILY
COMMUNITY

## 2018-03-01 RX ORDER — 0.9 % SODIUM CHLORIDE 0.9 %
10 VIAL (ML) INJECTION DAILY
Status: DISCONTINUED | OUTPATIENT
Start: 2018-03-01 | End: 2018-03-03

## 2018-03-01 RX ORDER — DEXTROSE MONOHYDRATE 25 G/50ML
12.5 INJECTION, SOLUTION INTRAVENOUS PRN
Status: DISCONTINUED | OUTPATIENT
Start: 2018-03-01 | End: 2018-03-19 | Stop reason: HOSPADM

## 2018-03-01 RX ORDER — DEXTROSE AND SODIUM CHLORIDE 5; .45 G/100ML; G/100ML
INJECTION, SOLUTION INTRAVENOUS CONTINUOUS PRN
Status: DISCONTINUED | OUTPATIENT
Start: 2018-03-01 | End: 2018-03-02

## 2018-03-01 RX ORDER — POTASSIUM CHLORIDE 7.45 MG/ML
10 INJECTION INTRAVENOUS
Status: COMPLETED | OUTPATIENT
Start: 2018-03-01 | End: 2018-03-01

## 2018-03-01 RX ORDER — NICOTINE POLACRILEX 4 MG
15 LOZENGE BUCCAL PRN
Status: DISCONTINUED | OUTPATIENT
Start: 2018-03-01 | End: 2018-03-19 | Stop reason: HOSPADM

## 2018-03-01 RX ORDER — DEXTROSE MONOHYDRATE 25 G/50ML
12.5 INJECTION, SOLUTION INTRAVENOUS PRN
Status: DISCONTINUED | OUTPATIENT
Start: 2018-03-01 | End: 2018-03-16 | Stop reason: SDUPTHER

## 2018-03-01 RX ORDER — DEXTROSE MONOHYDRATE 50 MG/ML
100 INJECTION, SOLUTION INTRAVENOUS PRN
Status: DISCONTINUED | OUTPATIENT
Start: 2018-03-01 | End: 2018-03-19 | Stop reason: HOSPADM

## 2018-03-01 RX ORDER — SODIUM CHLORIDE 450 MG/100ML
INJECTION, SOLUTION INTRAVENOUS CONTINUOUS
Status: DISCONTINUED | OUTPATIENT
Start: 2018-03-01 | End: 2018-03-02

## 2018-03-01 RX ADMIN — PANTOPRAZOLE SODIUM 40 MG: 40 INJECTION, POWDER, FOR SOLUTION INTRAVENOUS at 17:56

## 2018-03-01 RX ADMIN — POTASSIUM CHLORIDE 10 MEQ: 10 INJECTION, SOLUTION INTRAVENOUS at 23:29

## 2018-03-01 RX ADMIN — SODIUM CHLORIDE: 4.5 INJECTION, SOLUTION INTRAVENOUS at 16:14

## 2018-03-01 RX ADMIN — POTASSIUM CHLORIDE 10 MEQ: 10 INJECTION, SOLUTION INTRAVENOUS at 22:17

## 2018-03-01 RX ADMIN — SODIUM CHLORIDE 2000 ML: 9 INJECTION, SOLUTION INTRAVENOUS at 14:08

## 2018-03-01 RX ADMIN — DEXTROSE AND SODIUM CHLORIDE: 5; 450 INJECTION, SOLUTION INTRAVENOUS at 19:44

## 2018-03-01 RX ADMIN — Medication 4.86 UNITS/HR: at 20:31

## 2018-03-01 RX ADMIN — VANCOMYCIN HYDROCHLORIDE 1250 MG: 10 INJECTION, POWDER, LYOPHILIZED, FOR SOLUTION INTRAVENOUS at 13:58

## 2018-03-01 RX ADMIN — NOREPINEPHRINE BITARTRATE 2 MCG/MIN: 1 INJECTION, SOLUTION, CONCENTRATE INTRAVENOUS at 17:15

## 2018-03-01 RX ADMIN — Medication 10 ML: at 17:57

## 2018-03-01 RX ADMIN — POTASSIUM CHLORIDE 10 MEQ: 7.46 INJECTION, SOLUTION INTRAVENOUS at 12:38

## 2018-03-01 RX ADMIN — PIPERACILLIN SODIUM,TAZOBACTAM SODIUM 3.38 G: 3; .375 INJECTION, POWDER, FOR SOLUTION INTRAVENOUS at 11:28

## 2018-03-01 RX ADMIN — CALCIUM GLUCONATE 3 G: 94 INJECTION, SOLUTION INTRAVENOUS at 16:30

## 2018-03-01 RX ADMIN — SODIUM CHLORIDE 0.1 UNITS/KG/HR: 9 INJECTION, SOLUTION INTRAVENOUS at 12:39

## 2018-03-01 RX ADMIN — MAGNESIUM SULFATE HEPTAHYDRATE 1 G: 1 INJECTION, SOLUTION INTRAVENOUS at 22:52

## 2018-03-01 RX ADMIN — SODIUM CHLORIDE 2000 ML: 9 INJECTION, SOLUTION INTRAVENOUS at 10:57

## 2018-03-01 RX ADMIN — SODIUM CHLORIDE: 4.5 INJECTION, SOLUTION INTRAVENOUS at 15:27

## 2018-03-01 RX ADMIN — POTASSIUM CHLORIDE 10 MEQ: 7.46 INJECTION, SOLUTION INTRAVENOUS at 13:51

## 2018-03-01 RX ADMIN — MAGNESIUM SULFATE HEPTAHYDRATE 1 G: 1 INJECTION, SOLUTION INTRAVENOUS at 21:39

## 2018-03-01 RX ADMIN — INSULIN HUMAN 10 UNITS: 100 INJECTION, SOLUTION PARENTERAL at 12:45

## 2018-03-01 RX ADMIN — SODIUM CHLORIDE 3000 ML: 9 INJECTION, SOLUTION INTRAVENOUS at 12:50

## 2018-03-01 RX ADMIN — SODIUM CHLORIDE 1000 ML: 4.5 INJECTION, SOLUTION INTRAVENOUS at 16:10

## 2018-03-01 RX ADMIN — CEFTRIAXONE SODIUM 1 G: 1 INJECTION, POWDER, FOR SOLUTION INTRAMUSCULAR; INTRAVENOUS at 17:59

## 2018-03-01 RX ADMIN — SODIUM CHLORIDE 1000 ML: 4.5 INJECTION, SOLUTION INTRAVENOUS at 21:33

## 2018-03-01 NOTE — PROGRESS NOTES
RN paged regarding central line placement. Anesthesia will not be able to come until 6pm. General Surgery unavailable at the moment. Spoke to ER attending, Dr. Adeola Lea, in regards to central line placement. He has agreed to come up and place line shortly. 1L half normal saline bolus ordered.

## 2018-03-01 NOTE — PROGRESS NOTES
Pharmacy Note  Vancomycin Consult    Janice Streeter is a 69 y.o. female started on Vancomycin for sepsis; consult received from Dr. Da Rodgers to manage therapy. Also receiving the following antibiotics: zosyn. Patient Active Problem List   Diagnosis    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 ([address]    [facility]brovascular accident (CVA) ([facility] [address]    Left arm weakness    Facial droop    Dysarthria    Cerebral edema ([address]    Left-sided weakness    Hyperglycemia    Controlled type 2 diabetes mellitus with hyperglycemia, without long-term current use of insulin ([facility])       Allergies:  Tetracyclines & related     Temp max: 36.4    Recent Labs      03/01/18   1048   BUN  85*       Recent Labs      03/01/18   1048   CREATININE  1.79*       Recent Labs      03/01/18   1048   WBC  13.7*       No intake or output data in the 24 hours ending 03/01/18 1235      Ht Readings from Last 1 Encounters:   03/01/18 5' 9\" (1.753 m)        Wt Readings from Last 1 Encounters:   03/01/18 170 lb (77.1 kg)         Body mass index is 25.1 kg/m². Estimated Creatinine Clearance: 28 mL/min (A) (based on SCr of 1.79 mg/dL (H)). Assessment/Plan:  Will initiate vancomycin 1250 mg IV every 36 hours. Timing of trough level will be determined based on culture results, renal function, and clinical response. Thank you for the consult. Will continue to follow.   Jamila Todd, [place]  3/1/2018  12:37 PM

## 2018-03-01 NOTE — H&P
[facility]     HISTORY AND PHYSICAL EXAMINATION            Date:   3/1/2018  Patient name:  Paulino South  Date of admission:  3/1/2018 10:21 AM  MRN:   [id number]  Account:  [de-identified]  YOB: 1940  PCP:    Cali Rodriguez MD  Room:   09/09  Code Status:    Prior    Chief Complaint:     Chief Complaint   Patient presents with    Altered Mental Status       History Obtained From:     patient, family member - daughters/sister, electronic medical record    History of Present Illness: The patient is a 69 y.o. Non-/non  female with hx DMII, CIDP, prior CVA with left hemiplegia who presents with Altered Mental Status   and she is admitted to the hospital for the management of DKA with UTI. Pt has AMS and is unable to provide hx, limited hx from family. Pt has had increased c/o HA over last day. Family noted increase in urine odor over last several days. Pt developed AMS in nursing home, presented to ED. In ED Pt was found to have Na 150, K 3.6, , bicarb 7, vbg 7.284/15.9/55.9/7.5, betahydroxy 2.05, anion gap 20, corrected calcium 6.1, leukocytosis 13.7, and UA with + LE/nitrite, purulent appearing urine. Given 5L NS, started on insuline drip. CTH showing unclear poss new infarct with rec for MRI.     Past Medical History:     Past Medical History:   Diagnosis Date    Cerebral edema ([facility] [address] 01/10/2018    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 (HCC) 01/10/2018    Chronic inflammatory demyelinating polyneuropathy (HCC)     CIDP (chronic inflammatory demyelinating polyneuropathy) ([facility] [address]     Diabetes mellitus ([facility] [address]     Hyperlipidemia     Hypertension     Left arm weakness 01/10/2018    Left-sided weakness 01/10/2018        Past Surgical History:     Past Surgical History:   Procedure lentiform nucleus and lateral thalamic region. Focal low density in the right caudate head is noted suggesting prior infarct. ORBITS: The visualized portion of the orbits demonstrate no acute abnormality. SINUSES: The visualized paranasal sinuses and mastoid air cells demonstrate no acute abnormality. SOFT TISSUES/SKULL:  No acute abnormality of the visualized skull or soft tissues. 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 There is surrounding low density extending deep into the lentiform nucleus region and right lateral thalamic region. It is unclear whether this is gliosis from the prior ischemic event or a subtle superimposed area of new ischemia. MRI may be more helpful High density in the right MCA within the sylvian fissure. This is age indeterminate given the recent infarct. This may represent sequela of prior thrombus, however new thrombus not excluded.   Again if the patient 7 current right hemispheric stroke symptoms, consider MRI \      Assessment :      Primary Problem  DKA, type 2, not at goal [facility])    Active Hospital Problems    Diagnosis Date Noted    DKA, type 2, not at goal [facility]) [E13.10] 03/01/2018    Sepsis ([facility] [address] [A41.9] 03/01/2018    UTI (urinary tract infection) [N39.0] 03/01/2018    Leukocytosis [D72.829] 03/01/2018    Increased anion gap metabolic acidosis [A91.5] 03/01/2018    SAMINA (acute kidney injury) ([facility] [address] [N17.9] 03/01/2018    Hypernatremia [E87.0] 03/01/2018    Hypocalcemia [E83.51] 03/01/2018    Hypokalemia [E87.6] 03/01/2018       Plan:     Patient status Admit as inpatient in ICU    DKA with hx DM II  -  on admission, anion gap 20, K 3.6, betahydroxy 2.05  - vbg 7.284/15.9/55.9/7.5  - Insulin drip, bmp Q4H, phos/mag Q4H, vbg Q4H per DKA protocol  - IVF @ 150mL/hr per DKA protocol    UTI sepsis   - purulent urine, UA + LE/Nitrite  - Meeting SIRS criteria, hypotensive, lactate 3.2->3.7  -

## 2018-03-01 NOTE — ED PROVIDER NOTES
Neck: Normal range of motion. Neck supple. Cardiovascular: Normal rate, regular rhythm, normal heart sounds and intact distal pulses. Exam reveals no gallop and no friction rub. No murmur heard. Pulmonary/Chest: Effort normal and breath sounds normal. No stridor. No respiratory distress. She has no wheezes. She has no rales. She exhibits no tenderness. Abdominal: Soft. Bowel sounds are normal. She exhibits no distension. There is no tenderness. There is no rebound and no guarding. Musculoskeletal: Normal range of motion. She exhibits no edema, tenderness or deformity. Neurological:   Pt with decreased mentation. Unable to complete full neurologic examination at initial presentation. 2:12 PM  Flaccid paralysis of Left arm and leg, Left sided facial droop. Symptoms in keeping with pt's baseline. Skin: Skin is warm and dry. No rash noted. She is not diaphoretic. No erythema. Nursing note and vitals reviewed. MEDICAL DECISION MAKING:   [facility]    12:43 PM  Pt with brisk cap refill, Pt with improving vs and more alert and awake. 2:07 PM  Patient more awake. Patient with brisk cap refill. Blood pressure improving. No urine output as of yet. Pt with profound dehydration secondary to decreased PO intake, persistent hyperglycemia. Will continue to provide fluids and consider pressors if pt does not continue to improve. Ct scan of head showed old MCA CVA but suggested MRI if pt's symptoms did not improve. Will complete resuscitating patient prior repeat imaging. Pt symptoms greater than 24 hrs and with prior CVA. No interventions would be taken.      Central Line  Date/Time: 3/1/2018 4:59 PM  Performed by: Stephani Martinez  Authorized by: Beto WILLIAMSON     Consent:     Consent obtained:  Verbal and emergent situation    Consent given by:  Guardian    Risks discussed:  Arterial puncture, bleeding, incorrect placement, infection and pneumothorax    Alternatives discussed:  No ultrasound) are read by the radiologist, see reports below, unless otherwise noted in MDM or here. XR CHEST (SINGLE VIEW FRONTAL)   Final Result   Uncomplicated interval insertion of right IJ catheter. Otherwise, no   interval change         CT HEAD WO CONTRAST   Final Result   Large area of developing encephalomalacia in the right MCA distribution and   perisylvian region from recent infarct. There is surrounding low density   extending deep into the lentiform nucleus region and right lateral thalamic   region. It is unclear whether this is gliosis from the prior ischemic event   or a subtle superimposed area of new ischemia. MRI may be more helpful      High density in the right MCA within the sylvian fissure. This is age   indeterminate given the recent infarct. This may represent sequela of prior   thrombus, however new thrombus not excluded. Again if the patient 7 current   right hemispheric stroke symptoms, consider MRI         XR CHEST (SINGLE VIEW FRONTAL)   Final Result   No focal airspace consolidation or pulmonary vascular congestion. US RENAL LIMITED    (Results Pending)     LABS: All lab results were reviewed by myself, and all abnormals are listed below.   Labs Reviewed   CBC WITH AUTO DIFFERENTIAL - Abnormal; Notable for the following:        Result Value    WBC 13.7 (*)     Hematocrit 48.4 (*)     MCHC 30.2 (*)     RDW 17.7 (*)     Platelets 98 (*)     MPV 12.3 (*)     Seg Neutrophils 86 (*)     Lymphocytes 8 (*)     Segs Absolute 11.78 (*)     All other components within normal limits   BASIC METABOLIC PANEL - Abnormal; Notable for the following:     Glucose 403 (*)     BUN 85 (*)     CREATININE 1.79 (*)     Calcium 4.7 (*)     Sodium 150 (*)     Potassium 3.6 (*)     Chloride 123 (*)     CO2 7 (*)     Anion Gap 20 (*)     GFR Non- 28 (*)     GFR  33 (*)     All other components within normal limits   HEPATIC FUNCTION PANEL - Abnormal; Notable for the chloride (PF) 0.9 % injection 10 mL    calcium gluconate 3 g in dextrose 5 % 250 mL IVPB    0.9 % sodium chloride bolus    sodium chloride 0.45 % bolus    DISCONTD: miconazole (MICOTIN) 2 % powder    norepinephrine (LEVOPHED) 16 mg in dextrose 5 % 250 mL infusion     CONSULTS:  IP CONSULT TO INTERNAL MEDICINE  IP CONSULT TO SOCIAL WORK  IP CONSULT TO NEUROLOGY    FINAL IMPRESSION      1. Sepsis, due to unspecified organism ([facility] [address]    2. Hypernatremia    3. SAMINA (acute kidney injury) ([facility] [address]    4. Acute UTI    5. Diabetic ketoacidosis without coma associated with type 2 diabetes mellitus [facility])          DISPOSITION/PLAN   DISPOSITION Admitted 03/01/2018 01:18:50 PM      PATIENT REFERRED TO:  Chris Diehl MD  [address] [place] [address] [phone]          DISCHARGE MEDICATIONS:  Current Discharge Medication List        Tanvi Albright MD  Attending Emergency Physician  [employer] voice recognition software used in portions of this document.                    Tanvi Albright MD  03/01/18 9025

## 2018-03-01 NOTE — ED NOTES
Bed: 09  Expected date:   Expected time:   Means of arrival:   Comments:  LS2     Jeanette Larsen RN  03/01/18 1023

## 2018-03-01 NOTE — PROGRESS NOTES
Family at bedside, tearful. Daughter educated regarding pt's blood pressure and an increased need for Levophed. Daughter will be staying the night in our ICU waiting room and wants to be contacted if there are any major changes with this patient.     Electronically signed by Morenita Lentz RN on 3/1/2018 at 6:31 PM

## 2018-03-02 ENCOUNTER — APPOINTMENT (OUTPATIENT)
Dept: ULTRASOUND IMAGING | Age: 78
DRG: 853 | End: 2018-03-02
Payer: MEDICARE

## 2018-03-02 ENCOUNTER — APPOINTMENT (OUTPATIENT)
Dept: CT IMAGING | Age: 78
DRG: 853 | End: 2018-03-02
Payer: MEDICARE

## 2018-03-02 PROBLEM — R68.89 SUSPECTED DEEP TISSUE INJURY: Status: ACTIVE | Noted: 2018-03-02

## 2018-03-02 LAB
ABSOLUTE EOS #: 0 K/UL (ref 0–0.4)
ABSOLUTE IMMATURE GRANULOCYTE: ABNORMAL K/UL (ref 0–0.3)
ABSOLUTE LYMPH #: 1.2 K/UL (ref 1–4.8)
ABSOLUTE MONO #: 0.4 K/UL (ref 0.1–1.3)
ALLEN TEST: ABNORMAL
ANION GAP SERPL CALCULATED.3IONS-SCNC: 17 MMOL/L (ref 9–17)
ANION GAP SERPL CALCULATED.3IONS-SCNC: 18 MMOL/L (ref 9–17)
ANION GAP SERPL CALCULATED.3IONS-SCNC: 19 MMOL/L (ref 9–17)
ANION GAP SERPL CALCULATED.3IONS-SCNC: 21 MMOL/L (ref 9–17)
BASOPHILS # BLD: 0 % (ref 0–2)
BASOPHILS ABSOLUTE: 0 K/UL (ref 0–0.2)
BUN BLDV-MCNC: 72 MG/DL (ref 8–23)
BUN BLDV-MCNC: 73 MG/DL (ref 8–23)
BUN BLDV-MCNC: 79 MG/DL (ref 8–23)
BUN BLDV-MCNC: 85 MG/DL (ref 8–23)
BUN BLDV-MCNC: 87 MG/DL (ref 8–23)
BUN BLDV-MCNC: 91 MG/DL (ref 8–23)
BUN/CREAT BLD: ABNORMAL (ref 9–20)
C DIFFICILE TOXINS, PCR: NORMAL
CALCIUM SERPL-MCNC: 6.5 MG/DL (ref 8.6–10.4)
CALCIUM SERPL-MCNC: 6.5 MG/DL (ref 8.6–10.4)
CALCIUM SERPL-MCNC: 6.7 MG/DL (ref 8.6–10.4)
CALCIUM SERPL-MCNC: 7.1 MG/DL (ref 8.6–10.4)
CAMPYLOBACTER PCR: NORMAL
CARBOXYHEMOGLOBIN: 0.4 %
CARBOXYHEMOGLOBIN: 1.3 %
CARBOXYHEMOGLOBIN: 1.4 %
CARBOXYHEMOGLOBIN: 1.5 %
CARBOXYHEMOGLOBIN: 1.6 %
CARBOXYHEMOGLOBIN: 2.1 %
CHLORIDE BLD-SCNC: 107 MMOL/L (ref 98–107)
CHLORIDE BLD-SCNC: 108 MMOL/L (ref 98–107)
CHLORIDE BLD-SCNC: 109 MMOL/L (ref 98–107)
CHLORIDE BLD-SCNC: 110 MMOL/L (ref 98–107)
CHLORIDE BLD-SCNC: 111 MMOL/L (ref 98–107)
CHLORIDE BLD-SCNC: 113 MMOL/L (ref 98–107)
CO2: 8 MMOL/L (ref 20–31)
CO2: 9 MMOL/L (ref 20–31)
CO2: 9 MMOL/L (ref 20–31)
CREAT SERPL-MCNC: 1.71 MG/DL (ref 0.5–0.9)
CREAT SERPL-MCNC: 1.76 MG/DL (ref 0.5–0.9)
CREAT SERPL-MCNC: 1.86 MG/DL (ref 0.5–0.9)
CREAT SERPL-MCNC: 2.03 MG/DL (ref 0.5–0.9)
CREAT SERPL-MCNC: 2.13 MG/DL (ref 0.5–0.9)
CREAT SERPL-MCNC: 2.21 MG/DL (ref 0.5–0.9)
CULTURE: ABNORMAL
DIFFERENTIAL TYPE: ABNORMAL
EOSINOPHILS RELATIVE PERCENT: 0 % (ref 0–4)
FIO2: ABNORMAL
FOLATE: 4.8 NG/ML
GFR AFRICAN AMERICAN: 26 ML/MIN
GFR AFRICAN AMERICAN: 27 ML/MIN
GFR AFRICAN AMERICAN: 29 ML/MIN
GFR AFRICAN AMERICAN: 32 ML/MIN
GFR AFRICAN AMERICAN: 34 ML/MIN
GFR AFRICAN AMERICAN: 35 ML/MIN
GFR NON-AFRICAN AMERICAN: 22 ML/MIN
GFR NON-AFRICAN AMERICAN: 23 ML/MIN
GFR NON-AFRICAN AMERICAN: 24 ML/MIN
GFR NON-AFRICAN AMERICAN: 26 ML/MIN
GFR NON-AFRICAN AMERICAN: 28 ML/MIN
GFR NON-AFRICAN AMERICAN: 29 ML/MIN
GFR SERPL CREATININE-BSD FRML MDRD: ABNORMAL ML/MIN/{1.73_M2}
GLUCOSE BLD-MCNC: 104 MG/DL (ref 65–105)
GLUCOSE BLD-MCNC: 112 MG/DL (ref 65–105)
GLUCOSE BLD-MCNC: 116 MG/DL (ref 65–105)
GLUCOSE BLD-MCNC: 118 MG/DL (ref 65–105)
GLUCOSE BLD-MCNC: 121 MG/DL (ref 70–99)
GLUCOSE BLD-MCNC: 126 MG/DL (ref 70–99)
GLUCOSE BLD-MCNC: 137 MG/DL (ref 65–105)
GLUCOSE BLD-MCNC: 139 MG/DL (ref 65–105)
GLUCOSE BLD-MCNC: 141 MG/DL (ref 65–105)
GLUCOSE BLD-MCNC: 146 MG/DL (ref 65–105)
GLUCOSE BLD-MCNC: 147 MG/DL (ref 65–105)
GLUCOSE BLD-MCNC: 163 MG/DL (ref 65–105)
GLUCOSE BLD-MCNC: 169 MG/DL (ref 70–99)
GLUCOSE BLD-MCNC: 172 MG/DL (ref 65–105)
GLUCOSE BLD-MCNC: 190 MG/DL (ref 65–105)
GLUCOSE BLD-MCNC: 193 MG/DL (ref 65–105)
GLUCOSE BLD-MCNC: 205 MG/DL (ref 70–99)
GLUCOSE BLD-MCNC: 207 MG/DL (ref 65–105)
GLUCOSE BLD-MCNC: 225 MG/DL (ref 65–105)
GLUCOSE BLD-MCNC: 248 MG/DL (ref 65–105)
GLUCOSE BLD-MCNC: 249 MG/DL (ref 65–105)
GLUCOSE BLD-MCNC: 250 MG/DL (ref 70–99)
GLUCOSE BLD-MCNC: 252 MG/DL (ref 65–105)
GLUCOSE BLD-MCNC: 255 MG/DL (ref 65–105)
GLUCOSE BLD-MCNC: 255 MG/DL (ref 65–105)
GLUCOSE BLD-MCNC: 292 MG/DL (ref 70–99)
HCO3 ARTERIAL: 8.5 MMOL/L
HCO3 VENOUS: 7.7 MMOL/L (ref 24–30)
HCO3 VENOUS: 8 MMOL/L (ref 24–30)
HCO3 VENOUS: 8.3 MMOL/L (ref 24–30)
HCO3 VENOUS: 8.4 MMOL/L (ref 24–30)
HCO3 VENOUS: 9 MMOL/L (ref 24–30)
HCT VFR BLD CALC: 45.7 % (ref 36–46)
HEMOGLOBIN: 14.3 G/DL (ref 12–16)
IMMATURE GRANULOCYTES: ABNORMAL %
LACTIC ACID: 2.8 MMOL/L (ref 0.5–2.2)
LACTIC ACID: 3.5 MMOL/L (ref 0.5–2.2)
LACTIC ACID: 3.6 MMOL/L (ref 0.5–2.2)
LYMPHOCYTES # BLD: 11 % (ref 24–44)
Lab: ABNORMAL
MAGNESIUM: 1.7 MG/DL (ref 1.6–2.6)
MAGNESIUM: 1.8 MG/DL (ref 1.6–2.6)
MAGNESIUM: 2 MG/DL (ref 1.6–2.6)
MAGNESIUM: 2 MG/DL (ref 1.6–2.6)
MAGNESIUM: 2.1 MG/DL (ref 1.6–2.6)
MAGNESIUM: 2.4 MG/DL (ref 1.6–2.6)
MCH RBC QN AUTO: 28.6 PG (ref 26–34)
MCHC RBC AUTO-ENTMCNC: 31.2 G/DL (ref 31–37)
MCV RBC AUTO: 91.5 FL (ref 80–100)
METHEMOGLOBIN: 0.7 %
METHEMOGLOBIN: 0.8 %
MODE: ABNORMAL
MONOCYTES # BLD: 4 % (ref 1–7)
NEGATIVE BASE EXCESS, ART: 19.7 MMOL/L (ref 0–2)
NEGATIVE BASE EXCESS, VEN: 20 MMOL/L (ref 0–2)
NEGATIVE BASE EXCESS, VEN: 20.1 MMOL/L (ref 0–2)
NEGATIVE BASE EXCESS, VEN: 20.7 MMOL/L (ref 0–2)
NEGATIVE BASE EXCESS, VEN: 21 MMOL/L (ref 0–2)
NEGATIVE BASE EXCESS, VEN: 21.4 MMOL/L (ref 0–2)
NOTIFICATION TIME: ABNORMAL
NOTIFICATION: ABNORMAL
NRBC AUTOMATED: ABNORMAL PER 100 WBC
O2 DEVICE/FLOW/%: ABNORMAL
O2 SAT, ARTERIAL: 97.3 %
O2 SAT, VEN: 78.4 %
O2 SAT, VEN: 78.7 %
O2 SAT, VEN: 79.1 %
O2 SAT, VEN: 85.1 %
O2 SAT, VEN: 87.8 %
ORGANISM: ABNORMAL
ORGANISM: ABNORMAL
OXYHEMOGLOBIN: ABNORMAL % (ref 95–98)
PATIENT TEMP: 37
PCO2 ARTERIAL: 22 MMHG
PCO2, ART, TEMP ADJ: ABNORMAL
PCO2, VEN, TEMP ADJ: ABNORMAL MMHG (ref 39–55)
PCO2, VEN: 22.9 (ref 39–55)
PCO2, VEN: 23 (ref 39–55)
PCO2, VEN: 23.1 (ref 39–55)
PCO2, VEN: 24.6 (ref 39–55)
PCO2, VEN: 26.9 (ref 39–55)
PDW BLD-RTO: 16.9 % (ref 11.5–14.9)
PEEP/CPAP: ABNORMAL
PH ARTERIAL: 7.19
PH VENOUS: 7.13 (ref 7.32–7.42)
PH VENOUS: 7.14 (ref 7.32–7.42)
PH VENOUS: 7.17 (ref 7.32–7.42)
PH, ART, TEMP ADJ: ABNORMAL
PH, VEN, TEMP ADJ: ABNORMAL (ref 7.32–7.42)
PHOSPHORUS: 2.4 MG/DL (ref 2.6–4.5)
PHOSPHORUS: 2.5 MG/DL (ref 2.6–4.5)
PHOSPHORUS: 2.9 MG/DL (ref 2.6–4.5)
PHOSPHORUS: 3.3 MG/DL (ref 2.6–4.5)
PHOSPHORUS: 3.4 MG/DL (ref 2.6–4.5)
PHOSPHORUS: 3.5 MG/DL (ref 2.6–4.5)
PLATELET # BLD: 116 K/UL (ref 150–450)
PLATELET ESTIMATE: ABNORMAL
PMV BLD AUTO: 11.7 FL (ref 6–12)
PO2 ARTERIAL: 117 MMHG
PO2, ART, TEMP ADJ: ABNORMAL MMHG
PO2, VEN, TEMP ADJ: ABNORMAL MMHG (ref 30–50)
PO2, VEN: 42.8 (ref 30–50)
PO2, VEN: 44.1 (ref 30–50)
PO2, VEN: 46.5 (ref 30–50)
PO2, VEN: 51.5 (ref 30–50)
PO2, VEN: 56.3 (ref 30–50)
POSITIVE BASE EXCESS, ART: ABNORMAL MMOL/L (ref 0–2)
POSITIVE BASE EXCESS, VEN: ABNORMAL MMOL/L (ref 0–2)
POTASSIUM SERPL-SCNC: 3.7 MMOL/L (ref 3.7–5.3)
POTASSIUM SERPL-SCNC: 4 MMOL/L (ref 3.7–5.3)
POTASSIUM SERPL-SCNC: 4.2 MMOL/L (ref 3.7–5.3)
POTASSIUM SERPL-SCNC: 4.6 MMOL/L (ref 3.7–5.3)
POTASSIUM SERPL-SCNC: 4.7 MMOL/L (ref 3.7–5.3)
POTASSIUM SERPL-SCNC: 5 MMOL/L (ref 3.7–5.3)
PSV: ABNORMAL
PT. POSITION: ABNORMAL
RBC # BLD: 4.99 M/UL (ref 4–5.2)
RBC # BLD: ABNORMAL 10*6/UL
RESPIRATORY RATE: 24
RESPIRATORY RATE: ABNORMAL
SALMONELLA PCR: NORMAL
SAMPLE SITE: ABNORMAL
SEG NEUTROPHILS: 85 % (ref 36–66)
SEGMENTED NEUTROPHILS ABSOLUTE COUNT: 9.3 K/UL (ref 1.3–9.1)
SET RATE: ABNORMAL
SHIGATOXIN GENE PCR: NORMAL
SHIGELLA SP PCR: NORMAL
SODIUM BLD-SCNC: 135 MMOL/L (ref 135–144)
SODIUM BLD-SCNC: 135 MMOL/L (ref 135–144)
SODIUM BLD-SCNC: 136 MMOL/L (ref 135–144)
SODIUM BLD-SCNC: 137 MMOL/L (ref 135–144)
SODIUM BLD-SCNC: 139 MMOL/L (ref 135–144)
SODIUM BLD-SCNC: 139 MMOL/L (ref 135–144)
SPECIMEN DESCRIPTION: ABNORMAL
SPECIMEN DESCRIPTION: ABNORMAL
SPECIMEN DESCRIPTION: NORMAL
SPECIMEN: NORMAL
STATUS: ABNORMAL
TEXT FOR RESPIRATORY: ABNORMAL
THYROXINE, FREE: 1.02 NG/DL (ref 0.93–1.7)
TOTAL HB: ABNORMAL G/DL (ref 12–16)
TOTAL RATE: ABNORMAL
TROPONIN INTERP: ABNORMAL
TROPONIN T: 0.07 NG/ML
TSH SERPL DL<=0.05 MIU/L-ACNC: 1.46 MIU/L (ref 0.3–5)
VITAMIN B-12: 794 PG/ML (ref 232–1245)
VT: ABNORMAL
WBC # BLD: 11 K/UL (ref 3.5–11)
WBC # BLD: ABNORMAL 10*3/UL

## 2018-03-02 PROCEDURE — 2000000000 HC ICU R&B

## 2018-03-02 PROCEDURE — 84443 ASSAY THYROID STIM HORMONE: CPT

## 2018-03-02 PROCEDURE — 2580000003 HC RX 258: Performed by: STUDENT IN AN ORGANIZED HEALTH CARE EDUCATION/TRAINING PROGRAM

## 2018-03-02 PROCEDURE — 97110 THERAPEUTIC EXERCISES: CPT

## 2018-03-02 PROCEDURE — 2500000003 HC RX 250 WO HCPCS: Performed by: EMERGENCY MEDICINE

## 2018-03-02 PROCEDURE — G8978 MOBILITY CURRENT STATUS: HCPCS

## 2018-03-02 PROCEDURE — 74176 CT ABD & PELVIS W/O CONTRAST: CPT

## 2018-03-02 PROCEDURE — 94762 N-INVAS EAR/PLS OXIMTRY CONT: CPT

## 2018-03-02 PROCEDURE — 99291 CRITICAL CARE FIRST HOUR: CPT | Performed by: INTERNAL MEDICINE

## 2018-03-02 PROCEDURE — 85025 COMPLETE CBC W/AUTO DIFF WBC: CPT

## 2018-03-02 PROCEDURE — 80048 BASIC METABOLIC PNL TOTAL CA: CPT

## 2018-03-02 PROCEDURE — 99232 SBSQ HOSP IP/OBS MODERATE 35: CPT | Performed by: NURSE PRACTITIONER

## 2018-03-02 PROCEDURE — 84100 ASSAY OF PHOSPHORUS: CPT

## 2018-03-02 PROCEDURE — 6360000002 HC RX W HCPCS: Performed by: INTERNAL MEDICINE

## 2018-03-02 PROCEDURE — C9113 INJ PANTOPRAZOLE SODIUM, VIA: HCPCS | Performed by: RADIOLOGY

## 2018-03-02 PROCEDURE — 83735 ASSAY OF MAGNESIUM: CPT

## 2018-03-02 PROCEDURE — 36592 COLLECT BLOOD FROM PICC: CPT

## 2018-03-02 PROCEDURE — 82947 ASSAY GLUCOSE BLOOD QUANT: CPT

## 2018-03-02 PROCEDURE — 82800 BLOOD PH: CPT

## 2018-03-02 PROCEDURE — 6360000002 HC RX W HCPCS: Performed by: RADIOLOGY

## 2018-03-02 PROCEDURE — 2580000003 HC RX 258: Performed by: INTERNAL MEDICINE

## 2018-03-02 PROCEDURE — 84484 ASSAY OF TROPONIN QUANT: CPT

## 2018-03-02 PROCEDURE — 76775 US EXAM ABDO BACK WALL LIM: CPT

## 2018-03-02 PROCEDURE — 82607 VITAMIN B-12: CPT

## 2018-03-02 PROCEDURE — 6370000000 HC RX 637 (ALT 250 FOR IP): Performed by: RADIOLOGY

## 2018-03-02 PROCEDURE — G8979 MOBILITY GOAL STATUS: HCPCS

## 2018-03-02 PROCEDURE — 82805 BLOOD GASES W/O2 SATURATION: CPT

## 2018-03-02 PROCEDURE — 84439 ASSAY OF FREE THYROXINE: CPT

## 2018-03-02 PROCEDURE — 2500000003 HC RX 250 WO HCPCS: Performed by: RADIOLOGY

## 2018-03-02 PROCEDURE — 36415 COLL VENOUS BLD VENIPUNCTURE: CPT

## 2018-03-02 PROCEDURE — 2580000003 HC RX 258: Performed by: RADIOLOGY

## 2018-03-02 PROCEDURE — 97162 PT EVAL MOD COMPLEX 30 MIN: CPT

## 2018-03-02 PROCEDURE — 2580000003 HC RX 258: Performed by: EMERGENCY MEDICINE

## 2018-03-02 PROCEDURE — 82746 ASSAY OF FOLIC ACID SERUM: CPT

## 2018-03-02 PROCEDURE — 83605 ASSAY OF LACTIC ACID: CPT

## 2018-03-02 RX ORDER — DEXTROSE AND SODIUM CHLORIDE 5; .9 G/100ML; G/100ML
INJECTION, SOLUTION INTRAVENOUS CONTINUOUS
Status: DISCONTINUED | OUTPATIENT
Start: 2018-03-02 | End: 2018-03-03

## 2018-03-02 RX ORDER — HEPARIN SODIUM 5000 [USP'U]/ML
5000 INJECTION, SOLUTION INTRAVENOUS; SUBCUTANEOUS EVERY 8 HOURS SCHEDULED
Status: DISCONTINUED | OUTPATIENT
Start: 2018-03-02 | End: 2018-03-03

## 2018-03-02 RX ORDER — 0.9 % SODIUM CHLORIDE 0.9 %
1000 INTRAVENOUS SOLUTION INTRAVENOUS ONCE
Status: COMPLETED | OUTPATIENT
Start: 2018-03-02 | End: 2018-03-02

## 2018-03-02 RX ADMIN — CEFTRIAXONE SODIUM 1 G: 1 INJECTION, POWDER, FOR SOLUTION INTRAMUSCULAR; INTRAVENOUS at 19:10

## 2018-03-02 RX ADMIN — POTASSIUM CHLORIDE 10 MEQ: 10 INJECTION, SOLUTION INTRAVENOUS at 00:41

## 2018-03-02 RX ADMIN — POTASSIUM CHLORIDE 10 MEQ: 10 INJECTION, SOLUTION INTRAVENOUS at 09:47

## 2018-03-02 RX ADMIN — POTASSIUM CHLORIDE 10 MEQ: 10 INJECTION, SOLUTION INTRAVENOUS at 08:39

## 2018-03-02 RX ADMIN — DEXTROSE AND SODIUM CHLORIDE: 5; 450 INJECTION, SOLUTION INTRAVENOUS at 01:33

## 2018-03-02 RX ADMIN — Medication 3.48 UNITS/HR: at 14:57

## 2018-03-02 RX ADMIN — POTASSIUM CHLORIDE 10 MEQ: 10 INJECTION, SOLUTION INTRAVENOUS at 04:20

## 2018-03-02 RX ADMIN — POTASSIUM CHLORIDE 10 MEQ: 10 INJECTION, SOLUTION INTRAVENOUS at 05:14

## 2018-03-02 RX ADMIN — DEXTROSE AND SODIUM CHLORIDE: 5; 900 INJECTION, SOLUTION INTRAVENOUS at 08:52

## 2018-03-02 RX ADMIN — POTASSIUM CHLORIDE 10 MEQ: 10 INJECTION, SOLUTION INTRAVENOUS at 22:53

## 2018-03-02 RX ADMIN — SODIUM CHLORIDE 1000 ML: 9 INJECTION, SOLUTION INTRAVENOUS at 10:43

## 2018-03-02 RX ADMIN — POTASSIUM CHLORIDE 10 MEQ: 10 INJECTION, SOLUTION INTRAVENOUS at 17:32

## 2018-03-02 RX ADMIN — POTASSIUM CHLORIDE 10 MEQ: 10 INJECTION, SOLUTION INTRAVENOUS at 14:08

## 2018-03-02 RX ADMIN — NOREPINEPHRINE BITARTRATE 15 MCG/MIN: 1 INJECTION, SOLUTION, CONCENTRATE INTRAVENOUS at 06:13

## 2018-03-02 RX ADMIN — CEFTRIAXONE SODIUM 1 G: 1 INJECTION, POWDER, FOR SOLUTION INTRAMUSCULAR; INTRAVENOUS at 05:10

## 2018-03-02 RX ADMIN — Medication 10 ML: at 09:46

## 2018-03-02 RX ADMIN — POTASSIUM CHLORIDE 10 MEQ: 10 INJECTION, SOLUTION INTRAVENOUS at 15:29

## 2018-03-02 RX ADMIN — HEPARIN SODIUM 5000 UNITS: 5000 INJECTION, SOLUTION INTRAVENOUS; SUBCUTANEOUS at 22:50

## 2018-03-02 RX ADMIN — Medication 16.5 UNITS/HR: at 05:12

## 2018-03-02 RX ADMIN — SODIUM CHLORIDE 1000 ML: 9 INJECTION, SOLUTION INTRAVENOUS at 14:08

## 2018-03-02 RX ADMIN — DEXTROSE AND SODIUM CHLORIDE: 5; 450 INJECTION, SOLUTION INTRAVENOUS at 06:12

## 2018-03-02 RX ADMIN — POTASSIUM CHLORIDE 10 MEQ: 10 INJECTION, SOLUTION INTRAVENOUS at 23:58

## 2018-03-02 RX ADMIN — SODIUM PHOSPHATE, MONOBASIC, MONOHYDRATE AND SODIUM PHOSPHATE, DIBASIC, ANHYDROUS 10 MMOL: 276; 142 INJECTION, SOLUTION INTRAVENOUS at 08:46

## 2018-03-02 RX ADMIN — POTASSIUM CHLORIDE 10 MEQ: 10 INJECTION, SOLUTION INTRAVENOUS at 03:27

## 2018-03-02 RX ADMIN — POTASSIUM CHLORIDE 10 MEQ: 10 INJECTION, SOLUTION INTRAVENOUS at 10:41

## 2018-03-02 RX ADMIN — PANTOPRAZOLE SODIUM 40 MG: 40 INJECTION, POWDER, FOR SOLUTION INTRAVENOUS at 09:46

## 2018-03-02 RX ADMIN — CALCIUM GLUCONATE 1 G: 94 INJECTION, SOLUTION INTRAVENOUS at 19:16

## 2018-03-02 RX ADMIN — DEXTROSE AND SODIUM CHLORIDE: 5; 900 INJECTION, SOLUTION INTRAVENOUS at 15:30

## 2018-03-02 RX ADMIN — DEXTROSE AND SODIUM CHLORIDE: 5; 900 INJECTION, SOLUTION INTRAVENOUS at 23:01

## 2018-03-02 ASSESSMENT — PAIN SCALES - GENERAL: PAINLEVEL_OUTOF10: 0

## 2018-03-02 NOTE — PROGRESS NOTES
every 2 hours  · Float heels off of bed surface with pillows  · No mepilex due to incontinence of stool  · Routine incontinence care with Alex barrier cloths and zinc oxide cream. Apply zinc oxide cream BID and prn incontinence.    · Covidien moisture wicking under pads vs cloth backed briefs  · Currently on pressure redistribution mattress  · Offloading boots on feet                Electronically signed by JOEL Dalton on 3/2/2018 at 4:37 PM

## 2018-03-02 NOTE — PROGRESS NOTES
Screening  Patient Currently in Pain: Unable to Assess  Vital Signs  Patient Currently in Pain: Unable to Assess       Orientation  Orientation  Overall Orientation Status: Impaired (answers to name)  Orientation Level: Oriented to person;Disoriented to place; Disoriented to time;Disoriented to situation    Social/Functional History  Social/Functional History  Lives With: Family (dtr, dtr's spouse and patient)  Type of Home: House  Home Layout: One level  Home Access: Ramped entrance  Entrance Stairs - Number of Steps: 10-15 steps are front  w/ left rail, 4 steps in front w/ 2 rails to porch ( dtr's sposue and 4 sons would lift pt up in W/C,  pt uses ramp in back- DEIRDRE uses ramp- family pushes pt up in W/C on ramp  Bathroom Toilet: Bedside commode (no longer uses BSC)  Home Equipment: Wheelchair-electric, Hospital bed (kaelyn lift, no longer uses power chair)  Receives Help From: Family, Personal care attendant  ADL Assistance: Needs assistance (dep for all care, uses diaper, 2 family members (STNAs) and dtr assist w/ bed bath and dressing)  Homemaking Assistance: Needs assistance (dtr is primary)  Homemaking Responsibilities: No  Ambulation Assistance:  (nonambulatory since 2009, used  power chair until CVA in January- no longer has trunk control to use)  Transfer Assistance:  (kaelyn lift from bed to recliner only)  Active : No  Occupation: Retired  IADL Comments: 2 family who are STNAs have been assisting in her care, dtr assists feeding pt (was left hand dominant but now left UE flaccid)  Additional Comments: pt had a CVA on 1-10-18 and was at [address] [place] [address] [address] for therapy. Pt was D/C home approximately 2 weeks ago to dtr's home.   Pt has been seen at home for home PT (3x week)1, OT and nurse, dtr Erna provided information as pt unable to provide  Objective     Observation/Palpation  Observation: wearing bilateral PRAFO boots, edema left UE    AROM RLE (degrees)  RLE AROM: Exceptions  R Hip Flexion 0-125:

## 2018-03-02 NOTE — PROGRESS NOTES
[facility]     Progress Note    3/2/2018    8:10 AM    Name:   Armida Mohan  MRN:     [id number]     Acct:      [de-identified]   Room:   2002/2002-01  IP Day:  1  Admit Date:  3/1/2018 10:21 AM    PCP:   Suzanna Hernández MD  Code Status:  Full Code    Subjective:     C/C:   Chief Complaint   Patient presents with    Altered Mental Status     Interval History Status: not changed. Pt found to have watery stools, hypotension to 60's/30's last evening, started on levophed, lactoferrin positive, C.diff pending, stools soft per nursing. Pt had 1 instance of bradycardia s/p physical exam, likely vasovagal 2/2 abd tenderness. Continued c/o abd pain. Brief History:     The patient is a 69 y.o. Non-/non  female with hx DMII, CIDP, prior CVA with left hemiplegia who presents with Altered Mental Status   and she is admitted to the hospital for the management of DKA with UTI. Pt has AMS and is unable to provide hx, limited hx from family. Pt has had increased c/o HA over last day. Family noted increase in urine odor over last several days. Pt developed AMS in nursing home, presented to ED. Review of Systems:     ROS    Unable to obtain ROS due to PT's AMS    Medications: Allergies:     Allergies   Allergen Reactions    Demeclocycline Shortness Of Breath    Tetracyclines & Related Shortness Of Breath       Current Meds:   Scheduled Meds:    pantoprazole  40 mg Intravenous Daily    And    sodium chloride (PF)  10 mL Intravenous Daily    cefTRIAXone (ROCEPHIN) IV  1 g Intravenous Q12H     Continuous Infusions:    dextrose 5 % and 0.9 % NaCl      dextrose      insulin (HUMAN R) non-weight based infusion 11.2 Units/hr (03/02/18 0736)    norepinephrine 11 mcg/min (03/02/18 0739)     PRN Meds: glucose, dextrose, glucagon (rDNA), Performed at [facility] [id number] [facility], [address] [place] [address] [phone]    Status Pending    CBC Auto Differential    Collection Time: 03/01/18 10:48 AM   Result Value Ref Range    WBC 13.7 (H) 3.5 - 11.0 k/uL    RBC 5.20 4.0 - 5.2 m/uL    Hemoglobin 14.6 12.0 - 16.0 g/dL    Hematocrit 48.4 (H) 36 - 46 %    MCV 93.2 80 - 100 fL    MCH 28.1 26 - 34 pg    MCHC 30.2 (L) 31 - 37 g/dL    RDW 17.7 (H) 11.5 - 14.9 %    Platelets 98 (L) 854 - 450 k/uL    MPV 12.3 (H) 6.0 - 12.0 fL    NRBC Automated NOT REPORTED per 100 WBC    Differential Type NOT REPORTED     Immature Granulocytes NOT REPORTED 0 %    Absolute Immature Granulocyte NOT REPORTED 0.00 - 0.30 k/uL    WBC Morphology NOT REPORTED     RBC Morphology NOT REPORTED     Platelet Estimate NOT REPORTED     Seg Neutrophils 86 (H) 36 - 66 %    Lymphocytes 8 (L) 24 - 44 %    Monocytes 6 1 - 7 %    Eosinophils % 0 0 - 4 %    Basophils 0 0 - 2 %    Segs Absolute 11.78 (H) 1.3 - 9.1 k/uL    Absolute Lymph # 1.10 1.0 - 4.8 k/uL    Absolute Mono # 0.82 0.1 - 1.3 k/uL    Absolute Eos # 0.00 0.0 - 0.4 k/uL    Basophils # 0.00 0.0 - 0.2 k/uL    Morphology ANISOCYTOSIS PRESENT    Basic Metabolic Prof    Collection Time: 03/01/18 10:48 AM   Result Value Ref Range    Glucose 403 (HH) 70 - 99 mg/dL    BUN 85 (H) 8 - 23 mg/dL    CREATININE 1.79 (H) 0.50 - 0.90 mg/dL    Bun/Cre Ratio NOT REPORTED 9 - 20    Calcium 4.7 (LL) 8.6 - 10.4 mg/dL    Sodium 150 (H) 135 - 144 mmol/L    Potassium 3.6 (L) 3.7 - 5.3 mmol/L    Chloride 123 (H) 98 - 107 mmol/L    CO2 7 (LL) 20 - 31 mmol/L    Anion Gap 20 (H) 9 - 17 mmol/L    GFR Non-African American 28 (L) >60 mL/min    GFR  33 (L) >60 mL/min    GFR Comment          GFR Staging NOT REPORTED    Liver Profile    Collection Time: 03/01/18 10:48 AM   Result Value Ref Range    Alb 1.8 (L) 3.5 - 5.2 g/dL    Alkaline Phosphatase 38 35 - 104 U/L    ALT 37 (H) 5 - 33 U/L    AST 19 <32 U/L    Total Bilirubin 0.65 0.3 - 1.2 mg/dL 6.8 (L) 8.6 - 10.4 mg/dL    Sodium 149 (H) 135 - 144 mmol/L    Potassium 4.0 3.7 - 5.3 mmol/L    Chloride 116 (H) 98 - 107 mmol/L    CO2 12 (L) 20 - 31 mmol/L    Anion Gap 21 (H) 9 - 17 mmol/L    GFR Non-African American 17 (L) >60 mL/min    GFR  21 (L) >60 mL/min    GFR Comment          GFR Staging NOT REPORTED    Magnesium    Collection Time: 03/01/18  4:12 PM   Result Value Ref Range    Magnesium 1.8 1.6 - 2.6 mg/dL   Phosphorus    Collection Time: 03/01/18  4:12 PM   Result Value Ref Range    Phosphorus 4.2 2.6 - 4.5 mg/dL   BLOOD GAS, VENOUS    Collection Time: 03/01/18  4:12 PM   Result Value Ref Range    pH, Eriberto 7.114 (LL) 7.320 - 7.420    pCO2, Eriberto 30.5 (L) 39.0 - 55.0    pO2, Eriberto 70.8 (H) 30.0 - 50.0    HCO3, Venous 9.8 (L) 24.0 - 30.0 mmol/L    Positive Base Excess, Eriberto NOT REPORTED 0.0 - 2.0 mmol/L    Negative Base Excess, Eriberto 19.7 (H) 0.0 - 2.0 mmol/L    O2 Sat, Eriberto 86.2 (H) 60.0 - 85.0 %    Total Hb NOT REPORTED 12.0 - 16.0 g/dl    Oxyhemoglobin NOT REPORTED 95.0 - 98.0 %    Carboxyhemoglobin 3.1 0 - 5 %    Methemoglobin 0.7 0.0 - 1.9 %    Pt Temp 37.0     pH, Eriberto, Temp Adj NOT REPORTED 7.320 - 7.420    pCO2, Eriberto, Temp Adj NOT REPORTED 39.0 - 55.0 mmHg    pO2, Eriberto, Temp Adj NOT REPORTED 30.0 - 50.0 mmHg    O2 Device/Flow/% NOT REPORTED     Respiratory Rate NOT REPORTED     Harvinder Test NOT REPORTED     Sample Site NOT REPORTED     Pt.  Position NOT REPORTED     Mode NOT REPORTED     Set Rate NOT REPORTED     Total Rate NOT REPORTED     VT NOT REPORTED     FIO2 NOT REPORTED     Peep/Cpap NOT REPORTED     PSV NOT REPORTED     Text for Respiratory NOT REPORTED     NOTIFICATION NOT REPORTED     NOTIFICATION TIME NOT REPORTED    POC Glucose Fingerstick    Collection Time: 03/01/18  5:00 PM   Result Value Ref Range    POC Glucose 302 (H) 65 - 105 mg/dL   Fecal lactoferrin    Collection Time: 03/01/18  6:00 PM   Result Value Ref Range    Lactoferrin, Qual (A) NFLACT     POSITIVE for fecal lactoferrin, a marker for fecal leukocytes and an indicator   POC Glucose Fingerstick    Collection Time: 03/01/18  6:04 PM   Result Value Ref Range    POC Glucose 311 (H) 65 - 105 mg/dL   POC Glucose Fingerstick    Collection Time: 03/01/18  7:06 PM   Result Value Ref Range    POC Glucose 222 (H) 65 - 105 mg/dL   Basic Metabolic Panel w/ Reflex to MG    Collection Time: 03/01/18  7:57 PM   Result Value Ref Range    Glucose 292 (H) 70 - 99 mg/dL    BUN 97 (HH) 8 - 23 mg/dL    CREATININE 2.41 (H) 0.50 - 0.90 mg/dL    Bun/Cre Ratio NOT REPORTED 9 - 20    Calcium 7.5 (L) 8.6 - 10.4 mg/dL    Sodium 144 135 - 144 mmol/L    Potassium 3.6 (L) 3.7 - 5.3 mmol/L    Chloride 113 (H) 98 - 107 mmol/L    CO2 10 (L) 20 - 31 mmol/L    Anion Gap 21 (H) 9 - 17 mmol/L    GFR Non-African American 20 (L) >60 mL/min    GFR  24 (L) >60 mL/min    GFR Comment          GFR Staging NOT REPORTED    Magnesium    Collection Time: 03/01/18  7:57 PM   Result Value Ref Range    Magnesium 1.6 1.6 - 2.6 mg/dL   Phosphorus    Collection Time: 03/01/18  7:57 PM   Result Value Ref Range    Phosphorus 3.6 2.6 - 4.5 mg/dL   BLOOD GAS, VENOUS    Collection Time: 03/01/18  7:57 PM   Result Value Ref Range    pH, Eriberto 7.140 (LL) 7.320 - 7.420    pCO2, Eriberto 27.3 (L) 39.0 - 55.0    pO2, Eriberto 132.0 (H) 30.0 - 50.0    HCO3, Venous 9.3 (L) 24.0 - 30.0 mmol/L    Positive Base Excess, Eriberto NOT REPORTED 0.0 - 2.0 mmol/L    Negative Base Excess, Eriberto 19.7 (H) 0.0 - 2.0 mmol/L    O2 Sat, Eriberto 95.5 %    Total Hb NOT REPORTED 12.0 - 16.0 g/dl    Oxyhemoglobin NOT REPORTED 95.0 - 98.0 %    Carboxyhemoglobin 2.5 %    Methemoglobin 0.6 %    Pt Temp 37.0     pH, Eriberto, Temp Adj NOT REPORTED 7.320 - 7.420    pCO2, Eriberto, Temp Adj NOT REPORTED 39.0 - 55.0 mmHg    pO2, Eriberto, Temp Adj NOT REPORTED 30.0 - 50.0 mmHg    O2 Device/Flow/% NOT REPORTED     Respiratory Rate NOT REPORTED     Harvinder Test NOT REPORTED     Sample Site NOT REPORTED     Pt.  Position NOT REPORTED Collection Time: 03/02/18  4:06 AM   Result Value Ref Range    Troponin T 0.07 (H) <0.03 ng/mL    Troponin Interp         CBC auto differential    Collection Time: 03/02/18  4:07 AM   Result Value Ref Range    WBC 11.0 3.5 - 11.0 k/uL    RBC 4.99 4.0 - 5.2 m/uL    Hemoglobin 14.3 12.0 - 16.0 g/dL    Hematocrit 45.7 36 - 46 %    MCV 91.5 80 - 100 fL    MCH 28.6 26 - 34 pg    MCHC 31.2 31 - 37 g/dL    RDW 16.9 (H) 11.5 - 14.9 %    Platelets 468 (L) 173 - 450 k/uL    MPV 11.7 6.0 - 12.0 fL    NRBC Automated NOT REPORTED per 100 WBC    Differential Type NOT REPORTED     Immature Granulocytes NOT REPORTED 0 %    Absolute Immature Granulocyte NOT REPORTED 0.00 - 0.30 k/uL    WBC Morphology NOT REPORTED     RBC Morphology NOT REPORTED     Platelet Estimate NOT REPORTED     Seg Neutrophils 85 (H) 36 - 66 %    Lymphocytes 11 (L) 24 - 44 %    Monocytes 4 1 - 7 %    Eosinophils % 0 0 - 4 %    Basophils 0 0 - 2 %    Segs Absolute 9.30 (H) 1.3 - 9.1 k/uL    Absolute Lymph # 1.20 1.0 - 4.8 k/uL    Absolute Mono # 0.40 0.1 - 1.3 k/uL    Absolute Eos # 0.00 0.0 - 0.4 k/uL    Basophils # 0.00 0.0 - 0.2 k/uL   Lactic Acid    Collection Time: 03/02/18  4:07 AM   Result Value Ref Range    Lactic Acid 3.5 (H) 0.5 - 2.2 mmol/L   BLOOD GAS, VENOUS    Collection Time: 03/02/18  4:18 AM   Result Value Ref Range    pH, Eriberto 7.173 (LL) 7.320 - 7.420    pCO2, Eriberto 23.0 (L) 39.0 - 55.0    pO2, Eriberto 56.3 (H) 30.0 - 50.0    HCO3, Venous 8.4 (L) 24.0 - 30.0 mmol/L    Positive Base Excess, Eriberto NOT REPORTED 0.0 - 2.0 mmol/L    Negative Base Excess, Eriberto 20.0 (H) 0.0 - 2.0 mmol/L    O2 Sat, Eriberto 87.8 %    Total Hb NOT REPORTED 12.0 - 16.0 g/dl    Oxyhemoglobin NOT REPORTED 95.0 - 98.0 %    Carboxyhemoglobin 1.5 %    Methemoglobin 0.8 %    Pt Temp 37.0     pH, Eriberto, Temp Adj NOT REPORTED 7.320 - 7.420    pCO2, Eriberto, Temp Adj NOT REPORTED 39.0 - 55.0 mmHg    pO2, Eriberto, Temp Adj NOT REPORTED 30.0 - 50.0 mmHg    O2 Device/Flow/% Cannula     Respiratory Rate NOT REPORTED     Harvinder Test NOT REPORTED     Sample Site NOT REPORTED     Pt. Position NOT REPORTED     Mode NOT REPORTED     Set Rate NOT REPORTED     Total Rate NOT REPORTED     VT NOT REPORTED     FIO2 2L     Peep/Cpap NOT REPORTED     PSV NOT REPORTED     Text for Respiratory RESULTS TO RN DEACON ISLAS     NOTIFICATION NOT REPORTED     NOTIFICATION TIME NOT REPORTED    POC Glucose Fingerstick    Collection Time: 03/02/18  5:11 AM   Result Value Ref Range    POC Glucose 225 (H) 65 - 105 mg/dL   POC Glucose Fingerstick    Collection Time: 03/02/18  6:34 AM   Result Value Ref Range    POC Glucose 193 (H) 65 - 105 mg/dL   POC Glucose Fingerstick    Collection Time: 03/02/18  7:31 AM   Result Value Ref Range    POC Glucose 172 (H) 65 - 105 mg/dL       Lab Results   Component Value Date/Time    SPECIAL NOT REPORTED 03/01/2018 11:15 AM     Lab Results   Component Value Date/Time    CULTURE GRAM NEGATIVE RODS >[id number] CFU/ML 03/01/2018 11:15 AM    CULTURE  03/01/2018 11:15 AM     Performed at [address] [place] [address] [phone]       Radiology:    Xr Chest (single View Frontal)    Result Date: 3/1/2018  EXAMINATION: SINGLE VIEW OF THE CHEST 3/1/2018 5:05 pm COMPARISON: None. HISTORY: ORDERING SYSTEM PROVIDED HISTORY: Central Line Placement TECHNOLOGIST PROVIDED HISTORY: Reason for exam:->Central Line Placement Reason for exam:->Portable Ordering Physician Provided Reason for Exam: line placement Acuity: Acute Type of Exam: Initial FINDINGS: The right IJ central venous catheter in place terminating at cavoatrial junction. There is no pneumothorax. The radiograph is somewhat limited due to left-sided tilt. Cardiac and mediastinal contour are normal.  There is minimal atelectatic change in the left costophrenic angle. Bony structures are grossly unremarkable. Dextroscoliosis at lower thoracic spine present similar to previous examination. Uncomplicated interval insertion of right IJ catheter. Otherwise, no interval change     Xr Chest (single View Frontal)    Result Date: 3/1/2018  EXAMINATION: SINGLE VIEW OF THE CHEST 3/1/2018 10:48 am COMPARISON: Chest x-ray from 01/10/2018 HISTORY: ORDERING SYSTEM PROVIDED HISTORY: Altered mental status,  confused TECHNOLOGIST PROVIDED HISTORY: Reason for exam:->Altered mental status,  confused Ordering Physician Provided Reason for Exam: AMS Acuity: Unknown Type of Exam: Unknown FINDINGS: There is no focal airspace consolidation, pleural effusion or pneumothorax. The cardiac silhouette appears mildly enlarged, but this may at least in part related to technique. There is no pulmonary vascular congestion. There are calcifications of the aortic arch. There are similar moderate hypertrophic degenerative changes of the visualized spine and shoulders. Visualized osseous structures appear mildly demineralized, but grossly intact, given the non dedicated imaging. No focal airspace consolidation or pulmonary vascular congestion. Ct Head Wo Contrast    Result Date: 3/1/2018  EXAMINATION: CT OF THE HEAD WITHOUT CONTRAST  3/1/2018 12:26 pm TECHNIQUE: CT of the head was performed without the administration of intravenous contrast. Dose modulation, iterative reconstruction, and/or weight based adjustment of the mA/kV was utilized to reduce the radiation dose to as low as reasonably achievable. COMPARISON: January 10 HISTORY: ORDERING SYSTEM PROVIDED HISTORY: AMS hx of CVA with Left sided deficits TECHNOLOGIST PROVIDED HISTORY: Has a \"code stroke\" or \"stroke alert\" been called? ->No Ordering Physician Provided Reason for Exam: AMS, HX OF CVA WITH LEFT SIDED DEFICITS Additional signs and symptoms: PATIENT UNABLE TO GIVE HISTORY. UNABLE TO STRAIGHTEN HAD FOR EXAM. FINDINGS: BRAIN/VENTRICLES: There is a large amount of volume loss in the right MCA distribution including in the perisylvian region. This is likely due to the patient's recent infarct.   There is curvilinear high density in the right sylvian fissures suggesting age-indeterminate thrombus in the MCA branch. There is low-density and loss of differentiation in the right lentiform nucleus and lateral thalamic region. Focal low density in the right caudate head is noted suggesting prior infarct. ORBITS: The visualized portion of the orbits demonstrate no acute abnormality. SINUSES: The visualized paranasal sinuses and mastoid air cells demonstrate no acute abnormality. SOFT TISSUES/SKULL:  No acute abnormality of the visualized skull or soft tissues. 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 There is surrounding low density extending deep into the lentiform nucleus region and right lateral thalamic region. It is unclear whether this is gliosis from the prior ischemic event or a subtle superimposed area of new ischemia. MRI may be more helpful High density in the right MCA within the sylvian fissure. This is age indeterminate given the recent infarct. This may represent sequela of prior thrombus, however new thrombus not excluded. Again if the patient 8 current right hemispheric stroke symptoms, consider MRI       Physical Examination:        Physical Exam    General Appearance:  Ill appearing woman resting in bed  Mental status: oriented to person only  Head:  normocephalic, atraumatic. Eye: drooping L eye noted  Ear: normal external ear, no discharge, hearing intact  Nose:  no drainage noted  Mouth: mucous membranes dry  Neck: supple  Lungs: Bilateral equal air entry, clear to ausculation, no wheezing, rales or rhonchi, normal effort  Cardiovascular: normal rate, regular rhythm, no murmur, gallop, rub.   Abdomen: Soft, mildly tender, nondistended, normal bowel sounds  Neurologic: left hemiplegia noted  Skin: No gross lesions, rashes, bruising or bleeding on exposed skin area  Extremities:  no pedal edema or calf pain with palpation  Psych: flat affect    Assessment:        Primary Problem  DKA, type 2, not at goal [facility])    Active Hospital Problems    Diagnosis Date Noted    DKA, type 2, not at goal [facility]) [E13.10] 03/01/2018    Sepsis ([facility] [address] [A41.9] 03/01/2018    UTI (urinary tract infection) [N39.0] 03/01/2018    Leukocytosis [D72.829] 03/01/2018    Increased anion gap metabolic acidosis [E14.5] 03/01/2018    SAMINA (acute kidney injury) ([facility] [address] [N17.9] 03/01/2018    Hypernatremia [E87.0] 03/01/2018    Hypocalcemia [E83.51] 03/01/2018    Hypokalemia [E87.6] 03/01/2018       Plan:        DKA with hx DM II, hypovolemic shock  -  on admission, anion gap 20, K 3.6, betahydroxy 2.05  - vbg 7.284/15.9/55.9/7.5  - Insulin drip, bmp Q4H, phos/mag Q4H, vbg Q4H per DKA protocol  - IVF @ 150mL/hr per DKA protocol  - Further 1-3L IVF bolus fluid challenge today, until hypotension improves     UTI sepsis with metabolic encephalopathy, poss septic shock  - purulent urine, UA + LE/Nitrite  - Meeting SIRS criteria, hypotensive, lactate 3.5  - Shock more likely 2/2 hypovolemia due to DKA, may have septic component  - Hx prior UTI with morganella, klebsiella, group D enterococcus sens to rocephin  - Rocephin  - f/u urine, blood cultures     Hypernatremia, hypocalcemia, hypokalemia; resolving  - Na drop 150 -> 135  - transition IVF to D5 NS     SAMINA  - Cr up to 1.79-> 2.13 over baseline ~0.56  - Likely 2/2 dehydration  - urine sodium 53, creatinine 38.5  - Renal US pending    Ronald Harkins MD  3/2/2018  8:10 AM   Attending Physician Statement  Patient seen and examined  I have discussed the care of the patient, including pertinent history and exam findings,  with the resident. I have reviewed the key elements of all parts of the encounter with the resident. I agree with the assessment, plan and orders as documented by the resident. cc time > 30 min    Megan Pizarro

## 2018-03-02 NOTE — PROGRESS NOTES
RN at the bedside and witnessed brief bradycardia to 36. Residents called in to assess. Pt complaining of abdominal pain at this time.

## 2018-03-02 NOTE — FLOWSHEET NOTE
03/01/18 2050   Provider Notification   Reason for Communication Evaluate   Provider Name Dr Asmita Abreu   Provider Notification Physician   Method of Communication Call   Response See orders   RN updated physician with new lab values: lactic acid up to 3.9, serum HCO3 down to 9, K 3.6, . Urine output is 15ml/hr. BP 78/43 with levophed at 16mcg/min. Fluids running at 150 per DKA protocol. See orders.

## 2018-03-03 LAB
ABSOLUTE BANDS #: 2.64 K/UL (ref 0–1)
ABSOLUTE BANDS #: 6.24 K/UL (ref 0–1)
ABSOLUTE EOS #: 0 K/UL (ref 0–0.4)
ABSOLUTE EOS #: 0 K/UL (ref 0–0.4)
ABSOLUTE IMMATURE GRANULOCYTE: ABNORMAL K/UL (ref 0–0.3)
ABSOLUTE IMMATURE GRANULOCYTE: ABNORMAL K/UL (ref 0–0.3)
ABSOLUTE LYMPH #: 0.62 K/UL (ref 1–4.8)
ABSOLUTE LYMPH #: 0.98 K/UL (ref 1–4.8)
ABSOLUTE MONO #: 0.47 K/UL (ref 0.1–1.3)
ABSOLUTE MONO #: 0.59 K/UL (ref 0.1–1.3)
ACETAMINOPHEN LEVEL: <10 UG/ML (ref 10–30)
ALLEN TEST: ABNORMAL
ANION GAP SERPL CALCULATED.3IONS-SCNC: 17 MMOL/L (ref 9–17)
ANION GAP SERPL CALCULATED.3IONS-SCNC: 18 MMOL/L (ref 9–17)
BANDS: 17 % (ref 0–10)
BANDS: 32 % (ref 0–10)
BASOPHILS # BLD: 0 % (ref 0–2)
BASOPHILS # BLD: 0 % (ref 0–2)
BASOPHILS ABSOLUTE: 0 K/UL (ref 0–0.2)
BASOPHILS ABSOLUTE: 0 K/UL (ref 0–0.2)
BUN BLDV-MCNC: 56 MG/DL (ref 8–23)
BUN BLDV-MCNC: 60 MG/DL (ref 8–23)
BUN BLDV-MCNC: 63 MG/DL (ref 8–23)
BUN BLDV-MCNC: 65 MG/DL (ref 8–23)
BUN BLDV-MCNC: 67 MG/DL (ref 8–23)
BUN BLDV-MCNC: 69 MG/DL (ref 8–23)
BUN/CREAT BLD: ABNORMAL (ref 9–20)
CALCIUM SERPL-MCNC: 6.7 MG/DL (ref 8.6–10.4)
CALCIUM SERPL-MCNC: 6.7 MG/DL (ref 8.6–10.4)
CALCIUM SERPL-MCNC: 6.8 MG/DL (ref 8.6–10.4)
CARBOXYHEMOGLOBIN: 1.3 %
CARBOXYHEMOGLOBIN: 1.3 % (ref 0–5)
CARBOXYHEMOGLOBIN: 1.5 %
CARBOXYHEMOGLOBIN: 1.7 %
CARBOXYHEMOGLOBIN: 2.1 %
CHLORIDE BLD-SCNC: 107 MMOL/L (ref 98–107)
CHLORIDE BLD-SCNC: 110 MMOL/L (ref 98–107)
CHLORIDE BLD-SCNC: 111 MMOL/L (ref 98–107)
CHLORIDE BLD-SCNC: 112 MMOL/L (ref 98–107)
CHLORIDE BLD-SCNC: 112 MMOL/L (ref 98–107)
CHLORIDE BLD-SCNC: 113 MMOL/L (ref 98–107)
CHLORIDE, UR: 34 MMOL/L
CO2: 10 MMOL/L (ref 20–31)
CO2: 11 MMOL/L (ref 20–31)
CO2: 7 MMOL/L (ref 20–31)
CO2: 7 MMOL/L (ref 20–31)
CO2: 8 MMOL/L (ref 20–31)
CO2: 9 MMOL/L (ref 20–31)
COMPLEMENT C3: 98 MG/DL (ref 90–180)
COMPLEMENT C4: 22 MG/DL (ref 10–40)
CORTISOL COLLECTION INFO: ABNORMAL
CORTISOL: 29.5 UG/DL (ref 2.7–18.4)
CREAT SERPL-MCNC: 1.42 MG/DL (ref 0.5–0.9)
CREAT SERPL-MCNC: 1.5 MG/DL (ref 0.5–0.9)
CREAT SERPL-MCNC: 1.56 MG/DL (ref 0.5–0.9)
CREAT SERPL-MCNC: 1.59 MG/DL (ref 0.5–0.9)
CREAT SERPL-MCNC: 1.67 MG/DL (ref 0.5–0.9)
CREAT SERPL-MCNC: 1.7 MG/DL (ref 0.5–0.9)
CREATININE URINE: 27 MG/DL (ref 28–217)
DIFFERENTIAL TYPE: ABNORMAL
DIFFERENTIAL TYPE: ABNORMAL
EOSINOPHILS RELATIVE PERCENT: 0 % (ref 0–4)
EOSINOPHILS RELATIVE PERCENT: 0 % (ref 0–4)
FERRITIN: 620 UG/L (ref 13–150)
FIBRINOGEN: 351 MG/DL (ref 170–410)
FIO2: ABNORMAL
GFR AFRICAN AMERICAN: 35 ML/MIN
GFR AFRICAN AMERICAN: 36 ML/MIN
GFR AFRICAN AMERICAN: 38 ML/MIN
GFR AFRICAN AMERICAN: 39 ML/MIN
GFR AFRICAN AMERICAN: 41 ML/MIN
GFR AFRICAN AMERICAN: 44 ML/MIN
GFR NON-AFRICAN AMERICAN: 29 ML/MIN
GFR NON-AFRICAN AMERICAN: 30 ML/MIN
GFR NON-AFRICAN AMERICAN: 32 ML/MIN
GFR NON-AFRICAN AMERICAN: 32 ML/MIN
GFR NON-AFRICAN AMERICAN: 34 ML/MIN
GFR NON-AFRICAN AMERICAN: 36 ML/MIN
GFR SERPL CREATININE-BSD FRML MDRD: ABNORMAL ML/MIN/{1.73_M2}
GLUCOSE BLD-MCNC: 107 MG/DL (ref 65–105)
GLUCOSE BLD-MCNC: 111 MG/DL (ref 65–105)
GLUCOSE BLD-MCNC: 127 MG/DL (ref 65–105)
GLUCOSE BLD-MCNC: 131 MG/DL (ref 65–105)
GLUCOSE BLD-MCNC: 148 MG/DL (ref 65–105)
GLUCOSE BLD-MCNC: 152 MG/DL (ref 65–105)
GLUCOSE BLD-MCNC: 152 MG/DL (ref 65–105)
GLUCOSE BLD-MCNC: 160 MG/DL (ref 65–105)
GLUCOSE BLD-MCNC: 160 MG/DL (ref 65–105)
GLUCOSE BLD-MCNC: 163 MG/DL (ref 65–105)
GLUCOSE BLD-MCNC: 166 MG/DL (ref 70–99)
GLUCOSE BLD-MCNC: 170 MG/DL (ref 65–105)
GLUCOSE BLD-MCNC: 173 MG/DL (ref 65–105)
GLUCOSE BLD-MCNC: 177 MG/DL (ref 65–105)
GLUCOSE BLD-MCNC: 178 MG/DL (ref 70–99)
GLUCOSE BLD-MCNC: 179 MG/DL (ref 65–105)
GLUCOSE BLD-MCNC: 184 MG/DL (ref 65–105)
GLUCOSE BLD-MCNC: 191 MG/DL (ref 65–105)
GLUCOSE BLD-MCNC: 196 MG/DL (ref 65–105)
GLUCOSE BLD-MCNC: 196 MG/DL (ref 65–105)
GLUCOSE BLD-MCNC: 199 MG/DL (ref 65–105)
GLUCOSE BLD-MCNC: 204 MG/DL (ref 65–105)
GLUCOSE BLD-MCNC: 209 MG/DL (ref 70–99)
GLUCOSE BLD-MCNC: 211 MG/DL (ref 65–105)
GLUCOSE BLD-MCNC: 211 MG/DL (ref 70–99)
GLUCOSE BLD-MCNC: 212 MG/DL (ref 65–105)
GLUCOSE BLD-MCNC: 221 MG/DL (ref 70–99)
GLUCOSE BLD-MCNC: 223 MG/DL (ref 65–105)
GLUCOSE BLD-MCNC: 224 MG/DL (ref 65–105)
GLUCOSE BLD-MCNC: 235 MG/DL (ref 70–99)
HAPTOGLOBIN: 173 MG/DL (ref 30–200)
HCO3 VENOUS: 11.6 MMOL/L (ref 24–30)
HCO3 VENOUS: 7.3 MMOL/L (ref 24–30)
HCO3 VENOUS: 7.3 MMOL/L (ref 24–30)
HCO3 VENOUS: 8.7 MMOL/L (ref 24–30)
HCO3 VENOUS: 9.7 MMOL/L (ref 24–30)
HCT VFR BLD CALC: 39.4 % (ref 36–46)
HCT VFR BLD CALC: 42.5 % (ref 36–46)
HEMOGLOBIN: 12.8 G/DL (ref 12–16)
HEMOGLOBIN: 13.6 G/DL (ref 12–16)
IMMATURE GRANULOCYTES: ABNORMAL %
IMMATURE GRANULOCYTES: ABNORMAL %
INR BLD: 1.1
IRON SATURATION: 12 % (ref 20–55)
IRON: 12 UG/DL (ref 37–145)
LACTATE DEHYDROGENASE: 288 U/L (ref 135–214)
LACTIC ACID: 2.5 MMOL/L (ref 0.5–2.2)
LACTIC ACID: 3.1 MMOL/L (ref 0.5–2.2)
LYMPHOCYTES # BLD: 4 % (ref 24–44)
LYMPHOCYTES # BLD: 5 % (ref 24–44)
MAGNESIUM: 1.6 MG/DL (ref 1.6–2.6)
MAGNESIUM: 1.7 MG/DL (ref 1.6–2.6)
MAGNESIUM: 1.9 MG/DL (ref 1.6–2.6)
MAGNESIUM: 2.1 MG/DL (ref 1.6–2.6)
MCH RBC QN AUTO: 28.3 PG (ref 26–34)
MCH RBC QN AUTO: 29.1 PG (ref 26–34)
MCHC RBC AUTO-ENTMCNC: 32 G/DL (ref 31–37)
MCHC RBC AUTO-ENTMCNC: 32.5 G/DL (ref 31–37)
MCV RBC AUTO: 87.1 FL (ref 80–100)
MCV RBC AUTO: 90.9 FL (ref 80–100)
METHEMOGLOBIN: 0.7 %
METHEMOGLOBIN: 0.7 % (ref 0–1.9)
METHEMOGLOBIN: 0.8 %
MODE: ABNORMAL
MONOCYTES # BLD: 3 % (ref 1–7)
MONOCYTES # BLD: 3 % (ref 1–7)
MORPHOLOGY: ABNORMAL
MORPHOLOGY: ABNORMAL
NEGATIVE BASE EXCESS, VEN: 14.8 MMOL/L (ref 0–2)
NEGATIVE BASE EXCESS, VEN: 17.9 MMOL/L (ref 0–2)
NEGATIVE BASE EXCESS, VEN: 18.4 MMOL/L (ref 0–2)
NEGATIVE BASE EXCESS, VEN: 21.6 MMOL/L (ref 0–2)
NEGATIVE BASE EXCESS, VEN: 21.9 MMOL/L (ref 0–2)
NOTIFICATION TIME: ABNORMAL
NOTIFICATION: ABNORMAL
NRBC AUTOMATED: ABNORMAL PER 100 WBC
NRBC AUTOMATED: ABNORMAL PER 100 WBC
O2 DEVICE/FLOW/%: ABNORMAL
O2 SAT, VEN: 83.1 %
O2 SAT, VEN: 92.1 % (ref 60–85)
O2 SAT, VEN: 94.8 %
O2 SAT, VEN: 95.7 %
O2 SAT, VEN: 96.4 %
OXYHEMOGLOBIN: ABNORMAL % (ref 95–98)
PARTIAL THROMBOPLASTIN TIME: 59.3 SEC (ref 23–31)
PATIENT TEMP: 37
PATIENT TEMP: ABNORMAL
PCO2, VEN, TEMP ADJ: ABNORMAL MMHG (ref 39–55)
PCO2, VEN: 18 (ref 39–55)
PCO2, VEN: 21.1 (ref 39–55)
PCO2, VEN: 21.8 (ref 39–55)
PCO2, VEN: 23.9 (ref 39–55)
PCO2, VEN: 25.1 (ref 39–55)
PDW BLD-RTO: 16.7 % (ref 11.5–14.9)
PDW BLD-RTO: 16.7 % (ref 11.5–14.9)
PEEP/CPAP: ABNORMAL
PH VENOUS: 7.13 (ref 7.32–7.42)
PH VENOUS: 7.15 (ref 7.32–7.42)
PH VENOUS: 7.2 (ref 7.32–7.42)
PH VENOUS: 7.29 (ref 7.32–7.42)
PH VENOUS: 7.3 (ref 7.32–7.42)
PH, VEN, TEMP ADJ: ABNORMAL (ref 7.32–7.42)
PHOSPHORUS: 2.5 MG/DL (ref 2.6–4.5)
PHOSPHORUS: 2.8 MG/DL (ref 2.6–4.5)
PHOSPHORUS: 3 MG/DL (ref 2.6–4.5)
PHOSPHORUS: 3.1 MG/DL (ref 2.6–4.5)
PHOSPHORUS: 3.1 MG/DL (ref 2.6–4.5)
PHOSPHORUS: 3.4 MG/DL (ref 2.6–4.5)
PLATELET # BLD: 63 K/UL (ref 150–450)
PLATELET # BLD: 70 K/UL (ref 150–450)
PLATELET ESTIMATE: ABNORMAL
PLATELET ESTIMATE: ABNORMAL
PMV BLD AUTO: 10.5 FL (ref 6–12)
PMV BLD AUTO: 11.6 FL (ref 6–12)
PO2, VEN, TEMP ADJ: ABNORMAL MMHG (ref 30–50)
PO2, VEN: 127 (ref 30–50)
PO2, VEN: 47.9 (ref 30–50)
PO2, VEN: 75.1 (ref 30–50)
PO2, VEN: 79.6 (ref 30–50)
PO2, VEN: 94 (ref 30–50)
POSITIVE BASE EXCESS, VEN: ABNORMAL MMOL/L (ref 0–2)
POTASSIUM SERPL-SCNC: 4.2 MMOL/L (ref 3.7–5.3)
POTASSIUM SERPL-SCNC: 4.4 MMOL/L (ref 3.7–5.3)
POTASSIUM SERPL-SCNC: 4.6 MMOL/L (ref 3.7–5.3)
POTASSIUM SERPL-SCNC: 4.8 MMOL/L (ref 3.7–5.3)
POTASSIUM SERPL-SCNC: 5.1 MMOL/L (ref 3.7–5.3)
POTASSIUM SERPL-SCNC: 5.3 MMOL/L (ref 3.7–5.3)
POTASSIUM, UR: 34.1 MMOL/L
PROTHROMBIN TIME: 11.4 SEC (ref 9.7–12)
PSV: ABNORMAL
PT. POSITION: ABNORMAL
PTH INTACT: 325.7 PG/ML (ref 15–65)
RBC # BLD: 4.52 M/UL (ref 4–5.2)
RBC # BLD: 4.67 M/UL (ref 4–5.2)
RBC # BLD: ABNORMAL 10*6/UL
RBC # BLD: ABNORMAL 10*6/UL
RESPIRATORY RATE: ABNORMAL
SALICYLATE LEVEL: <1 MG/DL (ref 3–10)
SAMPLE SITE: ABNORMAL
SEG NEUTROPHILS: 60 % (ref 36–66)
SEG NEUTROPHILS: 76 % (ref 36–66)
SEGMENTED NEUTROPHILS ABSOLUTE COUNT: 11.69 K/UL (ref 1.3–9.1)
SEGMENTED NEUTROPHILS ABSOLUTE COUNT: 11.77 K/UL (ref 1.3–9.1)
SERUM OSMOLALITY: 318 MOSM/KG (ref 275–295)
SET RATE: ABNORMAL
SODIUM BLD-SCNC: 135 MMOL/L (ref 135–144)
SODIUM BLD-SCNC: 136 MMOL/L (ref 135–144)
SODIUM BLD-SCNC: 137 MMOL/L (ref 135–144)
SODIUM BLD-SCNC: 138 MMOL/L (ref 135–144)
SODIUM,UR: 24 MMOL/L
TEXT FOR RESPIRATORY: ABNORMAL
TOTAL HB: ABNORMAL G/DL (ref 12–16)
TOTAL IRON BINDING CAPACITY: 98 UG/DL (ref 250–450)
TOTAL PROTEIN, URINE: 33 MG/DL
TOTAL RATE: ABNORMAL
TSH SERPL DL<=0.05 MIU/L-ACNC: 1.44 MIU/L (ref 0.3–5)
UNSATURATED IRON BINDING CAPACITY: 86 UG/DL (ref 112–347)
VITAMIN D 25-HYDROXY: 5.2 NG/ML (ref 30–100)
VT: ABNORMAL
WBC # BLD: 15.5 K/UL (ref 3.5–11)
WBC # BLD: 19.5 K/UL (ref 3.5–11)
WBC # BLD: ABNORMAL 10*3/UL
WBC # BLD: ABNORMAL 10*3/UL

## 2018-03-03 PROCEDURE — 6360000002 HC RX W HCPCS: Performed by: INTERNAL MEDICINE

## 2018-03-03 PROCEDURE — 84166 PROTEIN E-PHORESIS/URINE/CSF: CPT

## 2018-03-03 PROCEDURE — 85025 COMPLETE CBC W/AUTO DIFF WBC: CPT

## 2018-03-03 PROCEDURE — 82805 BLOOD GASES W/O2 SATURATION: CPT

## 2018-03-03 PROCEDURE — 86038 ANTINUCLEAR ANTIBODIES: CPT

## 2018-03-03 PROCEDURE — 80307 DRUG TEST PRSMV CHEM ANLYZR: CPT

## 2018-03-03 PROCEDURE — 2580000003 HC RX 258: Performed by: INTERNAL MEDICINE

## 2018-03-03 PROCEDURE — 80048 BASIC METABOLIC PNL TOTAL CA: CPT

## 2018-03-03 PROCEDURE — 84443 ASSAY THYROID STIM HORMONE: CPT

## 2018-03-03 PROCEDURE — 2500000003 HC RX 250 WO HCPCS: Performed by: RADIOLOGY

## 2018-03-03 PROCEDURE — 82800 BLOOD PH: CPT

## 2018-03-03 PROCEDURE — 84165 PROTEIN E-PHORESIS SERUM: CPT

## 2018-03-03 PROCEDURE — 82728 ASSAY OF FERRITIN: CPT

## 2018-03-03 PROCEDURE — 86160 COMPLEMENT ANTIGEN: CPT

## 2018-03-03 PROCEDURE — 2500000003 HC RX 250 WO HCPCS: Performed by: INTERNAL MEDICINE

## 2018-03-03 PROCEDURE — 86022 PLATELET ANTIBODIES: CPT

## 2018-03-03 PROCEDURE — 85610 PROTHROMBIN TIME: CPT

## 2018-03-03 PROCEDURE — 84100 ASSAY OF PHOSPHORUS: CPT

## 2018-03-03 PROCEDURE — 6360000002 HC RX W HCPCS: Performed by: STUDENT IN AN ORGANIZED HEALTH CARE EDUCATION/TRAINING PROGRAM

## 2018-03-03 PROCEDURE — 83540 ASSAY OF IRON: CPT

## 2018-03-03 PROCEDURE — C9113 INJ PANTOPRAZOLE SODIUM, VIA: HCPCS | Performed by: STUDENT IN AN ORGANIZED HEALTH CARE EDUCATION/TRAINING PROGRAM

## 2018-03-03 PROCEDURE — 82570 ASSAY OF URINE CREATININE: CPT

## 2018-03-03 PROCEDURE — 99223 1ST HOSP IP/OBS HIGH 75: CPT | Performed by: INTERNAL MEDICINE

## 2018-03-03 PROCEDURE — 84156 ASSAY OF PROTEIN URINE: CPT

## 2018-03-03 PROCEDURE — 99291 CRITICAL CARE FIRST HOUR: CPT | Performed by: INTERNAL MEDICINE

## 2018-03-03 PROCEDURE — 94640 AIRWAY INHALATION TREATMENT: CPT

## 2018-03-03 PROCEDURE — 83010 ASSAY OF HAPTOGLOBIN QUANT: CPT

## 2018-03-03 PROCEDURE — 2580000003 HC RX 258: Performed by: EMERGENCY MEDICINE

## 2018-03-03 PROCEDURE — 84133 ASSAY OF URINE POTASSIUM: CPT

## 2018-03-03 PROCEDURE — 83735 ASSAY OF MAGNESIUM: CPT

## 2018-03-03 PROCEDURE — 2500000003 HC RX 250 WO HCPCS: Performed by: EMERGENCY MEDICINE

## 2018-03-03 PROCEDURE — 2580000003 HC RX 258: Performed by: RADIOLOGY

## 2018-03-03 PROCEDURE — 6360000002 HC RX W HCPCS: Performed by: RADIOLOGY

## 2018-03-03 PROCEDURE — 36592 COLLECT BLOOD FROM PICC: CPT

## 2018-03-03 PROCEDURE — 82947 ASSAY GLUCOSE BLOOD QUANT: CPT

## 2018-03-03 PROCEDURE — 94762 N-INVAS EAR/PLS OXIMTRY CONT: CPT

## 2018-03-03 PROCEDURE — 6370000000 HC RX 637 (ALT 250 FOR IP): Performed by: RADIOLOGY

## 2018-03-03 PROCEDURE — 84300 ASSAY OF URINE SODIUM: CPT

## 2018-03-03 PROCEDURE — 85730 THROMBOPLASTIN TIME PARTIAL: CPT

## 2018-03-03 PROCEDURE — 2000000000 HC ICU R&B

## 2018-03-03 PROCEDURE — 82306 VITAMIN D 25 HYDROXY: CPT

## 2018-03-03 PROCEDURE — 83970 ASSAY OF PARATHORMONE: CPT

## 2018-03-03 PROCEDURE — 83550 IRON BINDING TEST: CPT

## 2018-03-03 PROCEDURE — 82533 TOTAL CORTISOL: CPT

## 2018-03-03 PROCEDURE — 83605 ASSAY OF LACTIC ACID: CPT

## 2018-03-03 PROCEDURE — 82436 ASSAY OF URINE CHLORIDE: CPT

## 2018-03-03 PROCEDURE — 36415 COLL VENOUS BLD VENIPUNCTURE: CPT

## 2018-03-03 PROCEDURE — 85384 FIBRINOGEN ACTIVITY: CPT

## 2018-03-03 PROCEDURE — 83930 ASSAY OF BLOOD OSMOLALITY: CPT

## 2018-03-03 PROCEDURE — 84155 ASSAY OF PROTEIN SERUM: CPT

## 2018-03-03 PROCEDURE — 83516 IMMUNOASSAY NONANTIBODY: CPT

## 2018-03-03 PROCEDURE — 83615 LACTATE (LD) (LDH) ENZYME: CPT

## 2018-03-03 PROCEDURE — 93971 EXTREMITY STUDY: CPT

## 2018-03-03 PROCEDURE — C9113 INJ PANTOPRAZOLE SODIUM, VIA: HCPCS | Performed by: RADIOLOGY

## 2018-03-03 RX ORDER — PANTOPRAZOLE SODIUM 40 MG/10ML
40 INJECTION, POWDER, LYOPHILIZED, FOR SOLUTION INTRAVENOUS 2 TIMES DAILY
Status: DISCONTINUED | OUTPATIENT
Start: 2018-03-03 | End: 2018-03-03

## 2018-03-03 RX ORDER — CIPROFLOXACIN 2 MG/ML
400 INJECTION, SOLUTION INTRAVENOUS EVERY 12 HOURS
Status: DISCONTINUED | OUTPATIENT
Start: 2018-03-03 | End: 2018-03-10

## 2018-03-03 RX ORDER — 0.9 % SODIUM CHLORIDE 0.9 %
10 VIAL (ML) INJECTION DAILY
Status: DISCONTINUED | OUTPATIENT
Start: 2018-03-04 | End: 2018-03-03

## 2018-03-03 RX ORDER — PANTOPRAZOLE SODIUM 40 MG/10ML
40 INJECTION, POWDER, LYOPHILIZED, FOR SOLUTION INTRAVENOUS 2 TIMES DAILY
Status: DISCONTINUED | OUTPATIENT
Start: 2018-03-04 | End: 2018-03-19 | Stop reason: HOSPADM

## 2018-03-03 RX ORDER — 0.9 % SODIUM CHLORIDE 0.9 %
10 VIAL (ML) INJECTION EVERY 12 HOURS
Status: DISCONTINUED | OUTPATIENT
Start: 2018-03-03 | End: 2018-03-19 | Stop reason: HOSPADM

## 2018-03-03 RX ORDER — 0.9 % SODIUM CHLORIDE 0.9 %
500 INTRAVENOUS SOLUTION INTRAVENOUS ONCE
Status: COMPLETED | OUTPATIENT
Start: 2018-03-03 | End: 2018-03-03

## 2018-03-03 RX ORDER — HEPARIN SODIUM 10000 [USP'U]/100ML
12 INJECTION, SOLUTION INTRAVENOUS CONTINUOUS
Status: DISCONTINUED | OUTPATIENT
Start: 2018-03-03 | End: 2018-03-08

## 2018-03-03 RX ORDER — CLOPIDOGREL BISULFATE 75 MG/1
75 TABLET ORAL DAILY
Status: DISCONTINUED | OUTPATIENT
Start: 2018-03-03 | End: 2018-03-19 | Stop reason: HOSPADM

## 2018-03-03 RX ADMIN — POTASSIUM CHLORIDE 10 MEQ: 10 INJECTION, SOLUTION INTRAVENOUS at 03:24

## 2018-03-03 RX ADMIN — POTASSIUM CHLORIDE 10 MEQ: 10 INJECTION, SOLUTION INTRAVENOUS at 15:07

## 2018-03-03 RX ADMIN — SODIUM BICARBONATE: 84 INJECTION, SOLUTION INTRAVENOUS at 19:09

## 2018-03-03 RX ADMIN — CIPROFLOXACIN 400 MG: 2 INJECTION, SOLUTION INTRAVENOUS at 12:37

## 2018-03-03 RX ADMIN — POTASSIUM CHLORIDE 10 MEQ: 10 INJECTION, SOLUTION INTRAVENOUS at 13:47

## 2018-03-03 RX ADMIN — POTASSIUM CHLORIDE 10 MEQ: 10 INJECTION, SOLUTION INTRAVENOUS at 23:05

## 2018-03-03 RX ADMIN — HEPARIN SODIUM 5000 UNITS: 5000 INJECTION, SOLUTION INTRAVENOUS; SUBCUTANEOUS at 04:53

## 2018-03-03 RX ADMIN — POTASSIUM CHLORIDE 10 MEQ: 10 INJECTION, SOLUTION INTRAVENOUS at 22:04

## 2018-03-03 RX ADMIN — PANTOPRAZOLE SODIUM 40 MG: 40 INJECTION, POWDER, FOR SOLUTION INTRAVENOUS at 20:52

## 2018-03-03 RX ADMIN — Medication 8.2 UNITS/HR: at 12:58

## 2018-03-03 RX ADMIN — MAGNESIUM SULFATE HEPTAHYDRATE 1 G: 1 INJECTION, SOLUTION INTRAVENOUS at 12:51

## 2018-03-03 RX ADMIN — POTASSIUM CHLORIDE 10 MEQ: 10 INJECTION, SOLUTION INTRAVENOUS at 16:15

## 2018-03-03 RX ADMIN — POTASSIUM CHLORIDE 10 MEQ: 10 INJECTION, SOLUTION INTRAVENOUS at 02:24

## 2018-03-03 RX ADMIN — NOREPINEPHRINE BITARTRATE 8 MCG/MIN: 1 INJECTION, SOLUTION, CONCENTRATE INTRAVENOUS at 05:48

## 2018-03-03 RX ADMIN — SODIUM BICARBONATE 50 MEQ: 84 INJECTION, SOLUTION INTRAVENOUS at 11:06

## 2018-03-03 RX ADMIN — HEPARIN SODIUM AND DEXTROSE 12 UNITS/KG/HR: 10000; 5 INJECTION INTRAVENOUS at 19:10

## 2018-03-03 RX ADMIN — SODIUM BICARBONATE: 84 INJECTION, SOLUTION INTRAVENOUS at 11:06

## 2018-03-03 RX ADMIN — POTASSIUM CHLORIDE 10 MEQ: 10 INJECTION, SOLUTION INTRAVENOUS at 18:45

## 2018-03-03 RX ADMIN — DEXTROSE AND SODIUM CHLORIDE: 5; 900 INJECTION, SOLUTION INTRAVENOUS at 05:47

## 2018-03-03 RX ADMIN — CALCIUM GLUCONATE 1 G: 98 INJECTION, SOLUTION INTRAVENOUS at 09:35

## 2018-03-03 RX ADMIN — ENOXAPARIN SODIUM 80 MG: 80 INJECTION SUBCUTANEOUS at 09:15

## 2018-03-03 RX ADMIN — Medication 10 ML: at 21:51

## 2018-03-03 RX ADMIN — SODIUM PHOSPHATE, MONOBASIC, MONOHYDRATE AND SODIUM PHOSPHATE, DIBASIC, ANHYDROUS 10 MMOL: 276; 142 INJECTION, SOLUTION INTRAVENOUS at 17:32

## 2018-03-03 RX ADMIN — POTASSIUM CHLORIDE 10 MEQ: 10 INJECTION, SOLUTION INTRAVENOUS at 17:29

## 2018-03-03 RX ADMIN — CEFTRIAXONE SODIUM 1 G: 1 INJECTION, POWDER, FOR SOLUTION INTRAMUSCULAR; INTRAVENOUS at 04:52

## 2018-03-03 RX ADMIN — PANTOPRAZOLE SODIUM 40 MG: 40 INJECTION, POWDER, FOR SOLUTION INTRAVENOUS at 09:14

## 2018-03-03 RX ADMIN — SODIUM CHLORIDE 500 ML: 9 INJECTION, SOLUTION INTRAVENOUS at 21:48

## 2018-03-03 RX ADMIN — Medication 10 ML: at 09:14

## 2018-03-03 RX ADMIN — PHENYLEPHRINE HYDROCHLORIDE 100 MCG/MIN: 10 INJECTION INTRAVENOUS at 16:25

## 2018-03-03 RX ADMIN — MAGNESIUM SULFATE HEPTAHYDRATE 1 G: 1 INJECTION, SOLUTION INTRAVENOUS at 13:47

## 2018-03-03 ASSESSMENT — PAIN SCALES - GENERAL
PAINLEVEL_OUTOF10: 0

## 2018-03-03 ASSESSMENT — PAIN SCALES - WONG BAKER
WONGBAKER_NUMERICALRESPONSE: 0

## 2018-03-03 NOTE — PROGRESS NOTES
Anticoagulation plan discussed with Dr. Tanisha Ryan at this time. New order received to start a low dose heparin drip without boluses.

## 2018-03-03 NOTE — CONSULTS
1258    pantoprazole (PROTONIX) injection 40 mg  40 mg Intravenous Daily Nayeli Torres MD   40 mg at 18 0914    And    sodium chloride (PF) 0.9 % injection 10 mL  10 mL Intravenous Daily Nayeli Torres MD   10 mL at 18 0914    norepinephrine (LEVOPHED) 16 mg in dextrose 5 % 250 mL infusion  2 mcg/min Intravenous Continuous Yoana Morrell MD 9.4 mL/hr at 18 1221 10 mcg/min at 18 1221       Allergies:  Demeclocycline and Tetracyclines & related    Social History:   reports that she has never smoked. She has never used smokeless tobacco. She reports that she does not drink alcohol or use drugs. Family History: family history is not on file. Family history was reviewed and no pertinent family history noted.       REVIEW OF SYSTEMS:    Pt NOT able to provide any ROS>    PHYSICAL EXAM:      BP (!) 79/48   Pulse 76   Temp 97.3 °F (36.3 °C) (Axillary)   Resp 18   Ht 5' 9\" (1.753 m)   Wt 167 lb 9.6 oz (76 kg)   SpO2 98%   BMI 24.75 kg/m²    Temp (24hrs), Av.9 °F (36.1 °C), Min:95.7 °F (35.4 °C), Max:97.6 °F (36.4 °C)  General appearance - ill appearing, no in pain or distress   Mental status - lethargic arousable  Eyes - pupils equal and reactive, extraocular eye movements intact   Mouth - mucous membranes moist, pharynx normal without lesions   Neck - supple, no significant adenopathy   Lymphatics - no palpable lymphadenopathy, no hepatosplenomegaly   Chest - clear to auscultation, no wheezes, rales or rhonchi, symmetric air entry   Heart - normal rate, regular rhythm, normal S1, S2, no murmurs  Abdomen - soft, nontender, nondistended, no masses or organomegaly   Neurological - non focal  Musculoskeletal - no joint tenderness, deformity or swelling   Extremities - peripheral pulses normal, ++ pedal edema, no clubbing or cyanosis   Skin - normal coloration and turgor, no rashes, no suspicious skin lesions noted ,    DATA:    Labs:   CBC:   Recent Labs      18   0407 03/01/2018     IMPRESSION AND RECOMMENDATIONS[de-identified]   1. AMS: Sepsis, CVA  2. Sepsis: Due to UTI, now on pressors, IV Abx  3. SAMINA  4. Thrombocytopenia: Most likely due to sepsis, as clinically the time period does not fit HIT, and my clinical suspicion is very low. I will check smear, DIC panel, nutritional deficiency  5. Recent CVA: : New infarcts on CT head and she will need MRI once she is more stable  6. Acute DVT: Likely provoked due to hospitalization, immobility, I recommend STOP Lovenox and start Heparin drip lose dose today and change to full dose tomorrow without bolus if plts are stable, No evidence of bleeding. She is at risk for bleeding due to recent CVA with hemorrhagic transformation and also due to low plts. 7. She will need repeat Brain imaging in few days  8. Transfuse plts if bleeding or less than 50 K. I will consider IVC filter If we are not able to anticoagulate  9. Monitor counts daily  10. I had discussion with team as well as pt's daughter and explained the complex situation and the risk/benefits. Discussed with family and Nurse. Thank you for asking us to see this patient.     Danika Lin MD  Hematologist/Medical Oncologist  Cell: [phone]

## 2018-03-03 NOTE — PROGRESS NOTES
norepinephrine 7 mcg/min (18 0640)     PRN Meds: glucose, dextrose, glucagon (rDNA), dextrose, dextrose, potassium chloride, magnesium sulfate, sodium phosphate IVPB **OR** sodium phosphate IVPB **OR** sodium phosphate IVPB    Data:     Past Medical History:   has a past medical history of Cerebral edema ([facility] 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 ([facility] Chronic inflammatory demyelinating polyneuropathy (HCC); CIDP (chronic inflammatory demyelinating polyneuropathy) ([facility] Diabetes mellitus ([facility] Hyperlipidemia; Hypertension; Left arm weakness; and Left-sided weakness. Social History:   reports that she has never smoked. She has never used smokeless tobacco. She reports that she does not drink alcohol or use drugs. Family History: History reviewed. No pertinent family history. Vitals:  /63   Pulse 78   Temp (!) 32 °F (0 °C)   Resp 21   Ht 5' 9\" (1.753 m)   Wt 167 lb 9.6 oz (76 kg)   SpO2 98%   BMI 24.75 kg/m²    Temp (24hrs), Av.7 °F (30.4 °C), Min:32 °F (0 °C), Max:98.2 °F (36.8 °C)    Recent Labs      18   0329  18   0439  18   0541  18   0638   POCGLU  184*  204*  199*  196*       I/O (24Hr):     Intake/Output Summary (Last 24 hours) at 18 0805  Last data filed at 18 0445   Gross per 24 hour   Intake          8001.01 ml   Output              575 ml   Net          7426.01 ml       Labs:    Recent Results (from the past 24 hour(s))   POC Glucose Fingerstick    Collection Time: 18  8:35 AM   Result Value Ref Range    POC Glucose 190 (H) 65 - 105 mg/dL   POC Glucose Fingerstick    Collection Time: 18  9:38 AM   Result Value Ref Range    POC Glucose 146 (H) 65 - 105 mg/dL   POC Glucose Fingerstick    Collection Time: 18 10:32 AM   Result Value Ref Range    POC Glucose 163 (H) 65 - 105 mg/dL   POC Glucose Fingerstick    Collection Time: 18 11:29 AM   Result Value Ref Range    POC Glucose 147 (H) 65 - NOTIFICATION NOT REPORTED     NOTIFICATION TIME NOT REPORTED    POC Glucose Fingerstick    Collection Time: 03/02/18 12:35 PM   Result Value Ref Range    POC Glucose 141 (H) 65 - 105 mg/dL   POC Glucose Fingerstick    Collection Time: 03/02/18  2:56 PM   Result Value Ref Range    POC Glucose 118 (H) 65 - 105 mg/dL   Basic Metabolic Panel w/ Reflex to MG    Collection Time: 03/02/18  3:50 PM   Result Value Ref Range    Glucose 126 (H) 70 - 99 mg/dL    BUN 73 (H) 8 - 23 mg/dL    CREATININE 1.76 (H) 0.50 - 0.90 mg/dL    Bun/Cre Ratio NOT REPORTED 9 - 20    Calcium 6.5 (L) 8.6 - 10.4 mg/dL    Sodium 139 135 - 144 mmol/L    Potassium 4.7 3.7 - 5.3 mmol/L    Chloride 113 (H) 98 - 107 mmol/L    CO2 8 (LL) 20 - 31 mmol/L    Anion Gap 18 (H) 9 - 17 mmol/L    GFR Non-African American 28 (L) >60 mL/min    GFR  34 (L) >60 mL/min    GFR Comment          GFR Staging NOT REPORTED    Magnesium    Collection Time: 03/02/18  3:50 PM   Result Value Ref Range    Magnesium 1.7 1.6 - 2.6 mg/dL   Phosphorus    Collection Time: 03/02/18  3:50 PM   Result Value Ref Range    Phosphorus 3.3 2.6 - 4.5 mg/dL   T4, Free    Collection Time: 03/02/18  3:50 PM   Result Value Ref Range    Thyroxine, Free 1.02 0.93 - 1.70 ng/dL   TSH without Reflex    Collection Time: 03/02/18  3:50 PM   Result Value Ref Range    TSH 1.46 0.30 - 5.00 mIU/L   POC Glucose Fingerstick    Collection Time: 03/02/18  3:50 PM   Result Value Ref Range    POC Glucose 104 65 - 105 mg/dL   BLOOD GAS, VENOUS    Collection Time: 03/02/18  3:51 PM   Result Value Ref Range    pH, Eriberto 7.145 (LL) 7.320 - 7.420    pCO2, Eriberto 23.1 (L) 39.0 - 55.0    pO2, Eriberto 51.5 (H) 30.0 - 50.0    HCO3, Venous 8.0 (L) 24.0 - 30.0 mmol/L    Positive Base Excess, Eriberto NOT REPORTED 0.0 - 2.0 mmol/L    Negative Base Excess, Eriberto 21.0 (H) 0.0 - 2.0 mmol/L    O2 Sat, Eriberto 85.1 %    Total Hb NOT REPORTED 12.0 - 16.0 g/dl    Oxyhemoglobin NOT REPORTED 95.0 - 98.0 %    Carboxyhemoglobin 1.4 % Ref Range    Phosphorus 3.4 2.6 - 4.5 mg/dL   POC Glucose Fingerstick    Collection Time: 03/03/18 12:33 AM   Result Value Ref Range    POC Glucose 160 (H) 65 - 105 mg/dL   POC Glucose Fingerstick    Collection Time: 03/03/18  2:21 AM   Result Value Ref Range    POC Glucose 173 (H) 65 - 105 mg/dL   POC Glucose Fingerstick    Collection Time: 03/03/18  3:29 AM   Result Value Ref Range    POC Glucose 184 (H) 65 - 105 mg/dL   Basic Metabolic Panel w/ Reflex to MG    Collection Time: 03/03/18  4:35 AM   Result Value Ref Range    Glucose 235 (H) 70 - 99 mg/dL    BUN 67 (H) 8 - 23 mg/dL    CREATININE 1.67 (H) 0.50 - 0.90 mg/dL    Bun/Cre Ratio NOT REPORTED 9 - 20    Calcium 6.8 (L) 8.6 - 10.4 mg/dL    Sodium 136 135 - 144 mmol/L    Potassium 5.3 3.7 - 5.3 mmol/L    Chloride 111 (H) 98 - 107 mmol/L    CO2 7 (LL) 20 - 31 mmol/L    Anion Gap 18 (H) 9 - 17 mmol/L    GFR Non-African American 30 (L) >60 mL/min    GFR  36 (L) >60 mL/min    GFR Comment          GFR Staging NOT REPORTED    Magnesium    Collection Time: 03/03/18  4:35 AM   Result Value Ref Range    Magnesium 1.7 1.6 - 2.6 mg/dL   Phosphorus    Collection Time: 03/03/18  4:35 AM   Result Value Ref Range    Phosphorus 3.1 2.6 - 4.5 mg/dL   Blood Gas, Venous    Collection Time: 03/03/18  4:35 AM   Result Value Ref Range    pH, Eriberto 7.148 (LL) 7.320 - 7.420    pCO2, Eriberto 21.1 (L) 39.0 - 55.0    pO2, Eriberto 47.9 30.0 - 50.0    HCO3, Venous 7.3 (L) 24.0 - 30.0 mmol/L    Positive Base Excess, Eriberto NOT REPORTED 0.0 - 2.0 mmol/L    Negative Base Excess, Eriberto 21.6 (H) 0.0 - 2.0 mmol/L    O2 Sat, Eriberto 83.1 %    Total Hb NOT REPORTED 12.0 - 16.0 g/dl    Oxyhemoglobin NOT REPORTED 95.0 - 98.0 %    Carboxyhemoglobin 1.7 %    Methemoglobin 0.7 %    Pt Temp 37.0     pH, Eriberto, Temp Adj NOT REPORTED 7.320 - 7.420    pCO2, Eriberto, Temp Adj NOT REPORTED 39.0 - 55.0 mmHg    pO2, Eriberto, Temp Adj NOT REPORTED 30.0 - 50.0 mmHg    O2 Device/Flow/% NOT REPORTED     Respiratory Rate NOT 03/01/2018       Plan:        DKA with hx DM II, hypovolemic shock  -  on admission, anion gap 20, K 3.6, betahydroxy 2.05  - vbg 7.148/21.1/47.9/7.3  - Insulin drip, bmp Q4H, phos/mag Q4H, vbg Q4H per DKA protocol  - IVF @ 150mL/hr per DKA protocol  - Neph consult for persistent acidosis, starting bicarb     UTI sepsis with metabolic encephalopathy, poss septic shock  - purulent urine, UA + LE/Nitrite  - Shock more likely 2/2 hypovolemia due to DKA, may have septic component  - Urine culture showing morganella, group D entercoccus both sens to cipro  - transition to IV cipro    LLE DVT   - CT abd for new onset abd pain yesterday showed thigh swelling, iliofemoral enlargement  - Stat venous duplex LLE  - therapeutic enoxaparin, pharm to adjust for Cr clearance  - V/Q scan     SAMINA, improving  - Cr up to 1.79-> 1.67 over baseline ~0.56  - Likely 2/2 dehydration  - urine sodium 53, creatinine 38.5  - Renal US neg    Nayeli Torres MD  3/3/2018  8:05 AM

## 2018-03-03 NOTE — CONSULTS
[address]                   [address] [facility], [address]                                   CONSULTATION    PATIENT NAME: Ernst Kamara                      :        1940  MED REC NO:   [id number]                              ROOM:       2002  ACCOUNT NO:   [de-identified]                           ADMIT DATE: 2018  PROVIDER:     Isak Milton DATE:  2018    REFERRING PHYSICIAN:  Ivan Pizarro MD    HISTORY OF PRESENT ILLNESS:  The patient is a 52-year-old lady who was  brought to the hospital after poor arousal and awareness. She was found to suffer from urinary tract infection and hyponatremia. In 2017, she suffered a right hemispheric infarction. MRI and CT films of that occasion are reviewed. As discussed with the patient's granddaughter who was at the bedside, she  was taken to [facility] and a clot extraction was attempted  despite which she remains with left hemiplegia. She is on a medical monitored ICU. According to the patient, arousal and awareness have improved. Previous echocardiography note is reviewed. Bubble study was negative. Previous MRI scan is noted. Previous CT and current CT are noted. CT films are personally reviewed. Right middle cerebral artery infarction is noted. Heavy-set elderly lady seen who aroused easily, but remains lethargic. She  knows my name but association not clear. Flattening of the left nasolabial  fold is noted. Dense left hemiplegia is noted. No evidence of nuchal  stiffness. Cardiac and carotid auscultation unremarkable. Trace reflexes  noted. Extensive plantar response of the left side. ASSESSMENT:  1.  Encephalopathy, metabolic, toxic from urinary tract infection and  hypernatremia. 2.  Right hemispheric infarction with dense left hemiplegia. 3.  The patient also is diabetic with elevated blood sugars.     RECOMMENDATIONS: Continue present care of managing her metabolic and toxic  state. A carotid scan is recommended though she has a jugular catheter on the  right side, I do not see a carotid scan in her investigation profile. We  will follow periodically in the hospital with you. An EEG, thyroid  profile, B12, folate levels are requested.         Alfonzo Radford    D: 03/02/2018 19:36:53       T: 03/02/2018 19:38:25     [email or web address]  Job#: [id number]     Doc#: [id number]    CC:  Ricardo Ocasio

## 2018-03-03 NOTE — CONSULTS
NEPHROLOGY CONSULT     Patient :  Kerri Garcia; 69 y.o. MRN# [id number]  Location:  2002/2002-01  Attending:  Elliott Davenport MD  Admit Date:  3/1/2018   Hospital Day: 2      Reason for Consult:  Acute renal failure, metabolic acidosis      Chief Complaint:  Altered mental status  History Obtained From: Family, EMR, nursing staff    History of Present Illness: This is a 69 y.o. female with past medical history of type 2 diabetes, history of CVA with left hemiplegia right MCA infarct in January 2018, history of CIDP, presented with complains of confusion on decrease in responsiveness altered mental status. Patient was admitted to ICU with a diagnosis of DKA, sepsis. Patient bicarb was 8 anion gap was 20 on admission patient was started on DKA protocol insulin drip anion gap has improved but serum bicarb has not improved. Patient remains hypotensive and septic shock UA is consistent with UTI  According to the family patient was fine 2 days ago before coming to the hospital and she developed sudden onset altered mental status and that prompted them to bring the patient to the hospital.  Initial labs also revealed acute renal failure at kidney and peaked to 2.7 MG/DL, which improved to 1.5 mg/dL today but urine output has been dropping and patient was as hypotensive and on high-dose Levophed. Patient also had a Doppler of lower extremity which showed acute of left lower extremity. Patient's platelets are also low. Workup for thrombocytopenia has been in progress  Medication review shows use of ARB.     Past Medical History:        Diagnosis Date    Cerebral edema ([facility] 01/10/2018    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 (HCC) 01/10/2018    Chronic inflammatory demyelinating polyneuropathy (HCC)     CIDP (chronic inflammatory demyelinating polyneuropathy) ([facility]     Diabetes mellitus ([facility]     Hyperlipidemia     Hypertension     Left arm weakness 01/10/2018    Left-sided weakness 01/10/2018       Past Surgical History:        Procedure Laterality Date    CHOLECYSTECTOMY      HYSTERECTOMY      partial    HYSTERECTOMY      THROMBECTOMY  01/10/2018    EMERGENT WITH CEREBRAL ANGIOGRAM    TONSILLECTOMY      TRANSESOPHAGEAL ECHOCARDIOGRAM  01/16/2018       Current Medications:      enoxaparin (LOVENOX) injection 80 mg BID   sodium bicarbonate 150 mEq in dextrose 5 % 1,000 mL infusion Continuous   clopidogrel (PLAVIX) tablet 75 mg Daily   ciprofloxacin (CIPRO) IVPB 400 mg Q12H   glucose (GLUTOSE) 40 % oral gel 15 g PRN   dextrose 50 % solution 12.5 g PRN   glucagon (rDNA) injection 1 mg PRN   dextrose 5 % solution PRN   dextrose 50 % solution 12.5 g PRN   potassium chloride 10 mEq/100 mL IVPB (Peripheral Line) PRN   magnesium sulfate 1 g in dextrose 5% 100 mL IVPB PRN   sodium phosphate 10 mmol in dextrose 5 % 250 mL IVPB PRN   Or    sodium phosphate 15 mmol in dextrose 5 % 250 mL IVPB PRN   Or    sodium phosphate 20 mmol in dextrose 5 % 500 mL IVPB PRN   insulin regular (HUMULIN R;NOVOLIN R) 100 Units in sodium chloride 0.9 % 100 mL infusion Continuous   pantoprazole (PROTONIX) injection 40 mg Daily   And    sodium chloride (PF) 0.9 % injection 10 mL Daily   norepinephrine (LEVOPHED) 16 mg in dextrose 5 % 250 mL infusion Continuous       Allergies:  Demeclocycline and Tetracyclines & related    Social History:   Social History     Social History    Marital status:      Spouse name: N/A    Number of children: N/A    Years of education: N/A     Occupational History    Not on file. Social History Main Topics    Smoking status: Never Smoker    Smokeless tobacco: Never Used    Alcohol use No    Drug use: No    Sexual activity: Not on file     Other Topics Concern    Not on file     Social History Narrative    ** Merged History Encounter **            Family History:   History reviewed. No pertinent family history.     Review of Systems:    Patient is lethargic and minimally

## 2018-03-03 NOTE — PLAN OF CARE
Problem: Discharge Planning:  Goal: Discharged to appropriate level of care  Discharged to appropriate level of care   Outcome: Not Met This Shift      Problem: Gas Exchange - Impaired:  Goal: Levels of oxygenation will improve  Levels of oxygenation will improve   Outcome: Met This Shift  O2 sufficient on 2L NC     Problem: Infection, Septic Shock:  Goal: Will show no infection signs and symptoms  Will show no infection signs and symptoms   Outcome: Ongoing  Continue to monitor. Problem: Serum Glucose Level - Abnormal:  Goal: Ability to maintain appropriate glucose levels will improve  Ability to maintain appropriate glucose levels will improve   Outcome: Ongoing  Pt. Remains in DKA, continue to monitor. Problem: Tissue Perfusion, Altered:  Goal: Circulatory function within specified parameters  Circulatory function within specified parameters   Outcome: Ongoing  Pt. Remains on vasopressors this shift, continue to monitor. Problem: Venous Thromboembolism:  Goal: Will show no signs or symptoms of venous thromboembolism  Will show no signs or symptoms of venous thromboembolism   Outcome: Not Met This Shift  Pt positive for LLE DVT. Problem: Fluid Volume - Imbalance:  Goal: Will remain free of signs and symptoms of dehydration  Will remain free of signs and symptoms of dehydration   Outcome: Met This Shift  Pt. Positive fluid balance     Problem: Injury - Acid Base Imbalance:  Goal: Acid-base balance  Acid-base balance   Outcome: Ongoing  Continue to monitor     Problem: Risk for Impaired Skin Integrity  Goal: Tissue integrity - skin and mucous membranes  Structural intactness and normal physiological function of skin and  mucous membranes. Outcome: Met This Shift  Skin assessments completed. No new areas of breakdown noted this shift     Problem: Falls - Risk of  Goal: Absence of falls  Outcome: Met This Shift  No falls this shift, pt.  Safety maintained

## 2018-03-03 NOTE — PROGRESS NOTES
During rounds Dr. Brody Appiah spoke with Dr. Celena العراقي regarding Pt. Imaging and Hx reviewed, in light of recent hx ischemic stroke with hemorrhagic conversion will monitor platelets, transfuse if <50, consider IVC filter if signs of bleeding. Pt received enoxaparin @0915, will transition AC per Dr. Janice Morrell.

## 2018-03-03 NOTE — PROGRESS NOTES
Attempted Carotid Doppler exam this morning but was told by [facility] that ordering  Has asked for it to be done after lV line in right side of neck comes out. Likely mid week would be a good time to check back. Please call vascular should she become available sooner.

## 2018-03-04 LAB
ABSOLUTE BANDS #: 0.54 K/UL (ref 0–1)
ABSOLUTE BANDS #: 1.73 K/UL (ref 0–1)
ABSOLUTE EOS #: 0 K/UL (ref 0–0.4)
ABSOLUTE EOS #: 0 K/UL (ref 0–0.4)
ABSOLUTE IMMATURE GRANULOCYTE: ABNORMAL K/UL (ref 0–0.3)
ABSOLUTE IMMATURE GRANULOCYTE: ABNORMAL K/UL (ref 0–0.3)
ABSOLUTE LYMPH #: 0.63 K/UL (ref 1–4.8)
ABSOLUTE LYMPH #: 1.22 K/UL (ref 1–4.8)
ABSOLUTE MONO #: 0.41 K/UL (ref 0.1–1.3)
ABSOLUTE MONO #: 0.63 K/UL (ref 0.1–1.3)
ALBUMIN SERPL-MCNC: 1.7 G/DL (ref 3.5–5.2)
ALLEN TEST: ABNORMAL
ANION GAP SERPL CALCULATED.3IONS-SCNC: 14 MMOL/L (ref 9–17)
ANION GAP SERPL CALCULATED.3IONS-SCNC: 15 MMOL/L (ref 9–17)
ANION GAP SERPL CALCULATED.3IONS-SCNC: 16 MMOL/L (ref 9–17)
ANION GAP SERPL CALCULATED.3IONS-SCNC: 17 MMOL/L (ref 9–17)
BANDS: 11 % (ref 0–10)
BANDS: 4 % (ref 0–10)
BASOPHILS # BLD: 0 % (ref 0–2)
BASOPHILS # BLD: 0 % (ref 0–2)
BASOPHILS ABSOLUTE: 0 K/UL (ref 0–0.2)
BASOPHILS ABSOLUTE: 0 K/UL (ref 0–0.2)
BUN BLDV-MCNC: 40 MG/DL (ref 8–23)
BUN BLDV-MCNC: 44 MG/DL (ref 8–23)
BUN BLDV-MCNC: 46 MG/DL (ref 8–23)
BUN BLDV-MCNC: 48 MG/DL (ref 8–23)
BUN BLDV-MCNC: 51 MG/DL (ref 8–23)
BUN BLDV-MCNC: 54 MG/DL (ref 8–23)
BUN/CREAT BLD: ABNORMAL (ref 9–20)
CALCIUM SERPL-MCNC: 6.4 MG/DL (ref 8.6–10.4)
CALCIUM SERPL-MCNC: 6.4 MG/DL (ref 8.6–10.4)
CALCIUM SERPL-MCNC: 6.5 MG/DL (ref 8.6–10.4)
CALCIUM SERPL-MCNC: 6.5 MG/DL (ref 8.6–10.4)
CALCIUM SERPL-MCNC: 6.6 MG/DL (ref 8.6–10.4)
CALCIUM SERPL-MCNC: 6.8 MG/DL (ref 8.6–10.4)
CARBOXYHEMOGLOBIN: 1.2 % (ref 0–5)
CARBOXYHEMOGLOBIN: 1.4 %
CARBOXYHEMOGLOBIN: 1.8 %
CARBOXYHEMOGLOBIN: 1.9 %
CARBOXYHEMOGLOBIN: 3.1 %
CARBOXYHEMOGLOBIN: 3.5 %
CHLORIDE BLD-SCNC: 104 MMOL/L (ref 98–107)
CHLORIDE BLD-SCNC: 104 MMOL/L (ref 98–107)
CHLORIDE BLD-SCNC: 106 MMOL/L (ref 98–107)
CHLORIDE BLD-SCNC: 106 MMOL/L (ref 98–107)
CHLORIDE BLD-SCNC: 108 MMOL/L (ref 98–107)
CHLORIDE BLD-SCNC: 109 MMOL/L (ref 98–107)
CO2: 11 MMOL/L (ref 20–31)
CO2: 11 MMOL/L (ref 20–31)
CO2: 14 MMOL/L (ref 20–31)
CO2: 17 MMOL/L (ref 20–31)
CO2: 17 MMOL/L (ref 20–31)
CO2: 19 MMOL/L (ref 20–31)
CORTISOL COLLECTION INFO: ABNORMAL
CORTISOL: 21.6 UG/DL (ref 2.7–18.4)
CREAT SERPL-MCNC: 1.01 MG/DL (ref 0.5–0.9)
CREAT SERPL-MCNC: 1.11 MG/DL (ref 0.5–0.9)
CREAT SERPL-MCNC: 1.19 MG/DL (ref 0.5–0.9)
CREAT SERPL-MCNC: 1.25 MG/DL (ref 0.5–0.9)
CREAT SERPL-MCNC: 1.29 MG/DL (ref 0.5–0.9)
CREAT SERPL-MCNC: 1.4 MG/DL (ref 0.5–0.9)
DIFFERENTIAL TYPE: ABNORMAL
DIFFERENTIAL TYPE: ABNORMAL
DIRECT EXAM: NORMAL
EOSINOPHILS RELATIVE PERCENT: 0 % (ref 0–4)
EOSINOPHILS RELATIVE PERCENT: 0 % (ref 0–4)
FIO2: ABNORMAL
GFR AFRICAN AMERICAN: 44 ML/MIN
GFR AFRICAN AMERICAN: 49 ML/MIN
GFR AFRICAN AMERICAN: 51 ML/MIN
GFR AFRICAN AMERICAN: 53 ML/MIN
GFR AFRICAN AMERICAN: 58 ML/MIN
GFR AFRICAN AMERICAN: >60 ML/MIN
GFR NON-AFRICAN AMERICAN: 37 ML/MIN
GFR NON-AFRICAN AMERICAN: 40 ML/MIN
GFR NON-AFRICAN AMERICAN: 42 ML/MIN
GFR NON-AFRICAN AMERICAN: 44 ML/MIN
GFR NON-AFRICAN AMERICAN: 48 ML/MIN
GFR NON-AFRICAN AMERICAN: 53 ML/MIN
GFR SERPL CREATININE-BSD FRML MDRD: ABNORMAL ML/MIN/{1.73_M2}
GLUCOSE BLD-MCNC: 118 MG/DL (ref 65–105)
GLUCOSE BLD-MCNC: 118 MG/DL (ref 70–99)
GLUCOSE BLD-MCNC: 127 MG/DL (ref 65–105)
GLUCOSE BLD-MCNC: 128 MG/DL (ref 65–105)
GLUCOSE BLD-MCNC: 133 MG/DL (ref 65–105)
GLUCOSE BLD-MCNC: 134 MG/DL (ref 65–105)
GLUCOSE BLD-MCNC: 138 MG/DL (ref 65–105)
GLUCOSE BLD-MCNC: 147 MG/DL (ref 70–99)
GLUCOSE BLD-MCNC: 148 MG/DL (ref 65–105)
GLUCOSE BLD-MCNC: 152 MG/DL (ref 65–105)
GLUCOSE BLD-MCNC: 152 MG/DL (ref 65–105)
GLUCOSE BLD-MCNC: 155 MG/DL (ref 65–105)
GLUCOSE BLD-MCNC: 160 MG/DL (ref 65–105)
GLUCOSE BLD-MCNC: 161 MG/DL (ref 65–105)
GLUCOSE BLD-MCNC: 161 MG/DL (ref 65–105)
GLUCOSE BLD-MCNC: 163 MG/DL (ref 65–105)
GLUCOSE BLD-MCNC: 176 MG/DL (ref 65–105)
GLUCOSE BLD-MCNC: 177 MG/DL (ref 65–105)
GLUCOSE BLD-MCNC: 180 MG/DL (ref 65–105)
GLUCOSE BLD-MCNC: 181 MG/DL (ref 65–105)
GLUCOSE BLD-MCNC: 186 MG/DL (ref 65–105)
GLUCOSE BLD-MCNC: 186 MG/DL (ref 70–99)
GLUCOSE BLD-MCNC: 188 MG/DL (ref 65–105)
GLUCOSE BLD-MCNC: 194 MG/DL (ref 65–105)
GLUCOSE BLD-MCNC: 198 MG/DL (ref 70–99)
GLUCOSE BLD-MCNC: 200 MG/DL (ref 70–99)
GLUCOSE BLD-MCNC: 201 MG/DL (ref 70–99)
GLUCOSE BLD-MCNC: 214 MG/DL (ref 65–105)
HCO3 VENOUS: 10.9 MMOL/L (ref 24–30)
HCO3 VENOUS: 11.9 MMOL/L (ref 24–30)
HCO3 VENOUS: 15.2 MMOL/L (ref 24–30)
HCO3 VENOUS: 16.2 MMOL/L (ref 24–30)
HCO3 VENOUS: 17 MMOL/L (ref 24–30)
HCO3 VENOUS: 20.5 MMOL/L (ref 24–30)
HCT VFR BLD CALC: 36.6 % (ref 36–46)
HCT VFR BLD CALC: 39.2 % (ref 36–46)
HEMOGLOBIN: 11.8 G/DL (ref 12–16)
HEMOGLOBIN: 12.3 G/DL (ref 12–16)
HEPARIN INDUCED PLATELET ANTIBODY: 0.23 O.D. (ref 0–0.4)
IMMATURE GRANULOCYTES: ABNORMAL %
IMMATURE GRANULOCYTES: ABNORMAL %
LACTIC ACID: 3 MMOL/L (ref 0.5–2.2)
LACTIC ACID: 3.4 MMOL/L (ref 0.5–2.2)
LYMPHOCYTES # BLD: 4 % (ref 24–44)
LYMPHOCYTES # BLD: 9 % (ref 24–44)
Lab: NORMAL
MAGNESIUM: 1.6 MG/DL (ref 1.6–2.6)
MAGNESIUM: 1.7 MG/DL (ref 1.6–2.6)
MAGNESIUM: 1.8 MG/DL (ref 1.6–2.6)
MAGNESIUM: 1.8 MG/DL (ref 1.6–2.6)
MAGNESIUM: 1.9 MG/DL (ref 1.6–2.6)
MAGNESIUM: 2 MG/DL (ref 1.6–2.6)
MCH RBC QN AUTO: 28.1 PG (ref 26–34)
MCH RBC QN AUTO: 28.9 PG (ref 26–34)
MCHC RBC AUTO-ENTMCNC: 31.4 G/DL (ref 31–37)
MCHC RBC AUTO-ENTMCNC: 32.3 G/DL (ref 31–37)
MCV RBC AUTO: 86.9 FL (ref 80–100)
MCV RBC AUTO: 92 FL (ref 80–100)
METHEMOGLOBIN: 0.5 %
METHEMOGLOBIN: 0.6 %
METHEMOGLOBIN: 0.7 %
METHEMOGLOBIN: 0.7 % (ref 0–1.9)
MODE: ABNORMAL
MONOCYTES # BLD: 3 % (ref 1–7)
MONOCYTES # BLD: 4 % (ref 1–7)
MORPHOLOGY: ABNORMAL
MORPHOLOGY: NORMAL
NEGATIVE BASE EXCESS, VEN: 13.3 MMOL/L (ref 0–2)
NEGATIVE BASE EXCESS, VEN: 14.1 MMOL/L (ref 0–2)
NEGATIVE BASE EXCESS, VEN: 3.8 MMOL/L (ref 0–2)
NEGATIVE BASE EXCESS, VEN: 7.7 MMOL/L (ref 0–2)
NEGATIVE BASE EXCESS, VEN: 7.8 MMOL/L (ref 0–2)
NEGATIVE BASE EXCESS, VEN: 9.7 MMOL/L (ref 0–2)
NOTIFICATION TIME: ABNORMAL
NOTIFICATION: ABNORMAL
NRBC AUTOMATED: ABNORMAL PER 100 WBC
NRBC AUTOMATED: ABNORMAL PER 100 WBC
O2 DEVICE/FLOW/%: ABNORMAL
O2 SAT, VEN: 77.2 %
O2 SAT, VEN: 78 %
O2 SAT, VEN: 82.7 % (ref 60–85)
O2 SAT, VEN: 90.2 %
O2 SAT, VEN: 90.3 %
O2 SAT, VEN: 93.2 %
OXYHEMOGLOBIN: ABNORMAL % (ref 95–98)
PARTIAL THROMBOPLASTIN TIME: 107.3 SEC (ref 23–31)
PARTIAL THROMBOPLASTIN TIME: 51.3 SEC (ref 23–31)
PARTIAL THROMBOPLASTIN TIME: 68.2 SEC (ref 23–31)
PARTIAL THROMBOPLASTIN TIME: 73.4 SEC (ref 23–31)
PATIENT TEMP: 37
PCO2, VEN, TEMP ADJ: ABNORMAL MMHG (ref 39–55)
PCO2, VEN: 16.3 (ref 39–55)
PCO2, VEN: 22.7 (ref 39–55)
PCO2, VEN: 23.2 (ref 39–55)
PCO2, VEN: 25 (ref 39–55)
PCO2, VEN: 27.3 (ref 39–55)
PCO2, VEN: 30.6 (ref 39–55)
PDW BLD-RTO: 16.7 % (ref 11.5–14.9)
PDW BLD-RTO: 16.9 % (ref 11.5–14.9)
PEEP/CPAP: ABNORMAL
PH VENOUS: 7.33 (ref 7.32–7.42)
PH VENOUS: 7.39 (ref 7.32–7.42)
PH VENOUS: 7.4 (ref 7.32–7.42)
PH VENOUS: 7.43 (ref 7.32–7.42)
PH VENOUS: 7.43 (ref 7.32–7.42)
PH VENOUS: 7.45 (ref 7.32–7.42)
PH, VEN, TEMP ADJ: ABNORMAL (ref 7.32–7.42)
PHOSPHORUS: 2.7 MG/DL (ref 2.6–4.5)
PHOSPHORUS: 2.7 MG/DL (ref 2.6–4.5)
PHOSPHORUS: 2.9 MG/DL (ref 2.6–4.5)
PHOSPHORUS: 3.1 MG/DL (ref 2.6–4.5)
PHOSPHORUS: 3.1 MG/DL (ref 2.6–4.5)
PHOSPHORUS: 3.2 MG/DL (ref 2.6–4.5)
PLATELET # BLD: 71 K/UL (ref 150–450)
PLATELET # BLD: 72 K/UL (ref 150–450)
PLATELET ESTIMATE: ABNORMAL
PLATELET ESTIMATE: ABNORMAL
PMV BLD AUTO: 10.9 FL (ref 6–12)
PMV BLD AUTO: 10.9 FL (ref 6–12)
PO2, VEN, TEMP ADJ: ABNORMAL MMHG (ref 30–50)
PO2, VEN: 39 (ref 30–50)
PO2, VEN: 40.2 (ref 30–50)
PO2, VEN: 43.1 (ref 30–50)
PO2, VEN: 54 (ref 30–50)
PO2, VEN: 64.5 (ref 30–50)
PO2, VEN: 76.9 (ref 30–50)
POSITIVE BASE EXCESS, VEN: ABNORMAL MMOL/L (ref 0–2)
POTASSIUM SERPL-SCNC: 4.1 MMOL/L (ref 3.7–5.3)
POTASSIUM SERPL-SCNC: 4.3 MMOL/L (ref 3.7–5.3)
POTASSIUM SERPL-SCNC: 4.5 MMOL/L (ref 3.7–5.3)
POTASSIUM SERPL-SCNC: 4.9 MMOL/L (ref 3.7–5.3)
POTASSIUM SERPL-SCNC: 5.1 MMOL/L (ref 3.7–5.3)
POTASSIUM SERPL-SCNC: 5.1 MMOL/L (ref 3.7–5.3)
PSV: ABNORMAL
PT. POSITION: ABNORMAL
RBC # BLD: 4.21 M/UL (ref 4–5.2)
RBC # BLD: 4.26 M/UL (ref 4–5.2)
RBC # BLD: ABNORMAL 10*6/UL
RBC # BLD: ABNORMAL 10*6/UL
RESPIRATORY RATE: ABNORMAL
SAMPLE SITE: ABNORMAL
SEG NEUTROPHILS: 81 % (ref 36–66)
SEG NEUTROPHILS: 84 % (ref 36–66)
SEGMENTED NEUTROPHILS ABSOLUTE COUNT: 11.33 K/UL (ref 1.3–9.1)
SEGMENTED NEUTROPHILS ABSOLUTE COUNT: 12.71 K/UL (ref 1.3–9.1)
SET RATE: ABNORMAL
SODIUM BLD-SCNC: 134 MMOL/L (ref 135–144)
SODIUM BLD-SCNC: 136 MMOL/L (ref 135–144)
SODIUM BLD-SCNC: 136 MMOL/L (ref 135–144)
SODIUM BLD-SCNC: 137 MMOL/L (ref 135–144)
SODIUM BLD-SCNC: 137 MMOL/L (ref 135–144)
SODIUM BLD-SCNC: 138 MMOL/L (ref 135–144)
SPECIMEN DESCRIPTION: NORMAL
SPECIMEN DESCRIPTION: NORMAL
STATUS: NORMAL
TEXT FOR RESPIRATORY: ABNORMAL
TOTAL HB: ABNORMAL G/DL (ref 12–16)
TOTAL RATE: ABNORMAL
VT: ABNORMAL
WBC # BLD: 13.5 K/UL (ref 3.5–11)
WBC # BLD: 15.7 K/UL (ref 3.5–11)
WBC # BLD: ABNORMAL 10*3/UL
WBC # BLD: ABNORMAL 10*3/UL

## 2018-03-04 PROCEDURE — 85730 THROMBOPLASTIN TIME PARTIAL: CPT

## 2018-03-04 PROCEDURE — 36415 COLL VENOUS BLD VENIPUNCTURE: CPT

## 2018-03-04 PROCEDURE — 2580000003 HC RX 258: Performed by: INTERNAL MEDICINE

## 2018-03-04 PROCEDURE — 82533 TOTAL CORTISOL: CPT

## 2018-03-04 PROCEDURE — 6360000002 HC RX W HCPCS: Performed by: RADIOLOGY

## 2018-03-04 PROCEDURE — 83735 ASSAY OF MAGNESIUM: CPT

## 2018-03-04 PROCEDURE — 2500000003 HC RX 250 WO HCPCS: Performed by: EMERGENCY MEDICINE

## 2018-03-04 PROCEDURE — 2580000003 HC RX 258: Performed by: RADIOLOGY

## 2018-03-04 PROCEDURE — 83605 ASSAY OF LACTIC ACID: CPT

## 2018-03-04 PROCEDURE — C9113 INJ PANTOPRAZOLE SODIUM, VIA: HCPCS | Performed by: INTERNAL MEDICINE

## 2018-03-04 PROCEDURE — 80048 BASIC METABOLIC PNL TOTAL CA: CPT

## 2018-03-04 PROCEDURE — 6360000002 HC RX W HCPCS: Performed by: INTERNAL MEDICINE

## 2018-03-04 PROCEDURE — 6370000000 HC RX 637 (ALT 250 FOR IP): Performed by: RADIOLOGY

## 2018-03-04 PROCEDURE — 2500000003 HC RX 250 WO HCPCS: Performed by: RADIOLOGY

## 2018-03-04 PROCEDURE — 99232 SBSQ HOSP IP/OBS MODERATE 35: CPT | Performed by: INTERNAL MEDICINE

## 2018-03-04 PROCEDURE — 2500000003 HC RX 250 WO HCPCS: Performed by: INTERNAL MEDICINE

## 2018-03-04 PROCEDURE — 82800 BLOOD PH: CPT

## 2018-03-04 PROCEDURE — 84100 ASSAY OF PHOSPHORUS: CPT

## 2018-03-04 PROCEDURE — 2000000000 HC ICU R&B

## 2018-03-04 PROCEDURE — 2580000003 HC RX 258: Performed by: EMERGENCY MEDICINE

## 2018-03-04 PROCEDURE — 85025 COMPLETE CBC W/AUTO DIFF WBC: CPT

## 2018-03-04 PROCEDURE — 99233 SBSQ HOSP IP/OBS HIGH 50: CPT | Performed by: INTERNAL MEDICINE

## 2018-03-04 PROCEDURE — 82040 ASSAY OF SERUM ALBUMIN: CPT

## 2018-03-04 PROCEDURE — 94762 N-INVAS EAR/PLS OXIMTRY CONT: CPT

## 2018-03-04 PROCEDURE — 82805 BLOOD GASES W/O2 SATURATION: CPT

## 2018-03-04 PROCEDURE — 82947 ASSAY GLUCOSE BLOOD QUANT: CPT

## 2018-03-04 RX ORDER — INSULIN GLARGINE 100 [IU]/ML
14 INJECTION, SOLUTION SUBCUTANEOUS ONCE
Status: DISCONTINUED | OUTPATIENT
Start: 2018-03-04 | End: 2018-03-06

## 2018-03-04 RX ADMIN — POTASSIUM CHLORIDE 10 MEQ: 10 INJECTION, SOLUTION INTRAVENOUS at 01:25

## 2018-03-04 RX ADMIN — CIPROFLOXACIN 400 MG: 2 INJECTION, SOLUTION INTRAVENOUS at 00:24

## 2018-03-04 RX ADMIN — POTASSIUM CHLORIDE 10 MEQ: 10 INJECTION, SOLUTION INTRAVENOUS at 23:31

## 2018-03-04 RX ADMIN — PHENYLEPHRINE HYDROCHLORIDE 15 MCG/MIN: 10 INJECTION INTRAVENOUS at 05:07

## 2018-03-04 RX ADMIN — CALCIUM GLUCONATE 1 G: 98 INJECTION, SOLUTION INTRAVENOUS at 11:25

## 2018-03-04 RX ADMIN — SODIUM BICARBONATE: 84 INJECTION, SOLUTION INTRAVENOUS at 22:00

## 2018-03-04 RX ADMIN — POTASSIUM CHLORIDE 10 MEQ: 10 INJECTION, SOLUTION INTRAVENOUS at 02:40

## 2018-03-04 RX ADMIN — Medication 10 ML: at 21:14

## 2018-03-04 RX ADMIN — CIPROFLOXACIN 400 MG: 2 INJECTION, SOLUTION INTRAVENOUS at 23:53

## 2018-03-04 RX ADMIN — PANTOPRAZOLE SODIUM 40 MG: 40 INJECTION, POWDER, FOR SOLUTION INTRAVENOUS at 21:14

## 2018-03-04 RX ADMIN — POTASSIUM CHLORIDE 10 MEQ: 10 INJECTION, SOLUTION INTRAVENOUS at 06:54

## 2018-03-04 RX ADMIN — Medication 2.4 UNITS/HR: at 17:13

## 2018-03-04 RX ADMIN — Medication 10 ML: at 09:37

## 2018-03-04 RX ADMIN — NOREPINEPHRINE BITARTRATE 12 MCG/MIN: 1 INJECTION, SOLUTION, CONCENTRATE INTRAVENOUS at 05:07

## 2018-03-04 RX ADMIN — CIPROFLOXACIN 400 MG: 2 INJECTION, SOLUTION INTRAVENOUS at 13:18

## 2018-03-04 RX ADMIN — SODIUM BICARBONATE: 84 INJECTION, SOLUTION INTRAVENOUS at 12:52

## 2018-03-04 RX ADMIN — PANTOPRAZOLE SODIUM 40 MG: 40 INJECTION, POWDER, FOR SOLUTION INTRAVENOUS at 09:36

## 2018-03-04 RX ADMIN — SODIUM BICARBONATE: 84 INJECTION, SOLUTION INTRAVENOUS at 05:07

## 2018-03-04 RX ADMIN — SODIUM PHOSPHATE, MONOBASIC, MONOHYDRATE AND SODIUM PHOSPHATE, DIBASIC, ANHYDROUS 10 MMOL: 276; 142 INJECTION, SOLUTION INTRAVENOUS at 01:29

## 2018-03-04 RX ADMIN — POTASSIUM CHLORIDE 10 MEQ: 10 INJECTION, SOLUTION INTRAVENOUS at 05:34

## 2018-03-04 RX ADMIN — SODIUM PHOSPHATE, MONOBASIC, MONOHYDRATE AND SODIUM PHOSPHATE, DIBASIC, ANHYDROUS 10 MMOL: 276; 142 INJECTION, SOLUTION INTRAVENOUS at 23:30

## 2018-03-04 NOTE — PROGRESS NOTES
Carboxyhemoglobin 1.3 0 - 5 %    Methemoglobin 0.7 0.0 - 1.9 %    Pt Temp NOT REPORTED     pH, Eriberto, Temp Adj NOT REPORTED 7.320 - 7.420    pCO2, Eriberto, Temp Adj NOT REPORTED 39.0 - 55.0 mmHg    pO2, Eriberto, Temp Adj NOT REPORTED 30.0 - 50.0 mmHg    O2 Device/Flow/% NOT REPORTED     Respiratory Rate NOT REPORTED     Harvinder Test NOT REPORTED     Sample Site NOT REPORTED     Pt.  Position NOT REPORTED     Mode NOT REPORTED     Set Rate NOT REPORTED     Total Rate NOT REPORTED     VT NOT REPORTED     FIO2 NOT REPORTED     Peep/Cpap NOT REPORTED     PSV NOT REPORTED     Text for Respiratory NOT REPORTED     NOTIFICATION NOT REPORTED     NOTIFICATION TIME NOT REPORTED    Lactic Acid    Collection Time: 03/03/18  8:14 AM   Result Value Ref Range    Lactic Acid 2.5 (H) 0.5 - 2.2 mmol/L   Path Review, Smear    Collection Time: 03/03/18  8:14 AM   Result Value Ref Range    Pathologist Review TO BE REVIEWED BY PATHOLOGIST    POC Glucose Fingerstick    Collection Time: 03/03/18  8:33 AM   Result Value Ref Range    POC Glucose 196 (H) 65 - 105 mg/dL   POC Glucose Fingerstick    Collection Time: 03/03/18  9:27 AM   Result Value Ref Range    POC Glucose 177 (H) 65 - 933 mg/dL   SALICYLATE LEVEL    Collection Time: 03/03/18  9:55 AM   Result Value Ref Range    Salicylate Lvl <1 (L) 3 - 10 mg/dL   ACETAMINOPHEN LEVEL    Collection Time: 03/03/18  9:55 AM   Result Value Ref Range    Acetaminophen Level <10 (L) 10 - 30 ug/mL   Osmolality    Collection Time: 03/03/18  9:55 AM   Result Value Ref Range    Serum Osmolality 318 (H) 275 - 295 mOsm/kg   Electrophoresis Protein, Serum without Reflex to Immunofixation    Collection Time: 03/03/18  9:55 AM   Result Value Ref Range    Total Protein 3.8 (L) 6.4 - 8.3 g/dL    Albumin (calculated) Pending g/dL    Albumin % Pending %    Alpha-1-Globulin Pending g/dL    Alpha 1 % Pending %    Alpha-2-Globulin Pending g/dL    Alpha 2 % Pending %    Beta Globulin Pending g/dL    Beta Percent Pending % Gamma Globulin Pending g/dL    Gamma Globulin % Pending %    Total Prot. Sum Pending g/dL    Total Prot.  Sum,% Pending %    Protein Electrophoresis, Serum Pending     Pathologist Pending    C3 COMPLEMENT    Collection Time: 03/03/18  9:55 AM   Result Value Ref Range    Complement C3 98 90 - 180 mg/dL   C4 COMPLEMENT    Collection Time: 03/03/18  9:55 AM   Result Value Ref Range    Complement C4 22 10 - 40 mg/dL   POC Glucose Fingerstick    Collection Time: 03/03/18 10:41 AM   Result Value Ref Range    POC Glucose 212 (H) 65 - 105 mg/dL   POC Glucose Fingerstick    Collection Time: 03/03/18 11:32 AM   Result Value Ref Range    POC Glucose 211 (H) 65 - 105 mg/dL   Basic Metabolic Panel w/ Reflex to MG    Collection Time: 03/03/18 11:57 AM   Result Value Ref Range    Glucose 221 (H) 70 - 99 mg/dL    BUN 63 (H) 8 - 23 mg/dL    CREATININE 1.56 (H) 0.50 - 0.90 mg/dL    Bun/Cre Ratio NOT REPORTED 9 - 20    Calcium 6.8 (L) 8.6 - 10.4 mg/dL    Sodium 138 135 - 144 mmol/L    Potassium 4.2 3.7 - 5.3 mmol/L    Chloride 112 (H) 98 - 107 mmol/L    CO2 9 (LL) 20 - 31 mmol/L    Anion Gap 17 9 - 17 mmol/L    GFR Non-African American 32 (L) >60 mL/min    GFR  39 (L) >60 mL/min    GFR Comment          GFR Staging NOT REPORTED    Magnesium    Collection Time: 03/03/18 11:57 AM   Result Value Ref Range    Magnesium 1.6 1.6 - 2.6 mg/dL   Phosphorus    Collection Time: 03/03/18 11:57 AM   Result Value Ref Range    Phosphorus 3.0 2.6 - 4.5 mg/dL   BLOOD GAS, VENOUS    Collection Time: 03/03/18 11:57 AM   Result Value Ref Range    pH, Eriberto 7.197 (LL) 7.320 - 7.420    pCO2, Eriberto 25.1 (L) 39.0 - 55.0    pO2, Eriberto 94.0 (H) 30.0 - 50.0    HCO3, Venous 9.7 (L) 24.0 - 30.0 mmol/L    Positive Base Excess, Eriberto NOT REPORTED 0.0 - 2.0 mmol/L    Negative Base Excess, Eriberto 18.4 (H) 0.0 - 2.0 mmol/L    O2 Sat, Eriberto 95.7 %    Total Hb NOT REPORTED 12.0 - 16.0 g/dl    Oxyhemoglobin NOT REPORTED 95.0 - 98.0 %    Carboxyhemoglobin 1.3 % Methemoglobin 0.7 %    Pt Temp 37.0     pH, Eriberto, Temp Adj Pending 7.320 - 7.420    pCO2, Eriberto, Temp Adj Pending 39.0 - 55.0 mmHg    pO2, Eriberto, Temp Adj Pending 30.0 - 50.0 mmHg    O2 Device/Flow/% NOT REPORTED     Respiratory Rate NOT REPORTED     Harvinder Test NOT REPORTED     Sample Site NOT REPORTED     Pt. Position NOT REPORTED     Mode NOT REPORTED     Set Rate NOT REPORTED     Total Rate NOT REPORTED     VT NOT REPORTED     FIO2 NOT REPORTED     Peep/Cpap NOT REPORTED     PSV NOT REPORTED     Text for Respiratory NOT REPORTED     NOTIFICATION NOT REPORTED     NOTIFICATION TIME NOT REPORTED    Creatinine,Random Ur    Collection Time: 03/03/18 11:59 AM   Result Value Ref Range    Creatinine, Ur 27.0 (L) 28.0 - 217.0 mg/dL   Sodium, Random Ur    Collection Time: 03/03/18 11:59 AM   Result Value Ref Range    Sodium,Ur 24 mmol/L   POTASSIUM, URINE, RANDOM    Collection Time: 03/03/18 11:59 AM   Result Value Ref Range    Potassium, Ur 34.1 mmol/L   CHLORIDE, URINE, RANDOM    Collection Time: 03/03/18 11:59 AM   Result Value Ref Range    Chloride, Ur 34 mmol/L   PROTEIN, URINE, RANDOM    Collection Time: 03/03/18 11:59 AM   Result Value Ref Range    Total Protein, Urine 33 mg/dL   PROTEIN ELECTROPHORESIS, URINE    Collection Time: 03/03/18 11:59 AM   Result Value Ref Range    Specimen Type . URINE     Urine Total Protein 33 mg/dL    P E Interpretation, U Pending     Pathologist Pending    POC Glucose Fingerstick    Collection Time: 03/03/18 12:18 PM   Result Value Ref Range    POC Glucose 224 (H) 65 - 105 mg/dL   POC Glucose Fingerstick    Collection Time: 03/03/18  1:41 PM   Result Value Ref Range    POC Glucose 223 (H) 65 - 105 mg/dL   POC Glucose Fingerstick    Collection Time: 03/03/18  2:43 PM   Result Value Ref Range    POC Glucose 170 (H) 65 - 105 mg/dL   POC Glucose Fingerstick    Collection Time: 03/03/18  3:39 PM   Result Value Ref Range    POC Glucose 179 (H) 65 - 105 mg/dL   Basic Metabolic Panel w/ Reflex to ug/dL    Cortisol Collection Info NOT REPORTED    TSH w/reflex to FT4    Collection Time: 03/03/18  4:14 PM   Result Value Ref Range    TSH 1.44 0.30 - 5.00 mIU/L   FIBRINOGEN    Collection Time: 03/03/18  4:14 PM   Result Value Ref Range    Fibrinogen 351 170 - 410 mg/dL   PROTIME-INR    Collection Time: 03/03/18  4:14 PM   Result Value Ref Range    Protime 11.4 9.7 - 12.0 sec    INR 1.1    LACTATE DEHYDROGENASE    Collection Time: 03/03/18  4:14 PM   Result Value Ref Range     (H) 135 - 214 U/L   HAPTOGLOBIN    Collection Time: 03/03/18  4:14 PM   Result Value Ref Range    Haptoglobin 173 30 - 200 mg/dL   IRON AND TIBC    Collection Time: 03/03/18  4:14 PM   Result Value Ref Range    Iron 12 (L) 37 - 145 ug/dL    TIBC 98 (L) 250 - 450 ug/dL    Iron Saturation 12 (L) 20 - 55 %    UIBC 86 (L) 112 - 347 ug/dL   FERRITIN    Collection Time: 03/03/18  4:14 PM   Result Value Ref Range    Ferritin 620 (H) 13 - 150 ug/L   PTH, Intact    Collection Time: 03/03/18  4:14 PM   Result Value Ref Range    Pth Intact 325.7 (H) 15.0 - 65.0 pg/mL   Vitamin D 25 Hydroxy    Collection Time: 03/03/18  4:14 PM   Result Value Ref Range    Vit D, 25-Hydroxy 5.2 (L) 30.0 - 100.0 ng/mL   POC Glucose Fingerstick    Collection Time: 03/03/18  4:21 PM   Result Value Ref Range    POC Glucose 152 (H) 65 - 105 mg/dL   POC Glucose Fingerstick    Collection Time: 03/03/18  4:41 PM   Result Value Ref Range    POC Glucose 152 (H) 65 - 105 mg/dL   POC Glucose Fingerstick    Collection Time: 03/03/18  5:27 PM   Result Value Ref Range    POC Glucose 131 (H) 65 - 105 mg/dL   POC Glucose Fingerstick    Collection Time: 03/03/18  6:42 PM   Result Value Ref Range    POC Glucose 111 (H) 65 - 105 mg/dL   POC Glucose Fingerstick    Collection Time: 03/03/18  7:36 PM   Result Value Ref Range    POC Glucose 163 (H) 65 - 105 mg/dL   Basic Metabolic Panel w/ Reflex to MG    Collection Time: 03/03/18  8:17 PM   Result Value Ref Range    Glucose 178 (H) 70 - mmol/L    Chloride 109 (H) 98 - 107 mmol/L    CO2 11 (L) 20 - 31 mmol/L    Anion Gap 16 9 - 17 mmol/L    GFR Non-African American 37 (L) >60 mL/min    GFR  44 (L) >60 mL/min    GFR Comment          GFR Staging NOT REPORTED    Magnesium    Collection Time: 03/04/18 12:05 AM   Result Value Ref Range    Magnesium 2.0 1.6 - 2.6 mg/dL   Phosphorus    Collection Time: 03/04/18 12:05 AM   Result Value Ref Range    Phosphorus 2.7 2.6 - 4.5 mg/dL   BLOOD GAS, VENOUS    Collection Time: 03/04/18 12:05 AM   Result Value Ref Range    pH, Eriberto 7.327 7.320 - 7.420    pCO2, Eriberto 22.7 (L) 39.0 - 55.0    pO2, Eriberto 54.0 (H) 30.0 - 50.0    HCO3, Venous 11.9 (L) 24.0 - 30.0 mmol/L    Positive Base Excess, Eriberto NOT REPORTED 0.0 - 2.0 mmol/L    Negative Base Excess, Eriberto 14.1 (H) 0.0 - 2.0 mmol/L    O2 Sat, Eriberto 90.2 %    Total Hb NOT REPORTED 12.0 - 16.0 g/dl    Oxyhemoglobin NOT REPORTED 95.0 - 98.0 %    Carboxyhemoglobin 1.4 %    Methemoglobin 0.7 %    Pt Temp 37.0     pH, Eriberto, Temp Adj NOT REPORTED 7.320 - 7.420    pCO2, Eriberto, Temp Adj NOT REPORTED 39.0 - 55.0 mmHg    pO2, Eriberto, Temp Adj NOT REPORTED 30.0 - 50.0 mmHg    O2 Device/Flow/% NOT REPORTED     Respiratory Rate NOT REPORTED     Harvinder Test NOT REPORTED     Sample Site NOT REPORTED     Pt.  Position NOT REPORTED     Mode NOT REPORTED     Set Rate NOT REPORTED     Total Rate NOT REPORTED     VT NOT REPORTED     FIO2 NOT REPORTED     Peep/Cpap NOT REPORTED     PSV NOT REPORTED     Text for Respiratory NOT REPORTED     NOTIFICATION NOT REPORTED     NOTIFICATION TIME NOT REPORTED    POC Glucose Fingerstick    Collection Time: 03/04/18 12:43 AM   Result Value Ref Range    POC Glucose 118 (H) 65 - 105 mg/dL   POC Glucose Fingerstick    Collection Time: 03/04/18  1:40 AM   Result Value Ref Range    POC Glucose 161 (H) 65 - 105 mg/dL   POC Glucose Fingerstick    Collection Time: 03/04/18  2:27 AM   Result Value Ref Range    POC Glucose 180 (H) 65 - 105 mg/dL   POC Glucose Fingerstick    Collection Time: 03/04/18  3:45 AM   Result Value Ref Range    POC Glucose 194 (H) 65 - 105 mg/dL   Basic Metabolic Panel w/ Reflex to MG    Collection Time: 03/04/18  4:34 AM   Result Value Ref Range    Glucose 200 (H) 70 - 99 mg/dL    BUN 51 (H) 8 - 23 mg/dL    CREATININE 1.29 (H) 0.50 - 0.90 mg/dL    Bun/Cre Ratio NOT REPORTED 9 - 20    Calcium 6.4 (L) 8.6 - 10.4 mg/dL    Sodium 134 (L) 135 - 144 mmol/L    Potassium 4.9 3.7 - 5.3 mmol/L    Chloride 106 98 - 107 mmol/L    CO2 11 (L) 20 - 31 mmol/L    Anion Gap 17 9 - 17 mmol/L    GFR Non-African American 40 (L) >60 mL/min    GFR  49 (L) >60 mL/min    GFR Comment          GFR Staging NOT REPORTED    Magnesium    Collection Time: 03/04/18  4:34 AM   Result Value Ref Range    Magnesium 1.8 1.6 - 2.6 mg/dL   Phosphorus    Collection Time: 03/04/18  4:34 AM   Result Value Ref Range    Phosphorus 3.1 2.6 - 4.5 mg/dL   BLOOD GAS, VENOUS    Collection Time: 03/04/18  4:34 AM   Result Value Ref Range    pH, Eriberto 7.435 (H) 7.320 - 7.420    pCO2, Eriberto 16.3 (L) 39.0 - 55.0    pO2, Eriberto 64.5 (H) 30.0 - 50.0    HCO3, Venous 10.9 (L) 24.0 - 30.0 mmol/L    Positive Base Excess, Eriberto NOT REPORTED 0.0 - 2.0 mmol/L    Negative Base Excess, Eriberto 13.3 (H) 0.0 - 2.0 mmol/L    O2 Sat, Eriberto 90.3 %    Total Hb NOT REPORTED 12.0 - 16.0 g/dl    Oxyhemoglobin NOT REPORTED 95.0 - 98.0 %    Carboxyhemoglobin 3.1 %    Methemoglobin 0.7 %    Pt Temp 37.0     pH, Eriberto, Temp Adj NOT REPORTED 7.320 - 7.420    pCO2, Eriberto, Temp Adj NOT REPORTED 39.0 - 55.0 mmHg    pO2, Eriberto, Temp Adj NOT REPORTED 30.0 - 50.0 mmHg    O2 Device/Flow/% Cannula     Respiratory Rate NOT REPORTED     Harvinder Test NOT REPORTED     Sample Site NOT REPORTED     Pt.  Position SEMI-FOWLERS     Mode NOT REPORTED     Set Rate NOT REPORTED     Total Rate NOT REPORTED     VT NOT REPORTED     FIO2 NOT REPORTED     Peep/Cpap NOT REPORTED     PSV NOT REPORTED     Text for Respiratory NOT REPORTED     NOTIFICATION AM    CULTURE GROUP D ENTEROCOCCUS >[id number] CFU/ML (A) 03/01/2018 11:15 AM    CULTURE  03/01/2018 11:15 AM     Performed at [facility], [address] [place] [address] [phone]       Radiology:    Ct Abdomen Pelvis Wo Contrast Additional Contrast? Radiologist Recommendation    Result Date: 3/2/2018  EXAMINATION: CT OF THE ABDOMEN AND PELVIS WITHOUT CONTRAST 3/2/2018 5:11 pm TECHNIQUE: CT of the abdomen and pelvis was performed without the administration of intravenous contrast. Multiplanar reformatted images are provided for review. Dose modulation, iterative reconstruction, and/or weight based adjustment of the mA/kV was utilized to reduce the radiation dose to as low as reasonably achievable. COMPARISON: None. HISTORY: ORDERING SYSTEM PROVIDED HISTORY: New onset abd pain in DKA, check for distension, perf TECHNOLOGIST PROVIDED HISTORY: Additional Contrast?->Radiologist Recommendation Ordering Physician Provided Reason for Exam: PATIENT STATES ABDOMINAL PAIN FINDINGS: Lower Chest: There are trace bilateral pleural effusions and bilateral lower lobe subsegmental atelectasis. There is mild global cardiomegaly. Tiny hiatus hernia is present. Organs: Exam is limited by the absence of intravenous contrast. No focal liver lesion. The gallbladder is surgically absent. No significant biliary ductal dilatation. The spleen is normal in size without focal lesion. There is moderate fatty atrophy of the pancreas without focal lesion or ductal dilatation. The adrenal glands are unremarkable. No urinary tract calculus or collecting system dilatation. No focal liver lesion. GI/Bowel: The appendix is normal.  No bowel obstruction. There is moderate colonic diverticulosis without evidence for diverticulitis. Pelvis: There is a Alegre catheter and air in a decompressed bladder. Remote hysterectomy. Peritoneum/Retroperitoneum: There is moderate presacral edema. No abdominal lymphadenopathy or ascites.  Bones/Soft Tissues: There is mild diffuse subcutaneous edema/generalized anasarca. Moderate-severe thoracolumbar scoliosis with moderate lumbar levoscoliosis. Small fat containing umbilical hernia. There is relative enlargement of the left iliofemoral vein compared to the right with increased soft tissue swelling of the left upper thigh. The possibility of left DVT/thrombus could be entertained. There is mild generalized anasarca. Relative enlargement of the left iliofemoral with increased swelling of the left upper thigh. The possibility of the presence of left DVT/thrombus could be entertained. Left lower extremity ultrasound Doppler could be obtained as clinically indicated. Otherwise, no acute process in the abdomen or pelvis. Xr Chest (single View Frontal)    Result Date: 3/1/2018  EXAMINATION: SINGLE VIEW OF THE CHEST 3/1/2018 5:05 pm COMPARISON: None. HISTORY: ORDERING SYSTEM PROVIDED HISTORY: Central Line Placement TECHNOLOGIST PROVIDED HISTORY: Reason for exam:->Central Line Placement Reason for exam:->Portable Ordering Physician Provided Reason for Exam: line placement Acuity: Acute Type of Exam: Initial FINDINGS: The right IJ central venous catheter in place terminating at cavoatrial junction. There is no pneumothorax. The radiograph is somewhat limited due to left-sided tilt. Cardiac and mediastinal contour are normal.  There is minimal atelectatic change in the left costophrenic angle. Bony structures are grossly unremarkable. Dextroscoliosis at lower thoracic spine present similar to previous examination. Uncomplicated interval insertion of right IJ catheter.   Otherwise, no interval change     Xr Chest (single View Frontal)    Result Date: 3/1/2018  EXAMINATION: SINGLE VIEW OF THE CHEST 3/1/2018 10:48 am COMPARISON: Chest x-ray from 01/10/2018 HISTORY: ORDERING SYSTEM PROVIDED HISTORY: Altered mental status,  confused TECHNOLOGIST PROVIDED HISTORY: Reason for exam:->Altered mental status, demonstrate no acute abnormality. SINUSES: The visualized paranasal sinuses and mastoid air cells demonstrate no acute abnormality. SOFT TISSUES/SKULL:  No acute abnormality of the visualized skull or soft tissues. 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 There is surrounding low density extending deep into the lentiform nucleus region and right lateral thalamic region. It is unclear whether this is gliosis from the prior ischemic event or a subtle superimposed area of new ischemia. MRI may be more helpful High density in the right MCA within the sylvian fissure. This is age indeterminate given the recent infarct. This may represent sequela of prior thrombus, however new thrombus not excluded. Again if the patient 8 current right hemispheric stroke symptoms, consider MRI     Us Renal Limited    Result Date: 3/2/2018  EXAMINATION: ULTRASOUND OF THE KIDNEYS 3/2/2018 2:21 pm COMPARISON: None. HISTORY: ORDERING SYSTEM PROVIDED HISTORY: RENAL FAILURE, ACUTE (KIDNEY INJURY) TECHNOLOGIST PROVIDED HISTORY: Ordering Physician Provided Reason for Exam: arf Acuity: Acute Type of Exam: Initial FINDINGS: The right kidney is less than optimally visualized due to surrounding bowel gas. The right kidney measures 12.8 x 5.8 x 5.6 cm and the left kidney 11.2 x 5.4 x 5.5 cm. The cortical thickness on the right is 17 mm and on the left is 18 mm. Kidneys demonstrate normal cortical echogenicity. No hydronephrosis or intrarenal stones. No focal lesions. Unremarkable ultrasound of the kidneys.      Vl Lower Extremity Venous Left    Result Date: 3/3/2018    [facility]  Vascular Lower Extremities DVT Study Procedure   Patient Name   Barb Mohan Date of Study           03/03/2018                 L   Date of Birth  1940  Gender                  Female   Age            69 year(s)  Race                       Room Number Physical Exam    General Appearance:  Ill appearing woman resting in bed, somnolent but wakes readily  Mental status: oriented to person only  Head:  normocephalic, atraumatic. Eye: drooping L eye noted  Ear: normal external ear, no discharge, hearing intact  Nose:  no drainage noted  Mouth: MOM  Neck: supple  Lungs: Bilateral equal air entry, clear to ausculation, no wheezing, rales or rhonchi, normal effort  Cardiovascular: normal rate, regular rhythm, no murmur, gallop, rub.   Abdomen: Soft, mildly tender, nondistended, normal bowel sounds  Neurologic: left hemiplegia noted  Skin: No gross lesions, rashes, bruising or bleeding on exposed skin area  Extremities:  LLE edema to thigh  Psych: flat affect    Assessment:        Primary Problem  DKA, type 2, not at goal [facility])    Active Hospital Problems    Diagnosis Date Noted    Acute UTI [N39.0]     Thrombocytopenia ([facility] [D69.6]     Acute deep vein thrombosis (DVT) of lower extremity ([facility] [I82.409]     Suspected deep tissue injury [Z04.9] 03/02/2018    DKA, type 2, not at goal [facility]) [E13.10] 03/01/2018    Sepsis ([facility] [facility] [A41.9] 03/01/2018    UTI (urinary tract infection) [N39.0] 03/01/2018    Leukocytosis [D72.829] 03/01/2018    Increased anion gap metabolic acidosis [I82.9] 03/01/2018    SAMINA (acute kidney injury) ([facility] [facility] [N17.9] 03/01/2018    Hypernatremia [E87.0] 03/01/2018    Hypocalcemia [E83.51] 03/01/2018    Hypokalemia [E87.6] 03/01/2018       Plan:        DKA with hx DM II, hypovolemic shock  -  on admission, anion gap 20, K 3.6, betahydroxy 2.05  - Gap 15  - vbg 7.435/16.3/64.5/10.9  - Insulin drip, bmp Q4H, phos/mag Q4H, vbg Q4H per DKA protocol  - IVF @ 150mL/hr per DKA protocol  - Neph consult for persistent acidosis, on bicarb     UTI sepsis with metabolic encephalopathy, septic shock  - purulent urine, UA + LE/Nitrite  - lactic acid 3.1  - Urine culture showing morganella, group D entercoccus both sens to cipro  - transition to IV cipro    LLE DVT   - CT abd for new onset abd pain yesterday showed thigh swelling, iliofemoral enlargement  - Venous duplex LLE showing common femoral, femoral, popliteal, great/small saphenous veins  - platelet drop yesterday 116 -> 63, Hemeon consult   - HIT ab/Ser Rel Ass pending   - monitor platelets, transfuse <50, consider IVC filter if cannot anticoagulate   - transitioned to low dose heparin, if platelets stable transition to full dose heparin drip, no bolus  - Platelets 63 -> 70 -> 71     SAMINA, improving  - Cr up to 1.29 over baseline ~0.56  - Likely 2/2 dehydration  - urine sodium 53, creatinine 38.5  - Renal US neg    Gemini Diallo MD  3/4/2018  8:00 AM

## 2018-03-04 NOTE — CARE COORDINATION
ONGOING DISCHARGE PLAN:    Spoke with patient's Daughter, regarding discharge plan and she confirms that plan is still to go home w/ her  W/ VNS, [facility], still refusing SNF, if needed. Daughter states pt is improving sl. Pt. Remains on Insulin GTT & Heparin GTT for Lt leg DVT. WBC improving, was 19.5, today 15.7, remains on IV Cipro. Hemoc continues to follow. Will continue to follow for additional discharge needs.     Electronically signed by Марина Yanez RN on 3/4/2018 at 1:25 PM

## 2018-03-05 LAB
ABSOLUTE EOS #: 0.1 K/UL (ref 0–0.4)
ABSOLUTE IMMATURE GRANULOCYTE: ABNORMAL K/UL (ref 0–0.3)
ABSOLUTE LYMPH #: 1.3 K/UL (ref 1–4.8)
ABSOLUTE MONO #: 0.3 K/UL (ref 0.1–1.3)
ALLEN TEST: ABNORMAL
ANION GAP SERPL CALCULATED.3IONS-SCNC: 10 MMOL/L (ref 9–17)
ANION GAP SERPL CALCULATED.3IONS-SCNC: 13 MMOL/L (ref 9–17)
ANION GAP SERPL CALCULATED.3IONS-SCNC: 15 MMOL/L (ref 9–17)
ANTI-NUCLEAR ANTIBODY (ANA): NEGATIVE
BASOPHILS # BLD: 0 % (ref 0–2)
BASOPHILS ABSOLUTE: 0 K/UL (ref 0–0.2)
BUN BLDV-MCNC: 32 MG/DL (ref 8–23)
BUN BLDV-MCNC: 33 MG/DL (ref 8–23)
BUN BLDV-MCNC: 34 MG/DL (ref 8–23)
BUN BLDV-MCNC: 36 MG/DL (ref 8–23)
BUN BLDV-MCNC: 38 MG/DL (ref 8–23)
BUN/CREAT BLD: ABNORMAL (ref 9–20)
CALCIUM SERPL-MCNC: 6.3 MG/DL (ref 8.6–10.4)
CALCIUM SERPL-MCNC: 6.3 MG/DL (ref 8.6–10.4)
CALCIUM SERPL-MCNC: 6.6 MG/DL (ref 8.6–10.4)
CARBOXYHEMOGLOBIN: 1.3 %
CARBOXYHEMOGLOBIN: 1.9 %
CARBOXYHEMOGLOBIN: 1.9 % (ref 0–5)
CARBOXYHEMOGLOBIN: 2 %
CARBOXYHEMOGLOBIN: 3.8 %
CHLORIDE BLD-SCNC: 100 MMOL/L (ref 98–107)
CHLORIDE BLD-SCNC: 100 MMOL/L (ref 98–107)
CHLORIDE BLD-SCNC: 102 MMOL/L (ref 98–107)
CHLORIDE BLD-SCNC: 103 MMOL/L (ref 98–107)
CHLORIDE BLD-SCNC: 99 MMOL/L (ref 98–107)
CO2: 19 MMOL/L (ref 20–31)
CO2: 22 MMOL/L (ref 20–31)
CO2: 23 MMOL/L (ref 20–31)
CREAT SERPL-MCNC: 0.82 MG/DL (ref 0.5–0.9)
CREAT SERPL-MCNC: 0.83 MG/DL (ref 0.5–0.9)
CREAT SERPL-MCNC: 0.88 MG/DL (ref 0.5–0.9)
CREAT SERPL-MCNC: 0.96 MG/DL (ref 0.5–0.9)
CREAT SERPL-MCNC: 0.96 MG/DL (ref 0.5–0.9)
DIFFERENTIAL TYPE: ABNORMAL
EOSINOPHILS RELATIVE PERCENT: 1 % (ref 0–4)
FIO2: ABNORMAL
GFR AFRICAN AMERICAN: >60 ML/MIN
GFR NON-AFRICAN AMERICAN: 57 ML/MIN
GFR NON-AFRICAN AMERICAN: 57 ML/MIN
GFR NON-AFRICAN AMERICAN: >60 ML/MIN
GFR SERPL CREATININE-BSD FRML MDRD: ABNORMAL ML/MIN/{1.73_M2}
GLUCOSE BLD-MCNC: 104 MG/DL (ref 65–105)
GLUCOSE BLD-MCNC: 110 MG/DL (ref 65–105)
GLUCOSE BLD-MCNC: 114 MG/DL (ref 65–105)
GLUCOSE BLD-MCNC: 123 MG/DL (ref 70–99)
GLUCOSE BLD-MCNC: 126 MG/DL (ref 65–105)
GLUCOSE BLD-MCNC: 126 MG/DL (ref 65–105)
GLUCOSE BLD-MCNC: 130 MG/DL (ref 65–105)
GLUCOSE BLD-MCNC: 130 MG/DL (ref 65–105)
GLUCOSE BLD-MCNC: 138 MG/DL (ref 65–105)
GLUCOSE BLD-MCNC: 143 MG/DL (ref 65–105)
GLUCOSE BLD-MCNC: 148 MG/DL (ref 65–105)
GLUCOSE BLD-MCNC: 152 MG/DL (ref 65–105)
GLUCOSE BLD-MCNC: 156 MG/DL (ref 65–105)
GLUCOSE BLD-MCNC: 157 MG/DL (ref 65–105)
GLUCOSE BLD-MCNC: 157 MG/DL (ref 65–105)
GLUCOSE BLD-MCNC: 158 MG/DL (ref 65–105)
GLUCOSE BLD-MCNC: 159 MG/DL (ref 65–105)
GLUCOSE BLD-MCNC: 159 MG/DL (ref 70–99)
GLUCOSE BLD-MCNC: 160 MG/DL (ref 70–99)
GLUCOSE BLD-MCNC: 161 MG/DL (ref 65–105)
GLUCOSE BLD-MCNC: 161 MG/DL (ref 65–105)
GLUCOSE BLD-MCNC: 162 MG/DL (ref 65–105)
GLUCOSE BLD-MCNC: 175 MG/DL (ref 70–99)
GLUCOSE BLD-MCNC: 186 MG/DL (ref 70–99)
GLUCOSE BLD-MCNC: 98 MG/DL (ref 65–105)
HCO3 VENOUS: 19.4 MMOL/L (ref 24–30)
HCO3 VENOUS: 22.8 MMOL/L (ref 24–30)
HCO3 VENOUS: 24 MMOL/L (ref 24–30)
HCO3 VENOUS: 24.5 MMOL/L (ref 24–30)
HCO3 VENOUS: 24.6 MMOL/L (ref 24–30)
HCT VFR BLD CALC: 34.9 % (ref 36–46)
HEMOGLOBIN: 11.6 G/DL (ref 12–16)
IMMATURE GRANULOCYTES: ABNORMAL %
LACTIC ACID: 2.5 MMOL/L (ref 0.5–2.2)
LYMPHOCYTES # BLD: 10 % (ref 24–44)
MAGNESIUM: 1.6 MG/DL (ref 1.6–2.6)
MAGNESIUM: 1.9 MG/DL (ref 1.6–2.6)
MAGNESIUM: 1.9 MG/DL (ref 1.6–2.6)
MAGNESIUM: 2 MG/DL (ref 1.6–2.6)
MAGNESIUM: 2.2 MG/DL (ref 1.6–2.6)
MCH RBC QN AUTO: 28.8 PG (ref 26–34)
MCHC RBC AUTO-ENTMCNC: 33.4 G/DL (ref 31–37)
MCV RBC AUTO: 86.4 FL (ref 80–100)
METHEMOGLOBIN: 0.4 %
METHEMOGLOBIN: 0.5 % (ref 0–1.9)
METHEMOGLOBIN: 0.6 %
METHEMOGLOBIN: 0.7 %
METHEMOGLOBIN: 0.7 %
MODE: ABNORMAL
MONOCYTES # BLD: 3 % (ref 1–7)
NEGATIVE BASE EXCESS, VEN: 0.1 MMOL/L (ref 0–2)
NEGATIVE BASE EXCESS, VEN: 0.9 MMOL/L (ref 0–2)
NEGATIVE BASE EXCESS, VEN: 1.6 MMOL/L (ref 0–2)
NEGATIVE BASE EXCESS, VEN: 4.1 MMOL/L (ref 0–2)
NEGATIVE BASE EXCESS, VEN: ABNORMAL MMOL/L (ref 0–2)
NOTIFICATION TIME: ABNORMAL
NOTIFICATION: ABNORMAL
NRBC AUTOMATED: ABNORMAL PER 100 WBC
O2 DEVICE/FLOW/%: ABNORMAL
O2 SAT, VEN: 71.5 %
O2 SAT, VEN: 79.6 %
O2 SAT, VEN: 88.8 %
O2 SAT, VEN: 95.2 %
O2 SAT, VEN: 96.3 % (ref 60–85)
OXYHEMOGLOBIN: ABNORMAL % (ref 95–98)
PARTIAL THROMBOPLASTIN TIME: 117.6 SEC (ref 23–31)
PARTIAL THROMBOPLASTIN TIME: 42.7 SEC (ref 23–31)
PARTIAL THROMBOPLASTIN TIME: 47.1 SEC (ref 23–31)
PARTIAL THROMBOPLASTIN TIME: 54.4 SEC (ref 23–31)
PATHOLOGIST REVIEW: NORMAL
PATIENT TEMP: 37
PCO2, VEN, TEMP ADJ: ABNORMAL MMHG (ref 39–55)
PCO2, VEN: 26.4 (ref 39–55)
PCO2, VEN: 34.5 (ref 39–55)
PCO2, VEN: 35.1 (ref 39–55)
PCO2, VEN: 37.7 (ref 39–55)
PCO2, VEN: 42.5 (ref 39–55)
PDW BLD-RTO: 16.8 % (ref 11.5–14.9)
PEEP/CPAP: ABNORMAL
PH VENOUS: 7.37 (ref 7.32–7.42)
PH VENOUS: 7.42 (ref 7.32–7.42)
PH VENOUS: 7.43 (ref 7.32–7.42)
PH VENOUS: 7.44 (ref 7.32–7.42)
PH VENOUS: 7.48 (ref 7.32–7.42)
PH, VEN, TEMP ADJ: ABNORMAL (ref 7.32–7.42)
PHOSPHORUS: 2.9 MG/DL (ref 2.6–4.5)
PHOSPHORUS: 3 MG/DL (ref 2.6–4.5)
PHOSPHORUS: 3.1 MG/DL (ref 2.6–4.5)
PLATELET # BLD: 71 K/UL (ref 150–450)
PLATELET ESTIMATE: ABNORMAL
PMV BLD AUTO: 11 FL (ref 6–12)
PO2, VEN, TEMP ADJ: ABNORMAL MMHG (ref 30–50)
PO2, VEN: 119 (ref 30–50)
PO2, VEN: 121 (ref 30–50)
PO2, VEN: 35.6 (ref 30–50)
PO2, VEN: 44.3 (ref 30–50)
PO2, VEN: 56.7 (ref 30–50)
POSITIVE BASE EXCESS, VEN: 0.1 MMOL/L (ref 0–2)
POSITIVE BASE EXCESS, VEN: ABNORMAL MMOL/L (ref 0–2)
POTASSIUM SERPL-SCNC: 4.3 MMOL/L (ref 3.7–5.3)
POTASSIUM SERPL-SCNC: 4.4 MMOL/L (ref 3.7–5.3)
POTASSIUM SERPL-SCNC: 4.8 MMOL/L (ref 3.7–5.3)
PSV: ABNORMAL
PT. POSITION: ABNORMAL
RBC # BLD: 4.04 M/UL (ref 4–5.2)
RBC # BLD: ABNORMAL 10*6/UL
RESPIRATORY RATE: ABNORMAL
SAMPLE SITE: ABNORMAL
SEG NEUTROPHILS: 86 % (ref 36–66)
SEGMENTED NEUTROPHILS ABSOLUTE COUNT: 10.7 K/UL (ref 1.3–9.1)
SET RATE: ABNORMAL
SODIUM BLD-SCNC: 134 MMOL/L (ref 135–144)
SODIUM BLD-SCNC: 135 MMOL/L (ref 135–144)
SODIUM BLD-SCNC: 136 MMOL/L (ref 135–144)
SODIUM BLD-SCNC: 136 MMOL/L (ref 135–144)
SODIUM BLD-SCNC: 137 MMOL/L (ref 135–144)
TEXT FOR RESPIRATORY: ABNORMAL
TOTAL HB: ABNORMAL G/DL (ref 12–16)
TOTAL RATE: ABNORMAL
VT: ABNORMAL
WBC # BLD: 12.4 K/UL (ref 3.5–11)
WBC # BLD: ABNORMAL 10*3/UL

## 2018-03-05 PROCEDURE — 85025 COMPLETE CBC W/AUTO DIFF WBC: CPT

## 2018-03-05 PROCEDURE — 36415 COLL VENOUS BLD VENIPUNCTURE: CPT

## 2018-03-05 PROCEDURE — 99233 SBSQ HOSP IP/OBS HIGH 50: CPT | Performed by: INTERNAL MEDICINE

## 2018-03-05 PROCEDURE — 85730 THROMBOPLASTIN TIME PARTIAL: CPT

## 2018-03-05 PROCEDURE — 6360000002 HC RX W HCPCS: Performed by: INTERNAL MEDICINE

## 2018-03-05 PROCEDURE — 82800 BLOOD PH: CPT

## 2018-03-05 PROCEDURE — 2580000003 HC RX 258: Performed by: EMERGENCY MEDICINE

## 2018-03-05 PROCEDURE — G8996 SWALLOW CURRENT STATUS: HCPCS

## 2018-03-05 PROCEDURE — 2580000003 HC RX 258: Performed by: INTERNAL MEDICINE

## 2018-03-05 PROCEDURE — 92610 EVALUATE SWALLOWING FUNCTION: CPT

## 2018-03-05 PROCEDURE — 99232 SBSQ HOSP IP/OBS MODERATE 35: CPT | Performed by: INTERNAL MEDICINE

## 2018-03-05 PROCEDURE — 95816 EEG AWAKE AND DROWSY: CPT

## 2018-03-05 PROCEDURE — 82947 ASSAY GLUCOSE BLOOD QUANT: CPT

## 2018-03-05 PROCEDURE — 2500000003 HC RX 250 WO HCPCS: Performed by: EMERGENCY MEDICINE

## 2018-03-05 PROCEDURE — 51701 INSERT BLADDER CATHETER: CPT

## 2018-03-05 PROCEDURE — 83605 ASSAY OF LACTIC ACID: CPT

## 2018-03-05 PROCEDURE — 80048 BASIC METABOLIC PNL TOTAL CA: CPT

## 2018-03-05 PROCEDURE — 51798 US URINE CAPACITY MEASURE: CPT

## 2018-03-05 PROCEDURE — 6360000002 HC RX W HCPCS: Performed by: RADIOLOGY

## 2018-03-05 PROCEDURE — 82805 BLOOD GASES W/O2 SATURATION: CPT

## 2018-03-05 PROCEDURE — 2580000003 HC RX 258: Performed by: RADIOLOGY

## 2018-03-05 PROCEDURE — 84100 ASSAY OF PHOSPHORUS: CPT

## 2018-03-05 PROCEDURE — 83735 ASSAY OF MAGNESIUM: CPT

## 2018-03-05 PROCEDURE — 94762 N-INVAS EAR/PLS OXIMTRY CONT: CPT

## 2018-03-05 PROCEDURE — G8997 SWALLOW GOAL STATUS: HCPCS

## 2018-03-05 PROCEDURE — C9113 INJ PANTOPRAZOLE SODIUM, VIA: HCPCS | Performed by: INTERNAL MEDICINE

## 2018-03-05 PROCEDURE — 2500000003 HC RX 250 WO HCPCS: Performed by: INTERNAL MEDICINE

## 2018-03-05 PROCEDURE — 2000000000 HC ICU R&B

## 2018-03-05 RX ADMIN — CALCIUM GLUCONATE 2 G: 98 INJECTION, SOLUTION INTRAVENOUS at 10:16

## 2018-03-05 RX ADMIN — MAGNESIUM SULFATE HEPTAHYDRATE 1 G: 1 INJECTION, SOLUTION INTRAVENOUS at 05:33

## 2018-03-05 RX ADMIN — PANTOPRAZOLE SODIUM 40 MG: 40 INJECTION, POWDER, FOR SOLUTION INTRAVENOUS at 08:44

## 2018-03-05 RX ADMIN — POTASSIUM CHLORIDE 10 MEQ: 10 INJECTION, SOLUTION INTRAVENOUS at 02:31

## 2018-03-05 RX ADMIN — PANTOPRAZOLE SODIUM 40 MG: 40 INJECTION, POWDER, FOR SOLUTION INTRAVENOUS at 23:55

## 2018-03-05 RX ADMIN — HEPARIN SODIUM AND DEXTROSE 8 UNITS/KG/HR: 10000; 5 INJECTION INTRAVENOUS at 07:02

## 2018-03-05 RX ADMIN — POTASSIUM CHLORIDE 10 MEQ: 10 INJECTION, SOLUTION INTRAVENOUS at 00:53

## 2018-03-05 RX ADMIN — SODIUM BICARBONATE: 84 INJECTION, SOLUTION INTRAVENOUS at 12:52

## 2018-03-05 RX ADMIN — Medication 10 ML: at 23:55

## 2018-03-05 RX ADMIN — CIPROFLOXACIN 400 MG: 2 INJECTION, SOLUTION INTRAVENOUS at 11:31

## 2018-03-05 RX ADMIN — NOREPINEPHRINE BITARTRATE 5 MCG/MIN: 1 INJECTION, SOLUTION, CONCENTRATE INTRAVENOUS at 07:01

## 2018-03-05 RX ADMIN — CIPROFLOXACIN 400 MG: 2 INJECTION, SOLUTION INTRAVENOUS at 23:55

## 2018-03-05 RX ADMIN — Medication 10 ML: at 08:44

## 2018-03-05 RX ADMIN — POTASSIUM CHLORIDE 10 MEQ: 10 INJECTION, SOLUTION INTRAVENOUS at 05:13

## 2018-03-05 RX ADMIN — POTASSIUM CHLORIDE 10 MEQ: 10 INJECTION, SOLUTION INTRAVENOUS at 04:03

## 2018-03-05 RX ADMIN — MAGNESIUM SULFATE HEPTAHYDRATE 1 G: 1 INJECTION, SOLUTION INTRAVENOUS at 04:29

## 2018-03-05 ASSESSMENT — PAIN SCALES - GENERAL: PAINLEVEL_OUTOF10: 1

## 2018-03-05 NOTE — PROGRESS NOTES
Leukocytosis    Increased anion gap metabolic acidosis    SAMINA (acute kidney injury) ([address]    Hypernatremia    Hypocalcemia    Hypokalemia    Suspected deep tissue injury    Acute UTI    Thrombocytopenia (HCC)    Acute deep vein thrombosis (DVT) of lower extremity (HCC)         Sarita Palmer M.D.   Neurology  3/5/2018  11:12 AM

## 2018-03-05 NOTE — PROGRESS NOTES
edema/swelling  ALLERGIC/IMMUNOLOGIC:  negative for urticaria , itching  ENDOCRINE:  negative increase in drinking, increase in urination, hot or cold intolerance  MUSCULOSKELETAL:  negative joint pains, muscle aches, swelling of joints  NEUROLOGICAL:  Positive for chronic L hemiplegia  BEHAVIOR/PSYCH:  negative for depression, anxiety    Medications: Allergies: Allergies   Allergen Reactions    Demeclocycline Shortness Of Breath    Tetracyclines & Related Shortness Of Breath       Current Meds:   Scheduled Meds:    insulin glargine  14 Units Subcutaneous Once    insulin lispro  0-6 Units Subcutaneous TID WC    insulin lispro  0-3 Units Subcutaneous Nightly    vitamin D  50,000 Units Oral Weekly    clopidogrel  75 mg Oral Daily    ciprofloxacin  400 mg Intravenous Q12H    sodium chloride (PF)  10 mL Intravenous Q12H    And    pantoprazole  40 mg Intravenous BID     Continuous Infusions:    sodium bicarbonate infusion 100 mL/hr at 03/04/18 2200    phenylephrine (ALEAH-SYNEPHRINE) 50mg/250mL infusion Stopped (03/04/18 0640)    heparin (porcine) 8 Units/kg/hr (03/05/18 0702)    dextrose      insulin (HUMAN R) non-weight based infusion 0.98 Units/hr (03/05/18 0700)    norepinephrine 5 mcg/min (03/05/18 0701)     PRN Meds: glucose, dextrose, glucagon (rDNA), dextrose, dextrose, potassium chloride, magnesium sulfate, sodium phosphate IVPB **OR** sodium phosphate IVPB **OR** sodium phosphate IVPB    Data:     Past Medical History:   has a past medical history of Cerebral edema ([facility] [address] 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 Social History:   reports that she has never smoked.  She has never used smokeless tobacco. She reports that she does not drink alcohol or use drugs. Family History: History reviewed. No pertinent family history. Vitals:  BP (!) 118/104   Pulse 81   Temp 96.8 °F (36 °C) (Axillary)   Resp 16   Ht 5' 9\" (1.753 m)   Wt 207 lb 4.8 oz (94 kg)   SpO2 98%   BMI 30.61 kg/m²    Temp (24hrs), Av.1 °F (36.2 °C), Min:96.1 °F (35.6 °C), Max:97.6 °F (36.4 °C)    Recent Labs      18   0143  18   0230  18   0332  18   0503   POCGLU  156*  161*  152*  158*       I/O (24Hr):     Intake/Output Summary (Last 24 hours) at 18 0730  Last data filed at 18 0646   Gross per 24 hour   Intake             5380 ml   Output             2730 ml   Net             2650 ml       Labs:    Recent Results (from the past 24 hour(s))   POC Glucose Fingerstick    Collection Time: 18  7:35 AM   Result Value Ref Range    POC Glucose 163 (H) 65 - 105 mg/dL   Basic Metabolic Panel w/ Reflex to MG    Collection Time: 18  8:26 AM   Result Value Ref Range    Glucose 186 (H) 70 - 99 mg/dL    BUN 48 (H) 8 - 23 mg/dL    CREATININE 1.25 (H) 0.50 - 0.90 mg/dL    Bun/Cre Ratio NOT REPORTED 9 - 20    Calcium 6.5 (L) 8.6 - 10.4 mg/dL    Sodium 137 135 - 144 mmol/L    Potassium 5.1 3.7 - 5.3 mmol/L    Chloride 108 (H) 98 - 107 mmol/L    CO2 14 (L) 20 - 31 mmol/L    Anion Gap 15 9 - 17 mmol/L    GFR Non-African American 42 (L) >60 mL/min    GFR  51 (L) >60 mL/min    GFR Comment          GFR Staging NOT REPORTED    Magnesium    Collection Time: 18  8:26 AM   Result Value Ref Range    Magnesium 1.9 1.6 - 2.6 mg/dL   Phosphorus    Collection Time: 18  8:26 AM   Result Value Ref Range    Phosphorus 3.2 2.6 - 4.5 mg/dL   BLOOD GAS, VENOUS    Collection Time: 18  8:26 AM   Result Value Ref Range    pH, Eriberto 7.394 7.320 - 7.420    pCO2, Eriberto 25.0 (L) 39.0 - 55.0    pO2, Eriberto 39.0 30.0 - 50.0    HCO3, Venous 15.2 (L) 24.0 - 30.0 mmol/L    Positive Base Excess, Eriberto NOT REPORTED 0.0 - 2.0 mmol/L    Negative Base Collection Time: 03/04/18  2:32 PM   Result Value Ref Range    POC Glucose 214 (H) 65 - 105 mg/dL   POC Glucose Fingerstick    Collection Time: 03/04/18  3:56 PM   Result Value Ref Range    POC Glucose 181 (H) 65 - 105 mg/dL   Basic Metabolic Panel w/ Reflex to MG    Collection Time: 03/04/18  3:58 PM   Result Value Ref Range    Glucose 198 (H) 70 - 99 mg/dL    BUN 44 (H) 8 - 23 mg/dL    CREATININE 1.11 (H) 0.50 - 0.90 mg/dL    Bun/Cre Ratio NOT REPORTED 9 - 20    Calcium 6.4 (L) 8.6 - 10.4 mg/dL    Sodium 136 135 - 144 mmol/L    Potassium 4.3 3.7 - 5.3 mmol/L    Chloride 104 98 - 107 mmol/L    CO2 17 (L) 20 - 31 mmol/L    Anion Gap 15 9 - 17 mmol/L    GFR Non-African American 48 (L) >60 mL/min    GFR  58 (L) >60 mL/min    GFR Comment          GFR Staging NOT REPORTED    Magnesium    Collection Time: 03/04/18  3:58 PM   Result Value Ref Range    Magnesium 1.6 1.6 - 2.6 mg/dL   Phosphorus    Collection Time: 03/04/18  3:58 PM   Result Value Ref Range    Phosphorus 2.9 2.6 - 4.5 mg/dL   BLOOD GAS, VENOUS    Collection Time: 03/04/18  3:58 PM   Result Value Ref Range    pH, Eriberto 7.452 (H) 7.320 - 7.420    pCO2, Eriberto 23.2 (L) 39.0 - 55.0    pO2, Eriberto 76.9 (H) 30.0 - 50.0    HCO3, Venous 16.2 (L) 24.0 - 30.0 mmol/L    Positive Base Excess, Eribetro NOT REPORTED 0.0 - 2.0 mmol/L    Negative Base Excess, Eriberto 7.7 (H) 0.0 - 2.0 mmol/L    O2 Sat, Eriberto 93.2 %    Total Hb NOT REPORTED 12.0 - 16.0 g/dl    Oxyhemoglobin NOT REPORTED 95.0 - 98.0 %    Carboxyhemoglobin 3.5 %    Methemoglobin 0.6 %    Pt Temp 37.0     pH, Eriberto, Temp Adj Pending 7.320 - 7.420    pCO2, Eriberto, Temp Adj Pending 39.0 - 55.0 mmHg    pO2, Eriberto, Temp Adj Pending 30.0 - 50.0 mmHg    O2 Device/Flow/% NOT REPORTED     Respiratory Rate NOT REPORTED     Harvinder Test NOT REPORTED     Sample Site NOT REPORTED     Pt.  Position NOT REPORTED     Mode NOT REPORTED     Set Rate NOT REPORTED     Total Rate NOT REPORTED     VT NOT REPORTED     FIO2 NOT REPORTED pCO2, Eriberto 30.6 (L) 39.0 - 55.0    pO2, Eriberto 40.2 30.0 - 50.0    HCO3, Venous 20.5 (L) 24.0 - 30.0 mmol/L    Positive Base Excess, Eriberto NOT REPORTED 0.0 - 2.0 mmol/L    Negative Base Excess, Eriberto 3.8 (H) 0.0 - 2.0 mmol/L    O2 Sat, Eriberto 78.0 %    Total Hb NOT REPORTED 12.0 - 16.0 g/dl    Oxyhemoglobin NOT REPORTED 95.0 - 98.0 %    Carboxyhemoglobin 1.8 %    Methemoglobin 0.5 %    Pt Temp 37.0     pH, Eriberto, Temp Adj NOT REPORTED 7.320 - 7.420    pCO2, Eriberto, Temp Adj NOT REPORTED 39.0 - 55.0 mmHg    pO2, Eriberto, Temp Adj NOT REPORTED 30.0 - 50.0 mmHg    O2 Device/Flow/% Cannula     Respiratory Rate NOT REPORTED     Harvinder Test NOT REPORTED     Sample Site NOT REPORTED     Pt.  Position NOT REPORTED     Mode NOT REPORTED     Set Rate NOT REPORTED     Total Rate NOT REPORTED     VT NOT REPORTED     FIO2 NOT REPORTED     Peep/Cpap NOT REPORTED     PSV NOT REPORTED     Text for Respiratory NOT REPORTED     NOTIFICATION NOT REPORTED     NOTIFICATION TIME NOT REPORTED    POC Glucose Fingerstick    Collection Time: 03/04/18  9:06 PM   Result Value Ref Range    POC Glucose 128 (H) 65 - 105 mg/dL   Basic Metabolic Panel w/ Reflex to MG    Collection Time: 03/04/18  9:47 PM   Result Value Ref Range    Glucose 147 (H) 70 - 99 mg/dL    BUN 40 (H) 8 - 23 mg/dL    CREATININE 1.01 (H) 0.50 - 0.90 mg/dL    Bun/Cre Ratio NOT REPORTED 9 - 20    Calcium 6.5 (L) 8.6 - 10.4 mg/dL    Sodium 138 135 - 144 mmol/L    Potassium 4.1 3.7 - 5.3 mmol/L    Chloride 104 98 - 107 mmol/L    CO2 19 (L) 20 - 31 mmol/L    Anion Gap 15 9 - 17 mmol/L    GFR Non-African American 53 (L) >60 mL/min    GFR African American >60 >60 mL/min    GFR Comment          GFR Staging NOT REPORTED    Magnesium    Collection Time: 03/04/18  9:47 PM   Result Value Ref Range    Magnesium 1.7 1.6 - 2.6 mg/dL   Phosphorus    Collection Time: 03/04/18  9:47 PM   Result Value Ref Range    Phosphorus 2.7 2.6 - 4.5 mg/dL   POC Glucose Fingerstick    Collection Time: 03/04/18  9:56 PM 1941  Gender                  Female   Age            69 year(s)  Race                       Room Number    2002   Corporate ID # [id number]   Patient Acct # [de-identified]   MR #           [id number]      Joon Guy, [facility]   Accession #    [id number]   Interpreting Physician  Josr Vines   Referring                  Referring Physician     Charlene Scott  Nurse  Practitioner  Procedure Type of Study:   Veins: Lower Extremities DVT Study, Venous Scan Lower Left. Indications for Study:Swelling. Patient Status: In Patient. Technical Quality:Limited visualization. Limitation reason:body habitus and swelling.   - Critical Result:INDU Higgins, INDU Brandon. Conclusions   Summary   Acute deep vein thrombosis of the left leg involving the common femoral,  superficial femoral, popliteal veins. Superficial thrombophlebitis of the lower extremity involving the left  greater saphenous vein, lesser saphenous vein, acute. Right common femoral vein has no DVT. Signature   ----------------------------------------------------------------  Electronically signed by Joslyn Schlatter Farooq(Interpreting  physician) on 03/03/2018 02:46 PM  ----------------------------------------------------------------  Findings:   Right Impression:              Left Impression:  The common femoral vein        The common femoral, femoral and popliteal  demonstrates normal            veins are dilated and non-compressible with  compressibility and            hypoechoic echoes. augmentation. Limited visualization of the calf veins. Great saphenous vein is dilated and                                 non-compressible with hypoechoic echoes                                 from proximal knee to saphenofemoral                                 junction.                                   The small

## 2018-03-05 NOTE — FLOWSHEET NOTE
03/04/18 2220   Provider Notification   Reason for Communication Evaluate; Review case   Provider Name Dr Xu Cristobal   Provider Notification Physician   Method of Communication Call   Response No new orders       Physician updated that primary has requested that the bicarb gtt be stopped and start d5w as fluids. Physician wants the bicarb to continue,as this gtt has dextrose in it as well. Physician requested that writer touch base with primary regarding discontinuing the insulin gtt. Will follow up.

## 2018-03-05 NOTE — FLOWSHEET NOTE
03/04/18 2030   Provider Notification   Reason for Communication Evaluate; Review case   Provider Name Dr Layne Aburto   Provider Notification Physician   Method of Communication Call   Response No new orders       Physician updated that patient insulin gtt had been 0.7 for multiple hours. Physician requested that the insulin gtt continue but that the sodium bicarb be d/c and D%W be started. Will follow up with nephrology.

## 2018-03-05 NOTE — PROGRESS NOTES
Surekha De La Rosa MD   Stopped at 03/04/18 2007    vitamin D (CHOLECALCIFEROL) tablet 50,000 Units  50,000 Units Oral Weekly MD Mesfin        sodium bicarbonate 150 mEq in dextrose 5 % 1,000 mL infusion   Intravenous Continuous Yandel Solano  mL/hr at 03/04/18 1656      clopidogrel (PLAVIX) tablet 75 mg  75 mg Oral Daily Charlene Scott MD        ciprofloxacin (CIPRO) IVPB 400 mg  400 mg Intravenous Q12H Charlene Scott MD   Stopped at 03/04/18 1420    phenylephrine (ALEAH-SYNEPHRINE) 50 mg in dextrose 5 % 250 mL infusion  100 mcg/min Intravenous Continuous MD Mesfin   Stopped at 03/04/18 0640    heparin 25,000 units in dextrose 5% 250 mL infusion  12 Units/kg/hr Intravenous Continuous Maureen Hsu MD 4.6 mL/hr at 03/04/18 2111 6 Units/kg/hr at 03/04/18 2111    sodium chloride (PF) 0.9 % injection 10 mL  10 mL Intravenous Q12H Tho Ray MD   10 mL at 03/04/18 2114    And    pantoprazole (PROTONIX) injection 40 mg  40 mg Intravenous BID Tho Ray MD   40 mg at 03/04/18 2114    glucose (GLUTOSE) 40 % oral gel 15 g  15 g Oral PRN Phani Louis MD        dextrose 50 % solution 12.5 g  12.5 g Intravenous PRN Phani Louis MD        glucagon (rDNA) injection 1 mg  1 mg Intramuscular PRN Phani Louis MD        dextrose 5 % solution  100 mL/hr Intravenous PRN Phani Louis MD        dextrose 50 % solution 12.5 g  12.5 g Intravenous PRN Charlene Scott MD        potassium chloride 10 mEq/100 mL IVPB (Peripheral Line)  10 mEq Intravenous PRN Charlene Scott  mL/hr at 03/04/18 0654 10 mEq at 03/04/18 0654    magnesium sulfate 1 g in dextrose 5% 100 mL IVPB  1 g Intravenous PRN Charlene Scott MD   Stopped at 03/03/18 1447    sodium phosphate 10 mmol in dextrose 5 % 250 mL IVPB  10 mmol Intravenous PRN Charlene Scott MD   Stopped at 03/04/18 0269    Or    sodium phosphate 15 mmol in dextrose 5 % 250 mL IVPB  15 mmol Intravenous

## 2018-03-05 NOTE — PROGRESS NOTES
NEPHROLOGY PROGRESS NOTE    Patient :  Armida Mohan; 69 y.o. MRN# [id number]  Location:  2002/2002-01  Attending:  Adilene Mittal MD  Admit Date:  3/1/2018   Hospital Day: 4      Reason for Consult:  Acute renal failure, metabolic acidosis      Chief Complaint:  Altered mental status      History of Present Illness: This is a 69 y.o. female with past medical history of type 2 diabetes, history of CVA with left hemiplegia right MCA infarct in January 2018, history of CIDP, presented with complains of confusion on decrease in responsiveness altered mental status. Patient was admitted to ICU with a diagnosis of DKA, sepsis. Patient bicarb was 8 anion gap was 20 on admission patient was started on DKA protocol insulin drip anion gap has improved but serum bicarb has not improved. Patient remains hypotensive and septic shock UA is consistent with UTI  According to the family patient was fine 2 days ago before coming to the hospital and she developed sudden onset altered mental status and that prompted them to bring the patient to the hospital.  Initial labs also revealed acute renal failure at kidney and peaked to 2.7 MG/DL, which improved to 1.5 mg/dL today but urine output has been dropping and patient was as hypotensive and on high-dose Levophed. Patient also had a Doppler of lower extremity which showed acute of left lower extremity. Patient's platelets are also low. Workup for thrombocytopenia has been in progress  Medication review shows use of ARB. Interval history/subjective:    Blood pressure is better on Levophed down to 1.5 mcg/min  Kidney function improved serum creatinine improved. Metabolic acidosis improved  Patient is more awake alert responsive  On sodium bicarb drip  Good urine output  Swallow test recommendation for puree diet.     Current Medications:      insulin glargine (LANTUS) injection vial 14 Units Once   insulin lispro (HUMALOG) injection vial 0-6 Units TID WC   insulin lispro APPEARANCE: More awake and alert responsive  HEAD: normocephalic  EYES:  Not pale, anicteric   NOSE:  No nasal discharge. THROAT:  Oral cavity and pharynx normal.  Dry  CARDIAC: Normal S1 and S2. No S3, S4 or murmurs. Rhythm is regular. LUNGS: Clear to auscultation and percussion without rales, rhonchi, wheezing or diminished breath sounds. NECK: Neck supple, non-tender without lymphadenopathy, masses or thyromegaly. JVD-neg    MUSKULOSKELETAL: Adequately aligned spine. No joint erythema or tenderness. EXTREMITIES: 1-2+ edema. Peripheral pulses intact. NEURO: Moving upper extremities      Labs:   CBC:  Recent Labs      03/04/18   0434  03/04/18   1558  03/05/18   0701   WBC  15.7*  13.5*  12.4*   RBC  4.26  4.21  4.04   HGB  12.3  11.8*  11.6*   HCT  39.2  36.6  34.9*   MCV  92.0  86.9  86.4   MCH  28.9  28.1  28.8   MCHC  31.4  32.3  33.4   RDW  16.9*  16.7*  16.8*   PLT  71*  72*  71*   MPV  10.9  10.9  11.0      BMP:   Recent Labs      03/05/18   0224  03/05/18   0701  03/05/18   1144   NA  134*  137  136   K  4.3  4.8  4.4   CL  100  102  103   CO2  19*  22  23   BUN  38*  36*  34*   CREATININE  0.96*  0.96*  0.88   GLUCOSE  175*  186*  160*   CALCIUM  6.3*  6.3*  6.6*      Phosphorus:    Recent Labs      03/05/18   0224  03/05/18   0701  03/05/18   1144   PHOS  3.1  3.1  3.1     Magnesium:   Recent Labs      03/05/18   0224  03/05/18   0701  03/05/18   1144   MG  1.6  2.2  1.9     Albumin:   Recent Labs      03/04/18   0826   LABALBU  1.7*       IRON:    Recent Labs      03/03/18   1614   IRON  12*     Iron Saturation:  Invalid input(s): PERCENTFE  TIBC:    Recent Labs      03/03/18   1614   TIBC  98*     FERRITIN:    Recent Labs      03/03/18   1614   FERRITIN  620*     SPEP: No results for input(s): SPEP in the last 72 hours.    Recent Labs      03/03/18   0955  03/03/18   1159   PROT  3.8*   --    ALBCAL  Pending   --    ALBPCT  Pending   --    LABALPH  Pending  Pending   --    A1PCT  Pending   --

## 2018-03-05 NOTE — PROGRESS NOTES
regarding safe dietary consistencies, effective compensatory strategies, and safe eating environment. Impression  Dysphagia Diagnosis: Moderate to severe oral stage dysphagia  Dysphagia Outcome Severity Scale: Level 3: Moderate dysphagia- Total assisstance, supervision or strategies. Two or more diet consistencies restricted     Treatment Plan  Requires SLP Intervention: Yes  Duration/Frequency of Treatment: 3-5x per week          Recommended Diet and Intervention  Diet Solids Recommendation: Dysphagia I Pureed  Liquid Consistency Recommendation: Thin (no straws)  Recommended Form of Meds: Crushed in puree as able       Compensatory Swallowing Strategies  Compensatory Swallowing Strategies: Small bites/sips;Upright as possible for all oral intake; Alternate solids and liquids; Lingual sweep; Total feed; No straws;Eat/Feed slowly    Treatment/Goals  Short-term Goals  Goal 1: Pt will tolerate least restrictive diet with no s/s of aspiration 100% of the time  Goal 2: Pt will complete oral motor exercises 10x each     General  Chart Reviewed: Yes  Comments: Previous CVA noted January 2018. Daughter present at bedside reports pt with decreased appetite. Pt currently only taking small sips of water via cup  Behavior/Cognition: Cooperative  Follows Directions: Simple  Dentition: Some missing teeth;Dentures top  Patient Positioning: Upright in bed  Baseline Vocal Quality: Weak  Volitional Swallow: Absent  Prior Dysphagia History: Pt was upgraded at bedside to level II diet and thin liquids at [facility] on 1/15 per chart  Consistencies Administered:  Thin - cup;Puree         Vision/Hearing  Vision  Vision: Within Functional Limits  Hearing  Hearing: Within functional limits    Oral Motor Deficits  Oral/Motor  Oral Motor: Exceptions to [facility]  Labial ROM: Reduced left  Labial Symmetry: Abnormal symmetry left  Labial Strength: Reduced  Labial Coordination: Reduced  Lingual ROM: Reduced left  Lingual Symmetry: Abnormal symmetry
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
## 2018-03-05 NOTE — FLOWSHEET NOTE
03/04/18 0790   Provider Notification   Reason for Communication Evaluate; Review case   Provider Name Dr Luciana Watkins   Provider Notification Physician   Method of Communication Call   Response No new orders     Physician contacted at the request of nephrology regarding discontinuing  the insulin gtt. Writer also touched base regarding the bicarb gtt being maintained per  Nephrology. Primary has requested that the insulin gtt be continued. No new orders at this time. Will continue to monitor.

## 2018-03-05 NOTE — PROGRESS NOTES
hours 1/17/18  Yes Jimy Ruiz MD   amLODIPine (NORVASC) 10 MG tablet Take 1 tablet by mouth daily 1/18/18  Yes Jimy Ruiz MD   tamsulosin [facility]) 0.4 MG capsule Take 1 capsule by mouth daily 1/18/18  Yes Jimy Ruiz MD   clopidogrel (PLAVIX) 75 MG tablet Take 1 tablet by mouth daily 1/18/18  Yes Jimy Ruiz MD   metFORMIN (GLUCOPHAGE) 1000 MG tablet Take 1,000 mg by mouth 2 times daily (with meals)   Yes Historical Provider, MD   losartan (COZAAR) 100 MG tablet Take 100 mg by mouth daily   Yes Historical Provider, MD   simvastatin (ZOCOR) 20 MG tablet Take 1 tablet by mouth nightly 1/17/18   Jimy Ruiz MD   metFORMIN (GLUCOPHAGE) 1000 MG tablet Take 1,000 mg by mouth 2 times daily (with meals)    Historical Provider, MD   losartan (COZAAR) 100 MG tablet Take 100 mg by mouth daily    Historical Provider, MD   Omega-3 Fatty Acids (FISH OIL) 1000 MG CAPS Take 3,000 mg by mouth 3 times daily    Historical Provider, MD   glimepiride (AMARYL) 4 MG tablet Take 4 mg by mouth 2 times daily    Historical Provider, MD   amLODIPine (NORVASC) 5 MG tablet Take 5 mg by mouth daily    Historical Provider, MD   fenofibrate (TRICOR) 48 MG tablet Take 48 mg by mouth daily    Historical Provider, MD   aspirin 81 MG tablet Take 81 mg by mouth daily    Historical Provider, MD   Omega-3 Fatty Acids (FISH OIL) 1000 MG CAPS Take 3,000 mg by mouth daily    Historical Provider, MD   atenolol (TENORMIN) 50 MG tablet Take 50 mg by mouth daily    Historical Provider, MD     Current Facility-Administered Medications   Medication Dose Route Frequency Provider Last Rate Last Dose    insulin glargine (LANTUS) injection vial 14 Units  14 Units Subcutaneous Once Ileana Zamarripa MD        insulin lispro (HUMALOG) injection vial 0-6 Units  0-6 Units Subcutaneous TID  Ileana Zamarripa MD        insulin lispro (HUMALOG) injection vial 0-3 Units  0-3 Units Subcutaneous Nightly Ileana Zamarripa MD   Stopped at 03/04/18 2007    vitamin D (CHOLECALCIFEROL) tablet 50,000 Units  50,000 Units [facility]        sodium bicarbonate 150 mEq in dextrose 5 % 1,000 mL infusion   Intravenous Continuous [facility],  mL/hr at 03/05/18 1252      clopidogrel (PLAVIX) tablet 75 mg  75 mg Oral Daily Juni Manrique MD        ciprofloxacin (CIPRO) IVPB 400 mg  400 mg Intravenous Q12H Juni Manrique MD   Stopped at 03/05/18 1231    heparin 25,000 units in dextrose 5% 250 mL infusion  12 Units/kg/hr Intravenous Continuous Deepak Preston MD 6.1 mL/hr at 03/05/18 0702 8 Units/kg/hr at 03/05/18 0702    sodium chloride (PF) 0.9 % injection 10 mL  10 mL Intravenous Q12H J Carlos Ward MD   10 mL at 03/05/18 0844    And    pantoprazole (PROTONIX) injection 40 mg  40 mg Intravenous BID J Carlos Ward MD   40 mg at 03/05/18 0844    glucose (GLUTOSE) 40 % oral gel 15 g  15 g Oral PRN Aston Chi MD        dextrose 50 % solution 12.5 g  12.5 g Intravenous PRN Aston Chi MD        glucagon (rDNA) injection 1 mg  1 mg Intramuscular PRHUGO Chi MD        dextrose 5 % solution  100 mL/hr Intravenous PRHUGO Chi MD        dextrose 50 % solution 12.5 g  12.5 g Intravenous PRN Juni Manrique MD        potassium chloride 10 mEq/100 mL IVPB (Peripheral Line)  10 mEq Intravenous PRN Juni Manrique  mL/hr at 03/05/18 0513 10 mEq at 03/05/18 0513    magnesium sulfate 1 g in dextrose 5% 100 mL IVPB  1 g Intravenous PRN Juni Manrique MD   Stopped at 03/05/18 9550    sodium phosphate 10 mmol in dextrose 5 % 250 mL IVPB  10 mmol Intravenous PRN Juni Manrique MD   Stopped at 03/05/18 0420    Or    sodium phosphate 15 mmol in dextrose 5 % 250 mL IVPB  15 mmol Intravenous PRN Juni Manrique MD        Or    sodium phosphate 20 mmol in dextrose 5 % 500 mL IVPB  20 mmol Intravenous PRN Juni Manrique MD        insulin regular (HUMULIN R;NOVOLIN R) 100 Units in sodium chloride 0.9 % 100 mL infusion  0.1 Units/kg/hr Intravenous Continuous Rona Price MD 0.9 mL/hr at 18 1312 0.88 Units/hr at 18 1312    norepinephrine (LEVOPHED) 16 mg in dextrose 5 % 250 mL infusion  2 mcg/min Intravenous Continuous Toro Gomez MD 1.4 mL/hr at 18 0913 1.5 mcg/min at 18 9470       Allergies:  Demeclocycline and Tetracyclines & related    Social History:   reports that she has never smoked. She has never used smokeless tobacco. She reports that she does not drink alcohol or use drugs. Family History: family history is not on file. Family history was reviewed and no pertinent family history noted.       REVIEW OF SYSTEMS:    She feels better   Still tired  No fever chills    PHYSICAL EXAM:      /81   Pulse 72   Temp 98.2 °F (36.8 °C) (Oral)   Resp 14   Ht 5' 9\" (1.753 m)   Wt 207 lb 4.8 oz (94 kg)   SpO2 97%   BMI 30.61 kg/m²    Temp (24hrs), Av.4 °F (36.3 °C), Min:96.1 °F (35.6 °C), Max:98.6 °F (37 °C)  General appearance - ill appearing, no in pain or distress   Mental status - more awake today  Eyes - pupils equal and reactive, extraocular eye movements intact   Mouth - mucous membranes moist, pharynx normal without lesions   Neck - supple, no significant adenopathy   Lymphatics - no palpable lymphadenopathy, no hepatosplenomegaly   Chest - clear to auscultation, no wheezes, rales or rhonchi, symmetric air entry   Heart - normal rate, regular rhythm, normal S1, S2, no murmurs  Abdomen - soft, nontender, nondistended, no masses or organomegaly   Neurological - non focal  Musculoskeletal - no joint tenderness, deformity or swelling   Extremities - peripheral pulses normal, ++ pedal edema, no clubbing or cyanosis   Skin - normal coloration and turgor, no rashes, no suspicious skin lesions noted ,    DATA:    Labs:   CBC:   Recent Labs      18   1558  18   0701   WBC  13.5*  12.4*   HGB  11.8*  11.6*   HCT  36.6  34.9*   PLT  72*  71* BMP:   Recent Labs      03/05/18   0701  03/05/18   1144   NA  137  136   K  4.8  4.4   CO2  22  23   BUN  36*  34*   CREATININE  0.96*  0.88   LABGLOM  57*  >60   GLUCOSE  186*  160*     PT/INR:   Recent Labs      03/03/18   1614   PROTIME  11.4   INR  1.1       IMAGING DATA:    US doppler LE  Summary        Acute deep vein thrombosis of the left leg involving the common femoral,    superficial femoral, popliteal veins.    Superficial thrombophlebitis of the lower extremity involving the left    greater saphenous vein, lesser saphenous vein, acute.    Right common femoral vein has no DVT.         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  There is surrounding low density   extending deep into the lentiform nucleus region and right lateral thalamic   region.  It is unclear whether this is gliosis from the prior ischemic event   or a subtle superimposed area of new ischemia.  MRI may be more helpful       High density in the right MCA within the sylvian fissure.  This is age   indeterminate given the recent infarct.  This may represent sequela of prior   thrombus, however new thrombus not excluded.  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

## 2018-03-06 LAB
-: ABNORMAL
ABSOLUTE EOS #: 0.1 K/UL (ref 0–0.4)
ABSOLUTE IMMATURE GRANULOCYTE: ABNORMAL K/UL (ref 0–0.3)
ABSOLUTE LYMPH #: 1.3 K/UL (ref 1–4.8)
ABSOLUTE MONO #: 0.3 K/UL (ref 0.1–1.3)
ALBUMIN (CALCULATED): 1.8 G/DL (ref 3.2–5.2)
ALBUMIN PERCENT: 48 % (ref 45–65)
ALLEN TEST: ABNORMAL
ALLEN TEST: ABNORMAL
ALPHA 1 PERCENT: 7 % (ref 3–6)
ALPHA 2 PERCENT: 22 % (ref 6–13)
ALPHA-1-GLOBULIN: 0.3 G/DL (ref 0.1–0.4)
ALPHA-2-GLOBULIN: 0.8 G/DL (ref 0.5–0.9)
AMORPHOUS: ABNORMAL
ANCA MYELOPEROXIDASE: 10 AU/ML
ANCA PROTEINASE 3: 4 AU/ML
ANION GAP SERPL CALCULATED.3IONS-SCNC: 10 MMOL/L (ref 9–17)
ANION GAP SERPL CALCULATED.3IONS-SCNC: 11 MMOL/L (ref 9–17)
ANION GAP SERPL CALCULATED.3IONS-SCNC: 11 MMOL/L (ref 9–17)
ANION GAP SERPL CALCULATED.3IONS-SCNC: 12 MMOL/L (ref 9–17)
BACTERIA: ABNORMAL
BASOPHILS # BLD: 0 % (ref 0–2)
BASOPHILS ABSOLUTE: 0 K/UL (ref 0–0.2)
BETA GLOBULIN: 0.5 G/DL (ref 0.5–1.1)
BETA PERCENT: 12 % (ref 11–19)
BILIRUBIN URINE: NEGATIVE
BUN BLDV-MCNC: 27 MG/DL (ref 8–23)
BUN BLDV-MCNC: 27 MG/DL (ref 8–23)
BUN BLDV-MCNC: 29 MG/DL (ref 8–23)
BUN BLDV-MCNC: 30 MG/DL (ref 8–23)
BUN/CREAT BLD: ABNORMAL (ref 9–20)
CALCIUM IONIZED: 0.98 MMOL/L (ref 1.13–1.33)
CALCIUM SERPL-MCNC: 6.3 MG/DL (ref 8.6–10.4)
CALCIUM SERPL-MCNC: 6.4 MG/DL (ref 8.6–10.4)
CALCIUM SERPL-MCNC: 6.5 MG/DL (ref 8.6–10.4)
CALCIUM SERPL-MCNC: 6.5 MG/DL (ref 8.6–10.4)
CARBOXYHEMOGLOBIN: 2.8 %
CARBOXYHEMOGLOBIN: 2.8 % (ref 0–5)
CASTS UA: ABNORMAL /LPF
CHLORIDE BLD-SCNC: 102 MMOL/L (ref 98–107)
CHLORIDE BLD-SCNC: 99 MMOL/L (ref 98–107)
CO2: 22 MMOL/L (ref 20–31)
CO2: 23 MMOL/L (ref 20–31)
COLOR: YELLOW
COMMENT UA: ABNORMAL
CREAT SERPL-MCNC: 0.73 MG/DL (ref 0.5–0.9)
CREAT SERPL-MCNC: 0.76 MG/DL (ref 0.5–0.9)
CREAT SERPL-MCNC: 0.77 MG/DL (ref 0.5–0.9)
CREAT SERPL-MCNC: 0.79 MG/DL (ref 0.5–0.9)
CRYSTALS, UA: ABNORMAL /HPF
DIFFERENTIAL TYPE: ABNORMAL
EOSINOPHILS RELATIVE PERCENT: 1 % (ref 0–4)
EPITHELIAL CELLS UA: ABNORMAL /HPF
FIO2: ABNORMAL
FIO2: ABNORMAL
GAMMA GLOBULIN %: 11 % (ref 9–20)
GAMMA GLOBULIN: 0.4 G/DL (ref 0.5–1.5)
GFR AFRICAN AMERICAN: >60 ML/MIN
GFR NON-AFRICAN AMERICAN: >60 ML/MIN
GFR SERPL CREATININE-BSD FRML MDRD: ABNORMAL ML/MIN/{1.73_M2}
GLUCOSE BLD-MCNC: 103 MG/DL (ref 70–99)
GLUCOSE BLD-MCNC: 105 MG/DL (ref 65–105)
GLUCOSE BLD-MCNC: 110 MG/DL (ref 65–105)
GLUCOSE BLD-MCNC: 112 MG/DL (ref 65–105)
GLUCOSE BLD-MCNC: 120 MG/DL (ref 65–105)
GLUCOSE BLD-MCNC: 122 MG/DL (ref 65–105)
GLUCOSE BLD-MCNC: 126 MG/DL (ref 70–99)
GLUCOSE BLD-MCNC: 129 MG/DL (ref 65–105)
GLUCOSE BLD-MCNC: 136 MG/DL (ref 65–105)
GLUCOSE BLD-MCNC: 139 MG/DL (ref 70–99)
GLUCOSE BLD-MCNC: 162 MG/DL (ref 70–99)
GLUCOSE BLD-MCNC: 169 MG/DL (ref 65–105)
GLUCOSE BLD-MCNC: 196 MG/DL (ref 65–105)
GLUCOSE BLD-MCNC: 79 MG/DL (ref 65–105)
GLUCOSE BLD-MCNC: 93 MG/DL (ref 65–105)
GLUCOSE BLD-MCNC: 95 MG/DL (ref 65–105)
GLUCOSE URINE: ABNORMAL
HCO3 VENOUS: 16.1 MMOL/L (ref 24–30)
HCO3 VENOUS: 22.7 MMOL/L (ref 24–30)
HCT VFR BLD CALC: 32.7 % (ref 36–46)
HEMOGLOBIN: 11 G/DL (ref 12–16)
IMMATURE GRANULOCYTES: ABNORMAL %
KETONES, URINE: NEGATIVE
LACTIC ACID: 1.7 MMOL/L (ref 0.5–2.2)
LACTIC ACID: 2.3 MMOL/L (ref 0.5–2.2)
LEUKOCYTE ESTERASE, URINE: NEGATIVE
LYMPHOCYTES # BLD: 18 % (ref 24–44)
MAGNESIUM: 1.7 MG/DL (ref 1.6–2.6)
MAGNESIUM: 1.7 MG/DL (ref 1.6–2.6)
MAGNESIUM: 1.8 MG/DL (ref 1.6–2.6)
MAGNESIUM: 1.9 MG/DL (ref 1.6–2.6)
MCH RBC QN AUTO: 29.2 PG (ref 26–34)
MCHC RBC AUTO-ENTMCNC: 33.6 G/DL (ref 31–37)
MCV RBC AUTO: 86.9 FL (ref 80–100)
METHEMOGLOBIN: 0.4 %
METHEMOGLOBIN: 0.5 % (ref 0–1.9)
MODE: ABNORMAL
MODE: ABNORMAL
MONOCYTES # BLD: 4 % (ref 1–7)
MUCUS: ABNORMAL
MYOGLOBIN: 1909 NG/ML (ref 25–58)
NEGATIVE BASE EXCESS, VEN: 1.7 MMOL/L (ref 0–2)
NEGATIVE BASE EXCESS, VEN: 8.6 MMOL/L (ref 0–2)
NITRITE, URINE: NEGATIVE
NOTIFICATION TIME: ABNORMAL
NOTIFICATION TIME: ABNORMAL
NOTIFICATION: ABNORMAL
NOTIFICATION: ABNORMAL
NRBC AUTOMATED: ABNORMAL PER 100 WBC
O2 DEVICE/FLOW/%: ABNORMAL
O2 DEVICE/FLOW/%: ABNORMAL
O2 SAT, VEN: 88.6 %
O2 SAT, VEN: 92.6 % (ref 60–85)
OTHER OBSERVATIONS UA: ABNORMAL
OXYHEMOGLOBIN: ABNORMAL % (ref 95–98)
OXYHEMOGLOBIN: ABNORMAL % (ref 95–98)
P E INTERPRETATION, U: NORMAL
PARTIAL THROMBOPLASTIN TIME: 40.1 SEC (ref 23–31)
PARTIAL THROMBOPLASTIN TIME: 42.9 SEC (ref 23–31)
PARTIAL THROMBOPLASTIN TIME: 70.3 SEC (ref 23–31)
PATHOLOGIST: ABNORMAL
PATHOLOGIST: NORMAL
PATIENT TEMP: 37
PATIENT TEMP: 37
PCO2, VEN, TEMP ADJ: ABNORMAL MMHG (ref 39–55)
PCO2, VEN, TEMP ADJ: ABNORMAL MMHG (ref 39–55)
PCO2, VEN: 25.7 (ref 39–55)
PCO2, VEN: 34.5 (ref 39–55)
PDW BLD-RTO: 16.3 % (ref 11.5–14.9)
PEEP/CPAP: ABNORMAL
PEEP/CPAP: ABNORMAL
PH UA: 5 (ref 5–8)
PH VENOUS: 7.41 (ref 7.32–7.42)
PH VENOUS: 7.43 (ref 7.32–7.42)
PH, VEN, TEMP ADJ: ABNORMAL (ref 7.32–7.42)
PH, VEN, TEMP ADJ: ABNORMAL (ref 7.32–7.42)
PHOSPHORUS: 3 MG/DL (ref 2.6–4.5)
PLATELET # BLD: 70 K/UL (ref 150–450)
PLATELET ESTIMATE: ABNORMAL
PMV BLD AUTO: 10.3 FL (ref 6–12)
PO2, VEN, TEMP ADJ: ABNORMAL MMHG (ref 30–50)
PO2, VEN, TEMP ADJ: ABNORMAL MMHG (ref 30–50)
PO2, VEN: 56.3 (ref 30–50)
PO2, VEN: 69.5 (ref 30–50)
POSITIVE BASE EXCESS, VEN: ABNORMAL MMOL/L (ref 0–2)
POSITIVE BASE EXCESS, VEN: ABNORMAL MMOL/L (ref 0–2)
POTASSIUM SERPL-SCNC: 4 MMOL/L (ref 3.7–5.3)
POTASSIUM SERPL-SCNC: 4.3 MMOL/L (ref 3.7–5.3)
POTASSIUM SERPL-SCNC: 4.5 MMOL/L (ref 3.7–5.3)
POTASSIUM SERPL-SCNC: 4.6 MMOL/L (ref 3.7–5.3)
PROTEIN ELECTROPHORESIS, SERUM: ABNORMAL
PROTEIN UA: NEGATIVE
PSV: ABNORMAL
PSV: ABNORMAL
PT. POSITION: ABNORMAL
PT. POSITION: ABNORMAL
RBC # BLD: 3.76 M/UL (ref 4–5.2)
RBC # BLD: ABNORMAL 10*6/UL
RBC UA: ABNORMAL /HPF
RENAL EPITHELIAL, UA: ABNORMAL /HPF
RESPIRATORY RATE: ABNORMAL
RESPIRATORY RATE: ABNORMAL
SAMPLE SITE: ABNORMAL
SAMPLE SITE: ABNORMAL
SEG NEUTROPHILS: 77 % (ref 36–66)
SEGMENTED NEUTROPHILS ABSOLUTE COUNT: 5.5 K/UL (ref 1.3–9.1)
SET RATE: ABNORMAL
SET RATE: ABNORMAL
SODIUM BLD-SCNC: 134 MMOL/L (ref 135–144)
SODIUM BLD-SCNC: 134 MMOL/L (ref 135–144)
SODIUM BLD-SCNC: 135 MMOL/L (ref 135–144)
SODIUM BLD-SCNC: 135 MMOL/L (ref 135–144)
SPECIFIC GRAVITY UA: 1.01 (ref 1–1.03)
SPECIMEN TYPE: NORMAL
TEXT FOR RESPIRATORY: ABNORMAL
TEXT FOR RESPIRATORY: ABNORMAL
TOTAL CK: 208 U/L (ref 26–192)
TOTAL HB: ABNORMAL G/DL (ref 12–16)
TOTAL HB: ABNORMAL G/DL (ref 12–16)
TOTAL PROT. SUM,%: 100 % (ref 98–102)
TOTAL PROT. SUM: 3.8 G/DL (ref 6.3–8.2)
TOTAL PROTEIN: 3.8 G/DL (ref 6.4–8.3)
TOTAL RATE: ABNORMAL
TOTAL RATE: ABNORMAL
TRICHOMONAS: ABNORMAL
TURBIDITY: ABNORMAL
URINE HGB: ABNORMAL
URINE TOTAL PROTEIN: 33 MG/DL
UROBILINOGEN, URINE: NORMAL
VT: ABNORMAL
VT: ABNORMAL
WBC # BLD: 7.2 K/UL (ref 3.5–11)
WBC # BLD: ABNORMAL 10*3/UL
WBC UA: ABNORMAL /HPF
YEAST: ABNORMAL

## 2018-03-06 PROCEDURE — 36415 COLL VENOUS BLD VENIPUNCTURE: CPT

## 2018-03-06 PROCEDURE — 83735 ASSAY OF MAGNESIUM: CPT

## 2018-03-06 PROCEDURE — 80048 BASIC METABOLIC PNL TOTAL CA: CPT

## 2018-03-06 PROCEDURE — 84100 ASSAY OF PHOSPHORUS: CPT

## 2018-03-06 PROCEDURE — 6360000002 HC RX W HCPCS: Performed by: INTERNAL MEDICINE

## 2018-03-06 PROCEDURE — 93923 UPR/LXTR ART STDY 3+ LVLS: CPT

## 2018-03-06 PROCEDURE — 99233 SBSQ HOSP IP/OBS HIGH 50: CPT | Performed by: INTERNAL MEDICINE

## 2018-03-06 PROCEDURE — C9113 INJ PANTOPRAZOLE SODIUM, VIA: HCPCS | Performed by: INTERNAL MEDICINE

## 2018-03-06 PROCEDURE — 2500000003 HC RX 250 WO HCPCS: Performed by: NURSE PRACTITIONER

## 2018-03-06 PROCEDURE — 83605 ASSAY OF LACTIC ACID: CPT

## 2018-03-06 PROCEDURE — 82330 ASSAY OF CALCIUM: CPT

## 2018-03-06 PROCEDURE — 85730 THROMBOPLASTIN TIME PARTIAL: CPT

## 2018-03-06 PROCEDURE — 6370000000 HC RX 637 (ALT 250 FOR IP): Performed by: RADIOLOGY

## 2018-03-06 PROCEDURE — 81001 URINALYSIS AUTO W/SCOPE: CPT

## 2018-03-06 PROCEDURE — 82805 BLOOD GASES W/O2 SATURATION: CPT

## 2018-03-06 PROCEDURE — 2580000003 HC RX 258: Performed by: NURSE PRACTITIONER

## 2018-03-06 PROCEDURE — 82800 BLOOD PH: CPT

## 2018-03-06 PROCEDURE — 6370000000 HC RX 637 (ALT 250 FOR IP): Performed by: FAMILY MEDICINE

## 2018-03-06 PROCEDURE — 99232 SBSQ HOSP IP/OBS MODERATE 35: CPT | Performed by: INTERNAL MEDICINE

## 2018-03-06 PROCEDURE — 82947 ASSAY GLUCOSE BLOOD QUANT: CPT

## 2018-03-06 PROCEDURE — 85025 COMPLETE CBC W/AUTO DIFF WBC: CPT

## 2018-03-06 PROCEDURE — 51702 INSERT TEMP BLADDER CATH: CPT | Performed by: NURSE PRACTITIONER

## 2018-03-06 PROCEDURE — 2580000003 HC RX 258: Performed by: INTERNAL MEDICINE

## 2018-03-06 PROCEDURE — 82550 ASSAY OF CK (CPK): CPT

## 2018-03-06 PROCEDURE — P9046 ALBUMIN (HUMAN), 25%, 20 ML: HCPCS | Performed by: INTERNAL MEDICINE

## 2018-03-06 PROCEDURE — 93970 EXTREMITY STUDY: CPT

## 2018-03-06 PROCEDURE — 94762 N-INVAS EAR/PLS OXIMTRY CONT: CPT

## 2018-03-06 PROCEDURE — 2000000000 HC ICU R&B

## 2018-03-06 PROCEDURE — 83874 ASSAY OF MYOGLOBIN: CPT

## 2018-03-06 PROCEDURE — 6360000002 HC RX W HCPCS: Performed by: RADIOLOGY

## 2018-03-06 PROCEDURE — 87086 URINE CULTURE/COLONY COUNT: CPT

## 2018-03-06 RX ORDER — ALBUMIN (HUMAN) 12.5 G/50ML
25 SOLUTION INTRAVENOUS ONCE
Status: COMPLETED | OUTPATIENT
Start: 2018-03-06 | End: 2018-03-06

## 2018-03-06 RX ORDER — INSULIN GLARGINE 100 [IU]/ML
14 INJECTION, SOLUTION SUBCUTANEOUS NIGHTLY
Status: DISCONTINUED | OUTPATIENT
Start: 2018-03-06 | End: 2018-03-08

## 2018-03-06 RX ORDER — SODIUM CHLORIDE 9 MG/ML
INJECTION, SOLUTION INTRAVENOUS CONTINUOUS
Status: DISCONTINUED | OUTPATIENT
Start: 2018-03-06 | End: 2018-03-06

## 2018-03-06 RX ORDER — SODIUM CHLORIDE 9 MG/ML
INJECTION, SOLUTION INTRAVENOUS CONTINUOUS
Status: DISCONTINUED | OUTPATIENT
Start: 2018-03-06 | End: 2018-03-15

## 2018-03-06 RX ORDER — DEXTROSE, SODIUM CHLORIDE, AND POTASSIUM CHLORIDE 5; .45; .15 G/100ML; G/100ML; G/100ML
INJECTION INTRAVENOUS CONTINUOUS
Status: DISCONTINUED | OUTPATIENT
Start: 2018-03-06 | End: 2018-03-06

## 2018-03-06 RX ADMIN — PANTOPRAZOLE SODIUM 40 MG: 40 INJECTION, POWDER, FOR SOLUTION INTRAVENOUS at 08:18

## 2018-03-06 RX ADMIN — POTASSIUM CHLORIDE, DEXTROSE MONOHYDRATE AND SODIUM CHLORIDE: 150; 5; 450 INJECTION, SOLUTION INTRAVENOUS at 05:09

## 2018-03-06 RX ADMIN — CIPROFLOXACIN 400 MG: 2 INJECTION, SOLUTION INTRAVENOUS at 11:59

## 2018-03-06 RX ADMIN — INSULIN LISPRO 1 UNITS: 100 INJECTION, SOLUTION INTRAVENOUS; SUBCUTANEOUS at 16:54

## 2018-03-06 RX ADMIN — SODIUM CHLORIDE: 9 INJECTION, SOLUTION INTRAVENOUS at 16:54

## 2018-03-06 RX ADMIN — PANTOPRAZOLE SODIUM 40 MG: 40 INJECTION, POWDER, FOR SOLUTION INTRAVENOUS at 21:14

## 2018-03-06 RX ADMIN — Medication 10 ML: at 08:18

## 2018-03-06 RX ADMIN — Medication 10 ML: at 21:15

## 2018-03-06 RX ADMIN — SODIUM CHLORIDE: 9 INJECTION, SOLUTION INTRAVENOUS at 13:46

## 2018-03-06 RX ADMIN — POTASSIUM CHLORIDE 10 MEQ: 10 INJECTION, SOLUTION INTRAVENOUS at 01:37

## 2018-03-06 RX ADMIN — ALBUMIN (HUMAN) 25 G: 0.25 INJECTION, SOLUTION INTRAVENOUS at 13:45

## 2018-03-06 RX ADMIN — Medication 14 UNITS: at 21:13

## 2018-03-06 RX ADMIN — POTASSIUM CHLORIDE 10 MEQ: 10 INJECTION, SOLUTION INTRAVENOUS at 02:39

## 2018-03-06 RX ADMIN — SODIUM CHLORIDE: 9 INJECTION, SOLUTION INTRAVENOUS at 02:50

## 2018-03-06 RX ADMIN — INSULIN LISPRO 1 UNITS: 100 INJECTION, SOLUTION INTRAVENOUS; SUBCUTANEOUS at 11:59

## 2018-03-06 RX ADMIN — POTASSIUM CHLORIDE 10 MEQ: 10 INJECTION, SOLUTION INTRAVENOUS at 04:01

## 2018-03-06 ASSESSMENT — PAIN SCALES - GENERAL
PAINLEVEL_OUTOF10: 0

## 2018-03-06 NOTE — PLAN OF CARE
Problem: Risk for Impaired Skin Integrity  Goal: Tissue integrity - skin and mucous membranes  Structural intactness and normal physiological function of skin and  mucous membranes. Outcome: Ongoing  Patient turned and repositioned every 2 hours and as needed for comfort. Skin remains dry and intact. No new skin breakdown noted. Problem: Falls - Risk of  Goal: Absence of falls  Outcome: Ongoing  Bed remains in lowest position, call light within reach. Patient remains free of falls at this time. RN will continue to monitor. Problem: Pain:  Goal: Pain level will decrease  Pain level will decrease   Outcome: Ongoing  Pain assessed at regular intervals. Medications administered as requested for comfort. Pain remains at a tolerable level.

## 2018-03-06 NOTE — PROGRESS NOTES
[place]  Speech Language Pathology    Date: 3/6/2018  Patient Name: Luna Brochure  YOB: 1940   AGE: 69 y.o. MRN: [id number]        Patient Not Available for Speech Therapy     Due to:  [x] Testing  [] Hemodialysis  [] Cancelled by RN  [] Surgery   [] Intubation/Sedation/Pain Medication  [] Medical instability  [] Other: Attempted at 10:20 am    Next scheduled treatment: As able    Completed by:  Elisabeth Cabrera M.A., [id number] [facility]

## 2018-03-06 NOTE — FLOWSHEET NOTE
03/06/18 0630   Provider Notification   Reason for Communication Evaluate; Review case   Provider Name Dr Rona Valles   Provider Notification Physician   Method of Communication Call   Response See orders       Physician updated on patient status and current fluid changes.  See new orers

## 2018-03-06 NOTE — PROGRESS NOTES
use drugs. Family History: History reviewed. No pertinent family history. Vitals:  BP 88/60   Pulse 78   Temp 96 °F (35.6 °C) (Axillary)   Resp 15   Ht 5' 9\" (1.753 m)   Wt 207 lb 4.8 oz (94 kg)   SpO2 97%   BMI 30.61 kg/m²   Temp (24hrs), Av.3 °F (36.3 °C), Min:96 °F (35.6 °C), Max:98.6 °F (37 °C)    Recent Labs      18   0422  18   0506  18   0604  18   0634   POCGLU  79  95  112*  122*       I/O (24Hr):     Intake/Output Summary (Last 24 hours) at 18 0751  Last data filed at 18 0604   Gross per 24 hour   Intake             3610 ml   Output              900 ml   Net             2710 ml       Labs:    Recent Results (from the past 24 hour(s))   POC Glucose Fingerstick    Collection Time: 18  7:57 AM   Result Value Ref Range    POC Glucose 161 (H) 65 - 105 mg/dL   POC Glucose Fingerstick    Collection Time: 18  8:58 AM   Result Value Ref Range    POC Glucose 138 (H) 65 - 105 mg/dL   APTT    Collection Time: 18  9:38 AM   Result Value Ref Range    PTT 54.4 (H) 23.0 - 31.0 sec   POC Glucose Fingerstick    Collection Time: 18 10:01 AM   Result Value Ref Range    POC Glucose 126 (H) 65 - 105 mg/dL   POC Glucose Fingerstick    Collection Time: 18 11:17 AM   Result Value Ref Range    POC Glucose 126 (H) 65 - 105 mg/dL   Basic Metabolic Panel w/ Reflex to MG    Collection Time: 18 11:44 AM   Result Value Ref Range    Glucose 160 (H) 70 - 99 mg/dL    BUN 34 (H) 8 - 23 mg/dL    CREATININE 0.88 0.50 - 0.90 mg/dL    Bun/Cre Ratio NOT REPORTED 9 - 20    Calcium 6.6 (L) 8.6 - 10.4 mg/dL    Sodium 136 135 - 144 mmol/L    Potassium 4.4 3.7 - 5.3 mmol/L    Chloride 103 98 - 107 mmol/L    CO2 23 20 - 31 mmol/L    Anion Gap 10 9 - 17 mmol/L    GFR Non-African American >60 >60 mL/min    GFR African American >60 >60 mL/min    GFR Comment          GFR Staging NOT REPORTED    Magnesium    Collection Time: 18 11:44 AM   Result Value Ref Range Magnesium 1.9 1.6 - 2.6 mg/dL   Phosphorus    Collection Time: 03/05/18 11:44 AM   Result Value Ref Range    Phosphorus 3.1 2.6 - 4.5 mg/dL   BLOOD GAS, VENOUS    Collection Time: 03/05/18 11:45 AM   Result Value Ref Range    pH, Eriberto 7.444 (H) 7.320 - 7.420    pCO2, Eriberto 35.1 (L) 39.0 - 55.0    pO2, Eriberto 35.6 30.0 - 50.0    HCO3, Venous 24.0 24.0 - 30.0 mmol/L    Positive Base Excess, Eriberto NOT REPORTED 0.0 - 2.0 mmol/L    Negative Base Excess, Eriberto 0.1 0.0 - 2.0 mmol/L    O2 Sat, Eriberto 71.5 %    Total Hb NOT REPORTED 12.0 - 16.0 g/dl    Oxyhemoglobin NOT REPORTED 95.0 - 98.0 %    Carboxyhemoglobin 1.3 %    Methemoglobin 0.6 %    Pt Temp 37.0     pH, Eriberto, Temp Adj NOT REPORTED 7.320 - 7.420    pCO2, Eriberto, Temp Adj NOT REPORTED 39.0 - 55.0 mmHg    pO2, Eriberto, Temp Adj NOT REPORTED 30.0 - 50.0 mmHg    O2 Device/Flow/% NOT REPORTED     Respiratory Rate NOT REPORTED     Harvinder Test NOT REPORTED     Sample Site NOT REPORTED     Pt.  Position NOT REPORTED     Mode NOT REPORTED     Set Rate NOT REPORTED     Total Rate NOT REPORTED     VT NOT REPORTED     FIO2 NOT REPORTED     Peep/Cpap NOT REPORTED     PSV NOT REPORTED     Text for Respiratory  DRAWN BY LAB     NOTIFICATION NOT REPORTED     NOTIFICATION TIME NOT REPORTED    POC Glucose Fingerstick    Collection Time: 03/05/18 11:59 AM   Result Value Ref Range    POC Glucose 130 (H) 65 - 105 mg/dL   POC Glucose Fingerstick    Collection Time: 03/05/18  1:12 PM   Result Value Ref Range    POC Glucose 148 (H) 65 - 105 mg/dL   POC Glucose Fingerstick    Collection Time: 03/05/18  2:12 PM   Result Value Ref Range    POC Glucose 159 (H) 65 - 105 mg/dL   POC Glucose Fingerstick    Collection Time: 03/05/18  3:26 PM   Result Value Ref Range    POC Glucose 143 (H) 65 - 105 mg/dL   POC Glucose Fingerstick    Collection Time: 03/05/18  4:07 PM   Result Value Ref Range    POC Glucose 157 (H) 65 - 105 mg/dL   Basic Metabolic Panel w/ Reflex to MG    Collection Time: 03/05/18  4:49 PM   Result Value Ref Range    Glucose 159 (H) 70 - 99 mg/dL    BUN 33 (H) 8 - 23 mg/dL    CREATININE 0.82 0.50 - 0.90 mg/dL    Bun/Cre Ratio NOT REPORTED 9 - 20    Calcium 6.6 (L) 8.6 - 10.4 mg/dL    Sodium 136 135 - 144 mmol/L    Potassium 4.3 3.7 - 5.3 mmol/L    Chloride 100 98 - 107 mmol/L    CO2 23 20 - 31 mmol/L    Anion Gap 13 9 - 17 mmol/L    GFR Non-African American >60 >60 mL/min    GFR African American >60 >60 mL/min    GFR Comment          GFR Staging NOT REPORTED    Magnesium    Collection Time: 03/05/18  4:49 PM   Result Value Ref Range    Magnesium 1.9 1.6 - 2.6 mg/dL   Phosphorus    Collection Time: 03/05/18  4:49 PM   Result Value Ref Range    Phosphorus 2.9 2.6 - 4.5 mg/dL   BLOOD GAS, VENOUS    Collection Time: 03/05/18  4:49 PM   Result Value Ref Range    pH, Eriberto 7.422 (H) 7.320 - 7.420    pCO2, Eriberto 37.7 (L) 39.0 - 55.0    pO2, Eriberto 44.3 30.0 - 50.0    HCO3, Venous 24.6 24.0 - 30.0 mmol/L    Positive Base Excess, Eriberto 0.1 0.0 - 2.0 mmol/L    Negative Base Excess, Eriberto NOT REPORTED 0.0 - 2.0 mmol/L    O2 Sat, Eriberto 79.6 %    Total Hb NOT REPORTED 12.0 - 16.0 g/dl    Oxyhemoglobin NOT REPORTED 95.0 - 98.0 %    Carboxyhemoglobin 2.0 %    Methemoglobin 0.7 %    Pt Temp 37.0     pH, Eriberto, Temp Adj NOT REPORTED 7.320 - 7.420    pCO2, Eriberto, Temp Adj NOT REPORTED 39.0 - 55.0 mmHg    pO2, Eriberto, Temp Adj NOT REPORTED 30.0 - 50.0 mmHg    O2 Device/Flow/% NOT REPORTED     Respiratory Rate NOT REPORTED     Harvinder Test NOT REPORTED     Sample Site NOT REPORTED     Pt.  Position NOT REPORTED     Mode NOT REPORTED     Set Rate NOT REPORTED     Total Rate NOT REPORTED     VT NOT REPORTED     FIO2 NOT REPORTED     Peep/Cpap NOT REPORTED     PSV NOT REPORTED     Text for Respiratory NOT REPORTED     NOTIFICATION NOT REPORTED     NOTIFICATION TIME NOT REPORTED    APTT    Collection Time: 03/05/18  4:49 PM   Result Value Ref Range    .6 (HH) 23.0 - 31.0 sec   POC Glucose Fingerstick    Collection Time: 03/05/18  6:08 PM   Result Value Ref Range    POC Glucose 130 (H) 65 - 105 mg/dL   POC Glucose Fingerstick    Collection Time: 03/05/18  7:22 PM   Result Value Ref Range    POC Glucose 114 (H) 65 - 105 mg/dL   Lactic Acid    Collection Time: 03/05/18  8:03 PM   Result Value Ref Range    Lactic Acid 2.5 (H) 0.5 - 2.2 mmol/L   Basic Metabolic Panel w/ Reflex to MG    Collection Time: 03/05/18  8:04 PM   Result Value Ref Range    Glucose 123 (H) 70 - 99 mg/dL    BUN 32 (H) 8 - 23 mg/dL    CREATININE 0.83 0.50 - 0.90 mg/dL    Bun/Cre Ratio NOT REPORTED 9 - 20    Calcium 6.6 (L) 8.6 - 10.4 mg/dL    Sodium 135 135 - 144 mmol/L    Potassium 4.3 3.7 - 5.3 mmol/L    Chloride 99 98 - 107 mmol/L    CO2 23 20 - 31 mmol/L    Anion Gap 13 9 - 17 mmol/L    GFR Non-African American >60 >60 mL/min    GFR African American >60 >60 mL/min    GFR Comment          GFR Staging NOT REPORTED    Magnesium    Collection Time: 03/05/18  8:04 PM   Result Value Ref Range    Magnesium 2.0 1.6 - 2.6 mg/dL   Phosphorus    Collection Time: 03/05/18  8:04 PM   Result Value Ref Range    Phosphorus 3.0 2.6 - 4.5 mg/dL   BLOOD GAS, VENOUS    Collection Time: 03/05/18  8:04 PM   Result Value Ref Range    pH, Eriberto 7.368 7.320 - 7.420    pCO2, Eriberto 42.5 39.0 - 55.0    pO2, Eriberto 119.0 (H) 30.0 - 50.0    HCO3, Venous 24.5 24.0 - 30.0 mmol/L    Positive Base Excess, Eriberto NOT REPORTED 0.0 - 2.0 mmol/L    Negative Base Excess, Eriberto 0.9 0.0 - 2.0 mmol/L    O2 Sat, Eriberto 96.3 (H) 60.0 - 85.0 %    Total Hb NOT REPORTED 12.0 - 16.0 g/dl    Oxyhemoglobin NOT REPORTED 95.0 - 98.0 %    Carboxyhemoglobin 1.9 0 - 5 %    Methemoglobin 0.5 0.0 - 1.9 %    Pt Temp 37.0     pH, Eriberto, Temp Adj NOT REPORTED 7.320 - 7.420    pCO2, Eriberto, Temp Adj NOT REPORTED 39.0 - 55.0 mmHg    pO2, Eriberto, Temp Adj NOT REPORTED 30.0 - 50.0 mmHg    O2 Device/Flow/% NOT REPORTED     Respiratory Rate NOT REPORTED     Harvinder Test NOT REPORTED     Sample Site NOT REPORTED     Pt.  Position NOT REPORTED     Mode NOT REPORTED     Set Rate NOT REPORTED     Total Rate NOT REPORTED     VT NOT REPORTED     FIO2 NOT REPORTED     Peep/Cpap NOT REPORTED     PSV NOT REPORTED     Text for Respiratory VENOUS SAMPLE RESULTED     NOTIFICATION NOT REPORTED     NOTIFICATION TIME NOT REPORTED    POC Glucose Fingerstick    Collection Time: 03/05/18  9:33 PM   Result Value Ref Range    POC Glucose 110 (H) 65 - 105 mg/dL   POC Glucose Fingerstick    Collection Time: 03/05/18 10:47 PM   Result Value Ref Range    POC Glucose 98 65 - 105 mg/dL   APTT    Collection Time: 03/05/18 11:09 PM   Result Value Ref Range    PTT 42.7 (H) 23.0 - 31.0 sec   POC Glucose Fingerstick    Collection Time: 03/05/18 11:21 PM   Result Value Ref Range    POC Glucose 104 65 - 105 mg/dL   POC Glucose Fingerstick    Collection Time: 03/06/18 12:19 AM   Result Value Ref Range    POC Glucose 110 (H) 65 - 105 mg/dL   Basic Metabolic Panel w/ Reflex to MG    Collection Time: 03/06/18 12:29 AM   Result Value Ref Range    Glucose 126 (H) 70 - 99 mg/dL    BUN 30 (H) 8 - 23 mg/dL    CREATININE 0.77 0.50 - 0.90 mg/dL    Bun/Cre Ratio NOT REPORTED 9 - 20    Calcium 6.5 (L) 8.6 - 10.4 mg/dL    Sodium 134 (L) 135 - 144 mmol/L    Potassium 4.0 3.7 - 5.3 mmol/L    Chloride 99 98 - 107 mmol/L    CO2 23 20 - 31 mmol/L    Anion Gap 12 9 - 17 mmol/L    GFR Non-African American >60 >60 mL/min    GFR African American >60 >60 mL/min    GFR Comment          GFR Staging NOT REPORTED    Magnesium    Collection Time: 03/06/18 12:29 AM   Result Value Ref Range    Magnesium 1.9 1.6 - 2.6 mg/dL   Phosphorus    Collection Time: 03/06/18 12:29 AM   Result Value Ref Range    Phosphorus 3.0 2.6 - 4.5 mg/dL   POC Glucose Fingerstick    Collection Time: 03/06/18  1:34 AM   Result Value Ref Range    POC Glucose 120 (H) 65 - 105 mg/dL   POC Glucose Fingerstick    Collection Time: 03/06/18  2:29 AM   Result Value Ref Range    POC Glucose 105 65 - 105 mg/dL   POC Glucose Fingerstick    Collection Time: 03/06/18  3:29 AM   Result Fingerstick    Collection Time: 03/06/18  5:06 AM   Result Value Ref Range    POC Glucose 95 65 - 105 mg/dL   APTT    Collection Time: 03/06/18  5:23 AM   Result Value Ref Range    PTT 40.1 (H) 23.0 - 31.0 sec   POC Glucose Fingerstick    Collection Time: 03/06/18  6:04 AM   Result Value Ref Range    POC Glucose 112 (H) 65 - 105 mg/dL   POC Glucose Fingerstick    Collection Time: 03/06/18  6:34 AM   Result Value Ref Range    POC Glucose 122 (H) 65 - 105 mg/dL   BLOOD GAS, VENOUS    Collection Time: 03/06/18 12:00 PM   Result Value Ref Range    pH, Eriberto 7.406 7.320 - 7.420    pCO2, Eriberto 25.7 (L) 39.0 - 55.0    pO2, Eriberto 69.5 (H) 30.0 - 50.0    HCO3, Venous 16.1 (L) 24.0 - 30.0 mmol/L    Positive Base Excess, Eriberto NOT REPORTED 0.0 - 2.0 mmol/L    Negative Base Excess, Eriberto 8.6 (H) 0.0 - 2.0 mmol/L    O2 Sat, Eriberto 92.6 (H) 60.0 - 85.0 %    Total Hb NOT REPORTED 12.0 - 16.0 g/dl    Oxyhemoglobin NOT REPORTED 95.0 - 98.0 %    Carboxyhemoglobin 2.8 0 - 5 %    Methemoglobin 0.5 0.0 - 1.9 %    Pt Temp 37.0     pH, Eriberto, Temp Adj NOT REPORTED 7.320 - 7.420    pCO2, Eriberto, Temp Adj NOT REPORTED 39.0 - 55.0 mmHg    pO2, Eriberto, Temp Adj NOT REPORTED 30.0 - 50.0 mmHg    O2 Device/Flow/% NOT REPORTED     Respiratory Rate NOT REPORTED     Harvinder Test NOT REPORTED     Sample Site NOT REPORTED     Pt.  Position NOT REPORTED     Mode NOT REPORTED     Set Rate NOT REPORTED     Total Rate NOT REPORTED     VT NOT REPORTED     FIO2 NOT REPORTED     Peep/Cpap NOT REPORTED     PSV NOT REPORTED     Text for Respiratory VENOUS SAMPLE     NOTIFICATION NOT REPORTED     NOTIFICATION TIME NOT REPORTED        Lab Results   Component Value Date/Time    SPECIAL NOT REPORTED 03/02/2018 02:28 PM     Lab Results   Component Value Date/Time    CULTURE MORGANELLA MORGANII >230488 CFU/ML (A) 03/01/2018 11:15 AM    CULTURE GROUP D ENTEROCOCCUS >113588 CFU/ML (A) 03/01/2018 11:15 AM    CULTURE  03/01/2018 11:15 AM     Performed at [facility]. [place], [address] [place] [address] [phone]       Radiology:    Ct Abdomen Pelvis Wo Contrast Additional Contrast? Radiologist Recommendation    Result Date: 3/2/2018  EXAMINATION: CT OF THE ABDOMEN AND PELVIS WITHOUT CONTRAST 3/2/2018 5:11 pm TECHNIQUE: CT of the abdomen and pelvis was performed without the administration of intravenous contrast. Multiplanar reformatted images are provided for review. Dose modulation, iterative reconstruction, and/or weight based adjustment of the mA/kV was utilized to reduce the radiation dose to as low as reasonably achievable. COMPARISON: None. HISTORY: ORDERING SYSTEM PROVIDED HISTORY: New onset abd pain in DKA, check for distension, perf TECHNOLOGIST PROVIDED HISTORY: Additional Contrast?->Radiologist Recommendation Ordering Physician Provided Reason for Exam: PATIENT STATES ABDOMINAL PAIN FINDINGS: Lower Chest: There are trace bilateral pleural effusions and bilateral lower lobe subsegmental atelectasis. There is mild global cardiomegaly. Tiny hiatus hernia is present. Organs: Exam is limited by the absence of intravenous contrast. No focal liver lesion. The gallbladder is surgically absent. No significant biliary ductal dilatation. The spleen is normal in size without focal lesion. There is moderate fatty atrophy of the pancreas without focal lesion or ductal dilatation. The adrenal glands are unremarkable. No urinary tract calculus or collecting system dilatation. No focal liver lesion. GI/Bowel: The appendix is normal.  No bowel obstruction. There is moderate colonic diverticulosis without evidence for diverticulitis. Pelvis: There is a Alegre catheter and air in a decompressed bladder. Remote hysterectomy. Peritoneum/Retroperitoneum: There is moderate presacral edema. No abdominal lymphadenopathy or ascites. Bones/Soft Tissues: There is mild diffuse subcutaneous edema/generalized anasarca. Moderate-severe thoracolumbar scoliosis with moderate lumbar levoscoliosis.   Small abnormality of the visualized skull or soft tissues. 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 There is surrounding low density extending deep into the lentiform nucleus region and right lateral thalamic region. It is unclear whether this is gliosis from the prior ischemic event or a subtle superimposed area of new ischemia. MRI may be more helpful High density in the right MCA within the sylvian fissure. This is age indeterminate given the recent infarct. This may represent sequela of prior thrombus, however new thrombus not excluded. Again if the patient 8 current right hemispheric stroke symptoms, consider MRI     Us Renal Limited    Result Date: 3/2/2018  EXAMINATION: ULTRASOUND OF THE KIDNEYS 3/2/2018 2:21 pm COMPARISON: None. HISTORY: ORDERING SYSTEM PROVIDED HISTORY: RENAL FAILURE, ACUTE (KIDNEY INJURY) TECHNOLOGIST PROVIDED HISTORY: Ordering Physician Provided Reason for Exam: arf Acuity: Acute Type of Exam: Initial FINDINGS: The right kidney is less than optimally visualized due to surrounding bowel gas. The right kidney measures 12.8 x 5.8 x 5.6 cm and the left kidney 11.2 x 5.4 x 5.5 cm. The cortical thickness on the right is 17 mm and on the left is 18 mm. Kidneys demonstrate normal cortical echogenicity. No hydronephrosis or intrarenal stones. No focal lesions. Unremarkable ultrasound of the kidneys.      Vl Lower Extremity Venous Left    Result Date: 3/3/2018    [facility]  Vascular Lower Extremities DVT Study Procedure   Patient Name   Harish Leblanc Date of Study           03/03/2018                 L   Date of Birth  1940  Gender                  Female   Age            69 year(s)  Race                       Room Number    2002   Corporate ID # [id number]   Patient Acct # [de-identified]   MR #           [id number]      [facility]   Accession #    [id number] normocephalic, atraumatic  Eye: drooping L eye noted  Ear: normal external ear, no discharge, hearing intact  Nose:  no drainage noted  Mouth: MOM  Neck: supple  Lungs: Bilateral equal air entry, clear to ausculation, no wheezing, rales or rhonchi, normal effort  Cardiovascular: normal rate, regular rhythm, no murmur, gallop, rub.   Abdomen: Soft, mildly tender, nondistended, normal bowel sounds  Neurologic: left hemiplegia noted   Skin: No gross lesions, rashes, bruising or bleeding on exposed skin area  Extremities:  LLE edema to thigh, improving from prior, RLE edema to knee, TTP in popliteal fossa  Psych: flat affect    Assessment:        Primary Problem  DKA, type 2, not at goal [facility])    Active Hospital Problems    Diagnosis Date Noted    Acute UTI [N39.0]     Thrombocytopenia ([facility] [D69.6]     Acute deep vein thrombosis (DVT) of lower extremity ([facility] [I82.409]     Suspected deep tissue injury [Z04.9] 03/02/2018    DKA, type 2, not at goal [facility]) [E13.10] 03/01/2018    Sepsis ([facility] [A41.9] 03/01/2018    UTI (urinary tract infection) [N39.0] 03/01/2018    Leukocytosis [D72.829] 03/01/2018    Increased anion gap metabolic acidosis [T74.1] 03/01/2018    SAMINA (acute kidney injury) ([facility] [N17.9] 03/01/2018    Hypernatremia [E87.0] 03/01/2018    Hypocalcemia [E83.51] 03/01/2018    Hypokalemia [E87.6] 03/01/2018       Plan:        DKA with hx DM II, hypovolemic shock  -  on admission, anion gap 20, K 3.6, betahydroxy 2.05  - Gap 10 currrently  - vbg 7.476/26.4/121.0/19.4  - bmp, phos/mag, vbg Q12H  - Insulin drip stopped 2/2 hypoglycemia, Pt still not consuming sufficient nutrition  - Neph consult for persistent acidosis, stopped bicarb, NG/PEG?  - IVF @ 75mL d5 1/2NS with K  - Swallow eval rec pureed with thin liquids  - Attempting to place NG tube for TF, after feeding will start subq insulin     UTI sepsis with metabolic encephalopathy, septic shock, improving  - purulent urine, UA + LE/Nitrite  - lactic acid 3.0->2.5  - Urine culture showing morganella, group D entercoccus both sens to cipro  - IV cipro    Poss repeat CVA  - neuro on board  - EEG legible portions normal  - MRI when Pt stable  - Swallow eval rec pureed w/ thin liquids    LLE DVT, new onset TTP/pain in RLE  - CT abd for new onset abd pain showed thigh swelling, iliofemoral enlargement  - Venous duplex LLE showing common femoral, femoral, popliteal, great/small saphenous veins  - Hemeon consult   - HIT ab neg/Ser Rel Ass pending   - monitor platelets, transfuse <50, consider IVC filter if cannot anticoagulate   - heparin drip  - Platelets 63 -> 70 -> 71-> 72 -> 71->70  - Stat B/L venous duplex     SAMINA, improving  - Cr up to 0.76 over baseline ~0.56  - Likely 2/2 dehydration  - urine sodium 53, creatinine 38.5  - Renal US neg    Rosevelt Holstein, MD  3/6/2018  7:51 AM

## 2018-03-06 NOTE — PROGRESS NOTES
APTT noted to be greater than 100, heparin gtt was paused for 60 minutes and rate reduced per protocol.  See [facility]

## 2018-03-06 NOTE — PROGRESS NOTES
Dr. Ace Estes at bedside to assess patient. Orders to place NG and start tube feed. Order to keep SBP >100.

## 2018-03-06 NOTE — PROGRESS NOTES
Senior resident note:    Sepsis secondary to UTI   -urine culture Morganella and group D enterococcus both sensitive to Cipro  -Wean levophed as tolerated    DKA  -Labs improving; currently not in DKA  Insulin drip discontinued last night  -Tube feedings through NG tube started this morning  -Start Lantus tonight  -IV normal saline at 100 mL per hour      Possible CVA  -MRI when patient stable  Neurology consult    Left lower leg DVT  -Continue heparin drip; to be managed by heme/onc  -Concern for DVT in right leg today; Dopplers negative; will order arterial Doppler of right leg

## 2018-03-06 NOTE — FLOWSHEET NOTE
Patient appears somnolent but did respond with soft voice  One of her sisters was at bedside; she appears attentive to patient  Patient welcomed prayer and expressed gratitude for it.     03/06/18 1435   Encounter Summary   Services provided to: Patient   Referral/Consult From: [facility];Family members  (A sister at bedside)   Continue Visiting (3/6/18)   Complexity of Encounter Low   Length of Encounter 15 minutes   Spiritual Assessment Completed Yes   Routine   Type Follow up   Assessment Approachable   Intervention Prayer;Sustaining presence/ Ministry of presence   Outcome Acceptance;Expressed gratitude;Receptive

## 2018-03-06 NOTE — PLAN OF CARE
Problem: Nutrition  Goal: Optimal nutrition therapy  Outcome: Ongoing  Nutrition Problem:  Moderate malnutrition  Intervention: Food and/or Nutrient Delivery: Start Tube Feeding  Nutritional Goals: EN to meet >90% estimated needs

## 2018-03-06 NOTE — PROGRESS NOTES
Physical Therapy  DATE: 3/6/2018    NAME: Emily Jim  MRN: [id number]   : 1940    Patient not seen this date for Physical Therapy due to:  [] Blood transfusion in progress  [] Cancel by RN  [] Hemodialysis  []  Refusal by Patient   [] Spine Precautions   [] Strict Bedrest  [] Surgery  [x] Testing Cancel per RN for vascular scan. 15:25 PM     [] Other        [] PT being discontinued at this time. Patient independent. No further needs. [] PT being discontinued at this time as the patient has been transferred to hospice care. No further needs.     Smitha Craig, PTA

## 2018-03-06 NOTE — PROGRESS NOTES
Nutrition Assessment    Type and Reason for Visit: Consult (tube feeding ordering and management)    Nutrition Recommendations: Initiate Glucerna 1.2 tube feeding via NG @ 25mL/hr and increase 25mL q4h as tolerated until goal of 65mL/hr is reached. Malnutrition Assessment:  · Malnutrition Status: Meets the criteria for moderate malnutrition  · Context: Acute illness or injury  · Findings of the 6 clinical characteristics of malnutrition (Minimum of 2 out of 6 clinical characteristics is required to make the diagnosis of moderate or severe Protein Calorie Malnutrition based on AND/ASPEN Guidelines):  1. Energy Intake-Less than or equal to 50%, greater than or equal to 1 month    2. Weight Loss-Unable to assess,    3. Fat Loss-Moderate subcutaneous fat loss, Orbital, Triceps  4. Muscle Loss-Moderate muscle mass loss, Temples (temporalis muscle), Clavicles (pectoralis and deltoids), Interosseous  5. Fluid Accumulation-Moderate to severe fluid accumulation, Extremities    Nutrition Diagnosis:   · Problem: Moderate malnutrition  · Etiology: related to Insufficient energy/nutrient consumption     Signs and symptoms:  as evidenced by Diet history of poor intake, Patient report of, Intake 0-25%, Moderate loss of subcutaneous fat, Moderate muscle loss, Swallow study results    Nutrition Assessment:  · Subjective Assessment: Consult re: tube feed start. Pt with NG placed. Poor appetite, swallowing dysfunction, lethargy, weakness, L hemiparesis impact pt's ability to feed self and consume PO intakes.    · Nutrition-Focused Physical Findings:    · Wound Type: Pressure Ulcer, Unstageable  · Current Nutrition Therapies:  · Oral Diet Orders: No Straws, Dysphagia 1 (Pureed)   · Oral Diet intake: Unable to assess  · Oral Nutrition Supplement (ONS) Orders: Diabetic Oral Supplement  · Tube Feeding (TF) Orders:   · Feeding Route: Nasogastric  · Formula: Diabetic  · Rate (ml/hr):65mL/hr    · Volume (ml/day): 1560mL/d  · Duration: Continuous 24hrs  · Current TF & Flush Orders Provides: 1872 kcal, 94g protein  · Anthropometric Measures:  · Ht: 5' 9\" (175.3 cm)   · Current Body Wt: 207 lb 3.7 oz (94 kg)  · Ideal Body Wt: 145 lb (65.8 kg), % Ideal Body 143%  · BMI Classification: BMI 30.0 - 34.9 Obese Class I  · Comparative Standards (Estimated Nutrition Needs):  · Estimated Daily Total Kcal: 1174-2687 kcal/d  · Estimated Daily Protein (g): 75-94g/d    Estimated Intake vs Estimated Needs: Intake Less Than Needs    Nutrition Risk Level: High    Nutrition Interventions:   Start Tube Feeding  Continued Inpatient Monitoring    Nutrition Evaluation:   · Evaluation: Goals set   · Goals: EN to meet >90% estimated needs   · Monitoring: NPO Status, TF Intake, TF Tolerance, Skin Integrity, Wound Healing, Chewing/Swallowing, Pertinent Labs, Fluid Balance    See Adult Nutrition Doc Flowsheet for more detail. Britta Thibodeaux R.D., L.D.   Clinical Dietitian    [phone]

## 2018-03-06 NOTE — FLOWSHEET NOTE
03/06/18 0428   Provider Notification   Reason for Communication Evaluate; Review case   Provider Name Jeet Rucks   Provider Notification Nurse Practitioner   Method of Communication Call     BS 79 reported to NP. See new orders.

## 2018-03-06 NOTE — PROGRESS NOTES
hours 1/17/18  Yes Peyton Cantrell MD   amLODIPine (NORVASC) 10 MG tablet Take 1 tablet by mouth daily 1/18/18  Yes Peyton Cantrell MD   tamsulosin [facility]) 0.4 MG capsule Take 1 capsule by mouth daily 1/18/18  Yes Peyton Cantrell MD   clopidogrel (PLAVIX) 75 MG tablet Take 1 tablet by mouth daily 1/18/18  Yes Peyton Cantrell MD   metFORMIN (GLUCOPHAGE) 1000 MG tablet Take 1,000 mg by mouth 2 times daily (with meals)   Yes Historical Provider, MD   losartan (COZAAR) 100 MG tablet Take 100 mg by mouth daily   Yes Historical Provider, MD   simvastatin (ZOCOR) 20 MG tablet Take 1 tablet by mouth nightly 1/17/18   Peyton Cantrell MD   metFORMIN (GLUCOPHAGE) 1000 MG tablet Take 1,000 mg by mouth 2 times daily (with meals)    Historical Provider, MD   losartan (COZAAR) 100 MG tablet Take 100 mg by mouth daily    Historical Provider, MD   Omega-3 Fatty Acids (FISH OIL) 1000 MG CAPS Take 3,000 mg by mouth 3 times daily    Historical Provider, MD   glimepiride (AMARYL) 4 MG tablet Take 4 mg by mouth 2 times daily    Historical Provider, MD   amLODIPine (NORVASC) 5 MG tablet Take 5 mg by mouth daily    Historical Provider, MD   fenofibrate (TRICOR) 48 MG tablet Take 48 mg by mouth daily    Historical Provider, MD   aspirin 81 MG tablet Take 81 mg by mouth daily    Historical Provider, MD   Omega-3 Fatty Acids (FISH OIL) 1000 MG CAPS Take 3,000 mg by mouth daily    Historical Provider, MD   atenolol (TENORMIN) 50 MG tablet Take 50 mg by mouth daily    Historical Provider, MD     Current Facility-Administered Medications   Medication Dose Route Frequency Provider Last Rate Last Dose    dextrose 5 % and 0.45 % NaCl with KCl 20 mEq infusion   Intravenous Continuous Rian Gage MD 75 mL/hr at 03/06/18 0630      insulin glargine (LANTUS) injection vial 14 Units  14 Units Subcutaneous Nightly Angelita Peterson MD        albumin human 25 % solution 25 g  25 g Intravenous Once Emily Abrams MD        insulin lispro Better now, Sepsis, CVA  2. Sepsis: Due to UTI, now on pressors, IV Abx, still on pressors now weaning slowly  3. LE swelling right: await doppler, Continue Heparin full dose  4. Thrombocytopenia: Most likely due to sepsis,  NO DIC  5. Recent CVA: : New infarcts on CT head and she will need MRI once she is more stable  6. Acute DVT: Likely provoked due to hospitalization, immobility,  Continue Heparin drip  7. She will need repeat Brain imaging in few days  8. Transfuse plts if bleeding or less than 50 K. I will consider IVC filter If we are not able to anticoagulate  9. Monitor counts daily  10. I had discussion with team as well as pt's daughter and explained the complex situation and the risk/benefits. Discussed with family and Nurse. Thank you for asking us to see this patient.     Tristian Lin MD  Hematologist/Medical Oncologist  Cell: [phone]

## 2018-03-07 LAB
ABO/RH: NORMAL
ABSOLUTE EOS #: 0.1 K/UL (ref 0–0.4)
ABSOLUTE IMMATURE GRANULOCYTE: ABNORMAL K/UL (ref 0–0.3)
ABSOLUTE LYMPH #: 1.2 K/UL (ref 1–4.8)
ABSOLUTE MONO #: 0.2 K/UL (ref 0.1–1.3)
ANTIBODY SCREEN: NEGATIVE
ARM BAND NUMBER: NORMAL
BASOPHILS # BLD: 0 % (ref 0–2)
BASOPHILS ABSOLUTE: 0 K/UL (ref 0–0.2)
CULTURE: NORMAL
DATE, STOOL #1: ABNORMAL
DATE, STOOL #2: ABNORMAL
DATE, STOOL #3: ABNORMAL
DIFFERENTIAL TYPE: ABNORMAL
EOSINOPHILS RELATIVE PERCENT: 3 % (ref 0–4)
EXPIRATION DATE: NORMAL
GLUCOSE BLD-MCNC: 134 MG/DL (ref 65–105)
GLUCOSE BLD-MCNC: 168 MG/DL (ref 65–105)
GLUCOSE BLD-MCNC: 168 MG/DL (ref 65–105)
HCT VFR BLD CALC: 26.6 % (ref 36–46)
HCT VFR BLD CALC: 27.2 % (ref 36–46)
HEMOCCULT SP1 STL QL: POSITIVE
HEMOCCULT SP2 STL QL: ABNORMAL
HEMOCCULT SP3 STL QL: ABNORMAL
HEMOGLOBIN: 8.9 G/DL (ref 12–16)
HEMOGLOBIN: 9.1 G/DL (ref 12–16)
IMMATURE GRANULOCYTES: ABNORMAL %
LYMPHOCYTES # BLD: 29 % (ref 24–44)
Lab: NORMAL
Lab: NORMAL
MCH RBC QN AUTO: 29.3 PG (ref 26–34)
MCHC RBC AUTO-ENTMCNC: 33.3 G/DL (ref 31–37)
MCV RBC AUTO: 88 FL (ref 80–100)
MONOCYTES # BLD: 5 % (ref 1–7)
NRBC AUTOMATED: ABNORMAL PER 100 WBC
PARTIAL THROMBOPLASTIN TIME: 64.3 SEC (ref 23–31)
PDW BLD-RTO: 16.3 % (ref 11.5–14.9)
PLATELET # BLD: 49 K/UL (ref 150–450)
PLATELET ESTIMATE: ABNORMAL
PMV BLD AUTO: 10.7 FL (ref 6–12)
RBC # BLD: 3.09 M/UL (ref 4–5.2)
RBC # BLD: ABNORMAL 10*6/UL
SEG NEUTROPHILS: 63 % (ref 36–66)
SEGMENTED NEUTROPHILS ABSOLUTE COUNT: 2.6 K/UL (ref 1.3–9.1)
SPECIMEN DESCRIPTION: NORMAL
STATUS: NORMAL
STATUS: NORMAL
TIME, STOOL #1: ABNORMAL
TIME, STOOL #2: ABNORMAL
TIME, STOOL #3: ABNORMAL
WBC # BLD: 4.2 K/UL (ref 3.5–11)
WBC # BLD: ABNORMAL 10*3/UL

## 2018-03-07 PROCEDURE — 6360000002 HC RX W HCPCS: Performed by: RADIOLOGY

## 2018-03-07 PROCEDURE — 6360000002 HC RX W HCPCS: Performed by: INTERNAL MEDICINE

## 2018-03-07 PROCEDURE — 86901 BLOOD TYPING SEROLOGIC RH(D): CPT

## 2018-03-07 PROCEDURE — 94762 N-INVAS EAR/PLS OXIMTRY CONT: CPT

## 2018-03-07 PROCEDURE — 2580000003 HC RX 258: Performed by: RADIOLOGY

## 2018-03-07 PROCEDURE — 92526 ORAL FUNCTION THERAPY: CPT

## 2018-03-07 PROCEDURE — C9113 INJ PANTOPRAZOLE SODIUM, VIA: HCPCS | Performed by: INTERNAL MEDICINE

## 2018-03-07 PROCEDURE — 36415 COLL VENOUS BLD VENIPUNCTURE: CPT

## 2018-03-07 PROCEDURE — 82947 ASSAY GLUCOSE BLOOD QUANT: CPT

## 2018-03-07 PROCEDURE — 6370000000 HC RX 637 (ALT 250 FOR IP): Performed by: RADIOLOGY

## 2018-03-07 PROCEDURE — 86850 RBC ANTIBODY SCREEN: CPT

## 2018-03-07 PROCEDURE — 85730 THROMBOPLASTIN TIME PARTIAL: CPT

## 2018-03-07 PROCEDURE — 36592 COLLECT BLOOD FROM PICC: CPT

## 2018-03-07 PROCEDURE — 85018 HEMOGLOBIN: CPT

## 2018-03-07 PROCEDURE — G0328 FECAL BLOOD SCRN IMMUNOASSAY: HCPCS

## 2018-03-07 PROCEDURE — 2580000003 HC RX 258: Performed by: INTERNAL MEDICINE

## 2018-03-07 PROCEDURE — 6370000000 HC RX 637 (ALT 250 FOR IP): Performed by: FAMILY MEDICINE

## 2018-03-07 PROCEDURE — 6370000000 HC RX 637 (ALT 250 FOR IP): Performed by: NURSE PRACTITIONER

## 2018-03-07 PROCEDURE — 36430 TRANSFUSION BLD/BLD COMPNT: CPT

## 2018-03-07 PROCEDURE — 86900 BLOOD TYPING SEROLOGIC ABO: CPT

## 2018-03-07 PROCEDURE — P9037 PLATE PHERES LEUKOREDU IRRAD: HCPCS

## 2018-03-07 PROCEDURE — 82800 BLOOD PH: CPT

## 2018-03-07 PROCEDURE — 85014 HEMATOCRIT: CPT

## 2018-03-07 PROCEDURE — 85025 COMPLETE CBC W/AUTO DIFF WBC: CPT

## 2018-03-07 PROCEDURE — 99233 SBSQ HOSP IP/OBS HIGH 50: CPT | Performed by: INTERNAL MEDICINE

## 2018-03-07 PROCEDURE — 2000000000 HC ICU R&B

## 2018-03-07 RX ORDER — FUROSEMIDE 10 MG/ML
20 INJECTION INTRAMUSCULAR; INTRAVENOUS ONCE
Status: COMPLETED | OUTPATIENT
Start: 2018-03-07 | End: 2018-03-07

## 2018-03-07 RX ORDER — 0.9 % SODIUM CHLORIDE 0.9 %
250 INTRAVENOUS SOLUTION INTRAVENOUS ONCE
Status: DISCONTINUED | OUTPATIENT
Start: 2018-03-07 | End: 2018-03-19 | Stop reason: HOSPADM

## 2018-03-07 RX ADMIN — CIPROFLOXACIN 400 MG: 2 INJECTION, SOLUTION INTRAVENOUS at 00:59

## 2018-03-07 RX ADMIN — CIPROFLOXACIN 400 MG: 2 INJECTION, SOLUTION INTRAVENOUS at 14:39

## 2018-03-07 RX ADMIN — CIPROFLOXACIN 400 MG: 2 INJECTION, SOLUTION INTRAVENOUS at 23:52

## 2018-03-07 RX ADMIN — CALCIUM GLUCONATE 2 G: 98 INJECTION, SOLUTION INTRAVENOUS at 11:29

## 2018-03-07 RX ADMIN — CLOPIDOGREL BISULFATE 75 MG: 75 TABLET ORAL at 08:18

## 2018-03-07 RX ADMIN — INSULIN LISPRO 1 UNITS: 100 INJECTION, SOLUTION INTRAVENOUS; SUBCUTANEOUS at 20:05

## 2018-03-07 RX ADMIN — PANTOPRAZOLE SODIUM 40 MG: 40 INJECTION, POWDER, FOR SOLUTION INTRAVENOUS at 08:18

## 2018-03-07 RX ADMIN — Medication 10 ML: at 08:18

## 2018-03-07 RX ADMIN — FUROSEMIDE 20 MG: 10 INJECTION, SOLUTION INTRAVENOUS at 11:45

## 2018-03-07 RX ADMIN — HEPARIN SODIUM AND DEXTROSE 10 UNITS/KG/HR: 10000; 5 INJECTION INTRAVENOUS at 03:45

## 2018-03-07 RX ADMIN — Medication 5 ML: at 19:51

## 2018-03-07 RX ADMIN — Medication 14 UNITS: at 20:12

## 2018-03-07 RX ADMIN — SODIUM CHLORIDE: 9 INJECTION, SOLUTION INTRAVENOUS at 03:45

## 2018-03-07 RX ADMIN — Medication 5 ML: at 00:59

## 2018-03-07 RX ADMIN — PANTOPRAZOLE SODIUM 40 MG: 40 INJECTION, POWDER, FOR SOLUTION INTRAVENOUS at 19:57

## 2018-03-07 RX ADMIN — Medication 10 ML: at 19:57

## 2018-03-07 ASSESSMENT — PAIN SCALES - GENERAL
PAINLEVEL_OUTOF10: 0

## 2018-03-07 NOTE — PROGRESS NOTES
GLUCOSEU  1+*   KETUA  NEGATIVE   AMORPHOUS  NOT REPORTED       Assessment/plan:    1. Acute kidney injurymost consistent with ischemic acute tubular necrosis as well as component of prerenal azotemia. Renal ultrasound showed no hydronephrosis. 2.  Acute left leg deep vein thrombosis  continue Heparin drip. 3.  Hypotension - secondary to urosepsis. Hemodynamic profile is improving with antibiotics [IV ciprofloxacin 400 mg every 12 hours]. 4.  Hypocalcemiasecondary to hypoalbuminemia and vitamin D deficiency ,corrected calcium is within normal limits Continue ergocalciferol 50,000 units p.o. weekly    5. Protein calorie malnutritionand tinea tube feeding. Plan:  IV fluids KVO  Continue tube feeding  Avoid positive balance  IV Lasix daily first dose now.   Avoid hypotension    Mesfin      Attending nephrologist.

## 2018-03-07 NOTE — PROGRESS NOTES
Plan:  [x] Continue ST services    [] Discharge from ST:        Discharge recommendations: [] Inpatient Rehab   [] [place]   [] Outpatient Therapy  [] Follow up at trauma clinic   [] Other:         Treatment completed by: Bob Vaughan,  Clinician  Cosigned by: Donya Nunez.  Jassi Ho M.A., Connor Chairez

## 2018-03-07 NOTE — PROGRESS NOTES
[facility]     Progress Note    3/7/2018    7:34 AM    Name:   Alana Moon  MRN:     [id number]     Acct:      [de-identified]   Room:   2002/2002-01  IP Day:  6  Admit Date:  3/1/2018 10:21 AM    PCP:   Jakob Sales MD  Code Status:  Full Code    Subjective:     C/C:   Chief Complaint   Patient presents with    Altered Mental Status     Interval History Status: improved     Examined Pt at bedside this AM.  Pt reports new onset RLE severe pain, TTP, swelling. Still on heparin drip, platelets down to 49, will transfuse 2 units. Off levophed. NG placement for TF. Pt A&Ox3, very conversational.    Brief History:     The patient is a 69 y.o. Non-/non  female with hx DMII, CIDP, prior CVA with left hemiplegia who presents with Altered Mental Status   and she is admitted to the hospital for the management of DKA with UTI. Pt has AMS and is unable to provide hx, limited hx from family. Pt has had increased c/o HA over last day. Family noted increase in urine odor over last several days. Pt developed AMS in nursing home, presented to ED. Review of Systems:     ROS    CONSTITUTIONAL:  negative for fevers, chills, sweats, Positive for fatigue  HEENT:  negative for vision, hearing changes, runny nose, throat pain  RESPIRATORY:  negative for shortness of breath, cough, congestion, wheezing. CARDIOVASCULAR:  negative for chest pain, palpitations.   GASTROINTESTINAL:  negative for nausea, vomiting, diarrhea, constipation, Positive for mild abd tenderness, poor appetite  GENITOURINARY:  negative for difficulty of urination, burning with urination, frequency   INTEGUMENT:  negative for rash, skin lesions, easy bruising   HEMATOLOGIC/LYMPHATIC:  Swelling in RLE improved, no longer TTP  ALLERGIC/IMMUNOLOGIC:  negative for urticaria , NOT REPORTED 0.0 - 2.0 mmol/L    Negative Base Excess, Eriberto 8.6 (H) 0.0 - 2.0 mmol/L    O2 Sat, Eriberto 92.6 (H) 60.0 - 85.0 %    Total Hb NOT REPORTED 12.0 - 16.0 g/dl    Oxyhemoglobin NOT REPORTED 95.0 - 98.0 %    Carboxyhemoglobin 2.8 0 - 5 %    Methemoglobin 0.5 0.0 - 1.9 %    Pt Temp 37.0     pH, Eriberto, Temp Adj NOT REPORTED 7.320 - 7.420    pCO2, Eriberto, Temp Adj NOT REPORTED 39.0 - 55.0 mmHg    pO2, Eriberto, Temp Adj NOT REPORTED 30.0 - 50.0 mmHg    O2 Device/Flow/% NOT REPORTED     Respiratory Rate NOT REPORTED     Harvinder Test NOT REPORTED     Sample Site NOT REPORTED     Pt.  Position NOT REPORTED     Mode NOT REPORTED     Set Rate NOT REPORTED     Total Rate NOT REPORTED     VT NOT REPORTED     FIO2 NOT REPORTED     Peep/Cpap NOT REPORTED     PSV NOT REPORTED     Text for Respiratory VENOUS SAMPLE     NOTIFICATION NOT REPORTED     NOTIFICATION TIME NOT REPORTED    POC Glucose Fingerstick    Collection Time: 03/06/18  4:34 PM   Result Value Ref Range    POC Glucose 196 (H) 65 - 105 mg/dL   APTT    Collection Time: 03/06/18  6:37 PM   Result Value Ref Range    PTT 70.3 (H) 23.0 - 31.0 sec   Lactic Acid    Collection Time: 03/06/18  7:50 PM   Result Value Ref Range    Lactic Acid 2.3 (H) 0.5 - 2.2 mmol/L   Basic Metabolic Panel w/ Reflex to MG    Collection Time: 03/06/18  7:50 PM   Result Value Ref Range    Glucose 139 (H) 70 - 99 mg/dL    BUN 27 (H) 8 - 23 mg/dL    CREATININE 0.73 0.50 - 0.90 mg/dL    Bun/Cre Ratio NOT REPORTED 9 - 20    Calcium 6.3 (L) 8.6 - 10.4 mg/dL    Sodium 135 135 - 144 mmol/L    Potassium 4.3 3.7 - 5.3 mmol/L    Chloride 102 98 - 107 mmol/L    CO2 22 20 - 31 mmol/L    Anion Gap 11 9 - 17 mmol/L    GFR Non-African American >60 >60 mL/min    GFR African American >60 >60 mL/min    GFR Comment          GFR Staging NOT REPORTED    BLOOD GAS, VENOUS    Collection Time: 03/06/18  7:50 PM   Result Value Ref Range    pH, Eriberto 7.426 (H) 7.320 - 7.420    pCO2, Eriberto 34.5 (L) 39.0 - 55.0    pO2, Eriberto 56.3 (H) 30.0 Neutrophils 63 36 - 66 %    Lymphocytes 29 24 - 44 %    Monocytes 5 1 - 7 %    Eosinophils % 3 0 - 4 %    Basophils 0 0 - 2 %    Segs Absolute 2.60 1.3 - 9.1 k/uL    Absolute Lymph # 1.20 1.0 - 4.8 k/uL    Absolute Mono # 0.20 0.1 - 1.3 k/uL    Absolute Eos # 0.10 0.0 - 0.4 k/uL    Basophils # 0.00 0.0 - 0.2 k/uL   APTT    Collection Time: 03/07/18  5:48 AM   Result Value Ref Range    PTT 64.3 (H) 23.0 - 31.0 sec       Lab Results   Component Value Date/Time    SPECIAL NOT REPORTED 03/06/2018 09:34 AM     Lab Results   Component Value Date/Time    CULTURE CULTURE IN PROGRESS 03/06/2018 09:34 AM    CULTURE  03/06/2018 09:34 AM     Performed at [facility] [address], [address] [place] [address] [phone]       Radiology:    Ct Abdomen Pelvis Wo Contrast Additional Contrast? Radiologist Recommendation    Result Date: 3/2/2018  EXAMINATION: CT OF THE ABDOMEN AND PELVIS WITHOUT CONTRAST 3/2/2018 5:11 pm TECHNIQUE: CT of the abdomen and pelvis was performed without the administration of intravenous contrast. Multiplanar reformatted images are provided for review. Dose modulation, iterative reconstruction, and/or weight based adjustment of the mA/kV was utilized to reduce the radiation dose to as low as reasonably achievable. COMPARISON: None. HISTORY: ORDERING SYSTEM PROVIDED HISTORY: New onset abd pain in DKA, check for distension, perf TECHNOLOGIST PROVIDED HISTORY: Additional Contrast?->Radiologist Recommendation Ordering Physician Provided Reason for Exam: PATIENT STATES ABDOMINAL PAIN FINDINGS: Lower Chest: There are trace bilateral pleural effusions and bilateral lower lobe subsegmental atelectasis. There is mild global cardiomegaly. Tiny hiatus hernia is present. Organs: Exam is limited by the absence of intravenous contrast. No focal liver lesion. The gallbladder is surgically absent. No significant biliary ductal dilatation. The spleen is normal in size without focal lesion.  There is moderate fatty atrophy of the pancreas without focal lesion or ductal dilatation. The adrenal glands are unremarkable. No urinary tract calculus or collecting system dilatation. No focal liver lesion. GI/Bowel: The appendix is normal.  No bowel obstruction. There is moderate colonic diverticulosis without evidence for diverticulitis. Pelvis: There is a Alegre catheter and air in a decompressed bladder. Remote hysterectomy. Peritoneum/Retroperitoneum: There is moderate presacral edema. No abdominal lymphadenopathy or ascites. Bones/Soft Tissues: There is mild diffuse subcutaneous edema/generalized anasarca. Moderate-severe thoracolumbar scoliosis with moderate lumbar levoscoliosis. Small fat containing umbilical hernia. There is relative enlargement of the left iliofemoral vein compared to the right with increased soft tissue swelling of the left upper thigh. The possibility of left DVT/thrombus could be entertained. There is mild generalized anasarca. Relative enlargement of the left iliofemoral with increased swelling of the left upper thigh. The possibility of the presence of left DVT/thrombus could be entertained. Left lower extremity ultrasound Doppler could be obtained as clinically indicated. Otherwise, no acute process in the abdomen or pelvis. Xr Chest (single View Frontal)    Result Date: 3/1/2018  EXAMINATION: SINGLE VIEW OF THE CHEST 3/1/2018 5:05 pm COMPARISON: None. HISTORY: ORDERING SYSTEM PROVIDED HISTORY: Central Line Placement TECHNOLOGIST PROVIDED HISTORY: Reason for exam:->Central Line Placement Reason for exam:->Portable Ordering Physician Provided Reason for Exam: line placement Acuity: Acute Type of Exam: Initial FINDINGS: The right IJ central venous catheter in place terminating at cavoatrial junction. There is no pneumothorax. The radiograph is somewhat limited due to left-sided tilt.   Cardiac and mediastinal contour are normal.  There is minimal atelectatic change in the left costophrenic angle. Bony structures are grossly unremarkable. Dextroscoliosis at lower thoracic spine present similar to previous examination. Uncomplicated interval insertion of right IJ catheter. Otherwise, no interval change     Xr Chest (single View Frontal)    Result Date: 3/1/2018  EXAMINATION: SINGLE VIEW OF THE CHEST 3/1/2018 10:48 am COMPARISON: Chest x-ray from 01/10/2018 HISTORY: ORDERING SYSTEM PROVIDED HISTORY: Altered mental status,  confused TECHNOLOGIST PROVIDED HISTORY: Reason for exam:->Altered mental status,  confused Ordering Physician Provided Reason for Exam: AMS Acuity: Unknown Type of Exam: Unknown FINDINGS: There is no focal airspace consolidation, pleural effusion or pneumothorax. The cardiac silhouette appears mildly enlarged, but this may at least in part related to technique. There is no pulmonary vascular congestion. There are calcifications of the aortic arch. There are similar moderate hypertrophic degenerative changes of the visualized spine and shoulders. Visualized osseous structures appear mildly demineralized, but grossly intact, given the non dedicated imaging. No focal airspace consolidation or pulmonary vascular congestion. Ct Head Wo Contrast    Result Date: 3/1/2018  EXAMINATION: CT OF THE HEAD WITHOUT CONTRAST  3/1/2018 12:26 pm TECHNIQUE: CT of the head was performed without the administration of intravenous contrast. Dose modulation, iterative reconstruction, and/or weight based adjustment of the mA/kV was utilized to reduce the radiation dose to as low as reasonably achievable. COMPARISON: January 10 HISTORY: ORDERING SYSTEM PROVIDED HISTORY: AMS hx of CVA with Left sided deficits TECHNOLOGIST PROVIDED HISTORY: Has a \"code stroke\" or \"stroke alert\" been called? ->No Ordering Physician Provided Reason for Exam: AMS, HX OF CVA WITH LEFT SIDED DEFICITS Additional signs and symptoms: PATIENT UNABLE TO GIVE HISTORY.  UNABLE TO STRAIGHTEN HAD FOR EXAM. FINDINGS: BRAIN/VENTRICLES: There is a large amount of volume loss in the right MCA distribution including in the perisylvian region. This is likely due to the patient's recent infarct. There is curvilinear high density in the right sylvian fissures suggesting age-indeterminate thrombus in the MCA branch. There is low-density and loss of differentiation in the right lentiform nucleus and lateral thalamic region. Focal low density in the right caudate head is noted suggesting prior infarct. ORBITS: The visualized portion of the orbits demonstrate no acute abnormality. SINUSES: The visualized paranasal sinuses and mastoid air cells demonstrate no acute abnormality. SOFT TISSUES/SKULL:  No acute abnormality of the visualized skull or soft tissues. 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 There is surrounding low density extending deep into the lentiform nucleus region and right lateral thalamic region. It is unclear whether this is gliosis from the prior ischemic event or a subtle superimposed area of new ischemia. MRI may be more helpful High density in the right MCA within the sylvian fissure. This is age indeterminate given the recent infarct. This may represent sequela of prior thrombus, however new thrombus not excluded. Again if the patient 8 current right hemispheric stroke symptoms, consider MRI     Us Renal Limited    Result Date: 3/2/2018  EXAMINATION: ULTRASOUND OF THE KIDNEYS 3/2/2018 2:21 pm COMPARISON: None. HISTORY: ORDERING SYSTEM PROVIDED HISTORY: RENAL FAILURE, ACUTE (KIDNEY INJURY) TECHNOLOGIST PROVIDED HISTORY: Ordering Physician Provided Reason for Exam: arf Acuity: Acute Type of Exam: Initial FINDINGS: The right kidney is less than optimally visualized due to surrounding bowel gas. The right kidney measures 12.8 x 5.8 x 5.6 cm and the left kidney 11.2 x 5.4 x 5.5 cm.   The cortical thickness on the right is 17 mm and on the left is 18 mm. Kidneys demonstrate normal cortical echogenicity. No hydronephrosis or intrarenal stones. No focal lesions. Unremarkable ultrasound of the kidneys. Vl Lower Extremity Arteries Right    Result Date: 3/6/2018    [facility]  Vascular Lower Arterial Plethysmography Procedure   Patient Name   Riley Bailey Date of Study           03/06/2018                 L   Date of Birth  1940  Gender                  Female   Age            69 year(s)  Race                       Room Number    2002   Corporate ID # [id number]   Patient Acct # [de-identified]   MR #           [id number]      Kirby Brandt, T   Accession #    [id number]   Interpreting Physician  Sohail Cloud   Referring                  Referring Physician     Ileana Zamarripa  Nurse  Practitioner  Procedure Type of Study:   Extremities Arteries: Lower Arterial Plethysmography, PVR Lower. Indications for Study:Pain, leg. Patient Status: In Patient. Technical Quality:Limited visualization. Limitation reason:Body habitus, bandages. Comments: Normal (0.95-1.3) Mild vascular insufficiency (0.7-0.94) Moderate vascular insufficiency (0.5-0.69) Severe vascular insufficiency (0.3-0.49) Critical Ischemia (0.1-0.29) Non-diagnostic due to calcification (>1.3) Toe pressure <40 mmHg poor wound healing potential  Conclusions   Summary   Right lower extremity ABIs and PVRs were performed for the evaluation of  peripheral vascular disease.  Study demonstrates:   ANIKET suggests no vascular insufficiency at rest.   Signature   ----------------------------------------------------------------  Electronically signed by Anh Sanchez RVT(Sonographer) on  03/06/2018 03:49 PM  ----------------------------------------------------------------   ----------------------------------------------------------------  Electronically signed by Hong De La Cruz(Interpreting  physician) on 03/06/2018 04:58 PM ----------------------------------------------------------------  Electronically signed by Hong Mello(Interpreting  physician) on 03/06/2018 05:02 PM  ----------------------------------------------------------------  Findings:   Right Impression:                          Left Impression:  The common femoral, femoral, popliteal and The popliteal to the common  tibial veins demonstrate normal            femoral veins are  compressibility and augmentation. non-compressible. Thrombus appears acute based on  Limited visualization of the lower thigh   B-Mode image evaluation. and calf veins. The saphenofemoral junction  Normal compressibility of the great        demonstrates no  saphenous vein. compressibility. Thrombus appears acute based on  Normal compressibility of the small        B-Mode image evaluation. saphenous vein. Limited visualization of the                                             calf veins. Normal compressibility of the                                             great saphenous vein. Normal compressibility of the                                             small saphenous vein.       Vl Lower Extremity Venous Left    Result Date: 3/3/2018    [facility]""  Vascular Lower Extremities DVT Study Procedure   Patient Name   Javier Judd Date of Study           03/03/2018                 L   Date of Birth  1940  Gender                  Female   Age            69 year(s)  Race                       Room Number    2002   Corporate ID # [id number]   Patient Acct # [de-identified]   MR #           [id number]      [facility]   Accession #    [id number]

## 2018-03-07 NOTE — PROGRESS NOTES
Full consult to follow, Acute left leg DVT with contraindication for anticoagulation. Will plan for IVC filter tomorrow.     Electronically signed by Sadiq Shirley MD on 3/7/18 at 12:21 PM

## 2018-03-07 NOTE — PLAN OF CARE
Problem: Gas Exchange - Impaired:  Goal: Levels of oxygenation will improve  Levels of oxygenation will improve   Outcome: Ongoing      Problem: Infection, Septic Shock:  Goal: Will show no infection signs and symptoms  Will show no infection signs and symptoms   Outcome: Ongoing      Problem: Fluid Volume - Imbalance:  Goal: Absence of imbalanced fluid volume signs and symptoms  Absence of imbalanced fluid volume signs and symptoms   Outcome: Completed Date Met: 03/07/18

## 2018-03-08 ENCOUNTER — APPOINTMENT (OUTPATIENT)
Dept: INTERVENTIONAL RADIOLOGY/VASCULAR | Age: 78
DRG: 853 | End: 2018-03-08
Payer: MEDICARE

## 2018-03-08 LAB
ABSOLUTE EOS #: 0.1 K/UL (ref 0–0.4)
ABSOLUTE IMMATURE GRANULOCYTE: ABNORMAL K/UL (ref 0–0.3)
ABSOLUTE LYMPH #: 1.2 K/UL (ref 1–4.8)
ABSOLUTE MONO #: 0.4 K/UL (ref 0.1–1.3)
ALBUMIN SERPL-MCNC: 1.9 G/DL (ref 3.5–5.2)
ANION GAP SERPL CALCULATED.3IONS-SCNC: 11 MMOL/L (ref 9–17)
BASOPHILS # BLD: 0 % (ref 0–2)
BASOPHILS ABSOLUTE: 0 K/UL (ref 0–0.2)
BLD PROD TYP BPU: NORMAL
BLD PROD TYP BPU: NORMAL
BUN BLDV-MCNC: 25 MG/DL (ref 8–23)
BUN/CREAT BLD: ABNORMAL (ref 9–20)
CALCIUM SERPL-MCNC: 7.2 MG/DL (ref 8.6–10.4)
CHLORIDE BLD-SCNC: 105 MMOL/L (ref 98–107)
CO2: 24 MMOL/L (ref 20–31)
CREAT SERPL-MCNC: 0.58 MG/DL (ref 0.5–0.9)
CULTURE: ABNORMAL
CULTURE: ABNORMAL
DIFFERENTIAL TYPE: ABNORMAL
DISPENSE STATUS BLOOD BANK: NORMAL
DISPENSE STATUS BLOOD BANK: NORMAL
EOSINOPHILS RELATIVE PERCENT: 2 % (ref 0–4)
GFR AFRICAN AMERICAN: >60 ML/MIN
GFR NON-AFRICAN AMERICAN: >60 ML/MIN
GFR SERPL CREATININE-BSD FRML MDRD: ABNORMAL ML/MIN/{1.73_M2}
GFR SERPL CREATININE-BSD FRML MDRD: ABNORMAL ML/MIN/{1.73_M2}
GLUCOSE BLD-MCNC: 109 MG/DL (ref 65–105)
GLUCOSE BLD-MCNC: 59 MG/DL (ref 65–105)
GLUCOSE BLD-MCNC: 69 MG/DL (ref 70–99)
GLUCOSE BLD-MCNC: 71 MG/DL (ref 65–105)
GLUCOSE BLD-MCNC: 77 MG/DL (ref 65–105)
GLUCOSE BLD-MCNC: 98 MG/DL (ref 65–105)
HCT VFR BLD CALC: 27.6 % (ref 36–46)
HCT VFR BLD CALC: 28.3 % (ref 36–46)
HCT VFR BLD CALC: 28.5 % (ref 36–46)
HEMOGLOBIN: 9.1 G/DL (ref 12–16)
HEMOGLOBIN: 9.2 G/DL (ref 12–16)
HEMOGLOBIN: 9.3 G/DL (ref 12–16)
HEPARIN TYPE: NORMAL
IMMATURE GRANULOCYTES: ABNORMAL %
LYMPHOCYTES # BLD: 29 % (ref 24–44)
Lab: ABNORMAL
MCH RBC QN AUTO: 28.9 PG (ref 26–34)
MCHC RBC AUTO-ENTMCNC: 33 G/DL (ref 31–37)
MCV RBC AUTO: 87.6 FL (ref 80–100)
MONOCYTES # BLD: 11 % (ref 1–7)
NRBC AUTOMATED: ABNORMAL PER 100 WBC
PARTIAL THROMBOPLASTIN TIME: 48.3 SEC (ref 23–31)
PDW BLD-RTO: 16.3 % (ref 11.5–14.9)
PLATELET # BLD: 105 K/UL (ref 150–450)
PLATELET ESTIMATE: ABNORMAL
PMV BLD AUTO: 9.3 FL (ref 6–12)
POTASSIUM SERPL-SCNC: 3.7 MMOL/L (ref 3.7–5.3)
RBC # BLD: 3.15 M/UL (ref 4–5.2)
RBC # BLD: ABNORMAL 10*6/UL
SEG NEUTROPHILS: 58 % (ref 36–66)
SEGMENTED NEUTROPHILS ABSOLUTE COUNT: 2.4 K/UL (ref 1.3–9.1)
SEROTONIN RELEASING ASSAY: NORMAL
SODIUM BLD-SCNC: 140 MMOL/L (ref 135–144)
SPECIMEN DESCRIPTION: ABNORMAL
SPECIMEN DESCRIPTION: ABNORMAL
SRA HEPARIN PLATELET ANTIBODY: NEGATIVE
SRA PORC HIGH DOSE: 0 %
SRA PORC LOW DOSE: 0 %
STATUS: ABNORMAL
TRANSFUSION STATUS: NORMAL
TRANSFUSION STATUS: NORMAL
UNIT DIVISION: 0
UNIT DIVISION: 0
UNIT NUMBER: NORMAL
UNIT NUMBER: NORMAL
WBC # BLD: 4.2 K/UL (ref 3.5–11)
WBC # BLD: ABNORMAL 10*3/UL

## 2018-03-08 PROCEDURE — 99232 SBSQ HOSP IP/OBS MODERATE 35: CPT | Performed by: INTERNAL MEDICINE

## 2018-03-08 PROCEDURE — 6370000000 HC RX 637 (ALT 250 FOR IP): Performed by: FAMILY MEDICINE

## 2018-03-08 PROCEDURE — 06H03DZ INSERTION OF INTRALUMINAL DEVICE INTO INFERIOR VENA CAVA, PERCUTANEOUS APPROACH: ICD-10-PCS | Performed by: SURGERY

## 2018-03-08 PROCEDURE — 36592 COLLECT BLOOD FROM PICC: CPT

## 2018-03-08 PROCEDURE — 37191 INS ENDOVAS VENA CAVA FILTR: CPT

## 2018-03-08 PROCEDURE — C1880 VENA CAVA FILTER: HCPCS

## 2018-03-08 PROCEDURE — 6360000002 HC RX W HCPCS: Performed by: INTERNAL MEDICINE

## 2018-03-08 PROCEDURE — 92526 ORAL FUNCTION THERAPY: CPT

## 2018-03-08 PROCEDURE — 99231 SBSQ HOSP IP/OBS SF/LOW 25: CPT | Performed by: NURSE PRACTITIONER

## 2018-03-08 PROCEDURE — 2580000003 HC RX 258: Performed by: INTERNAL MEDICINE

## 2018-03-08 PROCEDURE — 99233 SBSQ HOSP IP/OBS HIGH 50: CPT | Performed by: INTERNAL MEDICINE

## 2018-03-08 PROCEDURE — 2060000000 HC ICU INTERMEDIATE R&B

## 2018-03-08 PROCEDURE — 6370000000 HC RX 637 (ALT 250 FOR IP): Performed by: NURSE PRACTITIONER

## 2018-03-08 PROCEDURE — 80048 BASIC METABOLIC PNL TOTAL CA: CPT

## 2018-03-08 PROCEDURE — 82947 ASSAY GLUCOSE BLOOD QUANT: CPT

## 2018-03-08 PROCEDURE — 6370000000 HC RX 637 (ALT 250 FOR IP): Performed by: RADIOLOGY

## 2018-03-08 PROCEDURE — 37191 INS ENDOVAS VENA CAVA FILTR: CPT | Performed by: SURGERY

## 2018-03-08 PROCEDURE — C9113 INJ PANTOPRAZOLE SODIUM, VIA: HCPCS | Performed by: INTERNAL MEDICINE

## 2018-03-08 PROCEDURE — 97110 THERAPEUTIC EXERCISES: CPT

## 2018-03-08 PROCEDURE — 85025 COMPLETE CBC W/AUTO DIFF WBC: CPT

## 2018-03-08 PROCEDURE — B5191ZZ FLUOROSCOPY OF INFERIOR VENA CAVA USING LOW OSMOLAR CONTRAST: ICD-10-PCS | Performed by: SURGERY

## 2018-03-08 PROCEDURE — 2580000003 HC RX 258: Performed by: RADIOLOGY

## 2018-03-08 PROCEDURE — 82040 ASSAY OF SERUM ALBUMIN: CPT

## 2018-03-08 PROCEDURE — 6360000002 HC RX W HCPCS: Performed by: RADIOLOGY

## 2018-03-08 PROCEDURE — 36415 COLL VENOUS BLD VENIPUNCTURE: CPT

## 2018-03-08 PROCEDURE — 2500000003 HC RX 250 WO HCPCS: Performed by: SURGERY

## 2018-03-08 PROCEDURE — 94762 N-INVAS EAR/PLS OXIMTRY CONT: CPT

## 2018-03-08 PROCEDURE — 2580000003 HC RX 258: Performed by: EMERGENCY MEDICINE

## 2018-03-08 PROCEDURE — 85014 HEMATOCRIT: CPT

## 2018-03-08 PROCEDURE — 85730 THROMBOPLASTIN TIME PARTIAL: CPT

## 2018-03-08 PROCEDURE — 02HV33Z INSERTION OF INFUSION DEVICE INTO SUPERIOR VENA CAVA, PERCUTANEOUS APPROACH: ICD-10-PCS | Performed by: SURGERY

## 2018-03-08 PROCEDURE — 85018 HEMOGLOBIN: CPT

## 2018-03-08 PROCEDURE — 6360000004 HC RX CONTRAST MEDICATION: Performed by: INTERNAL MEDICINE

## 2018-03-08 PROCEDURE — 6360000002 HC RX W HCPCS: Performed by: FAMILY MEDICINE

## 2018-03-08 RX ORDER — ACETAMINOPHEN 160 MG/5ML
650 SOLUTION ORAL EVERY 4 HOURS PRN
Status: DISCONTINUED | OUTPATIENT
Start: 2018-03-08 | End: 2018-03-19 | Stop reason: HOSPADM

## 2018-03-08 RX ORDER — LIDOCAINE HYDROCHLORIDE 20 MG/ML
INJECTION, SOLUTION EPIDURAL; INFILTRATION; INTRACAUDAL; PERINEURAL
Status: COMPLETED | OUTPATIENT
Start: 2018-03-08 | End: 2018-03-08

## 2018-03-08 RX ORDER — QUETIAPINE FUMARATE 25 MG/1
25 TABLET, FILM COATED ORAL 2 TIMES DAILY
Status: DISCONTINUED | OUTPATIENT
Start: 2018-03-08 | End: 2018-03-09

## 2018-03-08 RX ORDER — ALBUMIN (HUMAN) 12.5 G/50ML
25 SOLUTION INTRAVENOUS ONCE
Status: DISCONTINUED | OUTPATIENT
Start: 2018-03-08 | End: 2018-03-08

## 2018-03-08 RX ORDER — INSULIN GLARGINE 100 [IU]/ML
12 INJECTION, SOLUTION SUBCUTANEOUS NIGHTLY
Status: DISCONTINUED | OUTPATIENT
Start: 2018-03-08 | End: 2018-03-19 | Stop reason: HOSPADM

## 2018-03-08 RX ORDER — FUROSEMIDE 10 MG/ML
20 INJECTION INTRAMUSCULAR; INTRAVENOUS ONCE
Status: COMPLETED | OUTPATIENT
Start: 2018-03-08 | End: 2018-03-08

## 2018-03-08 RX ADMIN — LIDOCAINE HYDROCHLORIDE 6 ML: 20 INJECTION, SOLUTION EPIDURAL; INFILTRATION; INTRACAUDAL; PERINEURAL at 09:23

## 2018-03-08 RX ADMIN — Medication 10 ML: at 21:22

## 2018-03-08 RX ADMIN — ACETAMINOPHEN 650 MG: 650 SOLUTION ORAL at 22:59

## 2018-03-08 RX ADMIN — PANTOPRAZOLE SODIUM 40 MG: 40 INJECTION, POWDER, FOR SOLUTION INTRAVENOUS at 12:16

## 2018-03-08 RX ADMIN — FUROSEMIDE 20 MG: 10 INJECTION, SOLUTION INTRAVENOUS at 11:48

## 2018-03-08 RX ADMIN — Medication 10 ML: at 12:17

## 2018-03-08 RX ADMIN — HEPARIN SODIUM AND DEXTROSE 10 UNITS/KG/HR: 10000; 5 INJECTION INTRAVENOUS at 04:24

## 2018-03-08 RX ADMIN — IOPAMIDOL 50 ML: 510 INJECTION, SOLUTION INTRAVASCULAR at 09:37

## 2018-03-08 RX ADMIN — SODIUM CHLORIDE: 9 INJECTION, SOLUTION INTRAVENOUS at 04:19

## 2018-03-08 RX ADMIN — CIPROFLOXACIN 400 MG: 2 INJECTION, SOLUTION INTRAVENOUS at 12:16

## 2018-03-08 RX ADMIN — DEXTROSE MONOHYDRATE 12.5 G: 25 INJECTION, SOLUTION INTRAVENOUS at 06:24

## 2018-03-08 RX ADMIN — PANTOPRAZOLE SODIUM 40 MG: 40 INJECTION, POWDER, FOR SOLUTION INTRAVENOUS at 21:51

## 2018-03-08 RX ADMIN — QUETIAPINE FUMARATE 25 MG: 25 TABLET ORAL at 21:22

## 2018-03-08 RX ADMIN — QUETIAPINE FUMARATE 25 MG: 25 TABLET ORAL at 11:50

## 2018-03-08 RX ADMIN — CLOPIDOGREL BISULFATE 75 MG: 75 TABLET ORAL at 11:48

## 2018-03-08 RX ADMIN — DEXTROSE MONOHYDRATE 12.5 G: 25 INJECTION, SOLUTION INTRAVENOUS at 11:49

## 2018-03-08 ASSESSMENT — PAIN SCALES - GENERAL
PAINLEVEL_OUTOF10: 5
PAINLEVEL_OUTOF10: 0
PAINLEVEL_OUTOF10: 8
PAINLEVEL_OUTOF10: 0

## 2018-03-08 ASSESSMENT — ENCOUNTER SYMPTOMS
EYES NEGATIVE: 1
GASTROINTESTINAL NEGATIVE: 1
RESPIRATORY NEGATIVE: 1
ALLERGIC/IMMUNOLOGIC NEGATIVE: 1

## 2018-03-08 ASSESSMENT — PAIN DESCRIPTION - PAIN TYPE: TYPE: ACUTE PAIN

## 2018-03-08 ASSESSMENT — PAIN DESCRIPTION - ORIENTATION: ORIENTATION: LEFT

## 2018-03-08 ASSESSMENT — PAIN DESCRIPTION - LOCATION: LOCATION: ARM

## 2018-03-08 NOTE — PROGRESS NOTES
Physical Therapy  Facility/Department: [facility] ICU  Daily Treatment Note  NAME: Diego Kelley  : 1940  MRN: [id number]    Date of Service: 3/8/2018    Patient Diagnosis(es):   Patient Active Problem List    Diagnosis Date Noted    Acute UTI     Thrombocytopenia ([address]     Acute deep vein thrombosis (DVT) of lower extremity ([address]     Suspected deep tissue injury 2018    DKA, type 2, not at goal [facility]) 2018    Sepsis ([address] 2018    UTI (urinary tract infection) 2018    Leukocytosis 2018    Increased anion gap metabolic acidosis     SAMINA (acute kidney injury) ([address] 2018    Hypernatremia 2018    Hypocalcemia 2018    Hypokalemia 2018    Controlled type 2 diabetes mellitus with hyperglycemia, without long-term current use of insulin ([address] 2018    Hyperglycemia     Left-sided weakness 2018    Cerebrovascular accident (CVA) ([address]     Left arm weakness     Facial droop     Dysarthria     Cerebral edema (HCC)     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 ([address] 01/10/2018       Past Medical History:   Diagnosis Date    Cerebral edema ([address] 01/10/2018    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 ([address] 01/10/2018    Chronic inflammatory demyelinating polyneuropathy (HCC)     CIDP (chronic inflammatory demyelinating polyneuropathy) ([address]     Diabetes mellitus ([address]     Hyperlipidemia     Hypertension     Left arm weakness 01/10/2018    Left-sided weakness 01/10/2018     Past Surgical History:   Procedure Laterality Date    CHOLECYSTECTOMY      HYSTERECTOMY      partial    HYSTERECTOMY      THROMBECTOMY  01/10/2018    EMERGENT WITH CEREBRAL ANGIOGRAM    TONSILLECTOMY      TRANSESOPHAGEAL ECHOCARDIOGRAM  2018       Restrictions  Restrictions/Precautions  Restrictions/Precautions: Fall Risk  Required Braces or Orthoses? :  (bilateral PRAFOs)  Subjective   General  Chart

## 2018-03-08 NOTE — PROGRESS NOTES
Dr. Tunde Buck at bedside to evaluate patient. Continue to monitor H/H. Repeat occult stool in a few days.

## 2018-03-08 NOTE — CONSULTS
Capillary refill takes less than 2 seconds. Imaging/Labs:     Venous duplex reveals left popliteal to common femoral acute DVT, acute thrombus in saphenofemoral junction. Platelet count this morning is 105, Cr 0.58    Assessment and Plan:     Acute left leg DVT with contraindication for long term anticoagulation  · Will plan for permanent IVC filter ([place])  · Consent obtained from daughter (POA)  · Hold heparin drip this morning     Electronically signed by Yadi Oliva MD on 3/8/18 at 8:48 AM      [address] [place] [address] Dr: [phone]  C: [phone]  Email: [email or web address]

## 2018-03-08 NOTE — PROGRESS NOTES
(36.3 °C) (Axillary)   Resp 19   Ht 5' 9\" (1.753 m)   Wt 207 lb 4.8 oz (94 kg)   SpO2 97%   BMI 30.61 kg/m²   General Appearance: well-developed and well nourished, in no acute distress and alert  Skin: warm and dry and purple discoloration to left heel and sacrum. Sacral wound evolving and open with a scant amount of serosanguinous drainage. Right heel dry and intact with purple discoloration  The patients pain isPain Level: 0  . Assessment:     Patient Active Problem List   Diagnosis    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 ([address]    Cerebrovascular accident (CVA) ([address]    Left arm weakness    Facial droop    Dysarthria    Cerebral edema (HCC)    Left-sided weakness    Hyperglycemia    Controlled type 2 diabetes mellitus with hyperglycemia, without long-term current use of insulin ([address]    DKA, type 2, not at goal [facility])    Sepsis ([address]    UTI (urinary tract infection)    Leukocytosis    Increased anion gap metabolic acidosis    SAMINA (acute kidney injury) ([address]    Hypernatremia    Hypocalcemia    Hypokalemia    Suspected deep tissue injury    Acute UTI    Thrombocytopenia (HCC)    Acute deep vein thrombosis (DVT) of lower extremity (HCC)       Measurements:  Wound 03/01/18 Coccyx non-blanchable purple, unstageable pressure ulcer (Active)   Wound Image   3/2/2018  8:00 AM   Wound Type Wound 3/8/2018 12:47 PM   Wound Deep tissue/Injury 3/8/2018 12:47 PM   Dressing Status Clean;Dry; Intact 3/8/2018 12:47 PM   Dressing Changed Changed/New 3/2/2018  4:00 AM   Dressing/Treatment Foam 3/8/2018 12:47 PM   Wound Cleansed Other (Comment) 3/1/2018  3:00 PM   Dressing Change Due 03/04/18 3/4/2018  4:00 AM   Wound Length (cm) 6 cm 3/8/2018 12:47 PM   Wound Width (cm) 5 cm 3/8/2018 12:47 PM   Calculated Wound Size (cm^2) (l*w) 30 cm^2 3/8/2018 12:47 PM   Change in Wound Size % (l*w) 16.67 3/8/2018 12:47 PM   Wound Assessment Other (Comment) 3/8/2018 12:47 PM by JOEL Rainey, CNP on 3/8/2018 at 12:53 PM

## 2018-03-08 NOTE — CARE COORDINATION
ONGOING DISCHARGE PLAN:    Plan is for pt to return home with daughter and vns Vicky  dino is started and signed,   Remains in ICU, on hep Gtt, + DVT- acute left leg , to have IVC placed  Nephrology note;  Assessment/plan:  1. Acute kidney injurymost consistent with ischemic acute tubular necrosis as well as component of prerenal azotemia. Renal ultrasound showed no hydronephrosis. 2.  Acute left leg deep vein thrombosis  continue Heparin drip. 3.  Hypotension - secondary to urosepsis. Hemodynamic profile is improving with antibiotics [IV ciprofloxacin 400 mg every 12 hours]. 4.  Hypocalcemiasecondary to hypoalbuminemia and vitamin D deficiency ,corrected calcium is within normal limits Continue ergocalciferol 50,000 units p.o. weekly  5. Protein calorie malnutritionand tinea tube feeding.     Plan:  IV fluids KVO  Continue tube feeding  Avoid positive balance  IV Lasix daily first dose now. Avoid hypotension    Will continue to follow for additional discharge needs.     Electronically signed by Shanique Meléndez RN on 3/8/2018 at 7:51 AM

## 2018-03-08 NOTE — PROGRESS NOTES
dextrose 5 % 250 mL IVPB  15 mmol Intravenous PRN Shoaib Grimaldo MD        Or    sodium phosphate 20 mmol in dextrose 5 % 500 mL IVPB  20 mmol Intravenous PRN Shoaib Grimaldo MD           Allergies:  Demeclocycline and Tetracyclines & related    Social History:   reports that she has never smoked. She has never used smokeless tobacco. She reports that she does not drink alcohol or use drugs. Family History: family history is not on file. Family history was reviewed and no pertinent family history noted. REVIEW OF SYSTEMS:    She feels better   Still tired  No fever chills    PHYSICAL EXAM:      BP (!) 148/82   Pulse 71   Temp 97.7 °F (36.5 °C) (Axillary)   Resp 18   Ht 5' 9\" (1.753 m)   Wt 207 lb 4.8 oz (94 kg)   SpO2 97%   BMI 30.61 kg/m²    Temp (24hrs), Av °F (36.7 °C), Min:97.7 °F (36.5 °C), Max:98.3 °F (36.8 °C)  General appearance - ill appearing, no in pain or distress   Mental status - more awake today  Eyes - pupils equal and reactive, extraocular eye movements intact   Mouth - mucous membranes moist, pharynx normal without lesions   Neck - supple, no significant adenopathy   Lymphatics - no palpable lymphadenopathy, no hepatosplenomegaly   Chest - clear to auscultation, no wheezes, rales or rhonchi, symmetric air entry   Heart - normal rate, regular rhythm, normal S1, S2, no murmurs  Abdomen - soft, nontender, nondistended, no masses or organomegaly   Neurological - non focal  Musculoskeletal - no joint tenderness, deformity or swelling   Extremities - peripheral pulses normal, ++ pedal edema, no clubbing or cyanosis   Skin - normal coloration and turgor, no rashes, no suspicious skin lesions noted ,    DATA:    Labs:   CBC:   Recent Labs      18   0404   18   0011  18   0427   WBC  4.2   --    --   4.2   HGB  9.1*   < >  9.3*  9.1*   HCT  27.2*   < >  28.5*  27.6*   PLT  49*   --    --   105*    < > = values in this interval not displayed.      BMP:   Recent Labs

## 2018-03-08 NOTE — PROGRESS NOTES
norepinephrine Stopped (18 1252)     PRN Meds: Magic Mouthwash, glucose, dextrose, glucagon (rDNA), dextrose, dextrose, potassium chloride, magnesium sulfate, sodium phosphate IVPB **OR** sodium phosphate IVPB **OR** sodium phosphate IVPB    Data:     Past Medical History:   has a past medical history of Cerebral edema ([facility] 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 ([facility] Chronic inflammatory demyelinating polyneuropathy (HCC); CIDP (chronic inflammatory demyelinating polyneuropathy) ([facility] Diabetes mellitus ([facility] Hyperlipidemia; Hypertension; Left arm weakness; and Left-sided weakness. Social History:   reports that she has never smoked. She has never used smokeless tobacco. She reports that she does not drink alcohol or use drugs. Family History: History reviewed. No pertinent family history. Vitals:  BP (!) 92/39   Pulse 68   Temp 97.7 °F (36.5 °C) (Axillary)   Resp 17   Ht 5' 9\" (1.753 m)   Wt 207 lb 4.8 oz (94 kg)   SpO2 98%   BMI 30.61 kg/m²   Temp (24hrs), Av.9 °F (36.6 °C), Min:97.5 °F (36.4 °C), Max:98.3 °F (36.8 °C)    Recent Labs      18   2112  18   1245  18   1614  18   POCGLU  129*  134*  168*  168*       I/O (24Hr):     Intake/Output Summary (Last 24 hours) at 18 0734  Last data filed at 18 1800   Gross per 24 hour   Intake             2225 ml   Output             2000 ml   Net              225 ml       Labs:    Recent Results (from the past 24 hour(s))   TYPE AND SCREEN    Collection Time: 18  7:47 AM   Result Value Ref Range    Expiration Date 03/10/2018     Arm Band Number [id number]     ABO/Rh A NEGATIVE     Antibody Screen NEGATIVE    PREPARE PLATELETS (CROSSMATCH) Number of Doses Requested (1 dose = 4 to 6 units or 1 Single Donor): 2    Collection Time: 18  9:37 AM   Result Value Ref Range    Unit Number [id number]     Product Code Leukocyte Reduced Irradiated Plateletpheresis     Unit PROVIDED HISTORY: Additional Contrast?->Radiologist Recommendation Ordering Physician Provided Reason for Exam: PATIENT STATES ABDOMINAL PAIN FINDINGS: Lower Chest: There are trace bilateral pleural effusions and bilateral lower lobe subsegmental atelectasis. There is mild global cardiomegaly. Tiny hiatus hernia is present. Organs: Exam is limited by the absence of intravenous contrast. No focal liver lesion. The gallbladder is surgically absent. No significant biliary ductal dilatation. The spleen is normal in size without focal lesion. There is moderate fatty atrophy of the pancreas without focal lesion or ductal dilatation. The adrenal glands are unremarkable. No urinary tract calculus or collecting system dilatation. No focal liver lesion. GI/Bowel: The appendix is normal.  No bowel obstruction. There is moderate colonic diverticulosis without evidence for diverticulitis. Pelvis: There is a Alegre catheter and air in a decompressed bladder. Remote hysterectomy. Peritoneum/Retroperitoneum: There is moderate presacral edema. No abdominal lymphadenopathy or ascites. Bones/Soft Tissues: There is mild diffuse subcutaneous edema/generalized anasarca. Moderate-severe thoracolumbar scoliosis with moderate lumbar levoscoliosis. Small fat containing umbilical hernia. There is relative enlargement of the left iliofemoral vein compared to the right with increased soft tissue swelling of the left upper thigh. The possibility of left DVT/thrombus could be entertained. There is mild generalized anasarca. Relative enlargement of the left iliofemoral with increased swelling of the left upper thigh. The possibility of the presence of left DVT/thrombus could be entertained. Left lower extremity ultrasound Doppler could be obtained as clinically indicated. Otherwise, no acute process in the abdomen or pelvis.      Xr Chest (single View Frontal)    Result Date: 3/1/2018  EXAMINATION: SINGLE VIEW OF THE CHEST intravenous contrast. Dose modulation, iterative reconstruction, and/or weight based adjustment of the mA/kV was utilized to reduce the radiation dose to as low as reasonably achievable. COMPARISON: January 10 HISTORY: ORDERING SYSTEM PROVIDED HISTORY: AMS hx of CVA with Left sided deficits TECHNOLOGIST PROVIDED HISTORY: Has a \"code stroke\" or \"stroke alert\" been called? ->No Ordering Physician Provided Reason for Exam: AMS, HX OF CVA WITH LEFT SIDED DEFICITS Additional signs and symptoms: PATIENT UNABLE TO GIVE HISTORY. UNABLE TO STRAIGHTEN HAD FOR EXAM. FINDINGS: BRAIN/VENTRICLES: There is a large amount of volume loss in the right MCA distribution including in the perisylvian region. This is likely due to the patient's recent infarct. There is curvilinear high density in the right sylvian fissures suggesting age-indeterminate thrombus in the MCA branch. There is low-density and loss of differentiation in the right lentiform nucleus and lateral thalamic region. Focal low density in the right caudate head is noted suggesting prior infarct. ORBITS: The visualized portion of the orbits demonstrate no acute abnormality. SINUSES: The visualized paranasal sinuses and mastoid air cells demonstrate no acute abnormality. SOFT TISSUES/SKULL:  No acute abnormality of the visualized skull or soft tissues. 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 There is surrounding low density extending deep into the lentiform nucleus region and right lateral thalamic region. It is unclear whether this is gliosis from the prior ischemic event or a subtle superimposed area of new ischemia. MRI may be more helpful High density in the right MCA within the sylvian fissure. This is age indeterminate given the recent infarct. This may represent sequela of prior thrombus, however new thrombus not excluded.   Again if the patient 8 current right hemispheric stroke deep and superficial systems. Findings are:   **Abnormal Study**   Right:  Technically limited study as stated above however in the visualized areas  no superficial or deep vein thrombosis was identified. Left:  Acute deep venous thrombosis extending from the popliteal vein into the  common femoral vein. Signature   ----------------------------------------------------------------  Electronically signed by Mary Li RVT(Sonographer) on  03/06/2018 03:00 PM  ----------------------------------------------------------------   ----------------------------------------------------------------  Electronically signed by Hong Rossi(Interpreting  physician) on 03/06/2018 05:02 PM  ----------------------------------------------------------------  Findings:   Right Impression:                          Left Impression:  The common femoral, femoral, popliteal and The popliteal to the common  tibial veins demonstrate normal            femoral veins are  compressibility and augmentation. non-compressible. Thrombus appears acute based on  Limited visualization of the lower thigh   B-Mode image evaluation. and calf veins. The saphenofemoral junction  Normal compressibility of the great        demonstrates no  saphenous vein. compressibility. Thrombus appears acute based on  Normal compressibility of the small        B-Mode image evaluation. saphenous vein. Limited visualization of the                                             calf veins. Normal compressibility of the                                             great saphenous vein.                                               Normal compressibility of the                                             small vein is dilated and                                 non-compressible with hypoechoic echoes                                 from proximal knee to saphenofemoral                                 junction. The small saphenous vein is dilated and                                 non-compressible with hypoechoic echoes.         Physical Examination:        Physical Exam    Pt in OR    Assessment:        Primary Problem  DKA, type 2, not at goal [facility])    Active Hospital Problems    Diagnosis Date Noted    Acute UTI [N39.0]     Thrombocytopenia ([facility] [D69.6]     Acute deep vein thrombosis (DVT) of lower extremity ([address] [I82.409]     Suspected deep tissue injury [Z04.9] 03/02/2018    DKA, type 2, not at goal [facility]) [E13.10] 03/01/2018    Sepsis ([facility] [A41.9] 03/01/2018    UTI (urinary tract infection) [N39.0] 03/01/2018    Leukocytosis [D72.829] 03/01/2018    Increased anion gap metabolic acidosis [Z24.4] 03/01/2018    SAMINA (acute kidney injury) ([facility] [N17.9] 03/01/2018    Hypernatremia [E87.0] 03/01/2018    Hypocalcemia [E83.51] 03/01/2018    Hypokalemia [E87.6] 03/01/2018       Plan:        LLE DVT, new onset TTP/pain in RLE, stable  - CT abd for new onset abd pain showed thigh swelling, iliofemoral enlargement  - Venous duplex LLE showing common femoral, femoral, popliteal, great/small saphenous veins  - B/L venous repeat shows R neg, L great/small saphenous open, other still thrombosed  - R arterial scan neg  - Hemeonc consult   - HIT ab neg/Ser Rel Ass pending   - monitor platelets, transfuse <50, consider IVC filter if cannot anticoagulate  - Platelets 84->338 s/p transfusion   - transfused 2 units platelets 3/7  - Hgb stable 11->9.1->9.1  - FOBT pos  - Vasc consult   - IVC filter today  - Will dc heparin drip  - H&H Q8H     Poss repeat CVA  - neuro on board  - EEG legible portions normal  - MRI when Pt stable  - Swallow eval rec pureed w/ thin liquids    UTI sepsis with

## 2018-03-08 NOTE — PROGRESS NOTES
sulfate 1 g in dextrose 5% 100 mL IVPB PRN   sodium phosphate 10 mmol in dextrose 5 % 250 mL IVPB PRN   Or    sodium phosphate 15 mmol in dextrose 5 % 250 mL IVPB PRN   Or    sodium phosphate 20 mmol in dextrose 5 % 500 mL IVPB PRN       Allergies:  Demeclocycline and Tetracyclines & related      Objective:  CURRENT TEMPERATURE:  Temp: 97.4 °F (36.3 °C)  MAXIMUM TEMPERATURE OVER 24HRS:  Temp (24hrs), Av.7 °F (36.5 °C), Min:97.4 °F (36.3 °C), Max:98.1 °F (36.7 °C)    CURRENT RESPIRATORY RATE:  Resp: 19  CURRENT PULSE:  Pulse: 69  CURRENT BLOOD PRESSURE:  BP: (!) 142/71  24HR BLOOD PRESSURE RANGE:  Systolic (03RTV), HTW:012 , Min:92 , VDB:811   ; Diastolic (36CLB), MDK:27, Min:39, Max:101    24HR INTAKE/OUTPUT:      Intake/Output Summary (Last 24 hours) at 18 1405  Last data filed at 18 1800   Gross per 24 hour   Intake           2002.5 ml   Output             2000 ml   Net              2.5 ml     No data found. Physical Exam:  GENERAL APPEARANCE: More awake and alert responsive  HEAD: normocephalic  EYES:  Not pale, anicteric   NOSE:  No nasal discharge. THROAT:  Oral cavity and pharynx normal.  Dry  CARDIAC: Normal S1 and S2. No S3, S4 or murmurs. Rhythm is regular. LUNGS: Clear to auscultation and percussion without rales, rhonchi, wheezing or diminished breath sounds. NECK: Neck supple, non-tender without lymphadenopathy, masses or thyromegaly. MUSKULOSKELETAL: Adequately aligned spine. No joint erythema or tenderness. EXTREMITIES: 2+ edema. Peripheral pulses intact.    NEURO: Moving upper extremities      Labs:   CBC:  Recent Labs      18   0501  18   0404  18   1608  18   0011  18   0427   WBC  7.2  4.2   --    --   4.2   RBC  3.76*  3.09*   --    --   3.15*   HGB  11.0*  9.1*  8.9*  9.3*  9.1*   HCT  32.7*  27.2*  26.6*  28.5*  27.6*   MCV  86.9  88.0   --    --   87.6   MCH  29.2  29.3   --    --   28.9   MCHC  33.6  33.3   --    --   33.0   RDW

## 2018-03-08 NOTE — OP NOTE
The  [place] filter dilator and sheath was then brought up under fluoroscopy  Into the inferior vena cava and dilator was removed. An inferior vena  cavogram was performed. The renal vein orfice was identified and  noted. The inferior vena cava was of normal caliber without any filling defect. The permanent [place] IVC filter was then brought up over a wire  and deployed just below the renal vein. The wire and the sheath were then  removed. A manual pressure was held for hemostasis. The patient tolerated  the procedure well.         Kevin Rubio MD    D: 03/08/2018 9:49:14       T: 03/08/2018 11:25:49     [email or web address]  Job#: [id number]     Doc#: [id number]    CC:

## 2018-03-09 LAB
ABSOLUTE EOS #: 0.1 K/UL (ref 0–0.4)
ABSOLUTE IMMATURE GRANULOCYTE: ABNORMAL K/UL (ref 0–0.3)
ABSOLUTE LYMPH #: 1.1 K/UL (ref 1–4.8)
ABSOLUTE MONO #: 0.4 K/UL (ref 0.1–1.3)
ANION GAP SERPL CALCULATED.3IONS-SCNC: 8 MMOL/L (ref 9–17)
BASOPHILS # BLD: 1 % (ref 0–2)
BASOPHILS ABSOLUTE: 0 K/UL (ref 0–0.2)
BUN BLDV-MCNC: 23 MG/DL (ref 8–23)
BUN/CREAT BLD: ABNORMAL (ref 9–20)
CALCIUM SERPL-MCNC: 7.6 MG/DL (ref 8.6–10.4)
CHLORIDE BLD-SCNC: 105 MMOL/L (ref 98–107)
CO2: 27 MMOL/L (ref 20–31)
CREAT SERPL-MCNC: 0.57 MG/DL (ref 0.5–0.9)
DIFFERENTIAL TYPE: ABNORMAL
EOSINOPHILS RELATIVE PERCENT: 3 % (ref 0–4)
GFR AFRICAN AMERICAN: >60 ML/MIN
GFR NON-AFRICAN AMERICAN: >60 ML/MIN
GFR SERPL CREATININE-BSD FRML MDRD: ABNORMAL ML/MIN/{1.73_M2}
GFR SERPL CREATININE-BSD FRML MDRD: ABNORMAL ML/MIN/{1.73_M2}
GLUCOSE BLD-MCNC: 151 MG/DL (ref 65–105)
GLUCOSE BLD-MCNC: 157 MG/DL (ref 65–105)
GLUCOSE BLD-MCNC: 159 MG/DL (ref 65–105)
GLUCOSE BLD-MCNC: 166 MG/DL (ref 65–105)
GLUCOSE BLD-MCNC: 172 MG/DL (ref 70–99)
HCT VFR BLD CALC: 27 % (ref 36–46)
HCT VFR BLD CALC: 28.4 % (ref 36–46)
HCT VFR BLD CALC: 29.4 % (ref 36–46)
HEMOGLOBIN: 8.8 G/DL (ref 12–16)
HEMOGLOBIN: 9.5 G/DL (ref 12–16)
HEMOGLOBIN: 9.5 G/DL (ref 12–16)
IMMATURE GRANULOCYTES: ABNORMAL %
LYMPHOCYTES # BLD: 30 % (ref 24–44)
MCH RBC QN AUTO: 29.4 PG (ref 26–34)
MCHC RBC AUTO-ENTMCNC: 33.4 G/DL (ref 31–37)
MCV RBC AUTO: 88.2 FL (ref 80–100)
MONOCYTES # BLD: 11 % (ref 1–7)
NRBC AUTOMATED: ABNORMAL PER 100 WBC
PARTIAL THROMBOPLASTIN TIME: 29.5 SEC (ref 23–31)
PDW BLD-RTO: 16.5 % (ref 11.5–14.9)
PLATELET # BLD: 95 K/UL (ref 150–450)
PLATELET ESTIMATE: ABNORMAL
PMV BLD AUTO: 9.9 FL (ref 6–12)
POTASSIUM SERPL-SCNC: 3.7 MMOL/L (ref 3.7–5.3)
RBC # BLD: 3.22 M/UL (ref 4–5.2)
RBC # BLD: ABNORMAL 10*6/UL
SEG NEUTROPHILS: 55 % (ref 36–66)
SEGMENTED NEUTROPHILS ABSOLUTE COUNT: 2.1 K/UL (ref 1.3–9.1)
SODIUM BLD-SCNC: 140 MMOL/L (ref 135–144)
WBC # BLD: 3.8 K/UL (ref 3.5–11)
WBC # BLD: ABNORMAL 10*3/UL

## 2018-03-09 PROCEDURE — 6370000000 HC RX 637 (ALT 250 FOR IP): Performed by: FAMILY MEDICINE

## 2018-03-09 PROCEDURE — 97110 THERAPEUTIC EXERCISES: CPT

## 2018-03-09 PROCEDURE — 99232 SBSQ HOSP IP/OBS MODERATE 35: CPT | Performed by: INTERNAL MEDICINE

## 2018-03-09 PROCEDURE — 51702 INSERT TEMP BLADDER CATH: CPT

## 2018-03-09 PROCEDURE — 36592 COLLECT BLOOD FROM PICC: CPT

## 2018-03-09 PROCEDURE — 85025 COMPLETE CBC W/AUTO DIFF WBC: CPT

## 2018-03-09 PROCEDURE — 2580000003 HC RX 258: Performed by: INTERNAL MEDICINE

## 2018-03-09 PROCEDURE — 6370000000 HC RX 637 (ALT 250 FOR IP): Performed by: NURSE PRACTITIONER

## 2018-03-09 PROCEDURE — 82947 ASSAY GLUCOSE BLOOD QUANT: CPT

## 2018-03-09 PROCEDURE — 80048 BASIC METABOLIC PNL TOTAL CA: CPT

## 2018-03-09 PROCEDURE — 6360000002 HC RX W HCPCS: Performed by: INTERNAL MEDICINE

## 2018-03-09 PROCEDURE — 6360000002 HC RX W HCPCS: Performed by: RADIOLOGY

## 2018-03-09 PROCEDURE — 6370000000 HC RX 637 (ALT 250 FOR IP): Performed by: RADIOLOGY

## 2018-03-09 PROCEDURE — 2060000000 HC ICU INTERMEDIATE R&B

## 2018-03-09 PROCEDURE — 85018 HEMOGLOBIN: CPT

## 2018-03-09 PROCEDURE — 97530 THERAPEUTIC ACTIVITIES: CPT

## 2018-03-09 PROCEDURE — 85730 THROMBOPLASTIN TIME PARTIAL: CPT

## 2018-03-09 PROCEDURE — 36415 COLL VENOUS BLD VENIPUNCTURE: CPT

## 2018-03-09 PROCEDURE — 85014 HEMATOCRIT: CPT

## 2018-03-09 PROCEDURE — C9113 INJ PANTOPRAZOLE SODIUM, VIA: HCPCS | Performed by: INTERNAL MEDICINE

## 2018-03-09 RX ORDER — MIDODRINE HYDROCHLORIDE 5 MG/1
5 TABLET ORAL EVERY 8 HOURS PRN
Status: DISCONTINUED | OUTPATIENT
Start: 2018-03-09 | End: 2018-03-19 | Stop reason: HOSPADM

## 2018-03-09 RX ORDER — QUETIAPINE FUMARATE 50 MG/1
50 TABLET, FILM COATED ORAL NIGHTLY
Status: DISCONTINUED | OUTPATIENT
Start: 2018-03-10 | End: 2018-03-19 | Stop reason: HOSPADM

## 2018-03-09 RX ADMIN — CIPROFLOXACIN 400 MG: 2 INJECTION, SOLUTION INTRAVENOUS at 12:42

## 2018-03-09 RX ADMIN — INSULIN LISPRO 1 UNITS: 100 INJECTION, SOLUTION INTRAVENOUS; SUBCUTANEOUS at 16:35

## 2018-03-09 RX ADMIN — INSULIN LISPRO 1 UNITS: 100 INJECTION, SOLUTION INTRAVENOUS; SUBCUTANEOUS at 07:50

## 2018-03-09 RX ADMIN — INSULIN LISPRO 1 UNITS: 100 INJECTION, SOLUTION INTRAVENOUS; SUBCUTANEOUS at 11:17

## 2018-03-09 RX ADMIN — Medication 10 ML: at 10:11

## 2018-03-09 RX ADMIN — INSULIN LISPRO 1 UNITS: 100 INJECTION, SOLUTION INTRAVENOUS; SUBCUTANEOUS at 20:50

## 2018-03-09 RX ADMIN — Medication 12 UNITS: at 20:50

## 2018-03-09 RX ADMIN — PANTOPRAZOLE SODIUM 40 MG: 40 INJECTION, POWDER, FOR SOLUTION INTRAVENOUS at 20:38

## 2018-03-09 RX ADMIN — CLOPIDOGREL BISULFATE 75 MG: 75 TABLET ORAL at 07:50

## 2018-03-09 RX ADMIN — ACETAMINOPHEN 650 MG: 650 SOLUTION ORAL at 20:38

## 2018-03-09 RX ADMIN — CIPROFLOXACIN 400 MG: 2 INJECTION, SOLUTION INTRAVENOUS at 00:25

## 2018-03-09 RX ADMIN — QUETIAPINE FUMARATE 25 MG: 25 TABLET ORAL at 07:50

## 2018-03-09 RX ADMIN — Medication 10 ML: at 20:38

## 2018-03-09 RX ADMIN — PANTOPRAZOLE SODIUM 40 MG: 40 INJECTION, POWDER, FOR SOLUTION INTRAVENOUS at 09:00

## 2018-03-09 ASSESSMENT — PAIN DESCRIPTION - LOCATION: LOCATION: FOOT

## 2018-03-09 ASSESSMENT — PAIN SCALES - WONG BAKER: WONGBAKER_NUMERICALRESPONSE: 4

## 2018-03-09 ASSESSMENT — PAIN SCALES - GENERAL
PAINLEVEL_OUTOF10: 7
PAINLEVEL_OUTOF10: 5

## 2018-03-09 ASSESSMENT — PAIN DESCRIPTION - ORIENTATION: ORIENTATION: RIGHT;LEFT

## 2018-03-09 ASSESSMENT — PAIN DESCRIPTION - PAIN TYPE: TYPE: ACUTE PAIN

## 2018-03-09 NOTE — PLAN OF CARE
Problem: Falls - Risk of  Goal: Absence of falls  Outcome: Ongoing  No falls this shift. Patient is alert and oriented. Call light is within reach. Problem: Pain:  Goal: Control of acute pain  Control of acute pain   Outcome: Ongoing  No c/o acute pain this shift.

## 2018-03-09 NOTE — PROGRESS NOTES
chloride 10 mEq/100 mL IVPB (Peripheral Line) PRN   magnesium sulfate 1 g in dextrose 5% 100 mL IVPB PRN   sodium phosphate 10 mmol in dextrose 5 % 250 mL IVPB PRN   Or    sodium phosphate 15 mmol in dextrose 5 % 250 mL IVPB PRN   Or    sodium phosphate 20 mmol in dextrose 5 % 500 mL IVPB PRN       Allergies:  Demeclocycline and Tetracyclines & related      Objective:  CURRENT TEMPERATURE:  Temp: 98.6 °F (37 °C)  MAXIMUM TEMPERATURE OVER 24HRS:  Temp (24hrs), Av °F (36.7 °C), Min:97.2 °F (36.2 °C), Max:98.6 °F (37 °C)    CURRENT RESPIRATORY RATE:  Resp: 14  CURRENT PULSE:  Pulse: 79  CURRENT BLOOD PRESSURE:  BP: (!) 107/50  24HR BLOOD PRESSURE RANGE:  Systolic (42NFC), AFT:296 , Min:106 , MOC:706   ; Diastolic (75YWR), AMY:15, Min:37, Max:87    24HR INTAKE/OUTPUT:      Intake/Output Summary (Last 24 hours) at 18 1740  Last data filed at 18 1200   Gross per 24 hour   Intake             1489 ml   Output             1475 ml   Net               14 ml     Patient Vitals for the past 96 hrs (Last 3 readings):   Weight   18 0515 216 lb 0.8 oz (98 kg)       Physical Exam:  GENERAL APPEARANCE: More awake and alert responsive  HEAD: normocephalic  EYES:  Not pale, anicteric   NOSE:  No nasal discharge. THROAT:  Oral cavity and pharynx normal.  Dry  CARDIAC: Normal S1 and S2. No S3, S4 or murmurs. Rhythm is regular. LUNGS: Clear to auscultation and percussion without rales, rhonchi, wheezing or diminished breath sounds. NECK: Neck supple, non-tender without lymphadenopathy, masses or thyromegaly. MUSKULOSKELETAL: Adequately aligned spine. No joint erythema or tenderness. EXTREMITIES: 2+ edema. Peripheral pulses intact.    NEURO: Moving upper extremities      Labs:   CBC:  Recent Labs      18   0404   18   0427   18   2348  18   0525  18   1402   WBC  4.2   --   4.2   --    --   3.8   --    RBC  3.09*   --   3.15*   --    --   3.22*   --    HGB  9.1*   < >  9.1*   < >  9.5*  9.5*  8.8*   HCT  27.2*   < >  27.6*   < >  29.4*  28.4*  27.0*   MCV  88.0   --   87.6   --    --   88.2   --    MCH  29.3   --   28.9   --    --   29.4   --    MCHC  33.3   --   33.0   --    --   33.4   --    RDW  16.3*   --   16.3*   --    --   16.5*   --    PLT  49*   --   105*   --    --   95*   --    MPV  10.7   --   9.3   --    --   9.9   --     < > = values in this interval not displayed. BMP:   Recent Labs      03/06/18   1950  03/08/18 0427 03/09/18   0525   NA  135  140  140   K  4.3  3.7  3.7   CL  102  105  105   CO2  22  24  27   BUN  27*  25*  23   CREATININE  0.73  0.58  0.57   GLUCOSE  139*  69*  172*   CALCIUM  6.3*  7.2*  7.6*      Phosphorus:    Recent Labs      03/06/18 1950   PHOS  3.0     Magnesium:   Recent Labs      03/06/18 1950   MG  1.7     Albumin:   Recent Labs      03/08/18 0427   LABALBU  1.9*       IRON:    No results for input(s): IRON in the last 72 hours. Iron Saturation:  Invalid input(s): PERCENTFE  TIBC:    No results for input(s): TIBC in the last 72 hours. FERRITIN:    No results for input(s): FERRITIN in the last 72 hours. SPEP: No results for input(s): SPEP in the last 72 hours. No results for input(s): PROT, ALBCAL, ALBCAL, ALBPCT, LABALPH, A1PCT, LABALPH, A2PCT, LABBETA, BETAPCT, GAMGLOB, GGPCT, PATH in the last 72 hours. UPEP:   No results for input(s): TPU in the last 72 hours. Urine Sodium:    No results for input(s): MICHELE in the last 72 hours. Urine Potassium:   No results for input(s): KUR in the last 72 hours. Urine Chloride: Invalid input(s): CLU  Urine Ph:  Invalid input(s): PO4U  Urine Osmolarity: No results for input(s): OSMOU in the last 72 hours. Urine Creatinine:    No results for input(s): LABCREA in the last 72 hours. Urine Eosinophils: Invalid input(s): EOSU  Urine Protein:    No results for input(s): TPU in the last 72 hours.   Urinalysis:    No results for input(s): Lexi Escalona, LE, MOLLY, Golden Buck, RBCUA, MUCUS,

## 2018-03-09 NOTE — PROGRESS NOTES
amLODIPine (NORVASC) 10 MG tablet Take 1 tablet by mouth daily 1/18/18  Yes Erin Morataya MD   tamsulosin [facility]) 0.4 MG capsule Take 1 capsule by mouth daily 1/18/18  Yes Erin Morataya MD   clopidogrel (PLAVIX) 75 MG tablet Take 1 tablet by mouth daily 1/18/18  Yes Erin Morataya MD   metFORMIN (GLUCOPHAGE) 1000 MG tablet Take 1,000 mg by mouth 2 times daily (with meals)   Yes Historical Provider, MD   losartan (COZAAR) 100 MG tablet Take 100 mg by mouth daily   Yes Historical Provider, MD   simvastatin (ZOCOR) 20 MG tablet Take 1 tablet by mouth nightly 1/17/18   Erin Morataya MD   metFORMIN (GLUCOPHAGE) 1000 MG tablet Take 1,000 mg by mouth 2 times daily (with meals)    Historical Provider, MD   losartan (COZAAR) 100 MG tablet Take 100 mg by mouth daily    Historical Provider, MD   Omega-3 Fatty Acids (FISH OIL) 1000 MG CAPS Take 3,000 mg by mouth 3 times daily    Historical Provider, MD   glimepiride (AMARYL) 4 MG tablet Take 4 mg by mouth 2 times daily    Historical Provider, MD   amLODIPine (NORVASC) 5 MG tablet Take 5 mg by mouth daily    Historical Provider, MD   fenofibrate (TRICOR) 48 MG tablet Take 48 mg by mouth daily    Historical Provider, MD   aspirin 81 MG tablet Take 81 mg by mouth daily    Historical Provider, MD   Omega-3 Fatty Acids (FISH OIL) 1000 MG CAPS Take 3,000 mg by mouth daily    Historical Provider, MD   atenolol (TENORMIN) 50 MG tablet Take 50 mg by mouth daily    Historical Provider, MD     Current Facility-Administered Medications   Medication Dose Route Frequency Provider Last Rate Last Dose    [START ON 3/10/2018] QUEtiapine (SEROQUEL) tablet 50 mg  50 mg Oral Nightly Lillian Louis MD        insulin glargine (LANTUS) injection vial 12 Units  12 Units Subcutaneous Nightly Lillian Louis MD        acetaminophen (TYLENOL) 160 MG/5ML solution 650 mg  650 mg Oral Q4H PRN Steve Zarate CNP   650 mg at 03/08/18 4894    0.9 % sodium chloride bolus  250 mL Intravenous Once

## 2018-03-09 NOTE — PROGRESS NOTES
pains, muscle aches, swelling of joints  NEUROLOGICAL:  Positive for chronic L hemiplegia  BEHAVIOR/PSYCH:  Confusion, agitation, personality change, improving    Medications: Allergies: Allergies   Allergen Reactions    Demeclocycline Shortness Of Breath    Tetracyclines & Related Shortness Of Breath       Current Meds:   Scheduled Meds:    insulin glargine  12 Units Subcutaneous Nightly    QUEtiapine  25 mg Oral BID    sodium chloride  250 mL Intravenous Once    insulin lispro  0-6 Units Subcutaneous TID WC    insulin lispro  0-3 Units Subcutaneous Nightly    vitamin D  50,000 Units Oral Weekly    clopidogrel  75 mg Oral Daily    ciprofloxacin  400 mg Intravenous Q12H    sodium chloride (PF)  10 mL Intravenous Q12H    And    pantoprazole  40 mg Intravenous BID     Continuous Infusions:    sodium chloride 10 mL/hr at 18 0419    dextrose       PRN Meds: acetaminophen, Magic Mouthwash, glucose, dextrose, glucagon (rDNA), dextrose, dextrose, potassium chloride, magnesium sulfate, sodium phosphate IVPB **OR** sodium phosphate IVPB **OR** sodium phosphate IVPB    Data:     Past Medical History:   has a past medical history of Cerebral edema ([facility] 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 ([facility] Chronic inflammatory demyelinating polyneuropathy (HCC); CIDP (chronic inflammatory demyelinating polyneuropathy) ([facility] Diabetes mellitus ([facility] Hyperlipidemia; Hypertension; Left arm weakness; and Left-sided weakness. Social History:   reports that she has never smoked. She has never used smokeless tobacco. She reports that she does not drink alcohol or use drugs. Family History: History reviewed. No pertinent family history.     Vitals:  /69   Pulse 82   Temp 98.6 °F (37 °C) (Axillary)   Resp 15   Ht 5' 9\" (1.753 m)   Wt 216 lb 0.8 oz (98 kg)   SpO2 97%   BMI 31.91 kg/m²    Temp (24hrs), Av.7 °F (36.5 °C), Min:97.2 °F (36.2 °C), Max:98.6 °F (37 onset abd pain in DKA, check for distension, perf TECHNOLOGIST PROVIDED HISTORY: Additional Contrast?->Radiologist Recommendation Ordering Physician Provided Reason for Exam: PATIENT STATES ABDOMINAL PAIN FINDINGS: Lower Chest: There are trace bilateral pleural effusions and bilateral lower lobe subsegmental atelectasis. There is mild global cardiomegaly. Tiny hiatus hernia is present. Organs: Exam is limited by the absence of intravenous contrast. No focal liver lesion. The gallbladder is surgically absent. No significant biliary ductal dilatation. The spleen is normal in size without focal lesion. There is moderate fatty atrophy of the pancreas without focal lesion or ductal dilatation. The adrenal glands are unremarkable. No urinary tract calculus or collecting system dilatation. No focal liver lesion. GI/Bowel: The appendix is normal.  No bowel obstruction. There is moderate colonic diverticulosis without evidence for diverticulitis. Pelvis: There is a Alegre catheter and air in a decompressed bladder. Remote hysterectomy. Peritoneum/Retroperitoneum: There is moderate presacral edema. No abdominal lymphadenopathy or ascites. Bones/Soft Tissues: There is mild diffuse subcutaneous edema/generalized anasarca. Moderate-severe thoracolumbar scoliosis with moderate lumbar levoscoliosis. Small fat containing umbilical hernia. There is relative enlargement of the left iliofemoral vein compared to the right with increased soft tissue swelling of the left upper thigh. The possibility of left DVT/thrombus could be entertained. There is mild generalized anasarca. Relative enlargement of the left iliofemoral with increased swelling of the left upper thigh. The possibility of the presence of left DVT/thrombus could be entertained. Left lower extremity ultrasound Doppler could be obtained as clinically indicated. Otherwise, no acute process in the abdomen or pelvis.      Xr Chest (single View Frontal)    Result Date: 3/1/2018  EXAMINATION: SINGLE VIEW OF THE CHEST 3/1/2018 5:05 pm COMPARISON: None. HISTORY: ORDERING SYSTEM PROVIDED HISTORY: Central Line Placement TECHNOLOGIST PROVIDED HISTORY: Reason for exam:->Central Line Placement Reason for exam:->Portable Ordering Physician Provided Reason for Exam: line placement Acuity: Acute Type of Exam: Initial FINDINGS: The right IJ central venous catheter in place terminating at cavoatrial junction. There is no pneumothorax. The radiograph is somewhat limited due to left-sided tilt. Cardiac and mediastinal contour are normal.  There is minimal atelectatic change in the left costophrenic angle. Bony structures are grossly unremarkable. Dextroscoliosis at lower thoracic spine present similar to previous examination. Uncomplicated interval insertion of right IJ catheter. Otherwise, no interval change     Xr Chest (single View Frontal)    Result Date: 3/1/2018  EXAMINATION: SINGLE VIEW OF THE CHEST 3/1/2018 10:48 am COMPARISON: Chest x-ray from 01/10/2018 HISTORY: ORDERING SYSTEM PROVIDED HISTORY: Altered mental status,  confused TECHNOLOGIST PROVIDED HISTORY: Reason for exam:->Altered mental status,  confused Ordering Physician Provided Reason for Exam: AMS Acuity: Unknown Type of Exam: Unknown FINDINGS: There is no focal airspace consolidation, pleural effusion or pneumothorax. The cardiac silhouette appears mildly enlarged, but this may at least in part related to technique. There is no pulmonary vascular congestion. There are calcifications of the aortic arch. There are similar moderate hypertrophic degenerative changes of the visualized spine and shoulders. Visualized osseous structures appear mildly demineralized, but grossly intact, given the non dedicated imaging. No focal airspace consolidation or pulmonary vascular congestion.      Ct Head Wo Contrast    Result Date: 3/1/2018  EXAMINATION: CT OF THE HEAD WITHOUT CONTRAST  3/1/2018 12:26 pm TECHNIQUE: CT of the head was performed without the administration of intravenous contrast. Dose modulation, iterative reconstruction, and/or weight based adjustment of the mA/kV was utilized to reduce the radiation dose to as low as reasonably achievable. COMPARISON: January 10 HISTORY: ORDERING SYSTEM PROVIDED HISTORY: AMS hx of CVA with Left sided deficits TECHNOLOGIST PROVIDED HISTORY: Has a \"code stroke\" or \"stroke alert\" been called? ->No Ordering Physician Provided Reason for Exam: AMS, HX OF CVA WITH LEFT SIDED DEFICITS Additional signs and symptoms: PATIENT UNABLE TO GIVE HISTORY. UNABLE TO STRAIGHTEN HAD FOR EXAM. FINDINGS: BRAIN/VENTRICLES: There is a large amount of volume loss in the right MCA distribution including in the perisylvian region. This is likely due to the patient's recent infarct. There is curvilinear high density in the right sylvian fissures suggesting age-indeterminate thrombus in the MCA branch. There is low-density and loss of differentiation in the right lentiform nucleus and lateral thalamic region. Focal low density in the right caudate head is noted suggesting prior infarct. ORBITS: The visualized portion of the orbits demonstrate no acute abnormality. SINUSES: The visualized paranasal sinuses and mastoid air cells demonstrate no acute abnormality. SOFT TISSUES/SKULL:  No acute abnormality of the visualized skull or soft tissues. 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 There is surrounding low density extending deep into the lentiform nucleus region and right lateral thalamic region. It is unclear whether this is gliosis from the prior ischemic event or a subtle superimposed area of new ischemia. MRI may be more helpful High density in the right MCA within the sylvian fissure. This is age indeterminate given the recent infarct.   This may represent sequela of prior thrombus, however new thrombus not excluded. Again if the patient 8 current right hemispheric stroke symptoms, consider MRI     Us Renal Limited    Result Date: 3/2/2018  EXAMINATION: ULTRASOUND OF THE KIDNEYS 3/2/2018 2:21 pm COMPARISON: None. HISTORY: ORDERING SYSTEM PROVIDED HISTORY: RENAL FAILURE, ACUTE (KIDNEY INJURY) TECHNOLOGIST PROVIDED HISTORY: Ordering Physician Provided Reason for Exam: arf Acuity: Acute Type of Exam: Initial FINDINGS: The right kidney is less than optimally visualized due to surrounding bowel gas. The right kidney measures 12.8 x 5.8 x 5.6 cm and the left kidney 11.2 x 5.4 x 5.5 cm. The cortical thickness on the right is 17 mm and on the left is 18 mm. Kidneys demonstrate normal cortical echogenicity. No hydronephrosis or intrarenal stones. No focal lesions. Unremarkable ultrasound of the kidneys. Vl Lower Extremity Arteries Right    Result Date: 3/6/2018    [facility]  Vascular Lower Arterial Plethysmography Procedure   Patient Name   Karol Blood Date of Study           03/06/2018                 L   Date of Birth  1940  Gender                  Female   Age            69 year(s)  Race                       Room Number    2002   Corporate ID # [id number]   Patient Acct # [de-identified]   MR #           [id number]      [facility]   Accession #    [id number]   Interpreting Physician  Armani Small   Referring                  Referring Physician     Ernesto Stroud  Nurse  Practitioner  Procedure Type of Study:   Extremities Arteries: Lower Arterial Plethysmography, PVR Lower. Indications for Study:Pain, leg. Patient Status: In Patient. Technical Quality:Limited visualization. Limitation reason:Body habitus, bandages.  Comments: Normal (0.95-1.3) Mild vascular insufficiency (0.7-0.94) Moderate vascular insufficiency (0.5-0.69) Severe vascular insufficiency (0.3-0.49) Critical Ischemia (0.1-0.29) Non-diagnostic due to calcification (>1.3) Toe pressure <40 mmHg poor wound healing potential  Conclusions   Summary   Right lower extremity ABIs and PVRs were performed for the evaluation of  peripheral vascular disease. Study demonstrates:   ANIKET suggests no vascular insufficiency at rest.   Signature   ----------------------------------------------------------------  Electronically signed by Juan Miguel Bearden RVT(Sonographer) on  03/06/2018 03:49 PM  ----------------------------------------------------------------   ----------------------------------------------------------------  Electronically signed by Hong Perkins(Interpreting  physician) on 03/06/2018 04:58 PM  ----------------------------------------------------------------  Findings:   Right Impression:  Decreased amplitude, biphasic waveform in the thigh. Normal amplitude, biphasic waveform at the calf and ankle. ANIKET demonstrates no vascular insufficiency at rest.      Vl Lower Extremity Bilateral Venous Duplex    Result Date: 3/6/2018    [facility]  Vascular Lower Extremities DVT Study Procedure   Patient Name   Qing Centeno Date of Study           03/06/2018                 L   Date of Birth  1940  Gender                  Female   Age            69 year(s)  Race                       Room Number    2002   Corporate ID # [id number]   Patient Acct # [de-identified]   MR #           [id number]      Amira Gladis, T   Accession #    [id number]   Interpreting Physician  Donald Olivo   Referring                  Referring Physician     Mil Blood  Nurse  Practitioner  Procedure Type of Study:   Veins: Lower Extremities DVT Study, Venous Scan Lower Bilateral.  Indications for Study:Pain and swelling. Patient Status: In Patient. Technical Quality:Poor visualization. Limitation reason:Body habitus, swelling.   - Critical Result:INDU Herzog.   Conclusions   Summary   Simultaneous real time imaging utilizing B-Mode, color doppler and  spectral waveform analysis was compressibility of the                                             small saphenous vein. Vl Lower Extremity Venous Left    Result Date: 3/3/2018    [facility]  Vascular Lower Extremities DVT Study Procedure   Patient Name   Elizabeth Olivas Date of Study           03/03/2018                 L   Date of Birth  1940  Gender                  Female   Age            69 year(s)  Race                       Room Number    2002   Corporate ID # [id number]   Patient Acct # [de-identified]   MR #           [id number]      Beckie Muñoz, [facility]   Accession #    [id number]   Interpreting Physician  Danis Montanez   Referring                  Referring Physician     Axel Gomez  Nurse  Practitioner  Procedure Type of Study:   Veins: Lower Extremities DVT Study, Venous Scan Lower Left. Indications for Study:Swelling. Patient Status: In Patient. Technical Quality:Limited visualization. Limitation reason:body habitus and swelling.   - Critical Result:INDU Galvin, INDU Hanks. Conclusions   Summary   Acute deep vein thrombosis of the left leg involving the common femoral,  superficial femoral, popliteal veins. Superficial thrombophlebitis of the lower extremity involving the left  greater saphenous vein, lesser saphenous vein, acute. Right common femoral vein has no DVT. Signature   ----------------------------------------------------------------  Electronically signed by Shahbaz Gregorio(Interpreting  physician) on 03/03/2018 02:46 PM  ----------------------------------------------------------------  Findings:   Right Impression:              Left Impression:  The common femoral vein        The common femoral, femoral and popliteal  demonstrates normal            veins are dilated and non-compressible with  compressibility and            hypoechoic echoes. augmentation.                                  Limited

## 2018-03-10 LAB
ABSOLUTE BANDS #: 0.45 K/UL (ref 0–1)
ABSOLUTE EOS #: 0.23 K/UL (ref 0–0.4)
ABSOLUTE IMMATURE GRANULOCYTE: ABNORMAL K/UL (ref 0–0.3)
ABSOLUTE LYMPH #: 1.67 K/UL (ref 1–4.8)
ABSOLUTE MONO #: 0.23 K/UL (ref 0.1–1.3)
ANION GAP SERPL CALCULATED.3IONS-SCNC: 8 MMOL/L (ref 9–17)
BANDS: 10 % (ref 0–10)
BASOPHILS # BLD: 0 % (ref 0–2)
BASOPHILS ABSOLUTE: 0 K/UL (ref 0–0.2)
BUN BLDV-MCNC: 25 MG/DL (ref 8–23)
BUN/CREAT BLD: ABNORMAL (ref 9–20)
CALCIUM SERPL-MCNC: 7.7 MG/DL (ref 8.6–10.4)
CHLORIDE BLD-SCNC: 105 MMOL/L (ref 98–107)
CO2: 25 MMOL/L (ref 20–31)
CREAT SERPL-MCNC: 0.61 MG/DL (ref 0.5–0.9)
DIFFERENTIAL TYPE: ABNORMAL
EOSINOPHILS RELATIVE PERCENT: 5 % (ref 0–4)
GFR AFRICAN AMERICAN: >60 ML/MIN
GFR NON-AFRICAN AMERICAN: >60 ML/MIN
GFR SERPL CREATININE-BSD FRML MDRD: ABNORMAL ML/MIN/{1.73_M2}
GFR SERPL CREATININE-BSD FRML MDRD: ABNORMAL ML/MIN/{1.73_M2}
GLUCOSE BLD-MCNC: 157 MG/DL (ref 65–105)
GLUCOSE BLD-MCNC: 162 MG/DL (ref 70–99)
GLUCOSE BLD-MCNC: 163 MG/DL (ref 65–105)
GLUCOSE BLD-MCNC: 167 MG/DL (ref 65–105)
GLUCOSE BLD-MCNC: 180 MG/DL (ref 65–105)
HCT VFR BLD CALC: 26.9 % (ref 36–46)
HCT VFR BLD CALC: 27 % (ref 36–46)
HCT VFR BLD CALC: 27.8 % (ref 36–46)
HEMOGLOBIN: 8.8 G/DL (ref 12–16)
HEMOGLOBIN: 8.9 G/DL (ref 12–16)
HEMOGLOBIN: 9.1 G/DL (ref 12–16)
IMMATURE GRANULOCYTES: ABNORMAL %
LYMPHOCYTES # BLD: 37 % (ref 24–44)
MCH RBC QN AUTO: 29.4 PG (ref 26–34)
MCHC RBC AUTO-ENTMCNC: 33 G/DL (ref 31–37)
MCV RBC AUTO: 88.9 FL (ref 80–100)
MONOCYTES # BLD: 5 % (ref 1–7)
MORPHOLOGY: ABNORMAL
NRBC AUTOMATED: ABNORMAL PER 100 WBC
PARTIAL THROMBOPLASTIN TIME: 30.5 SEC (ref 23–31)
PDW BLD-RTO: 16.8 % (ref 11.5–14.9)
PLATELET # BLD: 87 K/UL (ref 150–450)
PLATELET ESTIMATE: ABNORMAL
PMV BLD AUTO: 9.6 FL (ref 6–12)
POTASSIUM SERPL-SCNC: 3.8 MMOL/L (ref 3.7–5.3)
RBC # BLD: 3.04 M/UL (ref 4–5.2)
RBC # BLD: ABNORMAL 10*6/UL
SEG NEUTROPHILS: 43 % (ref 36–66)
SEGMENTED NEUTROPHILS ABSOLUTE COUNT: 1.92 K/UL (ref 1.3–9.1)
SODIUM BLD-SCNC: 138 MMOL/L (ref 135–144)
WBC # BLD: 4.5 K/UL (ref 3.5–11)
WBC # BLD: ABNORMAL 10*3/UL

## 2018-03-10 PROCEDURE — 2500000003 HC RX 250 WO HCPCS: Performed by: RADIOLOGY

## 2018-03-10 PROCEDURE — 82947 ASSAY GLUCOSE BLOOD QUANT: CPT

## 2018-03-10 PROCEDURE — 99233 SBSQ HOSP IP/OBS HIGH 50: CPT | Performed by: INTERNAL MEDICINE

## 2018-03-10 PROCEDURE — 85025 COMPLETE CBC W/AUTO DIFF WBC: CPT

## 2018-03-10 PROCEDURE — 85018 HEMOGLOBIN: CPT

## 2018-03-10 PROCEDURE — 6360000002 HC RX W HCPCS: Performed by: RADIOLOGY

## 2018-03-10 PROCEDURE — 6370000000 HC RX 637 (ALT 250 FOR IP): Performed by: RADIOLOGY

## 2018-03-10 PROCEDURE — 36415 COLL VENOUS BLD VENIPUNCTURE: CPT

## 2018-03-10 PROCEDURE — C9113 INJ PANTOPRAZOLE SODIUM, VIA: HCPCS | Performed by: INTERNAL MEDICINE

## 2018-03-10 PROCEDURE — 51702 INSERT TEMP BLADDER CATH: CPT | Performed by: NURSE PRACTITIONER

## 2018-03-10 PROCEDURE — 2580000003 HC RX 258: Performed by: INTERNAL MEDICINE

## 2018-03-10 PROCEDURE — 2060000000 HC ICU INTERMEDIATE R&B

## 2018-03-10 PROCEDURE — 6360000002 HC RX W HCPCS: Performed by: INTERNAL MEDICINE

## 2018-03-10 PROCEDURE — 85730 THROMBOPLASTIN TIME PARTIAL: CPT

## 2018-03-10 PROCEDURE — 36592 COLLECT BLOOD FROM PICC: CPT

## 2018-03-10 PROCEDURE — 85014 HEMATOCRIT: CPT

## 2018-03-10 PROCEDURE — 80048 BASIC METABOLIC PNL TOTAL CA: CPT

## 2018-03-10 RX ORDER — FLUCONAZOLE 2 MG/ML
200 INJECTION, SOLUTION INTRAVENOUS EVERY 24 HOURS
Status: DISCONTINUED | OUTPATIENT
Start: 2018-03-10 | End: 2018-03-14

## 2018-03-10 RX ORDER — LACTOBACILLUS RHAMNOSUS GG 10B CELL
1 CAPSULE ORAL
Status: DISCONTINUED | OUTPATIENT
Start: 2018-03-11 | End: 2018-03-19 | Stop reason: HOSPADM

## 2018-03-10 RX ORDER — FUROSEMIDE 10 MG/ML
40 INJECTION INTRAMUSCULAR; INTRAVENOUS DAILY
Status: DISCONTINUED | OUTPATIENT
Start: 2018-03-10 | End: 2018-03-13

## 2018-03-10 RX ADMIN — PANTOPRAZOLE SODIUM 40 MG: 40 INJECTION, POWDER, FOR SOLUTION INTRAVENOUS at 08:23

## 2018-03-10 RX ADMIN — CIPROFLOXACIN 400 MG: 2 INJECTION, SOLUTION INTRAVENOUS at 00:33

## 2018-03-10 RX ADMIN — QUETIAPINE FUMARATE 50 MG: 25 TABLET ORAL at 21:22

## 2018-03-10 RX ADMIN — CLOPIDOGREL BISULFATE 75 MG: 75 TABLET ORAL at 08:22

## 2018-03-10 RX ADMIN — FUROSEMIDE 40 MG: 10 INJECTION, SOLUTION INTRAVENOUS at 14:31

## 2018-03-10 RX ADMIN — Medication 10 ML: at 08:23

## 2018-03-10 RX ADMIN — FLUCONAZOLE 200 MG: 2 INJECTION, SOLUTION INTRAVENOUS at 14:39

## 2018-03-10 RX ADMIN — METRONIDAZOLE 500 MG: 500 INJECTION, SOLUTION INTRAVENOUS at 14:38

## 2018-03-10 RX ADMIN — METRONIDAZOLE 500 MG: 500 INJECTION, SOLUTION INTRAVENOUS at 21:22

## 2018-03-10 RX ADMIN — PANTOPRAZOLE SODIUM 40 MG: 40 INJECTION, POWDER, FOR SOLUTION INTRAVENOUS at 21:22

## 2018-03-10 RX ADMIN — INSULIN LISPRO 1 UNITS: 100 INJECTION, SOLUTION INTRAVENOUS; SUBCUTANEOUS at 21:34

## 2018-03-10 RX ADMIN — Medication 10 ML: at 21:22

## 2018-03-10 RX ADMIN — Medication 12 UNITS: at 21:34

## 2018-03-10 ASSESSMENT — PAIN SCALES - WONG BAKER: WONGBAKER_NUMERICALRESPONSE: 4

## 2018-03-10 ASSESSMENT — PAIN DESCRIPTION - PAIN TYPE: TYPE: ACUTE PAIN

## 2018-03-10 ASSESSMENT — PAIN SCALES - GENERAL
PAINLEVEL_OUTOF10: 0
PAINLEVEL_OUTOF10: 5

## 2018-03-10 ASSESSMENT — PAIN DESCRIPTION - ORIENTATION: ORIENTATION: RIGHT;LEFT

## 2018-03-10 ASSESSMENT — PAIN DESCRIPTION - LOCATION: LOCATION: FOOT

## 2018-03-10 NOTE — PLAN OF CARE
Problem: Falls - Risk of  Goal: Absence of falls  Outcome: Met This Shift  Pt remains free from falls this shift. Call light and over bed table within reach. Bed in lowest position, wheels locked. Floor remains free from excessive clutter. Will continue to monitor.

## 2018-03-10 NOTE — PROGRESS NOTES
Quality:Limited visualization. Limitation reason:Body habitus, bandages. Comments: Normal (0.95-1.3) Mild vascular insufficiency (0.7-0.94) Moderate vascular insufficiency (0.5-0.69) Severe vascular insufficiency (0.3-0.49) Critical Ischemia (0.1-0.29) Non-diagnostic due to calcification (>1.3) Toe pressure <40 mmHg poor wound healing potential  Conclusions   Summary   Right lower extremity ABIs and PVRs were performed for the evaluation of  peripheral vascular disease. Study demonstrates:   ANIKET suggests no vascular insufficiency at rest.   Signature   ----------------------------------------------------------------  Electronically signed by Kaitlynn Cano RVT(Sonographer) on  03/06/2018 03:49 PM  ----------------------------------------------------------------   ----------------------------------------------------------------  Electronically signed by Ruthanna Fothergill, Mohammed(Interpreting  physician) on 03/06/2018 04:58 PM  ----------------------------------------------------------------  Findings:   Right Impression:  Decreased amplitude, biphasic waveform in the thigh. Normal amplitude, biphasic waveform at the calf and ankle.    ANIKET demonstrates no vascular insufficiency at rest.      Vl Lower Extremity Bilateral Venous Duplex    Result Date: 3/6/2018    [facility]  Vascular Lower Extremities DVT Study Procedure   Patient Name   Barb Mohan Date of Study           03/06/2018                 L   Date of Birth  1940  Gender                  Female   Age            69 year(s)  Race                       Room Number    2002   Corporate ID # [id number]   Patient Acct # [de-identified]   MR #           [id number]      Brisa Morrison, [facility]   Accession #    [id number]   Interpreting Physician  Akhil Loredo   Referring                  Referring Physician     Amy Hughes  Nurse  Practitioner  Procedure Type of Study:   Veins: Lower Extremities DVT Study, Venous Scan Lower Right Impression:              Left Impression:  The common femoral vein        The common femoral, femoral and popliteal  demonstrates normal            veins are dilated and non-compressible with  compressibility and            hypoechoic echoes. augmentation. Limited visualization of the calf veins. Great saphenous vein is dilated and                                 non-compressible with hypoechoic echoes                                 from proximal knee to saphenofemoral                                 junction. The small saphenous vein is dilated and                                 non-compressible with hypoechoic echoes. Physical Examination:        Physical Exam    General Appearance:  Ill appearing woman alert today, answering question and following commanda  Mental status: oriented to person only  Head:  NG in place for TF normocephalic, atraumatic  Eye: drooping L eye noted  Ear: normal external ear, no discharge, hearing intact  Nose:  no drainage noted  Mouth: MOM  Neck: supple  Lungs: Bilateral equal air entry, clear to ausculation, no wheezing, rales or rhonchi, normal effort  Cardiovascular: normal rate, regular rhythm, no murmur, gallop, rub.   Abdomen: Soft, nontender, nondistended, normal bowel sounds  Neurologic: left hemiplegia noted   Skin: No gross lesions, rashes, bruising or bleeding on exposed skin area  Extremities:  LLE edema to thigh, improving from prior, RLE edema to knee, upper extremity edema worse on left with weeping  Psych: flat affect    Assessment:        Primary Problem  DKA, type 2, not at goal [facility])    Active Hospital Problems    Diagnosis Date Noted    Acute UTI [N39.0]     Thrombocytopenia ([facility] [D69.6]     Acute deep vein thrombosis (DVT) of lower extremity ([facility] [I82.409]     Suspected deep tissue injury [Z04.9] 03/02/2018    DKA, type 2, not at goal [facility])

## 2018-03-10 NOTE — PROGRESS NOTES
PROGRESS NOTE    PCP: Yashira Martinez MD  Referring Provider: No ref. provider found    IMPRESSION:   1. Admitted with sepsis due to UTI,  resolving  2. Diabetes mellitus with DKA on admission also resolved  3. Cytopenias likely due to acute illness  4. Prior CVA with possible extension this admission  5. Chronic kidney disease  6. Poor nutritional status excellent   7. GI bleed  8. Presence of IVC filter  9. Chronic inflammatory demyelinating polyneuropathy      INTERVAL HISTORY:   Patient with multiple comorbidities complicated by sepsis DKA DVT GI bleeding. Also has cytopenias. Today's white count 4.5, hemoglobin 8.9, platelets 87. Since her counts were relatively normal in January of this year would suspect this is reactive and it should improve as time goes on. PLAN:     1. Continue to monitor blood counts  2. Transfuse red cells and/or platelets as needed  3. Bone marrow examination now not warranted in my opinion    No Follow-up on file. VISIT DIAGNOSIS:  The primary encounter diagnosis was Sepsis, due to unspecified organism ([address] Diagnoses of Hypernatremia, SAMINA (acute kidney injury) ([address] Acute UTI, and Diabetic ketoacidosis without coma associated with type 2 diabetes mellitus ([address] were also pertinent to this visit.     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) 01/10/2018    Chronic inflammatory demyelinating polyneuropathy (HCC)     CIDP (chronic inflammatory demyelinating polyneuropathy) (HCC)     Diabetes mellitus ([address]     Hyperlipidemia     Hypertension     Left arm weakness 01/10/2018    Left-sided weakness 01/10/2018       PAST SURGICAL HISTORY:      Procedure Laterality Date    CHOLECYSTECTOMY      HYSTERECTOMY      partial    HYSTERECTOMY      THROMBECTOMY  01/10/2018    EMERGENT WITH CEREBRAL ANGIOGRAM    TONSILLECTOMY      TRANSESOPHAGEAL ECHOCARDIOGRAM  01/16/2018       CURRENT MEDICATIONS:   Current Facility-Administered Medications   Medication Dose Route Frequency Provider Last Rate Last Dose    [START ON 3/11/2018] lactobacillus (CULTURELLE) capsule 1 capsule  1 capsule Oral Daily with breakfast Guera Hancock MD        metronidazole (FLAGYL) 500 mg in NaCl 100 mL IVPB premix  500 mg Intravenous Q8H Guera Hancock  mL/hr at 03/10/18 1438 500 mg at 03/10/18 1438    furosemide (LASIX) injection 40 mg  40 mg Intravenous Daily Guera Hancock MD   40 mg at 03/10/18 1431    fluconazole (DIFLUCAN) 200 mg in 0.9 % NaCl 100 mL premix IVPB  200 mg Intravenous Q24H Guera Hancock  mL/hr at 03/10/18 1439 200 mg at 03/10/18 1439    QUEtiapine (SEROQUEL) tablet 50 mg  50 mg Oral Nightly Cemice MD Brandon        midodrine (PROAMATINE) tablet 5 mg  5 mg Oral Q8H PRN MD Mesfin        insulin glargine (LANTUS) injection vial 12 Units  12 Units Subcutaneous Nightly Ngoc Taylor MD   12 Units at 03/09/18 2050    acetaminophen (TYLENOL) 160 MG/5ML solution 650 mg  650 mg Oral Q4H PRN Nikia Gordon CNP   650 mg at 03/09/18 2038    0.9 % sodium chloride bolus  250 mL Intravenous Once Ngoc Taylor MD        0.9 % sodium chloride infusion   Intravenous Continuous MD Mesfin 10 mL/hr at 03/08/18 0419      Magic Mouthwash (Miracle Mouthwash) 5 mL  5 mL Swish & Spit 4x Daily PRN Nikia Gordon CNP   5 mL at 03/07/18 1951    insulin lispro (HUMALOG) injection vial 0-6 Units  0-6 Units Subcutaneous TID WC Ngoc Taylor MD   1 Units at 03/09/18 1635    insulin lispro (HUMALOG) injection vial 0-3 Units  0-3 Units Subcutaneous Nightly Ngoc Taylor MD   1 Units at 03/09/18 2050    vitamin D (CHOLECALCIFEROL) tablet 50,000 Units  50,000 Units Oral Weekly MD Mesfin        clopidogrel (PLAVIX) tablet 75 mg  75 mg Oral Daily Burnice MD Brandon   75 mg at 03/10/18 0022    sodium was not given any antibiotics.     She had a doppler LE which showed Acute deep vein thrombosis of the left leg involving the common femoral,superficial femoral, popliteal veins.    Allergies:  Demeclocycline and Tetracyclines & related     Social History:   reports that she has never smoked. She has never used smokeless tobacco. She reports that she does not drink alcohol or use drugs.      Family History: family history is not on file. Family history was reviewed and no pertinent family history noted.     ROS:  General: negative for fatigue, fever or night sweats  ENT: negative for headaches, hearing change, oral lesions or visual changes  Hematological and Lymphatic: negative for bleeding problems, blood clots, bruising, fatigue, night sweats or weight loss  Respiratory: negative for cough, shortness of breath or tachypnea  Cardiovascular: negative for chest pain, dyspnea on exertion, edema or paroxysmal nocturnal dyspnea  Gastrointestinal: negative for abdominal pain, appetite loss, change in bowel habits, constipation, diarrhea, melena or nausea/vomiting  Genito-Urinary: negative for dysuria, hematuria or incontinence  Musculoskeletal: negative for gait disturbance, joint pain, joint swelling or muscle pain  Neurological: negative for confusion, dizziness, headaches, seizures or tremors  Dermatological: negative for pruritus or rash      PHYSICAL EXAM:   Vitals: /65   Pulse 81   Temp 98.4 °F (36.9 °C) (Oral)   Resp 18   Ht 5' 9\" (1.753 m)   Wt 216 lb 0.8 oz (98 kg)   SpO2 96%   BMI 31.91 kg/m²   General appearance: alert, appears stated age and cooperative  Skin: Skin color, texture, turgor normal. No rashes or lesions  HEENT: Head: Normocephalic, no lesions, without obvious abnormality.   Neck: no adenopathy  Lungs: clear to auscultation bilaterally  Heart: regular rate and rhythm, S1, S2 normal, no murmur, click, rub or gallop  Abdomen: soft, non-tender; bowel sounds normal; no masses,  no organomegaly  Extremities: extremities normal, atraumatic, no cyanosis or edema  Neurologic: Mental status: Alert, oriented, thought content appropriate      LABS:   Results for orders placed or performed during the hospital encounter of 03/01/18   Flu A/B Ag Detection   Result Value Ref Range    Specimen Description       . NASOPHARYNGEAL SWAB Performed at [facility]: [address]    Specimen Description  Corine Huitron. [address] [phone]     Special Requests NOT REPORTED     Direct Exam PRESUMPTIVE NEGATIVE for Influenza A + B antigens. Direct Exam       PCR testing to confirm this result is available upon request.  Specimen will be    Direct Exam        saved in the laboratory for 7 days. Please call [phone] if PCR testing is    Direct Exam  indicated. Status FINAL 03/01/2018    Cult,Blood #2   Result Value Ref Range    Specimen Description       . BLOOD LEFT FA 1ML EACH BOTTLE Performed at [facility]: [address]    Specimen Description  [facility] [address] [phone]     Special Requests NOT REPORTED     Culture NO GROWTH 6 DAYS     Culture       Performed at [address] [phone]    Status FINAL 03/07/2018    CULTURE BLOOD #1   Result Value Ref Range    Specimen Description       . BLOOD 3ML BOTH BOTTLE RIGHT FA Performed at [facility]: [address]    Specimen Description  [address] [phone]     Special Requests NOT REPORTED     Culture NO GROWTH 6 DAYS     Culture       Performed at [address] [phone]    Status FINAL 03/07/2018    Urine culture via catheter   Result Value Ref Range    Specimen Description       . URINE,STRAIGHT CATHETER Performed at [facility]: [address]    Specimen Description  [facility]  [address] [phone]     Special Requests NOT REPORTED     Culture Nadiya Griffin >[id number] CFU/ML (A)     Culture GROUP D ENTEROCOCCUS >[id number] CFU/ML (A)     Culture       Performed at [facility] [address] [phone]    Status FINAL 03/02/2018     Organism CHALO     Organism EGD        Susceptibility    Morganella morganii - MARICRUZ     amikacin NOT REPORTED       ampicillin >=32 RESISTANT Resistant      ampicillin-sulbactam NOT REPORTED       aztreonam <=1 SUSCEPTIBLE Sensitive      ceFAZolin NOT REPORTED       cefepime NOT REPORTED       cefTRIAXone <=1 SUSCEPTIBLE Sensitive      ciprofloxacin <=0.25 SUSCEPTIBLE Sensitive      ertapenem NOT REPORTED       gentamicin <=1 SUSCEPTIBLE Sensitive      meropenem NOT REPORTED       nitrofurantoin 128 RESISTANT Resistant      tigecycline NOT REPORTED       tobramycin <=1 SUSCEPTIBLE Sensitive      trimethoprim-sulfamethoxazole <=20 SUSCEPTIBLE Sensitive      piperacillin-tazobactam <=4 SUSCEPTIBLE Sensitive     Group d enterococcus - MARICRUZ     ampicillin <=2 SUSCEPTIBLE Sensitive      penicillin NOT REPORTED       ciprofloxacin <=0.5 SUSCEPTIBLE Sensitive      erythromycin NOT REPORTED       Gentamicin, High Level NOT REPORTED       levofloxacin 1 SUSCEPTIBLE Sensitive      linezolid NOT REPORTED       nitrofurantoin <=16 SUSCEPTIBLE Sensitive      Synercid NOT REPORTED       Streptomycin, Hi Level NOT REPORTED       tetracycline >=16 RESISTANT Resistant      tigecycline NOT REPORTED       vancomycin 1 SUSCEPTIBLE Sensitive    Culture Stool   Result Value Ref Range    Specimen . FECES     Campylobacter PCR  CAMNEG     NEGATIVE: No Campylobacter spp. (jejuni or coli) DNA Detected    Salmonella PCR NEGATIVE: No Salmonella spp. DNA Detected SALNEG    Shigatoxin Gene PCR  STXNEG     NEGATIVE: No Shiga toxin-producing gene(s) Detected    Shigella Sp PCR NEGATIVE: No Shigella spp. / EIEC DNA Detected SHINEG   C Diff Toxin by RT PCR   Result Value Ref Range    Specimen Description . FECES     C Difficile tox, pcr  CDTNEG     NEGATIVE: C difficile tcdB nucleic acid not detected by RT-PCR. Urine culture via catheter   Result Value Ref Range    Specimen Description       . CATHETERIZED URINE Performed at [facility]: [address]    Specimen Description  [address] [facility] [phone]     Special Requests NOT REPORTED     Culture (A)      YEAST, NOT CANDIDA ALBICANS OR CANDIDA DUBLINIENSIS >[id number] CFU/ML    Culture       Performed at [facility] [address] [facility], [address] [place] [address] [phone]    Status FINAL 03/08/2018    CBC Auto Differential   Result Value Ref Range    WBC 13.7 (H) 3.5 - 11.0 k/uL    RBC 5.20 4.0 - 5.2 m/uL    Hemoglobin 14.6 12.0 - 16.0 g/dL    Hematocrit 48.4 (H) 36 - 46 %    MCV 93.2 80 - 100 fL    MCH 28.1 26 - 34 pg    MCHC 30.2 (L) 31 - 37 g/dL    RDW 17.7 (H) 11.5 - 14.9 %    Platelets 98 (L) 501 - 450 k/uL    MPV 12.3 (H) 6.0 - 12.0 fL    NRBC Automated NOT REPORTED per 100 WBC    Differential Type NOT REPORTED     Immature Granulocytes NOT REPORTED 0 %    Absolute Immature Granulocyte NOT REPORTED 0.00 - 0.30 k/uL    WBC Morphology NOT REPORTED     RBC Morphology NOT REPORTED     Platelet Estimate NOT REPORTED     Seg Neutrophils 86 (H) 36 - 66 %    Lymphocytes 8 (L) 24 - 44 %    Monocytes 6 1 - 7 %    Eosinophils % 0 0 - 4 %    Basophils 0 0 - 2 %    Segs Absolute 11.78 (H) 1.3 - 9.1 k/uL    Absolute Lymph # 1.10 1.0 - 4.8 k/uL    Absolute Mono # 0.82 0.1 - 1.3 k/uL    Absolute Eos # 0.00 0.0 - 0.4 k/uL    Basophils # 0.00 0.0 - 0.2 k/uL    Morphology ANISOCYTOSIS PRESENT    Basic Metabolic Prof   Result Value Ref Range    Glucose 403 (HH) 70 - 99 mg/dL    BUN 85 (H) 8 - 23 mg/dL    CREATININE 1.79 (H) 0.50 - 0.90 mg/dL    Bun/Cre Ratio NOT REPORTED 9 - 20    Calcium 4.7 (LL) 8.6 - 10.4 mg/dL    Sodium 150 (H) 135 - 144 mmol/L    Potassium 3.6 (L) 3.7 - 5.3 mmol/L    Chloride 123 (H) 98 - 107 mmol/L    CO2 7 (LL) 20 - 31 mmol/L    Anion Gap 20 (H) 9 - 17 mmol/L    GFR Non-African American 28 (L) >60 mL/min    GFR  33 (L) >60 mL/min    GFR Comment          GFR Staging NOT REPORTED    Liver Profile   Result Value Ref Range    Alb 1.8 (L) 3.5 - 5.2 g/dL    Alkaline Phosphatase 38 35 - 104 U/L    ALT 37 (H) 5 - 33 U/L    AST 19 <32 U/L    Total Bilirubin 0.65 0.3 - 1.2 mg/dL    Bilirubin, Direct 0.39 (H) <0.31 mg/dL    Bilirubin, Indirect 0.26 0.00 - 1.00 mg/dL    Total Protein 3.3 (L) 6.4 - 8.3 g/dL    Globulin NOT REPORTED 1.5 - 3.8 g/dL    Albumin/Globulin Ratio NOT REPORTED 1.0 - 2.5   Troponin   Result Value Ref Range    Troponin T 0.05 (H) <0.03 ng/mL    Troponin Interp         Troponin   Result Value Ref Range    Troponin T 0.07 (H) <0.03 ng/mL    Troponin Interp         Lactic Acid   Result Value Ref Range    Lactic Acid 3.2 (H) 0.5 - 2.2 mmol/L   Lactic Acid   Result Value Ref Range    Lactic Acid 3.7 (H) 0.5 - 2.2 mmol/L   Blood Gas, Venous   Result Value Ref Range    pH, Eriberto 7.284 (L) 7.320 - 7.420    pCO2, Eriberto 15.9 (L) 39.0 - 55.0    pO2, Eriberto 55.9 (H) 30.0 - 50.0    HCO3, Venous 7.5 (L) 24.0 - 30.0 mmol/L    Positive Base Excess, Eriberto NOT REPORTED 0.0 - 2.0 mmol/L    Negative Base Excess, Eriberto 19.2 (H) 0.0 - 2.0 mmol/L    O2 Sat, Eriberto 84.0 60.0 - 85.0 %    Total Hb NOT REPORTED 12.0 - 16.0 g/dl    Oxyhemoglobin NOT REPORTED 95.0 - 98.0 %    Carboxyhemoglobin 2.1 0 - 5 %    Methemoglobin 0.5 0.0 - 1.9 %    Pt Temp 37.0     pH, Eriberto, Temp Adj NOT REPORTED 7.320 - 7.420    pCO2, Eriberto, Temp Adj NOT REPORTED 39.0 - 55.0 mmHg    pO2, Eriberto, Temp Adj NOT REPORTED 30.0 - 50.0 mmHg    O2 Device/Flow/% Cannula     Respiratory Rate NOT REPORTED     Harvinder Test NA     Sample Site NOT REPORTED     Pt.  Position SEMI-FOWLERS     Mode NOT REPORTED     Set Rate NOT REPORTED     Total Rate NOT REPORTED     VT NOT REPORTED     FIO2 NOT REPORTED     Peep/Cpap NOT REPORTED     PSV NOT REPORTED     Text for Respiratory NOT REPORTED     NOTIFICATION NOT REPORTED     NOTIFICATION TIME NOT REPORTED    Urine mg/dL    Bun/Cre Ratio NOT REPORTED 9 - 20    Calcium 7.5 (L) 8.6 - 10.4 mg/dL    Sodium 144 135 - 144 mmol/L    Potassium 3.6 (L) 3.7 - 5.3 mmol/L    Chloride 113 (H) 98 - 107 mmol/L    CO2 10 (L) 20 - 31 mmol/L    Anion Gap 21 (H) 9 - 17 mmol/L    GFR Non-African American 20 (L) >60 mL/min    GFR  24 (L) >60 mL/min    GFR Comment          GFR Staging NOT REPORTED    Basic Metabolic Panel w/ Reflex to MG   Result Value Ref Range    Glucose 292 (H) 70 - 99 mg/dL    BUN 91 (HH) 8 - 23 mg/dL    CREATININE 2.21 (H) 0.50 - 0.90 mg/dL    Bun/Cre Ratio NOT REPORTED 9 - 20    Calcium 7.1 (L) 8.6 - 10.4 mg/dL    Sodium 139 135 - 144 mmol/L    Potassium 3.7 3.7 - 5.3 mmol/L    Chloride 109 (H) 98 - 107 mmol/L    CO2 9 (LL) 20 - 31 mmol/L    Anion Gap 21 (H) 9 - 17 mmol/L    GFR Non-African American 22 (L) >60 mL/min    GFR  26 (L) >60 mL/min    GFR Comment          GFR Staging NOT REPORTED    Magnesium   Result Value Ref Range    Magnesium 1.8 1.6 - 2.6 mg/dL   Magnesium   Result Value Ref Range    Magnesium 1.6 1.6 - 2.6 mg/dL   Magnesium   Result Value Ref Range    Magnesium 2.4 1.6 - 2.6 mg/dL   Phosphorus   Result Value Ref Range    Phosphorus 4.2 2.6 - 4.5 mg/dL   Phosphorus   Result Value Ref Range    Phosphorus 3.6 2.6 - 4.5 mg/dL   Phosphorus   Result Value Ref Range    Phosphorus 2.9 2.6 - 4.5 mg/dL   BLOOD GAS, VENOUS   Result Value Ref Range    pH, Eriberto 7.114 (LL) 7.320 - 7.420    pCO2, Eriberto 30.5 (L) 39.0 - 55.0    pO2, Eriberto 70.8 (H) 30.0 - 50.0    HCO3, Venous 9.8 (L) 24.0 - 30.0 mmol/L    Positive Base Excess, Eriberto NOT REPORTED 0.0 - 2.0 mmol/L    Negative Base Excess, Eriberto 19.7 (H) 0.0 - 2.0 mmol/L    O2 Sat, Eriberto 86.2 (H) 60.0 - 85.0 %    Total Hb NOT REPORTED 12.0 - 16.0 g/dl    Oxyhemoglobin NOT REPORTED 95.0 - 98.0 %    Carboxyhemoglobin 3.1 0 - 5 %    Methemoglobin 0.7 0.0 - 1.9 %    Pt Temp 37.0     pH, Eriberto, Temp Adj NOT REPORTED 7.320 - 7.420    pCO2, Eriberto, Temp Adj NOT REPORTED 39.0 - 55.0 mmHg    pO2, Eriberto, Temp Adj NOT REPORTED 30.0 - 50.0 mmHg    O2 Device/Flow/% NOT REPORTED     Respiratory Rate NOT REPORTED     Harvinder Test NOT REPORTED     Sample Site NOT REPORTED     Pt. Position NOT REPORTED     Mode NOT REPORTED     Set Rate NOT REPORTED     Total Rate NOT REPORTED     VT NOT REPORTED     FIO2 NOT REPORTED     Peep/Cpap NOT REPORTED     PSV NOT REPORTED     Text for Respiratory NOT REPORTED     NOTIFICATION NOT REPORTED     NOTIFICATION TIME NOT REPORTED    BLOOD GAS, VENOUS   Result Value Ref Range    pH, Eriberto 7.140 (LL) 7.320 - 7.420    pCO2, Eriberto 27.3 (L) 39.0 - 55.0    pO2, Eriberto 132.0 (H) 30.0 - 50.0    HCO3, Venous 9.3 (L) 24.0 - 30.0 mmol/L    Positive Base Excess, Eriberto NOT REPORTED 0.0 - 2.0 mmol/L    Negative Base Excess, Eriberto 19.7 (H) 0.0 - 2.0 mmol/L    O2 Sat, Eriberto 95.5 %    Total Hb NOT REPORTED 12.0 - 16.0 g/dl    Oxyhemoglobin NOT REPORTED 95.0 - 98.0 %    Carboxyhemoglobin 2.5 %    Methemoglobin 0.6 %    Pt Temp 37.0     pH, Eriberto, Temp Adj NOT REPORTED 7.320 - 7.420    pCO2, Eriberto, Temp Adj NOT REPORTED 39.0 - 55.0 mmHg    pO2, Eriberto, Temp Adj NOT REPORTED 30.0 - 50.0 mmHg    O2 Device/Flow/% NOT REPORTED     Respiratory Rate NOT REPORTED     Harvinder Test NOT REPORTED     Sample Site NOT REPORTED     Pt.  Position NOT REPORTED     Mode NOT REPORTED     Set Rate NOT REPORTED     Total Rate NOT REPORTED     VT NOT REPORTED     FIO2 NOT REPORTED     Peep/Cpap NOT REPORTED     PSV NOT REPORTED     Text for Respiratory RESULTS TO INDU ALMANZAR     NOTIFICATION NOT REPORTED     NOTIFICATION TIME NOT REPORTED    Sodium, Random Ur   Result Value Ref Range    Sodium,Ur 53 mmol/L   Creatinine,Random Ur   Result Value Ref Range    Creatinine, Ur 38.5 28.0 - 217.0 mg/dL   Lactic Acid   Result Value Ref Range    Lactic Acid 3.9 (H) 0.5 - 2.2 mmol/L   Lactic Acid   Result Value Ref Range    Lactic Acid 3.6 (H) 0.5 - 2.2 mmol/L   Basic Metabolic Panel w/ Reflex to MG   Result Value Ref Range CREATININE 2.03 (H) 0.50 - 0.90 mg/dL    Bun/Cre Ratio NOT REPORTED 9 - 20    Calcium 7.1 (L) 8.6 - 10.4 mg/dL    Sodium 135 135 - 144 mmol/L    Potassium 4.2 3.7 - 5.3 mmol/L    Chloride 107 98 - 107 mmol/L    CO2 9 (LL) 20 - 31 mmol/L    Anion Gap 19 (H) 9 - 17 mmol/L    GFR Non-African American 24 (L) >60 mL/min    GFR  29 (L) >60 mL/min    GFR Comment          GFR Staging NOT REPORTED    Magnesium   Result Value Ref Range    Magnesium 2.0 1.6 - 2.6 mg/dL   Phosphorus   Result Value Ref Range    Phosphorus 2.4 (L) 2.6 - 4.5 mg/dL   BLOOD GAS, VENOUS   Result Value Ref Range    pH, Eriberto 7.134 (LL) 7.320 - 7.420    pCO2, Eriberto 26.9 (L) 39.0 - 55.0    pO2, Eriberto 46.5 30.0 - 50.0    HCO3, Venous 9.0 (L) 24.0 - 30.0 mmol/L    Positive Base Excess, Eriberto NOT REPORTED 0.0 - 2.0 mmol/L    Negative Base Excess, Eriberto 20.1 (H) 0.0 - 2.0 mmol/L    O2 Sat, Eriberto 79.1 %    Total Hb NOT REPORTED 12.0 - 16.0 g/dl    Oxyhemoglobin NOT REPORTED 95.0 - 98.0 %    Carboxyhemoglobin 1.6 %    Methemoglobin 0.7 %    Pt Temp 37.0     pH, Eriberto, Temp Adj NOT REPORTED 7.320 - 7.420    pCO2, Eriberto, Temp Adj NOT REPORTED 39.0 - 55.0 mmHg    pO2, Eriberto, Temp Adj NOT REPORTED 30.0 - 50.0 mmHg    O2 Device/Flow/% NOT REPORTED     Respiratory Rate NOT REPORTED     Harvinder Test NA     Sample Site NA     Pt.  Position NOT REPORTED     Mode NOT REPORTED     Set Rate NOT REPORTED     Total Rate NOT REPORTED     VT NOT REPORTED     FIO2 None     Peep/Cpap NOT REPORTED     PSV NOT REPORTED     Text for Respiratory NOT REPORTED     NOTIFICATION NOT REPORTED     NOTIFICATION TIME NOT REPORTED    Arterial Blood Gases   Result Value Ref Range    pH, Arterial 7.194     pCO2, Arterial 22.0 mmHg    pO2, Arterial 117.0 mmHg    HCO3, Arterial 8.5 mmol/L    Positive Base Excess, Art NOT REPORTED 0.0 - 2.0 mmol/L    Negative Base Excess, Art 19.7 (H) 0.0 - 2.0 mmol/L    O2 Sat, Arterial 97.3 %    Total Hb NOT REPORTED 12.0 - 16.0 g/dl    Oxyhemoglobin NOT REPORTED 95.0 - 98.0 %    Carboxyhemoglobin 0.4 %    Methemoglobin 0.8 %    Pt Temp 37.0     pH, Art, Temp Adj NOT REPORTED     pCO2, Art, Temp Adj NOT REPORTED     pO2, Art, Temp Adj NOT REPORTED mmHg    O2 Device/Flow/% Cannula     Respiratory Rate 24     Harvinder Test PASS     Sample Site Left Radial Artery     Pt.  Position SUPINE     Mode NOT REPORTED     Set Rate NOT REPORTED     Total Rate NOT REPORTED     VT NOT REPORTED     FIO2 2L     Peep/Cpap NOT REPORTED     PSV NOT REPORTED     Text for Respiratory RESULTS TO INDU ALMANZAR     NOTIFICATION NOT REPORTED     NOTIFICATION TIME NOT REPORTED    Basic Metabolic Panel w/ Reflex to MG   Result Value Ref Range    Glucose 169 (H) 70 - 99 mg/dL    BUN 79 (H) 8 - 23 mg/dL    CREATININE 1.86 (H) 0.50 - 0.90 mg/dL    Bun/Cre Ratio NOT REPORTED 9 - 20    Calcium 6.7 (L) 8.6 - 10.4 mg/dL    Sodium 137 135 - 144 mmol/L    Potassium 4.6 3.7 - 5.3 mmol/L    Chloride 110 (H) 98 - 107 mmol/L    CO2 8 (LL) 20 - 31 mmol/L    Anion Gap 19 (H) 9 - 17 mmol/L    GFR Non-African American 26 (L) >60 mL/min    GFR  32 (L) >60 mL/min    GFR Comment          GFR Staging NOT REPORTED    Magnesium   Result Value Ref Range    Magnesium 2.0 1.6 - 2.6 mg/dL   Phosphorus   Result Value Ref Range    Phosphorus 3.4 2.6 - 4.5 mg/dL   BLOOD GAS, VENOUS   Result Value Ref Range    pH, Eriberto 7.137 (LL) 7.320 - 7.420    pCO2, Eriberto 24.6 (L) 39.0 - 55.0    pO2, Eriberto 44.1 30.0 - 50.0    HCO3, Venous 8.3 (L) 24.0 - 30.0 mmol/L    Positive Base Excess, Eriberto NOT REPORTED 0.0 - 2.0 mmol/L    Negative Base Excess, Eriberto 20.7 (H) 0.0 - 2.0 mmol/L    O2 Sat, Eriberto 78.4 %    Total Hb NOT REPORTED 12.0 - 16.0 g/dl    Oxyhemoglobin NOT REPORTED 95.0 - 98.0 %    Carboxyhemoglobin 1.3 %    Methemoglobin 0.7 %    Pt Temp 37.0     pH, Eriberto, Temp Adj NOT REPORTED 7.320 - 7.420    pCO2, Eriberto, Temp Adj NOT REPORTED 39.0 - 55.0 mmHg    pO2, Eriberto, Temp Adj NOT REPORTED 30.0 - 50.0 mmHg    O2 Device/Flow/% ROOM AIR Rate NOT REPORTED     Total Rate NOT REPORTED     VT NOT REPORTED     FIO2 NOT REPORTED     Peep/Cpap NOT REPORTED     PSV NOT REPORTED     Text for Respiratory NOT REPORTED     NOTIFICATION NOT REPORTED     NOTIFICATION TIME NOT REPORTED    Troponin   Result Value Ref Range    Troponin T 0.07 (H) <0.03 ng/mL    Troponin Interp         Basic Metabolic Panel w/ Reflex to MG   Result Value Ref Range    Glucose 121 (H) 70 - 99 mg/dL    BUN 72 (H) 8 - 23 mg/dL    CREATININE 1.71 (H) 0.50 - 0.90 mg/dL    Bun/Cre Ratio NOT REPORTED 9 - 20    Calcium 6.5 (L) 8.6 - 10.4 mg/dL    Sodium 136 135 - 144 mmol/L    Potassium 5.0 3.7 - 5.3 mmol/L    Chloride 111 (H) 98 - 107 mmol/L    CO2 8 (LL) 20 - 31 mmol/L    Anion Gap 17 9 - 17 mmol/L    GFR Non-African American 29 (L) >60 mL/min    GFR  35 (L) >60 mL/min    GFR Comment          GFR Staging NOT REPORTED    Magnesium   Result Value Ref Range    Magnesium 1.8 1.6 - 2.6 mg/dL   Phosphorus   Result Value Ref Range    Phosphorus 3.5 2.6 - 4.5 mg/dL   BLOOD GAS, VENOUS   Result Value Ref Range    pH, Eriberto 7.136 (LL) 7.320 - 7.420    pCO2, Eriberto 22.9 (L) 39.0 - 55.0    pO2, Eriberto 42.8 30.0 - 50.0    HCO3, Venous 7.7 (L) 24.0 - 30.0 mmol/L    Positive Base Excess, Eriberto NOT REPORTED 0.0 - 2.0 mmol/L    Negative Base Excess, Eriberto 21.4 (H) 0.0 - 2.0 mmol/L    O2 Sat, Eriberto 78.7 %    Total Hb NOT REPORTED 12.0 - 16.0 g/dl    Oxyhemoglobin NOT REPORTED 95.0 - 98.0 %    Carboxyhemoglobin 2.1 %    Methemoglobin 0.8 %    Pt Temp 37.0     pH, Eriberto, Temp Adj NOT REPORTED 7.320 - 7.420    pCO2, Eriberto, Temp Adj NOT REPORTED 39.0 - 55.0 mmHg    pO2, Eriberto, Temp Adj NOT REPORTED 30.0 - 50.0 mmHg    O2 Device/Flow/% NOT REPORTED     Respiratory Rate NOT REPORTED     Harvinder Test NOT REPORTED     Sample Site NOT REPORTED     Pt.  Position NOT REPORTED     Mode NOT REPORTED     Set Rate NOT REPORTED     Total Rate NOT REPORTED     VT NOT REPORTED     FIO2 NOT REPORTED     Peep/Cpap NOT REPORTED PSV NOT REPORTED     Text for Respiratory RESULTS TO INDU ISLAS     NOTIFICATION NOT REPORTED     NOTIFICATION TIME NOT REPORTED    Basic Metabolic Panel w/ Reflex to MG   Result Value Ref Range    Glucose 211 (H) 70 - 99 mg/dL    BUN 69 (H) 8 - 23 mg/dL    CREATININE 1.70 (H) 0.50 - 0.90 mg/dL    Bun/Cre Ratio NOT REPORTED 9 - 20    Calcium 6.7 (L) 8.6 - 10.4 mg/dL    Sodium 137 135 - 144 mmol/L    Potassium 5.1 3.7 - 5.3 mmol/L    Chloride 113 (H) 98 - 107 mmol/L    CO2 7 (LL) 20 - 31 mmol/L    Anion Gap 17 9 - 17 mmol/L    GFR Non-African American 29 (L) >60 mL/min    GFR  35 (L) >60 mL/min    GFR Comment          GFR Staging NOT REPORTED    Magnesium   Result Value Ref Range    Magnesium 1.7 1.6 - 2.6 mg/dL   Phosphorus   Result Value Ref Range    Phosphorus 3.4 2.6 - 4.5 mg/dL   Basic Metabolic Panel w/ Reflex to MG   Result Value Ref Range    Glucose 235 (H) 70 - 99 mg/dL    BUN 67 (H) 8 - 23 mg/dL    CREATININE 1.67 (H) 0.50 - 0.90 mg/dL    Bun/Cre Ratio NOT REPORTED 9 - 20    Calcium 6.8 (L) 8.6 - 10.4 mg/dL    Sodium 136 135 - 144 mmol/L    Potassium 5.3 3.7 - 5.3 mmol/L    Chloride 111 (H) 98 - 107 mmol/L    CO2 7 (LL) 20 - 31 mmol/L    Anion Gap 18 (H) 9 - 17 mmol/L    GFR Non-African American 30 (L) >60 mL/min    GFR  36 (L) >60 mL/min    GFR Comment          GFR Staging NOT REPORTED    Magnesium   Result Value Ref Range    Magnesium 1.7 1.6 - 2.6 mg/dL   Phosphorus   Result Value Ref Range    Phosphorus 3.1 2.6 - 4.5 mg/dL   Vitamin B12 & Folate   Result Value Ref Range    Vitamin B-12 794 232 - 1245 pg/mL    Folate 4.8 (L) >4.8 ng/mL   T4, Free   Result Value Ref Range    Thyroxine, Free 1.02 0.93 - 1.70 ng/dL   TSH without Reflex   Result Value Ref Range    TSH 1.46 0.30 - 5.00 mIU/L   Basic Metabolic Panel w/ Reflex to MG   Result Value Ref Range    Glucose 209 (H) 70 - 99 mg/dL    BUN 65 (H) 8 - 23 mg/dL    CREATININE 1.59 (H) 0.50 - 0.90 mg/dL    Bun/Cre Ratio NOT REPORTED 9 - 20    Calcium 6.7 (L) 8.6 - 10.4 mg/dL    Sodium 137 135 - 144 mmol/L    Potassium 4.6 3.7 - 5.3 mmol/L    Chloride 112 (H) 98 - 107 mmol/L    CO2 8 (LL) 20 - 31 mmol/L    Anion Gap 17 9 - 17 mmol/L    GFR Non-African American 32 (L) >60 mL/min    GFR  38 (L) >60 mL/min    GFR Comment          GFR Staging NOT REPORTED    Magnesium   Result Value Ref Range    Magnesium 1.7 1.6 - 2.6 mg/dL   Phosphorus   Result Value Ref Range    Phosphorus 3.1 2.6 - 4.5 mg/dL   Basic Metabolic Panel w/ Reflex to MG   Result Value Ref Range    Glucose 221 (H) 70 - 99 mg/dL    BUN 63 (H) 8 - 23 mg/dL    CREATININE 1.56 (H) 0.50 - 0.90 mg/dL    Bun/Cre Ratio NOT REPORTED 9 - 20    Calcium 6.8 (L) 8.6 - 10.4 mg/dL    Sodium 138 135 - 144 mmol/L    Potassium 4.2 3.7 - 5.3 mmol/L    Chloride 112 (H) 98 - 107 mmol/L    CO2 9 (LL) 20 - 31 mmol/L    Anion Gap 17 9 - 17 mmol/L    GFR Non-African American 32 (L) >60 mL/min    GFR  39 (L) >60 mL/min    GFR Comment          GFR Staging NOT REPORTED    Magnesium   Result Value Ref Range    Magnesium 1.6 1.6 - 2.6 mg/dL   Phosphorus   Result Value Ref Range    Phosphorus 3.0 2.6 - 4.5 mg/dL   BLOOD GAS, VENOUS   Result Value Ref Range    pH, Eriberto 7.197 (LL) 7.320 - 7.420    pCO2, Eriberto 25.1 (L) 39.0 - 55.0    pO2, Eriberto 94.0 (H) 30.0 - 50.0    HCO3, Venous 9.7 (L) 24.0 - 30.0 mmol/L    Positive Base Excess, Eriberto NOT REPORTED 0.0 - 2.0 mmol/L    Negative Base Excess, Eriberto 18.4 (H) 0.0 - 2.0 mmol/L    O2 Sat, Eriberto 95.7 %    Total Hb NOT REPORTED 12.0 - 16.0 g/dl    Oxyhemoglobin NOT REPORTED 95.0 - 98.0 %    Carboxyhemoglobin 1.3 %    Methemoglobin 0.7 %    Pt Temp 37.0     pH, Eriberto, Temp Adj NOT REPORTED 7.320 - 7.420    pCO2, Eriberto, Temp Adj NOT REPORTED 39.0 - 55.0 mmHg    pO2, Eriberto, Temp Adj NOT REPORTED 30.0 - 50.0 mmHg    O2 Device/Flow/% NOT REPORTED     Respiratory Rate NOT REPORTED     Harvinder Test NOT REPORTED     Sample Site NOT REPORTED     Pt.  Position NOT REPORTED     Mode NOT REPORTED     Set Rate NOT REPORTED     Total Rate NOT REPORTED     VT NOT REPORTED     FIO2 NOT REPORTED     Peep/Cpap NOT REPORTED     PSV NOT REPORTED     Text for Respiratory NOT REPORTED     NOTIFICATION NOT REPORTED     NOTIFICATION TIME NOT REPORTED    CBC Auto Differential   Result Value Ref Range    WBC 19.5 (H) 3.5 - 11.0 k/uL    RBC 4.67 4.0 - 5.2 m/uL    Hemoglobin 13.6 12.0 - 16.0 g/dL    Hematocrit 42.5 36 - 46 %    MCV 90.9 80 - 100 fL    MCH 29.1 26 - 34 pg    MCHC 32.0 31 - 37 g/dL    RDW 16.7 (H) 11.5 - 14.9 %    Platelets 63 (L) 950 - 450 k/uL    MPV 11.6 6.0 - 12.0 fL    NRBC Automated NOT REPORTED per 100 WBC    Differential Type NOT REPORTED     Immature Granulocytes NOT REPORTED 0 %    Absolute Immature Granulocyte NOT REPORTED 0.00 - 0.30 k/uL    WBC Morphology NOT REPORTED     RBC Morphology NOT REPORTED     Platelet Estimate NOT REPORTED     Seg Neutrophils 60 36 - 66 %    Lymphocytes 5 (L) 24 - 44 %    Monocytes 3 1 - 7 %    Eosinophils % 0 0 - 4 %    Basophils 0 0 - 2 %    Bands 32 (H) 0 - 10 %    Segs Absolute 11.69 (H) 1.3 - 9.1 k/uL    Absolute Lymph # 0.98 (L) 1.0 - 4.8 k/uL    Absolute Mono # 0.59 0.1 - 1.3 k/uL    Absolute Eos # 0.00 0.0 - 0.4 k/uL    Basophils # 0.00 0.0 - 0.2 k/uL    Absolute Bands # 6.24 (H) 0.0 - 1.0 k/uL    Morphology ANISOCYTOSIS PRESENT    Blood Gas, Venous   Result Value Ref Range    pH, Eriberto 7.133 (LL) 7.320 - 7.420    pCO2, Eriberto 21.8 (L) 39.0 - 55.0    pO2, Eriberto 75.1 (H) 30.0 - 50.0    HCO3, Venous 7.3 (L) 24.0 - 30.0 mmol/L    Positive Base Excess, Eriberto NOT REPORTED 0.0 - 2.0 mmol/L    Negative Base Excess, Eriberto 21.9 (H) 0.0 - 2.0 mmol/L    O2 Sat, Eriberto 92.1 (H) 60.0 - 85.0 %    Total Hb NOT REPORTED 12.0 - 16.0 g/dl    Oxyhemoglobin NOT REPORTED 95.0 - 98.0 %    Carboxyhemoglobin 1.3 0 - 5 %    Methemoglobin 0.7 0.0 - 1.9 %    Pt Temp NOT REPORTED     pH, Eriberto, Temp Adj NOT REPORTED 7.320 - 7.420    pCO2, Eriberto, Temp Adj NOT REPORTED 39.0 - Phosphorus   Result Value Ref Range    Phosphorus 2.8 2.6 - 4.5 mg/dL   BLOOD GAS, VENOUS   Result Value Ref Range    pH, Eriberto 7.297 (L) 7.320 - 7.420    pCO2, Eriberto 23.9 (L) 39.0 - 55.0    pO2, Eriberto 79.6 (H) 30.0 - 50.0    HCO3, Venous 11.6 (L) 24.0 - 30.0 mmol/L    Positive Base Excess, Eriberto NOT REPORTED 0.0 - 2.0 mmol/L    Negative Base Excess, Eriberto 14.8 (H) 0.0 - 2.0 mmol/L    O2 Sat, Eriberto 94.8 %    Total Hb NOT REPORTED 12.0 - 16.0 g/dl    Oxyhemoglobin NOT REPORTED 95.0 - 98.0 %    Carboxyhemoglobin 1.5 %    Methemoglobin 0.7 %    Pt Temp 37.0     pH, Eriberto, Temp Adj NOT REPORTED 7.320 - 7.420    pCO2, Eriberto, Temp Adj NOT REPORTED 39.0 - 55.0 mmHg    pO2, Eriberto, Temp Adj NOT REPORTED 30.0 - 50.0 mmHg    O2 Device/Flow/% NOT REPORTED     Respiratory Rate NOT REPORTED     Harvinder Test NOT REPORTED     Sample Site NOT REPORTED     Pt.  Position NOT REPORTED     Mode NOT REPORTED     Set Rate NOT REPORTED     Total Rate NOT REPORTED     VT NOT REPORTED     FIO2 NOT REPORTED     Peep/Cpap NOT REPORTED     PSV NOT REPORTED     Text for Respiratory NOT REPORTED     NOTIFICATION NOT REPORTED     NOTIFICATION TIME NOT REPORTED    Lactic Acid   Result Value Ref Range    Lactic Acid 2.5 (H) 0.5 - 2.2 mmol/L   Creatinine,Random Ur   Result Value Ref Range    Creatinine, Ur 27.0 (L) 28.0 - 217.0 mg/dL   Sodium, Random Ur   Result Value Ref Range    Sodium,Ur 24 mmol/L   POTASSIUM, URINE, RANDOM   Result Value Ref Range    Potassium, Ur 34.1 mmol/L   CHLORIDE, URINE, RANDOM   Result Value Ref Range    Chloride, Ur 34 mmol/L   SALICYLATE LEVEL   Result Value Ref Range    Salicylate Lvl <1 (L) 3 - 10 mg/dL   ACETAMINOPHEN LEVEL   Result Value Ref Range    Acetaminophen Level <10 (L) 10 - 30 ug/mL   Osmolality   Result Value Ref Range    Serum Osmolality 318 (H) 275 - 295 mOsm/kg   PROTEIN, URINE, RANDOM   Result Value Ref Range    Total Protein, Urine 33 mg/dL   PROTEIN ELECTROPHORESIS, URINE   Result Value Ref Range    Specimen Type ug/dL    Iron Saturation 12 (L) 20 - 55 %    UIBC 86 (L) 112 - 347 ug/dL   FERRITIN   Result Value Ref Range    Ferritin 620 (H) 13 - 150 ug/L   PTH, Intact   Result Value Ref Range    Pth Intact 325.7 (H) 15.0 - 65.0 pg/mL   Vitamin D 25 Hydroxy   Result Value Ref Range    Vit D, 25-Hydroxy 5.2 (L) 30.0 - 100.0 ng/mL   Basic Metabolic Panel w/ Reflex to MG   Result Value Ref Range    Glucose 200 (H) 70 - 99 mg/dL    BUN 51 (H) 8 - 23 mg/dL    CREATININE 1.29 (H) 0.50 - 0.90 mg/dL    Bun/Cre Ratio NOT REPORTED 9 - 20    Calcium 6.4 (L) 8.6 - 10.4 mg/dL    Sodium 134 (L) 135 - 144 mmol/L    Potassium 4.9 3.7 - 5.3 mmol/L    Chloride 106 98 - 107 mmol/L    CO2 11 (L) 20 - 31 mmol/L    Anion Gap 17 9 - 17 mmol/L    GFR Non-African American 40 (L) >60 mL/min    GFR  49 (L) >60 mL/min    GFR Comment          GFR Staging NOT REPORTED    Magnesium   Result Value Ref Range    Magnesium 1.8 1.6 - 2.6 mg/dL   Phosphorus   Result Value Ref Range    Phosphorus 3.1 2.6 - 4.5 mg/dL   BLOOD GAS, VENOUS   Result Value Ref Range    pH, Eriberto 7.435 (H) 7.320 - 7.420    pCO2, Eriberto 16.3 (L) 39.0 - 55.0    pO2, Eriberto 64.5 (H) 30.0 - 50.0    HCO3, Venous 10.9 (L) 24.0 - 30.0 mmol/L    Positive Base Excess, Eriberto NOT REPORTED 0.0 - 2.0 mmol/L    Negative Base Excess, Eriberto 13.3 (H) 0.0 - 2.0 mmol/L    O2 Sat, Eriberto 90.3 %    Total Hb NOT REPORTED 12.0 - 16.0 g/dl    Oxyhemoglobin NOT REPORTED 95.0 - 98.0 %    Carboxyhemoglobin 3.1 %    Methemoglobin 0.7 %    Pt Temp 37.0     pH, Eriberto, Temp Adj NOT REPORTED 7.320 - 7.420    pCO2, Eriberto, Temp Adj NOT REPORTED 39.0 - 55.0 mmHg    pO2, Eriberto, Temp Adj NOT REPORTED 30.0 - 50.0 mmHg    O2 Device/Flow/% Cannula     Respiratory Rate NOT REPORTED     Harvinder Test NOT REPORTED     Sample Site NOT REPORTED     Pt.  Position SEMI-FOWLERS     Mode NOT REPORTED     Set Rate NOT REPORTED     Total Rate NOT REPORTED     VT NOT REPORTED     FIO2 NOT REPORTED     Peep/Cpap NOT REPORTED     PSV NOT REPORTED Text for Respiratory NOT REPORTED     NOTIFICATION NOT REPORTED     NOTIFICATION TIME NOT REPORTED      *Note: Due to a large number of results and/or encounters for the requested time period, some results have not been displayed. A complete set of results can be found in Results Review.        Electronically signed by Chemo Casillas MD on 3/10/2018 at 3:35 PM

## 2018-03-11 PROBLEM — G61.81 CIDP WITH CNS OVERLAP (CHRONIC INFLAMMATORY DEMYELINATING POLYNEURITIS) (HCC): Chronic | Status: ACTIVE | Noted: 2017-07-29

## 2018-03-11 PROBLEM — N17.9 AKI (ACUTE KIDNEY INJURY) (HCC): Chronic | Status: ACTIVE | Noted: 2018-03-01

## 2018-03-11 PROBLEM — G61.81 CHRONIC INFLAMMATORY DEMYELINATING NEUROPATHY (HCC): Chronic | Status: ACTIVE | Noted: 2017-04-01

## 2018-03-11 PROBLEM — N39.0 UTI (URINARY TRACT INFECTION): Chronic | Status: ACTIVE | Noted: 2018-03-01

## 2018-03-11 PROBLEM — I10 HYPERTENSION: Chronic | Status: ACTIVE | Noted: 2017-04-01

## 2018-03-11 PROBLEM — E11.10 DKA, TYPE 2, NOT AT GOAL (HCC): Chronic | Status: ACTIVE | Noted: 2018-03-01

## 2018-03-11 PROBLEM — G61.81 CIDP WITH CNS OVERLAP (CHRONIC INFLAMMATORY DEMYELINATING POLYNEURITIS) (HCC): Status: ACTIVE | Noted: 2017-07-29

## 2018-03-11 PROBLEM — R53.1 LEFT-SIDED WEAKNESS: Chronic | Status: ACTIVE | Noted: 2018-01-12

## 2018-03-11 PROBLEM — M21.962 FOOT DEFORMITY, BILATERAL: Status: ACTIVE | Noted: 2017-07-29

## 2018-03-11 PROBLEM — M21.961 FOOT DEFORMITY, BILATERAL: Status: ACTIVE | Noted: 2017-07-29

## 2018-03-11 PROBLEM — I10 HYPERTENSION: Status: ACTIVE | Noted: 2017-04-01

## 2018-03-11 PROBLEM — G61.81 CHRONIC INFLAMMATORY DEMYELINATING NEUROPATHY (HCC): Status: ACTIVE | Noted: 2017-04-01

## 2018-03-11 PROBLEM — D64.9 ANEMIA: Chronic | Status: ACTIVE | Noted: 2018-03-11

## 2018-03-11 PROBLEM — E83.51 HYPOCALCEMIA: Chronic | Status: ACTIVE | Noted: 2018-03-01

## 2018-03-11 PROBLEM — E11.65 CONTROLLED TYPE 2 DIABETES MELLITUS WITH HYPERGLYCEMIA, WITHOUT LONG-TERM CURRENT USE OF INSULIN (HCC): Chronic | Status: ACTIVE | Noted: 2018-01-16

## 2018-03-11 LAB
ABSOLUTE EOS #: 0.1 K/UL (ref 0–0.4)
ABSOLUTE IMMATURE GRANULOCYTE: ABNORMAL K/UL (ref 0–0.3)
ABSOLUTE LYMPH #: 1.5 K/UL (ref 1–4.8)
ABSOLUTE MONO #: 0.4 K/UL (ref 0.1–1.3)
ALBUMIN SERPL-MCNC: 1.7 G/DL (ref 3.5–5.2)
ANION GAP SERPL CALCULATED.3IONS-SCNC: 9 MMOL/L (ref 9–17)
BASOPHILS # BLD: 0 % (ref 0–2)
BASOPHILS ABSOLUTE: 0 K/UL (ref 0–0.2)
BUN BLDV-MCNC: 25 MG/DL (ref 8–23)
BUN/CREAT BLD: ABNORMAL (ref 9–20)
CALCIUM SERPL-MCNC: 7.5 MG/DL (ref 8.6–10.4)
CHLORIDE BLD-SCNC: 104 MMOL/L (ref 98–107)
CO2: 28 MMOL/L (ref 20–31)
CREAT SERPL-MCNC: 0.53 MG/DL (ref 0.5–0.9)
DIFFERENTIAL TYPE: ABNORMAL
EOSINOPHILS RELATIVE PERCENT: 3 % (ref 0–4)
GFR AFRICAN AMERICAN: >60 ML/MIN
GFR NON-AFRICAN AMERICAN: >60 ML/MIN
GFR SERPL CREATININE-BSD FRML MDRD: ABNORMAL ML/MIN/{1.73_M2}
GFR SERPL CREATININE-BSD FRML MDRD: ABNORMAL ML/MIN/{1.73_M2}
GLUCOSE BLD-MCNC: 128 MG/DL (ref 65–105)
GLUCOSE BLD-MCNC: 135 MG/DL (ref 65–105)
GLUCOSE BLD-MCNC: 145 MG/DL (ref 65–105)
GLUCOSE BLD-MCNC: 150 MG/DL (ref 70–99)
GLUCOSE BLD-MCNC: 166 MG/DL (ref 65–105)
HCT VFR BLD CALC: 26.2 % (ref 36–46)
HCT VFR BLD CALC: 28.5 % (ref 36–46)
HEMOGLOBIN: 8.3 G/DL (ref 12–16)
HEMOGLOBIN: 9.1 G/DL (ref 12–16)
IMMATURE GRANULOCYTES: ABNORMAL %
LYMPHOCYTES # BLD: 35 % (ref 24–44)
MAGNESIUM: 1.6 MG/DL (ref 1.6–2.6)
MCH RBC QN AUTO: 28.4 PG (ref 26–34)
MCHC RBC AUTO-ENTMCNC: 31.8 G/DL (ref 31–37)
MCV RBC AUTO: 89.4 FL (ref 80–100)
MONOCYTES # BLD: 8 % (ref 1–7)
NRBC AUTOMATED: ABNORMAL PER 100 WBC
PARTIAL THROMBOPLASTIN TIME: 28.5 SEC (ref 23–31)
PDW BLD-RTO: 16.5 % (ref 11.5–14.9)
PLATELET # BLD: 96 K/UL (ref 150–450)
PLATELET ESTIMATE: ABNORMAL
PMV BLD AUTO: 9.2 FL (ref 6–12)
POTASSIUM SERPL-SCNC: 3.5 MMOL/L (ref 3.7–5.3)
RBC # BLD: 2.93 M/UL (ref 4–5.2)
RBC # BLD: ABNORMAL 10*6/UL
SEG NEUTROPHILS: 54 % (ref 36–66)
SEGMENTED NEUTROPHILS ABSOLUTE COUNT: 2.3 K/UL (ref 1.3–9.1)
SODIUM BLD-SCNC: 141 MMOL/L (ref 135–144)
WBC # BLD: 4.3 K/UL (ref 3.5–11)
WBC # BLD: ABNORMAL 10*3/UL

## 2018-03-11 PROCEDURE — 82947 ASSAY GLUCOSE BLOOD QUANT: CPT

## 2018-03-11 PROCEDURE — 99233 SBSQ HOSP IP/OBS HIGH 50: CPT | Performed by: INTERNAL MEDICINE

## 2018-03-11 PROCEDURE — 6370000000 HC RX 637 (ALT 250 FOR IP): Performed by: RADIOLOGY

## 2018-03-11 PROCEDURE — 36592 COLLECT BLOOD FROM PICC: CPT

## 2018-03-11 PROCEDURE — 85018 HEMOGLOBIN: CPT

## 2018-03-11 PROCEDURE — 6370000000 HC RX 637 (ALT 250 FOR IP): Performed by: INTERNAL MEDICINE

## 2018-03-11 PROCEDURE — 2580000003 HC RX 258: Performed by: INTERNAL MEDICINE

## 2018-03-11 PROCEDURE — 85025 COMPLETE CBC W/AUTO DIFF WBC: CPT

## 2018-03-11 PROCEDURE — 85730 THROMBOPLASTIN TIME PARTIAL: CPT

## 2018-03-11 PROCEDURE — C9113 INJ PANTOPRAZOLE SODIUM, VIA: HCPCS | Performed by: INTERNAL MEDICINE

## 2018-03-11 PROCEDURE — 83735 ASSAY OF MAGNESIUM: CPT

## 2018-03-11 PROCEDURE — 82040 ASSAY OF SERUM ALBUMIN: CPT

## 2018-03-11 PROCEDURE — 80048 BASIC METABOLIC PNL TOTAL CA: CPT

## 2018-03-11 PROCEDURE — 36415 COLL VENOUS BLD VENIPUNCTURE: CPT

## 2018-03-11 PROCEDURE — 85014 HEMATOCRIT: CPT

## 2018-03-11 PROCEDURE — 97110 THERAPEUTIC EXERCISES: CPT

## 2018-03-11 PROCEDURE — 2060000000 HC ICU INTERMEDIATE R&B

## 2018-03-11 PROCEDURE — 6360000002 HC RX W HCPCS: Performed by: INTERNAL MEDICINE

## 2018-03-11 PROCEDURE — 2500000003 HC RX 250 WO HCPCS: Performed by: RADIOLOGY

## 2018-03-11 PROCEDURE — 6360000002 HC RX W HCPCS: Performed by: RADIOLOGY

## 2018-03-11 RX ORDER — 0.9 % SODIUM CHLORIDE 0.9 %
1000 INTRAVENOUS SOLUTION INTRAVENOUS ONCE
Status: DISCONTINUED | OUTPATIENT
Start: 2018-03-12 | End: 2018-03-12

## 2018-03-11 RX ORDER — POTASSIUM CHLORIDE 20MEQ/15ML
40 LIQUID (ML) ORAL PRN
Status: DISCONTINUED | OUTPATIENT
Start: 2018-03-11 | End: 2018-03-19 | Stop reason: HOSPADM

## 2018-03-11 RX ORDER — POTASSIUM CHLORIDE 7.45 MG/ML
10 INJECTION INTRAVENOUS PRN
Status: DISCONTINUED | OUTPATIENT
Start: 2018-03-11 | End: 2018-03-19 | Stop reason: HOSPADM

## 2018-03-11 RX ORDER — POTASSIUM CHLORIDE 20 MEQ/1
40 TABLET, EXTENDED RELEASE ORAL PRN
Status: DISCONTINUED | OUTPATIENT
Start: 2018-03-11 | End: 2018-03-19 | Stop reason: HOSPADM

## 2018-03-11 RX ADMIN — Medication 10 ML: at 23:22

## 2018-03-11 RX ADMIN — Medication 10 ML: at 10:11

## 2018-03-11 RX ADMIN — FUROSEMIDE 40 MG: 10 INJECTION, SOLUTION INTRAVENOUS at 08:03

## 2018-03-11 RX ADMIN — Medication 1 CAPSULE: at 08:03

## 2018-03-11 RX ADMIN — INSULIN LISPRO 1 UNITS: 100 INJECTION, SOLUTION INTRAVENOUS; SUBCUTANEOUS at 17:36

## 2018-03-11 RX ADMIN — PANTOPRAZOLE SODIUM 40 MG: 40 INJECTION, POWDER, FOR SOLUTION INTRAVENOUS at 23:22

## 2018-03-11 RX ADMIN — QUETIAPINE FUMARATE 50 MG: 25 TABLET ORAL at 21:35

## 2018-03-11 RX ADMIN — POTASSIUM CHLORIDE 40 MEQ: 40 SOLUTION ORAL at 10:17

## 2018-03-11 RX ADMIN — METRONIDAZOLE 500 MG: 500 INJECTION, SOLUTION INTRAVENOUS at 21:35

## 2018-03-11 RX ADMIN — Medication 50000 UNITS: at 10:10

## 2018-03-11 RX ADMIN — Medication 12 UNITS: at 21:50

## 2018-03-11 RX ADMIN — METRONIDAZOLE 500 MG: 500 INJECTION, SOLUTION INTRAVENOUS at 15:21

## 2018-03-11 RX ADMIN — METRONIDAZOLE 500 MG: 500 INJECTION, SOLUTION INTRAVENOUS at 06:00

## 2018-03-11 RX ADMIN — CLOPIDOGREL BISULFATE 75 MG: 75 TABLET ORAL at 08:03

## 2018-03-11 RX ADMIN — PANTOPRAZOLE SODIUM 40 MG: 40 INJECTION, POWDER, FOR SOLUTION INTRAVENOUS at 10:10

## 2018-03-11 RX ADMIN — FLUCONAZOLE 200 MG: 2 INJECTION, SOLUTION INTRAVENOUS at 13:37

## 2018-03-11 RX ADMIN — INSULIN LISPRO 1 UNITS: 100 INJECTION, SOLUTION INTRAVENOUS; SUBCUTANEOUS at 21:50

## 2018-03-11 NOTE — PLAN OF CARE
Problem: Risk for Impaired Skin Integrity  Goal: Tissue integrity - skin and mucous membranes  Structural intactness and normal physiological function of skin and  mucous membranes. Intervention: SKIN ASSESSMENT  Patient has an unstageable dark area on her sacral area, with some small open areas surrounding. Covered with mepilex. Turn q hours. Waffle mattress in place. Problem: Falls - Risk of  Goal: Absence of falls  Outcome: Ongoing  No falls this shift. Patient is alert and oriented. Call light is within reach.

## 2018-03-11 NOTE — CONSULTS
General Surgery   History and Physical      PATIENT NAME: Rebecca Avalos   YOB: 1940    ADMISSION DATE: 3/1/2018 10:21 AM      TODAY'S DATE: 3/11/2018    CHIEF COMPLAINT:  Moderate protein calorie malnutrition      HISTORY OF PRESENT ILLNESS:  The patient is a 69 y.o. 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 She was admitted to ICU with a diagnosis of DKA and sepsis. She has moderate protein calorie malnutrition and is getting tube feeding through an NG tube. She was hypotensive initially. She developed a DVT and had an IVC filter during this admission.       Past Medical History:        Diagnosis Date    Cerebral edema ([address] 01/10/2018    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 (HCC) 01/10/2018    Chronic inflammatory demyelinating polyneuropathy (HCC)     CIDP (chronic inflammatory demyelinating polyneuropathy) (HCC)     Diabetes mellitus ([address]     Hyperlipidemia     Hypertension     Left arm weakness 01/10/2018    Left-sided weakness 01/10/2018       Past Surgical History:        Procedure Laterality Date    CHOLECYSTECTOMY      HYSTERECTOMY      partial    HYSTERECTOMY      THROMBECTOMY  01/10/2018    EMERGENT WITH CEREBRAL ANGIOGRAM    TONSILLECTOMY      TRANSESOPHAGEAL ECHOCARDIOGRAM  01/16/2018       Medications Prior to Admission:   Prescriptions Prior to Admission: atorvastatin (LIPITOR) 10 MG tablet, Take 10 mg by mouth daily  famotidine (PEPCID) 20 MG tablet, Take 20 mg by mouth daily  FLUoxetine (PROZAC) 10 MG capsule, Take 1 capsule by mouth daily  hydrALAZINE (APRESOLINE) 25 MG tablet, Take 1 tablet by mouth every 8 hours  amLODIPine (NORVASC) 10 MG tablet, Take 1 tablet by mouth daily  tamsulosin (FLOMAX) 0.4 MG capsule, Take 1 capsule by mouth daily  clopidogrel (PLAVIX) 75 MG tablet, Take 1 tablet by mouth [facility])    Dysarthria    Left-sided weakness    Controlled type 2 diabetes mellitus with hyperglycemia, without long-term current use of insulin (HCC)    Sepsis (HCC)    UTI (urinary tract infection)    Leukocytosis    Increased anion gap metabolic acidosis    SAMINA (acute kidney injury) ([facility] [address]    Hypocalcemia    Hypokalemia    Suspected deep tissue injury    Acute UTI    Thrombocytopenia (HCC)    Acute deep vein thrombosis (DVT) of lower extremity (HCC)    Chronic inflammatory demyelinating neuropathy (HCC)    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 *      PLAN    1. Will alert Thusay to her need for PEG tube so he can get her on his schedule as soon as possible  2.  Discussed risks, benefits, and alternatives with the POA        Electronically signed by Alma Benavides MD  on 3/11/2018 at 7:28 PM

## 2018-03-11 NOTE — PROGRESS NOTES
[address]      [address] [facility]     Progress Note    3/11/2018    8:34 AM    Name:   Armida Mohan  MRN:     [id number]     Acct:      [de-identified]   Room:   2091/2091-01  IP Day:  10  Admit Date:  3/1/2018 10:21 AM    PCP:   Suzanna Hernández MD  Code Status:  Full Code    Subjective:     C/C:   Chief Complaint   Patient presents with    Altered Mental Status     Interval History Status: improved     Examined Pt at bedside this AM.  Resting comofortably, no acute complaints. On tube feeds. Endorsing improvement in sleep, agitation, Pt pleasant during interaction. Family OK for PEG. Will try to DC iniguez, PICC tomorrow. Brief History:     The patient is a 69 y.o. Non-/non  female with hx DMII, CIDP, prior CVA with left hemiplegia who presents with Altered Mental Status   and she is admitted to the hospital for the management of DKA with UTI. Pt has AMS and is unable to provide hx, limited hx from family. Pt has had increased c/o HA over last day. Family noted increase in urine odor over last several days. Pt developed AMS in nursing home, presented to ED. Review of Systems:     ROS    CONSTITUTIONAL:  negative for fevers, chills, sweats, Positive for fatigue  HEENT:  negative for vision, hearing changes, runny nose, throat pain  RESPIRATORY:  negative for shortness of breath, cough, congestion, wheezing. CARDIOVASCULAR:  negative for chest pain, palpitations.   GASTROINTESTINAL:  negative for nausea, vomiting, diarrhea, constipation, positive poor appetite  GENITOURINARY:  negative for difficulty of urination, burning with urination, frequency   INTEGUMENT:  negative for rash, skin lesions, easy bruising   HEMATOLOGIC/LYMPHATIC:  Diffuse swelling  ALLERGIC/IMMUNOLOGIC:  negative for urticaria , itching  ENDOCRINE:  negative increase in drinking, REPORTED     Platelet Estimate NOT REPORTED     Seg Neutrophils 54 36 - 66 %    Lymphocytes 35 24 - 44 %    Monocytes 8 (H) 1 - 7 %    Eosinophils % 3 0 - 4 %    Basophils 0 0 - 2 %    Segs Absolute 2.30 1.3 - 9.1 k/uL    Absolute Lymph # 1.50 1.0 - 4.8 k/uL    Absolute Mono # 0.40 0.1 - 1.3 k/uL    Absolute Eos # 0.10 0.0 - 0.4 k/uL    Basophils # 0.00 0.0 - 0.2 k/uL   APTT    Collection Time: 03/11/18  5:49 AM   Result Value Ref Range    PTT 28.5 23.0 - 31.0 sec   Basic Metabolic Panel w/ Reflex to MG    Collection Time: 03/11/18  5:49 AM   Result Value Ref Range    Glucose 150 (H) 70 - 99 mg/dL    BUN 25 (H) 8 - 23 mg/dL    CREATININE 0.53 0.50 - 0.90 mg/dL    Bun/Cre Ratio NOT REPORTED 9 - 20    Calcium 7.5 (L) 8.6 - 10.4 mg/dL    Sodium 141 135 - 144 mmol/L    Potassium 3.5 (L) 3.7 - 5.3 mmol/L    Chloride 104 98 - 107 mmol/L    CO2 28 20 - 31 mmol/L    Anion Gap 9 9 - 17 mmol/L    GFR Non-African American >60 >60 mL/min    GFR African American >60 >60 mL/min    GFR Comment          GFR Staging NOT REPORTED    Magnesium    Collection Time: 03/11/18  5:49 AM   Result Value Ref Range    Magnesium 1.6 1.6 - 2.6 mg/dL       Lab Results   Component Value Date/Time    SPECIAL NOT REPORTED 03/06/2018 09:34 AM     Lab Results   Component Value Date/Time    CULTURE (A) 03/06/2018 09:34 AM     YEAST, NOT ELDON ALBICANS OR CANDIDA DUBLINIENSIS >176742 CFU/ML    CULTURE  03/06/2018 09:34 AM     Performed at [address] [place] [address] [phone]       Radiology:    Ct Abdomen Pelvis Wo Contrast Additional Contrast? Radiologist Recommendation    Result Date: 3/2/2018  EXAMINATION: CT OF THE ABDOMEN AND PELVIS WITHOUT CONTRAST 3/2/2018 5:11 pm TECHNIQUE: CT of the abdomen and pelvis was performed without the administration of intravenous contrast. Multiplanar reformatted images are provided for review.  Dose modulation, iterative reconstruction, and/or weight based adjustment of the mA/kV was could be obtained as clinically indicated. Otherwise, no acute process in the abdomen or pelvis. Xr Chest (single View Frontal)    Result Date: 3/1/2018  EXAMINATION: SINGLE VIEW OF THE CHEST 3/1/2018 5:05 pm COMPARISON: None. HISTORY: ORDERING SYSTEM PROVIDED HISTORY: Central Line Placement TECHNOLOGIST PROVIDED HISTORY: Reason for exam:->Central Line Placement Reason for exam:->Portable Ordering Physician Provided Reason for Exam: line placement Acuity: Acute Type of Exam: Initial FINDINGS: The right IJ central venous catheter in place terminating at cavoatrial junction. There is no pneumothorax. The radiograph is somewhat limited due to left-sided tilt. Cardiac and mediastinal contour are normal.  There is minimal atelectatic change in the left costophrenic angle. Bony structures are grossly unremarkable. Dextroscoliosis at lower thoracic spine present similar to previous examination. Uncomplicated interval insertion of right IJ catheter. Otherwise, no interval change     Xr Chest (single View Frontal)    Result Date: 3/1/2018  EXAMINATION: SINGLE VIEW OF THE CHEST 3/1/2018 10:48 am COMPARISON: Chest x-ray from 01/10/2018 HISTORY: ORDERING SYSTEM PROVIDED HISTORY: Altered mental status,  confused TECHNOLOGIST PROVIDED HISTORY: Reason for exam:->Altered mental status,  confused Ordering Physician Provided Reason for Exam: AMS Acuity: Unknown Type of Exam: Unknown FINDINGS: There is no focal airspace consolidation, pleural effusion or pneumothorax. The cardiac silhouette appears mildly enlarged, but this may at least in part related to technique. There is no pulmonary vascular congestion. There are calcifications of the aortic arch. There are similar moderate hypertrophic degenerative changes of the visualized spine and shoulders. Visualized osseous structures appear mildly demineralized, but grossly intact, given the non dedicated imaging.      No focal airspace consolidation or pulmonary vascular congestion. Ct Head Wo Contrast    Result Date: 3/1/2018  EXAMINATION: CT OF THE HEAD WITHOUT CONTRAST  3/1/2018 12:26 pm TECHNIQUE: CT of the head was performed without the administration of intravenous contrast. Dose modulation, iterative reconstruction, and/or weight based adjustment of the mA/kV was utilized to reduce the radiation dose to as low as reasonably achievable. COMPARISON: January 10 HISTORY: ORDERING SYSTEM PROVIDED HISTORY: AMS hx of CVA with Left sided deficits TECHNOLOGIST PROVIDED HISTORY: Has a \"code stroke\" or \"stroke alert\" been called? ->No Ordering Physician Provided Reason for Exam: AMS, HX OF CVA WITH LEFT SIDED DEFICITS Additional signs and symptoms: PATIENT UNABLE TO GIVE HISTORY. UNABLE TO STRAIGHTEN HAD FOR EXAM. FINDINGS: BRAIN/VENTRICLES: There is a large amount of volume loss in the right MCA distribution including in the perisylvian region. This is likely due to the patient's recent infarct. There is curvilinear high density in the right sylvian fissures suggesting age-indeterminate thrombus in the MCA branch. There is low-density and loss of differentiation in the right lentiform nucleus and lateral thalamic region. Focal low density in the right caudate head is noted suggesting prior infarct. ORBITS: The visualized portion of the orbits demonstrate no acute abnormality. SINUSES: The visualized paranasal sinuses and mastoid air cells demonstrate no acute abnormality. SOFT TISSUES/SKULL:  No acute abnormality of the visualized skull or soft tissues. 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 There is surrounding low density extending deep into the lentiform nucleus region and right lateral thalamic region. It is unclear whether this is gliosis from the prior ischemic event or a subtle superimposed area of new ischemia.   MRI may be more helpful High density in the right MCA within the sylvian fissure. This is age indeterminate given the recent infarct. This may represent sequela of prior thrombus, however new thrombus not excluded. Again if the patient 7 current right hemispheric stroke symptoms, consider MRI     Us Renal Limited    Result Date: 3/2/2018  EXAMINATION: ULTRASOUND OF THE KIDNEYS 3/2/2018 2:21 pm COMPARISON: None. HISTORY: ORDERING SYSTEM PROVIDED HISTORY: RENAL FAILURE, ACUTE (KIDNEY INJURY) TECHNOLOGIST PROVIDED HISTORY: Ordering Physician Provided Reason for Exam: arf Acuity: Acute Type of Exam: Initial FINDINGS: The right kidney is less than optimally visualized due to surrounding bowel gas. The right kidney measures 12.8 x 5.8 x 5.6 cm and the left kidney 11.2 x 5.4 x 5.5 cm. The cortical thickness on the right is 17 mm and on the left is 18 mm. Kidneys demonstrate normal cortical echogenicity. No hydronephrosis or intrarenal stones. No focal lesions. Unremarkable ultrasound of the kidneys. Vl Lower Extremity Arteries Right    Result Date: 3/6/2018    [facility]  Vascular Lower Arterial Plethysmography Procedure   Patient Name   Viridiana Jenkins Date of Study           03/06/2018                 L   Date of Birth  1940  Gender                  Female   Age            69 year(s)  Race                       Room Number    2002   Corporate ID # [id number]   Patient Acct # [de-identified]   MR #           [id number]      Javier Landin, [facility]   Accession #    [id number]   Interpreting Physician  Alida Kurtz   Referring                  Referring Physician     Cici Goodson  Nurse  Practitioner  Procedure Type of Study:   Extremities Arteries: Lower Arterial Plethysmography, PVR Lower. Indications for Study:Pain, leg. Patient Status: In Patient. Technical Quality:Limited visualization. Limitation reason:Body habitus, bandages.  Comments: Normal (0.95-1.3) Mild vascular insufficiency (0.7-0.94) Moderate vascular insufficiency (0.5-0.69) Severe vascular insufficiency (0.3-0.49) Critical Ischemia (0.1-0.29) Non-diagnostic due to calcification (>1.3) Toe pressure <40 mmHg poor wound healing potential  Conclusions   Summary   Right lower extremity ABIs and PVRs were performed for the evaluation of  peripheral vascular disease. Study demonstrates:   ANIKET suggests no vascular insufficiency at rest.   Signature   ----------------------------------------------------------------  Electronically signed by Ivanna Lozada RVT(Sonographer) on  03/06/2018 03:49 PM  ----------------------------------------------------------------   ----------------------------------------------------------------  Electronically signed by Hong Loera(Interpreting  physician) on 03/06/2018 04:58 PM  ----------------------------------------------------------------  Findings:   Right Impression:  Decreased amplitude, biphasic waveform in the thigh. Normal amplitude, biphasic waveform at the calf and ankle. ANIKET demonstrates no vascular insufficiency at rest.      Vl Lower Extremity Bilateral Venous Duplex    Result Date: 3/6/2018    [facility]  Vascular Lower Extremities DVT Study Procedure   Patient Name   Viridiana Jenkins Date of Study           03/06/2018                 L   Date of Birth  1940  Gender                  Female   Age            69 year(s)  Race                       Room Number    2002   Corporate ID # [id number]   Patient Acct # [de-identified]   MR #           [id number]      Javier Landin, [facility]   Accession #    [id number]   Interpreting Physician  Alida Kurtz   Referring                  Referring Physician     Cici Goodson  Nurse  Practitioner  Procedure Type of Study:   Veins: Lower Extremities DVT Study, Venous Scan Lower Bilateral.  Indications for Study:Pain and swelling. Patient Status: In Patient. Technical Quality:Poor visualization. Limitation reason:Body habitus, swelling.   - Critical Result:INDU Davis. Conclusions   Summary   Simultaneous real time imaging utilizing B-Mode, color doppler and  spectral waveform analysis was performed on the bilateral lower  extremities for venous examination of the deep and superficial systems. Findings are:   **Abnormal Study**   Right:  Technically limited study as stated above however in the visualized areas  no superficial or deep vein thrombosis was identified. Left:  Acute deep venous thrombosis extending from the popliteal vein into the  common femoral vein. Signature   ----------------------------------------------------------------  Electronically signed by Mary Li RVT(Sonographer) on  03/06/2018 03:00 PM  ----------------------------------------------------------------   ----------------------------------------------------------------  Electronically signed by Hong Rossi(Interpreting  physician) on 03/06/2018 05:02 PM  ----------------------------------------------------------------  Findings:   Right Impression:                          Left Impression:  The common femoral, femoral, popliteal and The popliteal to the common  tibial veins demonstrate normal            femoral veins are  compressibility and augmentation. non-compressible. Thrombus appears acute based on  Limited visualization of the lower thigh   B-Mode image evaluation. and calf veins. The saphenofemoral junction  Normal compressibility of the great        demonstrates no  saphenous vein. compressibility. Thrombus appears acute based on  Normal compressibility of the small        B-Mode image evaluation. saphenous vein. Limited visualization of the                                             calf veins.                                               Normal compressibility of the with  compressibility and            hypoechoic echoes. augmentation. Limited visualization of the calf veins. Great saphenous vein is dilated and                                 non-compressible with hypoechoic echoes                                 from proximal knee to saphenofemoral                                 junction. The small saphenous vein is dilated and                                 non-compressible with hypoechoic echoes. Physical Examination:        Physical Exam    General Appearance:  Ill appearing woman alert today, answering question and following commanda  Mental status: oriented to person only  Head:  NG in place for TF normocephalic, atraumatic  Eye: drooping L eye noted  Ear: normal external ear, no discharge, hearing intact  Nose:  no drainage noted  Mouth: MOM  Neck: supple  Lungs: Bilateral equal air entry, clear to ausculation, no wheezing, rales or rhonchi, normal effort  Cardiovascular: normal rate, regular rhythm, no murmur, gallop, rub.   Abdomen: Soft, nontender, nondistended, normal bowel sounds  Neurologic: left hemiplegia noted   Skin: No gross lesions, rashes, bruising or bleeding on exposed skin area  Extremities:  LLE edema to thigh, improving from prior, RLE edema to knee, upper extremity edema worse on left with weeping  Psych: flat affect    Assessment:        Primary Problem  DKA, type 2, not at goal [facility])    Active Hospital Problems    Diagnosis Date Noted    Acute UTI [N39.0]     Thrombocytopenia ([facility] [D69.6]     Acute deep vein thrombosis (DVT) of lower extremity ([facility] [I82.409]     Suspected deep tissue injury [Z04.9] 03/02/2018    DKA, type 2, not at goal [facility]) [E13.10] 03/01/2018    Sepsis ([facility] [A41.9] 03/01/2018    UTI (urinary tract infection) [N39.0] 03/01/2018    Leukocytosis [D72.829] 03/01/2018    Increased anion gap metabolic acidosis [R78.2]

## 2018-03-11 NOTE — PROGRESS NOTES
pt to tolerate exercise program for ROM x 10 reps  Short term goal 2: pt to tolerate 1/2 hour of therapuetic exercise and activity  Short term goal 3: pt to assist rolling in bed w/ right UE and max x 2  Patient Goals   Patient goals : unable to state    Plan    Plan  Times per week: 2-3x/ week  Times per day:  (2-3x/ week)  Specific instructions for Next Treatment: 3-2-18 hx CVA w/ left hemiparesis, dep for care, P to [facility] as tolerated  Current Treatment Recommendations: Strengthening, Balance Training, Transfer Training, Functional Mobility Training, Endurance Training, Home Exercise Program, Positioning, Neuromuscular Re-education  Safety Devices  Type of devices:  All fall risk precautions in place, Left in bed, Call light within reach     Therapy Time   Individual Concurrent Group Co-treatment   Time In [address]         Time Out 0928         Minutes [address] [place] [address]

## 2018-03-11 NOTE — CARE COORDINATION
ONGOING DISCHARGE PLAN:    Discharge plan for patient remains home with daughter and resumed services with [facility]. PT is recommending ECF and that has been refused.     Active order for IV lasix, diflucan and flagyl. Will continue to follow for additional discharge needs.     Electronically signed by Fritz Smith RN on 3/11/2018 at 4:03 PM

## 2018-03-11 NOTE — PROGRESS NOTES
NEPHROLOGY PROGRESS NOTE    Patient :  Denzel Gallagher; 69 y.o. MRN# [id number]  Location:    Attending:  Marc Olivas MD  Admit Date:  3/1/2018   Hospital Day: 10    Subjective: 69 y.o. 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 She was admitted to ICU with a diagnosis of DKA and sepsis. Initial laboratory studies were remarkable for serum bicarbonate 8 mmol/L, ketoacidosis and serum creatinine 2.7 mg/dL. She was hypotensive and required high dose Levophed which has since been titrated down to current level of 1 mcg/min. Patient bicarb was 8 anion gap was 20 on admission patient was started on DKA protocol insulin drip anion gap has improved but serum bicarb has not improved. Interval history/subjective:  Patient does not have any new complaints today. She however continues to exhibit significant left upper and lower extremity edema. Left upper extremity edema is now in ace wrap. Objective:  CURRENT TEMPERATURE:  Temp: 98.1 °F (36.7 °C)  MAXIMUM TEMPERATURE OVER 24HRS:  Temp (24hrs), Av.7 °F (37.1 °C), Min:98.1 °F (36.7 °C), Max:100.1 °F (37.8 °C)    CURRENT RESPIRATORY RATE:  Resp: 18  CURRENT PULSE:  Pulse: 71  CURRENT BLOOD PRESSURE:  BP: (!) 134/58  24HR BLOOD PRESSURE RANGE:  Systolic (54UCM), AFL:936 , Min:113 , AYA:732   ; Diastolic (12PYD), TCS:74, Min:57, Max:76    24HR INTAKE/OUTPUT:      Intake/Output Summary (Last 24 hours) at 18 1201  Last data filed at 18 0857   Gross per 24 hour   Intake                0 ml   Output             4125 ml   Net            -4125 ml     Patient Vitals for the past 96 hrs (Last 3 readings):   Weight   18 0515 216 lb 0.8 oz (98 kg)       Physical Exam:  GENERAL APPEARANCE: More awake and alert responsive  HEAD: normocephalic  EYES:  Not pale, anicteric   NOSE:  No nasal discharge.     THROAT:

## 2018-03-12 PROBLEM — E44.0 MODERATE MALNUTRITION (HCC): Status: ACTIVE | Noted: 2018-03-12

## 2018-03-12 LAB
ABSOLUTE EOS #: 0.1 K/UL (ref 0–0.4)
ABSOLUTE IMMATURE GRANULOCYTE: ABNORMAL K/UL (ref 0–0.3)
ABSOLUTE LYMPH #: 1.3 K/UL (ref 1–4.8)
ABSOLUTE MONO #: 0.4 K/UL (ref 0.1–1.3)
ANION GAP SERPL CALCULATED.3IONS-SCNC: 9 MMOL/L (ref 9–17)
BASOPHILS # BLD: 1 % (ref 0–2)
BASOPHILS ABSOLUTE: 0 K/UL (ref 0–0.2)
BUN BLDV-MCNC: 25 MG/DL (ref 8–23)
BUN/CREAT BLD: ABNORMAL (ref 9–20)
CALCIUM SERPL-MCNC: 7.7 MG/DL (ref 8.6–10.4)
CHLORIDE BLD-SCNC: 105 MMOL/L (ref 98–107)
CO2: 29 MMOL/L (ref 20–31)
CREAT SERPL-MCNC: 0.47 MG/DL (ref 0.5–0.9)
DIFFERENTIAL TYPE: ABNORMAL
EOSINOPHILS RELATIVE PERCENT: 2 % (ref 0–4)
GFR AFRICAN AMERICAN: >60 ML/MIN
GFR NON-AFRICAN AMERICAN: >60 ML/MIN
GFR SERPL CREATININE-BSD FRML MDRD: ABNORMAL ML/MIN/{1.73_M2}
GFR SERPL CREATININE-BSD FRML MDRD: ABNORMAL ML/MIN/{1.73_M2}
GLUCOSE BLD-MCNC: 144 MG/DL (ref 70–99)
GLUCOSE BLD-MCNC: 160 MG/DL (ref 65–105)
GLUCOSE BLD-MCNC: 208 MG/DL (ref 65–105)
HCT VFR BLD CALC: 25.6 % (ref 36–46)
HEMOGLOBIN: 8.1 G/DL (ref 12–16)
IMMATURE GRANULOCYTES: ABNORMAL %
LYMPHOCYTES # BLD: 33 % (ref 24–44)
MCH RBC QN AUTO: 28.4 PG (ref 26–34)
MCHC RBC AUTO-ENTMCNC: 31.8 G/DL (ref 31–37)
MCV RBC AUTO: 89.2 FL (ref 80–100)
MONOCYTES # BLD: 10 % (ref 1–7)
NRBC AUTOMATED: ABNORMAL PER 100 WBC
PARTIAL THROMBOPLASTIN TIME: 27.5 SEC (ref 23–31)
PDW BLD-RTO: 17 % (ref 11.5–14.9)
PLATELET # BLD: 140 K/UL (ref 150–450)
PLATELET ESTIMATE: ABNORMAL
PMV BLD AUTO: 9.2 FL (ref 6–12)
POTASSIUM SERPL-SCNC: 3.8 MMOL/L (ref 3.7–5.3)
RBC # BLD: 2.87 M/UL (ref 4–5.2)
RBC # BLD: ABNORMAL 10*6/UL
SEG NEUTROPHILS: 54 % (ref 36–66)
SEGMENTED NEUTROPHILS ABSOLUTE COUNT: 2.2 K/UL (ref 1.3–9.1)
SODIUM BLD-SCNC: 143 MMOL/L (ref 135–144)
WBC # BLD: 4 K/UL (ref 3.5–11)
WBC # BLD: ABNORMAL 10*3/UL

## 2018-03-12 PROCEDURE — 6360000002 HC RX W HCPCS: Performed by: INTERNAL MEDICINE

## 2018-03-12 PROCEDURE — 85730 THROMBOPLASTIN TIME PARTIAL: CPT

## 2018-03-12 PROCEDURE — 2580000003 HC RX 258: Performed by: INTERNAL MEDICINE

## 2018-03-12 PROCEDURE — 2580000003 HC RX 258: Performed by: FAMILY MEDICINE

## 2018-03-12 PROCEDURE — 6370000000 HC RX 637 (ALT 250 FOR IP): Performed by: RADIOLOGY

## 2018-03-12 PROCEDURE — 2500000003 HC RX 250 WO HCPCS: Performed by: RADIOLOGY

## 2018-03-12 PROCEDURE — 99233 SBSQ HOSP IP/OBS HIGH 50: CPT | Performed by: INTERNAL MEDICINE

## 2018-03-12 PROCEDURE — 92526 ORAL FUNCTION THERAPY: CPT

## 2018-03-12 PROCEDURE — 82947 ASSAY GLUCOSE BLOOD QUANT: CPT

## 2018-03-12 PROCEDURE — 6360000002 HC RX W HCPCS: Performed by: FAMILY MEDICINE

## 2018-03-12 PROCEDURE — 6370000000 HC RX 637 (ALT 250 FOR IP): Performed by: INTERNAL MEDICINE

## 2018-03-12 PROCEDURE — 85025 COMPLETE CBC W/AUTO DIFF WBC: CPT

## 2018-03-12 PROCEDURE — C9113 INJ PANTOPRAZOLE SODIUM, VIA: HCPCS | Performed by: INTERNAL MEDICINE

## 2018-03-12 PROCEDURE — 2060000000 HC ICU INTERMEDIATE R&B

## 2018-03-12 PROCEDURE — 36592 COLLECT BLOOD FROM PICC: CPT

## 2018-03-12 PROCEDURE — 6360000002 HC RX W HCPCS: Performed by: RADIOLOGY

## 2018-03-12 PROCEDURE — 80048 BASIC METABOLIC PNL TOTAL CA: CPT

## 2018-03-12 PROCEDURE — 6370000000 HC RX 637 (ALT 250 FOR IP): Performed by: NURSE PRACTITIONER

## 2018-03-12 RX ORDER — LORAZEPAM 2 MG/ML
0.5 INJECTION INTRAMUSCULAR ONCE
Status: DISCONTINUED | OUTPATIENT
Start: 2018-03-12 | End: 2018-03-19 | Stop reason: HOSPADM

## 2018-03-12 RX ORDER — ZOLPIDEM TARTRATE 5 MG/1
5 TABLET ORAL NIGHTLY PRN
Status: DISCONTINUED | OUTPATIENT
Start: 2018-03-12 | End: 2018-03-19 | Stop reason: HOSPADM

## 2018-03-12 RX ORDER — ZOLPIDEM TARTRATE 5 MG/1
5 TABLET ORAL NIGHTLY PRN
Status: DISCONTINUED | OUTPATIENT
Start: 2018-03-12 | End: 2018-03-12

## 2018-03-12 RX ORDER — DEXTROSE AND SODIUM CHLORIDE 5; .45 G/100ML; G/100ML
INJECTION, SOLUTION INTRAVENOUS CONTINUOUS
Status: DISCONTINUED | OUTPATIENT
Start: 2018-03-13 | End: 2018-03-15

## 2018-03-12 RX ADMIN — INSULIN LISPRO 1 UNITS: 100 INJECTION, SOLUTION INTRAVENOUS; SUBCUTANEOUS at 21:40

## 2018-03-12 RX ADMIN — PANTOPRAZOLE SODIUM 40 MG: 40 INJECTION, POWDER, FOR SOLUTION INTRAVENOUS at 23:12

## 2018-03-12 RX ADMIN — Medication 10 ML: at 23:12

## 2018-03-12 RX ADMIN — PANTOPRAZOLE SODIUM 40 MG: 40 INJECTION, POWDER, FOR SOLUTION INTRAVENOUS at 14:00

## 2018-03-12 RX ADMIN — METRONIDAZOLE 500 MG: 500 INJECTION, SOLUTION INTRAVENOUS at 19:15

## 2018-03-12 RX ADMIN — FUROSEMIDE 40 MG: 10 INJECTION, SOLUTION INTRAVENOUS at 09:47

## 2018-03-12 RX ADMIN — FLUCONAZOLE 200 MG: 2 INJECTION, SOLUTION INTRAVENOUS at 21:41

## 2018-03-12 RX ADMIN — CALCIUM GLUCONATE 2 G: 98 INJECTION, SOLUTION INTRAVENOUS at 11:02

## 2018-03-12 RX ADMIN — QUETIAPINE FUMARATE 50 MG: 25 TABLET ORAL at 21:40

## 2018-03-12 RX ADMIN — ZOLPIDEM TARTRATE 5 MG: 5 TABLET, FILM COATED ORAL at 21:40

## 2018-03-12 RX ADMIN — METRONIDAZOLE 500 MG: 500 INJECTION, SOLUTION INTRAVENOUS at 05:36

## 2018-03-12 RX ADMIN — CLOPIDOGREL BISULFATE 75 MG: 75 TABLET ORAL at 09:47

## 2018-03-12 RX ADMIN — Medication 12 UNITS: at 21:40

## 2018-03-12 RX ADMIN — INSULIN LISPRO 2 UNITS: 100 INJECTION, SOLUTION INTRAVENOUS; SUBCUTANEOUS at 14:15

## 2018-03-12 RX ADMIN — ZOLPIDEM TARTRATE 5 MG: 5 TABLET, FILM COATED ORAL at 01:30

## 2018-03-12 RX ADMIN — DEXTROSE AND SODIUM CHLORIDE: 5; 450 INJECTION, SOLUTION INTRAVENOUS at 23:12

## 2018-03-12 RX ADMIN — ACETAMINOPHEN 650 MG: 650 SOLUTION ORAL at 10:49

## 2018-03-12 RX ADMIN — Medication 1 CAPSULE: at 09:47

## 2018-03-12 RX ADMIN — Medication 10 ML: at 14:01

## 2018-03-12 ASSESSMENT — PAIN SCALES - WONG BAKER
WONGBAKER_NUMERICALRESPONSE: 4

## 2018-03-12 ASSESSMENT — PAIN SCALES - GENERAL: PAINLEVEL_OUTOF10: 5

## 2018-03-12 NOTE — PROGRESS NOTES
Patient was seen and examined. Previous entries reviewed. Patient is getting tube feeds via NG tube. She was on Plavix. Abdomen is benign. Extremity nontender. Patient will be scheduled for PEG tomorrow. Hold Plavix. Discussed with patient and the family.

## 2018-03-12 NOTE — FLOWSHEET NOTE
The patient is well supported by family  One of the visitors was quite distressed and I prayed with her and one of the other visitors. The nurse joined in  I prayed for the patient who is feeling quite weak and was in need of encouragement       03/12/18 6343   Encounter Summary   Services provided to: Patient and family together   Referral/Consult From: [facility] Family members   Continue Visiting (3/12/18)   Complexity of Encounter Moderate   Length of Encounter 30 minutes   Spiritual Assessment Completed Yes   Spiritual/Quaker   Type Spiritual support   Assessment Tearful;Grieving; Anxious   Intervention Prayer;Scripture;Nurtured hope   Outcome Connection/belonging;Comfort;Expressed feelings of octaviano, peace, and/or awe;Tearful

## 2018-03-12 NOTE — PROGRESS NOTES
Provides: 1845 kcal and 92 gm of protein  · Anthropometric Measures:  · Ht: 5' 9\" (175.3 cm)   · Current Body Wt: 209 lb (94.8 kg)  · Admission Body Wt: 167 lb (75.8 kg) (3/1/18)  · Ideal Body Wt: 145 lb (65.8 kg), % Ideal Body 115% (calculated from admission wt.)  · BMI Classification: BMI 30.0 - 34.9 Obese Class I (massive anasarca)  · Comparative Standards (Estimated Nutrition Needs):  · Estimated Daily Total Kcal: 8404-2885  · Estimated Daily Protein (g): 79-92    Estimated Intake vs Estimated Needs: Intake Meets Needs    Nutrition Risk Level: High    Nutrition Interventions:   Continue current Tube Feeding  Continued Inpatient Monitoring    Nutrition Evaluation:   · Evaluation: Progressing toward goals   · Goals: EN to meet >90% estimated needs   · Monitoring: NPO Status, Weight, Pertinent Labs, TF Intake, TF Tolerance, Chewing/Swallowing, Fluid Balance, Ascites/Edema    See Adult Nutrition Doc Flowsheet for more detail.      Ariel BILLY RKENZIE, L.D,  Clinical Dietitian  Pager # [phone]

## 2018-03-12 NOTE — PROGRESS NOTES
Speech Language Pathology  Speech Language Pathology  [facility] ICU    Dysphagia Treatment Note    Date: 3/12/2018  Patients Name: Patricia Dupree  MRN: [id number]  Diagnosis: Dysphagia  Patient Active Problem List   Diagnosis Code    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 [facility]) I63.311    Cerebrovascular accident (CVA) ([facility] I63.9    Left arm weakness R29.898    Facial droop R29.810    Dysarthria R47.1    Cerebral edema ([facility]) G93.6    Left-sided weakness R53.1    Hyperglycemia R73.9    Controlled type 2 diabetes mellitus with hyperglycemia, without long-term current use of insulin ([facility]) E11.65    DKA, type 2, not at goal [facility]) E13.10    Sepsis ([facility] A41.9    UTI (urinary tract infection) N39.0    Leukocytosis D72.829    Increased anion gap metabolic acidosis D03.5    SAMINA (acute kidney injury) ([facility] N17.9    Hypocalcemia E83.51    Hypokalemia E87.6    Suspected deep tissue injury Z04.9    Acute UTI N39.0    Thrombocytopenia ([facility]) D69.6    Acute deep vein thrombosis (DVT) of lower extremity ([facility]) I82.409    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 Pt not currently consuming food/liquids orally. Pt pending placement of PEG tube this week. Pt. seen for O/M treatment program.  Pt. completed O/M exercises X 2 X 3 sets with max cues. Pt. unable to complete neela maneuver. Pt appeared lethargic and reported that she felt \"out of it\". Pt's daughter stated pt getting no sleep previous night.      Pt ed provided to pt's daughter on importance of laryngeal/OM strengthening exercises. Dtr verbalized understanding and reported practicing exercises in room when pt is up and alert. Plan:  [x] Continue ST services    [] Discharge from ST:        Discharge recommendations: [] Inpatient Rehab   [] [place]   [] Outpatient Therapy  [] Follow up at trauma clinic   [] Other:         Treatment completed by: Ricardo Chau,  Clinician  Cosigned by: Niesha Greene.  Jose Borges M.A., [address] [facility]

## 2018-03-12 NOTE — PROGRESS NOTES
aches, swelling of joints  NEUROLOGICAL:  Positive for chronic L hemiplegia  BEHAVIOR/PSYCH:  recurrent agitation, sleeplessness    Medications: Allergies: Allergies   Allergen Reactions    Demeclocycline Shortness Of Breath    Tetracyclines & Related Shortness Of Breath       Current Meds:   Scheduled Meds:    ergocalciferol  50,000 Units Per NG tube Once per day on Sun    lactobacillus  1 capsule Oral Daily with breakfast    metroNIDAZOLE  500 mg Intravenous Q8H    furosemide  40 mg Intravenous Daily    fluconazole  200 mg Intravenous Q24H    QUEtiapine  50 mg Oral Nightly    insulin glargine  12 Units Subcutaneous Nightly    sodium chloride  250 mL Intravenous Once    insulin lispro  0-6 Units Subcutaneous TID WC    insulin lispro  0-3 Units Subcutaneous Nightly    clopidogrel  75 mg Oral Daily    sodium chloride (PF)  10 mL Intravenous Q12H    And    pantoprazole  40 mg Intravenous BID     Continuous Infusions:    sodium chloride 10 mL/hr at 03/08/18 0419    dextrose       PRN Meds: zolpidem, potassium chloride **OR** potassium chloride **OR** potassium chloride, midodrine, acetaminophen, Magic Mouthwash, glucose, dextrose, glucagon (rDNA), dextrose, dextrose, magnesium sulfate, sodium phosphate IVPB **OR** sodium phosphate IVPB **OR** sodium phosphate IVPB    Data:     Past Medical History:   has a past medical history of Cerebral edema ([facility] [address] 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 ([facility] [address] Chronic inflammatory demyelinating polyneuropathy (HCC); CIDP (chronic inflammatory demyelinating polyneuropathy) ([facility] [address] Diabetes mellitus ([facility] [address] Hyperlipidemia; Hypertension; Left arm weakness; and Left-sided weakness. Social History:   reports that she has never smoked. She has never used smokeless tobacco. She reports that she does not drink alcohol or use drugs. Family History: History reviewed. No pertinent family history.     Vitals:  BP (!) 129/46   Pulse 76   Temp 100.4 °F (38 °C) (Axillary)   Resp 18   Ht 5' 9\" (1.753 m)   Wt 209 lb 14.1 oz (95.2 kg)   SpO2 90%   BMI 30.99 kg/m²   Temp (24hrs), Av.4 °F (37.4 °C), Min:98.6 °F (37 °C), Max:100.4 °F (38 °C)    Recent Labs      18   0726  18   1112  18   1631  18   2044   POCGLU  128*  135*  166*  145*       I/O (24Hr):     Intake/Output Summary (Last 24 hours) at 18 0811  Last data filed at 18 0536   Gross per 24 hour   Intake             4869 ml   Output             1200 ml   Net             3669 ml       Labs:    Recent Results (from the past 24 hour(s))   POC Glucose Fingerstick    Collection Time: 18 11:12 AM   Result Value Ref Range    POC Glucose 135 (H) 65 - 105 mg/dL   POC Glucose Fingerstick    Collection Time: 18  4:31 PM   Result Value Ref Range    POC Glucose 166 (H) 65 - 105 mg/dL   POC Glucose Fingerstick    Collection Time: 18  8:44 PM   Result Value Ref Range    POC Glucose 145 (H) 65 - 105 mg/dL   CBC auto differential    Collection Time: 18  5:31 AM   Result Value Ref Range    WBC 4.0 3.5 - 11.0 k/uL    RBC 2.87 (L) 4.0 - 5.2 m/uL    Hemoglobin 8.1 (L) 12.0 - 16.0 g/dL    Hematocrit 25.6 (L) 36 - 46 %    MCV 89.2 80 - 100 fL    MCH 28.4 26 - 34 pg    MCHC 31.8 31 - 37 g/dL    RDW 17.0 (H) 11.5 - 14.9 %    Platelets 840 (L) 244 - 450 k/uL    MPV 9.2 6.0 - 12.0 fL    NRBC Automated NOT REPORTED per 100 WBC    Differential Type NOT REPORTED     Seg Neutrophils 54 36 - 66 %    Lymphocytes 33 24 - 44 %    Monocytes 10 (H) 1 - 7 %    Eosinophils % 2 0 - 4 %    Basophils 1 0 - 2 %    Immature Granulocytes NOT REPORTED 0 %    Segs Absolute 2.20 1.3 - 9.1 k/uL    Absolute Lymph # 1.30 1.0 - 4.8 k/uL    Absolute Mono # 0.40 0.1 - 1.3 k/uL    Absolute Eos # 0.10 0.0 - 0.4 k/uL    Basophils # 0.00 0.0 - 0.2 k/uL    Absolute Immature Granulocyte NOT REPORTED 0.00 - 0.30 k/uL    WBC Morphology NOT REPORTED     RBC Morphology NOT REPORTED     Platelet atelectasis. There is mild global cardiomegaly. Tiny hiatus hernia is present. Organs: Exam is limited by the absence of intravenous contrast. No focal liver lesion. The gallbladder is surgically absent. No significant biliary ductal dilatation. The spleen is normal in size without focal lesion. There is moderate fatty atrophy of the pancreas without focal lesion or ductal dilatation. The adrenal glands are unremarkable. No urinary tract calculus or collecting system dilatation. No focal liver lesion. GI/Bowel: The appendix is normal.  No bowel obstruction. There is moderate colonic diverticulosis without evidence for diverticulitis. Pelvis: There is a Alegre catheter and air in a decompressed bladder. Remote hysterectomy. Peritoneum/Retroperitoneum: There is moderate presacral edema. No abdominal lymphadenopathy or ascites. Bones/Soft Tissues: There is mild diffuse subcutaneous edema/generalized anasarca. Moderate-severe thoracolumbar scoliosis with moderate lumbar levoscoliosis. Small fat containing umbilical hernia. There is relative enlargement of the left iliofemoral vein compared to the right with increased soft tissue swelling of the left upper thigh. The possibility of left DVT/thrombus could be entertained. There is mild generalized anasarca. Relative enlargement of the left iliofemoral with increased swelling of the left upper thigh. The possibility of the presence of left DVT/thrombus could be entertained. Left lower extremity ultrasound Doppler could be obtained as clinically indicated. Otherwise, no acute process in the abdomen or pelvis. Xr Chest (single View Frontal)    Result Date: 3/1/2018  EXAMINATION: SINGLE VIEW OF THE CHEST 3/1/2018 5:05 pm COMPARISON: None.  HISTORY: ORDERING SYSTEM PROVIDED HISTORY: Central Line Placement TECHNOLOGIST PROVIDED HISTORY: Reason for exam:->Central Line Placement Reason for exam:->Portable Ordering Physician Provided Reason for Exam: line placement Acuity: Acute Type of Exam: Initial FINDINGS: The right IJ central venous catheter in place terminating at cavoatrial junction. There is no pneumothorax. The radiograph is somewhat limited due to left-sided tilt. Cardiac and mediastinal contour are normal.  There is minimal atelectatic change in the left costophrenic angle. Bony structures are grossly unremarkable. Dextroscoliosis at lower thoracic spine present similar to previous examination. Uncomplicated interval insertion of right IJ catheter. Otherwise, no interval change     Xr Chest (single View Frontal)    Result Date: 3/1/2018  EXAMINATION: SINGLE VIEW OF THE CHEST 3/1/2018 10:48 am COMPARISON: Chest x-ray from 01/10/2018 HISTORY: ORDERING SYSTEM PROVIDED HISTORY: Altered mental status,  confused TECHNOLOGIST PROVIDED HISTORY: Reason for exam:->Altered mental status,  confused Ordering Physician Provided Reason for Exam: AMS Acuity: Unknown Type of Exam: Unknown FINDINGS: There is no focal airspace consolidation, pleural effusion or pneumothorax. The cardiac silhouette appears mildly enlarged, but this may at least in part related to technique. There is no pulmonary vascular congestion. There are calcifications of the aortic arch. There are similar moderate hypertrophic degenerative changes of the visualized spine and shoulders. Visualized osseous structures appear mildly demineralized, but grossly intact, given the non dedicated imaging. No focal airspace consolidation or pulmonary vascular congestion. Ct Head Wo Contrast    Result Date: 3/1/2018  EXAMINATION: CT OF THE HEAD WITHOUT CONTRAST  3/1/2018 12:26 pm TECHNIQUE: CT of the head was performed without the administration of intravenous contrast. Dose modulation, iterative reconstruction, and/or weight based adjustment of the mA/kV was utilized to reduce the radiation dose to as low as reasonably achievable.  COMPARISON: January 10 HISTORY: [address] [place] [address] [address] [facility]  Vascular Lower Extremities DVT Study Procedure   Patient Name   Jason Humphrey Date of Study           03/03/2018                 L   Date of Birth  1940  Gender                  Female   Age            69 year(s)  Race                       Room Number    2002   Corporate ID # [id number]   Patient Acct # [de-identified]   MR #           [id number]      Joon Guy ELBA   Accession #    [id number]   Interpreting Physician  Gardenia Mckeon   Referring                  Referring Physician     Sandra Trinh  Nurse  Practitioner  Procedure Type of Study:   Veins: Lower Extremities DVT Study, Venous Scan Lower Left. Indications for Study:Swelling. Patient Status: In Patient. Technical Quality:Limited visualization. Limitation reason:body habitus and swelling.   - Critical Result:INDU Lord, INDU Barrios. Conclusions   Summary   Acute deep vein thrombosis of the left leg involving the common femoral,  superficial femoral, popliteal veins. Superficial thrombophlebitis of the lower extremity involving the left  greater saphenous vein, lesser saphenous vein, acute. Right common femoral vein has no DVT. Signature   ----------------------------------------------------------------  Electronically signed by Severa Atlas Farooq(Interpreting  physician) on 03/03/2018 02:46 PM  ----------------------------------------------------------------  Findings:   Right Impression:              Left Impression:  The common femoral vein        The common femoral, femoral and popliteal  demonstrates normal            veins are dilated and non-compressible with  compressibility and            hypoechoic echoes. augmentation. Limited visualization of the calf veins.                                   Great saphenous vein is dilated and                                 non-compressible with hypoechoic

## 2018-03-12 NOTE — PROGRESS NOTES
mg Per NG tube Nightly PRN Phill Pearson MD   5 mg at 03/12/18 0130    calcium gluconate 2 g in dextrose 5 % 250 mL IVPB  2 g Intravenous Once Nikki Briggs  mL/hr at 03/12/18 1102 2 g at 03/12/18 1102    ergocalciferol (DRISDOL) 8000 UNIT/ML drops 50,000 Units  50,000 Units Per NG tube Once per day on Sun [facility] MD GUANAKITO   50,000 Units at 03/11/18 1010    potassium chloride (KLOR-CON M) extended release tablet 40 mEq  40 mEq Oral PRN Richa Gregg MD        Or    potassium chloride 20 MEQ/15ML (10%) oral solution 40 mEq  40 mEq Oral PRN Richa Gregg MD   40 mEq at 03/11/18 1017    Or    potassium chloride 10 mEq/100 mL IVPB (Peripheral Line)  10 mEq Intravenous PRN Richa Gregg MD        lactobacillus (CULTURELLE) capsule 1 capsule  1 capsule Oral Daily with breakfast Richa Gregg MD   1 capsule at 03/12/18 0947    metronidazole (FLAGYL) 500 mg in NaCl 100 mL IVPB premix  500 mg Intravenous Q8H Richa Gregg MD   Stopped at 03/12/18 9039    furosemide (LASIX) injection 40 mg  40 mg Intravenous Daily Richa Gregg MD   40 mg at 03/12/18 0947    fluconazole (DIFLUCAN) 200 mg in 0.9 % NaCl 100 mL premix IVPB  200 mg Intravenous Q24H Richa Gregg  mL/hr at 03/11/18 1337 200 mg at 03/11/18 1337    QUEtiapine (SEROQUEL) tablet 50 mg  50 mg Oral Nightly Sandra Trinh MD   50 mg at 03/11/18 2135    midodrine (PROAMATINE) tablet 5 mg  5 mg Oral Q8H PRN [facility], MD        insulin glargine (LANTUS) injection vial 12 Units  12 Units Subcutaneous Nightly Sandra Trinh MD   12 Units at 03/11/18 2150    acetaminophen (TYLENOL) 160 MG/5ML solution 650 mg  650 mg Oral Q4H PRN Antonino Serrano CNP   650 mg at 03/12/18 1049    0.9 % sodium chloride bolus  250 mL Intravenous Once Sandra Trinh MD        0.9 % sodium chloride infusion   Intravenous Continuous [facility], MD 10 mL/hr at 03/08/18 0419      Magic Mouthwash (Miracle Mouthwash) 5 mL  5 mL Swish & Spit 4x Daily PRN Antonino Serrano CNP   5 mL at 03/07/18 1951    insulin lispro (HUMALOG) injection vial 0-6 Units  0-6 Units Subcutaneous TID WC Sandra Trinh MD   1 Units at 03/11/18 1736    insulin lispro (HUMALOG) injection vial 0-3 Units  0-3 Units Subcutaneous Nightly Sandra Trinh MD   1 Units at 03/11/18 2150    clopidogrel (PLAVIX) tablet 75 mg  75 mg Oral Daily Sandra Trinh MD   75 mg at 03/12/18 0947    sodium chloride (PF) 0.9 % injection 10 mL  10 mL Intravenous Q12H Keila Jimenez MD   10 mL at 03/12/18 0948    And    pantoprazole (PROTONIX) injection 40 mg  40 mg Intravenous BID Keila Jimenez MD   40 mg at 03/11/18 2322    glucose (GLUTOSE) 40 % oral gel 15 g  15 g Oral PRN Julieta Florence MD        dextrose 50 % solution 12.5 g  12.5 g Intravenous PRN Julieta Florence MD   12.5 g at 03/08/18 1149    glucagon (rDNA) injection 1 mg  1 mg Intramuscular PRN Julieta Florence MD        dextrose 5 % solution  100 mL/hr Intravenous PRN Julieta Florence MD        dextrose 50 % solution 12.5 g  12.5 g Intravenous PRN Sandra Trinh MD   12.5 g at 03/08/18 7392    magnesium sulfate 1 g in dextrose 5% 100 mL IVPB  1 g Intravenous PRN Sandra Trinh MD   Stopped at 03/05/18 8130    sodium phosphate 10 mmol in dextrose 5 % 250 mL IVPB  10 mmol Intravenous PRHUGO Trinh MD   Stopped at 03/05/18 0420    Or    sodium phosphate 15 mmol in dextrose 5 % 250 mL IVPB  15 mmol Intravenous PRHUGO Trinh MD        Or    sodium phosphate 20 mmol in dextrose 5 % 500 mL IVPB  20 mmol Intravenous PRHUGO Trinh MD           SUBJECTIVE:  Alexandria Bates is a very pleasant 69 y.o. female recovery from septic syndrome.     ROS:  General: negative for fatigue, fever or night sweats  ENT: negative for headaches, hearing change, oral lesions or visual changes  Hematological and Lymphatic: negative for bleeding problems, blood clots, bruising, fatigue, 100 /HPF    Casts UA NOT REPORTED /LPF    Crystals UA NOT REPORTED NONE /HPF    Epithelial Cells UA 10 TO 20 /HPF    Renal Epithelial, Urine NOT REPORTED 0 /HPF    Bacteria, UA MANY (A) NONE    Mucus, UA 2+ (A) NONE    Trichomonas, UA NOT REPORTED NONE    Amorphous, UA 2+ (A) NONE    Other Observations UA NOT REPORTED NREQ    Yeast, UA NOT REPORTED NONE   CK   Result Value Ref Range    Total  26 - 192 U/L   Osmolality   Result Value Ref Range    Serum Osmolality 384 (HH) 275 - 295 mOsm/kg   Basic Metabolic Panel w/ Reflex to MG   Result Value Ref Range    Glucose 391 (H) 70 - 99 mg/dL     (HH) 8 - 23 mg/dL    CREATININE 2.71 (H) 0.50 - 0.90 mg/dL    Bun/Cre Ratio NOT REPORTED 9 - 20    Calcium 6.8 (L) 8.6 - 10.4 mg/dL    Sodium 149 (H) 135 - 144 mmol/L    Potassium 4.0 3.7 - 5.3 mmol/L    Chloride 116 (H) 98 - 107 mmol/L    CO2 12 (L) 20 - 31 mmol/L    Anion Gap 21 (H) 9 - 17 mmol/L    GFR Non-African American 17 (L) >60 mL/min    GFR  21 (L) >60 mL/min    GFR Comment          GFR Staging NOT REPORTED    Basic Metabolic Panel w/ Reflex to MG   Result Value Ref Range    Glucose 292 (H) 70 - 99 mg/dL    BUN 97 (HH) 8 - 23 mg/dL    CREATININE 2.41 (H) 0.50 - 0.90 mg/dL    Bun/Cre Ratio NOT REPORTED 9 - 20    Calcium 7.5 (L) 8.6 - 10.4 mg/dL    Sodium 144 135 - 144 mmol/L    Potassium 3.6 (L) 3.7 - 5.3 mmol/L    Chloride 113 (H) 98 - 107 mmol/L    CO2 10 (L) 20 - 31 mmol/L    Anion Gap 21 (H) 9 - 17 mmol/L    GFR Non-African American 20 (L) >60 mL/min    GFR  24 (L) >60 mL/min    GFR Comment          GFR Staging NOT REPORTED    Basic Metabolic Panel w/ Reflex to MG   Result Value Ref Range    Glucose 292 (H) 70 - 99 mg/dL    BUN 91 (HH) 8 - 23 mg/dL    CREATININE 2.21 (H) 0.50 - 0.90 mg/dL    Bun/Cre Ratio NOT REPORTED 9 - 20    Calcium 7.1 (L) 8.6 - 10.4 mg/dL    Sodium 139 135 - 144 mmol/L    Potassium 3.7 3.7 - 5.3 mmol/L    Chloride 109 (H) 98 - 107 mmol/L    CO2 9 (LL) 20 - 31 mmol/L    Anion Gap 21 (H) 9 - 17 mmol/L    GFR Non-African American 22 (L) >60 mL/min    GFR  26 (L) >60 mL/min    GFR Comment          GFR Staging NOT REPORTED    Magnesium   Result Value Ref Range    Magnesium 1.8 1.6 - 2.6 mg/dL   Magnesium   Result Value Ref Range    Magnesium 1.6 1.6 - 2.6 mg/dL   Magnesium   Result Value Ref Range    Magnesium 2.4 1.6 - 2.6 mg/dL   Phosphorus   Result Value Ref Range    Phosphorus 4.2 2.6 - 4.5 mg/dL   Phosphorus   Result Value Ref Range    Phosphorus 3.6 2.6 - 4.5 mg/dL   Phosphorus   Result Value Ref Range    Phosphorus 2.9 2.6 - 4.5 mg/dL   BLOOD GAS, VENOUS   Result Value Ref Range    pH, Eriberto 7.114 (LL) 7.320 - 7.420    pCO2, Eriberto 30.5 (L) 39.0 - 55.0    pO2, Eriberto 70.8 (H) 30.0 - 50.0    HCO3, Venous 9.8 (L) 24.0 - 30.0 mmol/L    Positive Base Excess, Eriberto NOT REPORTED 0.0 - 2.0 mmol/L    Negative Base Excess, Eriberto 19.7 (H) 0.0 - 2.0 mmol/L    O2 Sat, Eriberto 86.2 (H) 60.0 - 85.0 %    Total Hb NOT REPORTED 12.0 - 16.0 g/dl    Oxyhemoglobin NOT REPORTED 95.0 - 98.0 %    Carboxyhemoglobin 3.1 0 - 5 %    Methemoglobin 0.7 0.0 - 1.9 %    Pt Temp 37.0     pH, Eriberto, Temp Adj NOT REPORTED 7.320 - 7.420    pCO2, Eriberto, Temp Adj NOT REPORTED 39.0 - 55.0 mmHg    pO2, Eriberto, Temp Adj NOT REPORTED 30.0 - 50.0 mmHg    O2 Device/Flow/% NOT REPORTED     Respiratory Rate NOT REPORTED     Harvinder Test NOT REPORTED     Sample Site NOT REPORTED     Pt.  Position NOT REPORTED     Mode NOT REPORTED     Set Rate NOT REPORTED     Total Rate NOT REPORTED     VT NOT REPORTED     FIO2 NOT REPORTED     Peep/Cpap NOT REPORTED     PSV NOT REPORTED     Text for Respiratory NOT REPORTED     NOTIFICATION NOT REPORTED     NOTIFICATION TIME NOT REPORTED    BLOOD GAS, VENOUS   Result Value Ref Range    pH, Eriberto 7.140 (LL) 7.320 - 7.420    pCO2, Eriberto 27.3 (L) 39.0 - 55.0    pO2, Eriberto 132.0 (H) 30.0 - 50.0    HCO3, Venous 9.3 (L) 24.0 - 30.0 mmol/L    Positive Base Excess, Eriberto NOT REPORTED 0.0 - 2.0 pO2, Eriberto 56.3 (H) 30.0 - 50.0    HCO3, Venous 8.4 (L) 24.0 - 30.0 mmol/L    Positive Base Excess, Eriberto NOT REPORTED 0.0 - 2.0 mmol/L    Negative Base Excess, Eriberto 20.0 (H) 0.0 - 2.0 mmol/L    O2 Sat, Eriberto 87.8 %    Total Hb NOT REPORTED 12.0 - 16.0 g/dl    Oxyhemoglobin NOT REPORTED 95.0 - 98.0 %    Carboxyhemoglobin 1.5 %    Methemoglobin 0.8 %    Pt Temp 37.0     pH, Eriberto, Temp Adj NOT REPORTED 7.320 - 7.420    pCO2, Eriberto, Temp Adj NOT REPORTED 39.0 - 55.0 mmHg    pO2, Eriberto, Temp Adj NOT REPORTED 30.0 - 50.0 mmHg    O2 Device/Flow/% Cannula     Respiratory Rate NOT REPORTED     Harvinder Test NOT REPORTED     Sample Site NOT REPORTED     Pt. Position NOT REPORTED     Mode NOT REPORTED     Set Rate NOT REPORTED     Total Rate NOT REPORTED     VT NOT REPORTED     FIO2 2L     Peep/Cpap NOT REPORTED     PSV NOT REPORTED     Text for Respiratory RESULTS TO INDU ISLAS     NOTIFICATION NOT REPORTED     NOTIFICATION TIME NOT REPORTED    Giardia / Cryptosporidum antigens   Result Value Ref Range    Specimen Description       . FECES Performed at [place] [address] [address] [place] [address]    Specimen Description  [id number] ([phone]     Special Requests NOT REPORTED     Direct Exam Giardia Antigen Assay Negative     Direct Exam Cryptosporidium Antigen Assay Negative     Direct Exam       A Giardia/Cryptosporidium Screen has been performed.   A traditional Ova and    Direct Exam        Parasite exam, if collected in a preservative, may be requested by contacting    Direct Exam        the laboratory at [phone] within 72 hrs of collection for cases of    Direct Exam        persistant diarrhea or if the patient has visited an endemic area or traveled    Direct Exam  outside of the [place]     Direct Exam       Performed at [facility] [address] [facility], [address] [place] [address] [phone]    Status FINAL 03/04/2018    Fecal lactoferrin   Result Value Ref Range    Lactoferrin, Qual (A) NFLACT     POSITIVE for fecal REPORTED 0 %    Absolute Immature Granulocyte NOT REPORTED 0.00 - 0.30 k/uL    WBC Morphology NOT REPORTED     RBC Morphology NOT REPORTED     Platelet Estimate NOT REPORTED     Seg Neutrophils 60 36 - 66 %    Lymphocytes 5 (L) 24 - 44 %    Monocytes 3 1 - 7 %    Eosinophils % 0 0 - 4 %    Basophils 0 0 - 2 %    Bands 32 (H) 0 - 10 %    Segs Absolute 11.69 (H) 1.3 - 9.1 k/uL    Absolute Lymph # 0.98 (L) 1.0 - 4.8 k/uL    Absolute Mono # 0.59 0.1 - 1.3 k/uL    Absolute Eos # 0.00 0.0 - 0.4 k/uL    Basophils # 0.00 0.0 - 0.2 k/uL    Absolute Bands # 6.24 (H) 0.0 - 1.0 k/uL    Morphology ANISOCYTOSIS PRESENT    Blood Gas, Venous   Result Value Ref Range    pH, Eriberto 7.133 (LL) 7.320 - 7.420    pCO2, Eriberto 21.8 (L) 39.0 - 55.0    pO2, Eriberto 75.1 (H) 30.0 - 50.0    HCO3, Venous 7.3 (L) 24.0 - 30.0 mmol/L    Positive Base Excess, Eriberto NOT REPORTED 0.0 - 2.0 mmol/L    Negative Base Excess, Eriberto 21.9 (H) 0.0 - 2.0 mmol/L    O2 Sat, Eriberto 92.1 (H) 60.0 - 85.0 %    Total Hb NOT REPORTED 12.0 - 16.0 g/dl    Oxyhemoglobin NOT REPORTED 95.0 - 98.0 %    Carboxyhemoglobin 1.3 0 - 5 %    Methemoglobin 0.7 0.0 - 1.9 %    Pt Temp NOT REPORTED     pH, Eriberto, Temp Adj NOT REPORTED 7.320 - 7.420    pCO2, Eriberto, Temp Adj NOT REPORTED 39.0 - 55.0 mmHg    pO2, Eriberto, Temp Adj NOT REPORTED 30.0 - 50.0 mmHg    O2 Device/Flow/% NOT REPORTED     Respiratory Rate NOT REPORTED     Harvinder Test NOT REPORTED     Sample Site NOT REPORTED     Pt.  Position NOT REPORTED     Mode NOT REPORTED     Set Rate NOT REPORTED     Total Rate NOT REPORTED     VT NOT REPORTED     FIO2 NOT REPORTED     Peep/Cpap NOT REPORTED     PSV NOT REPORTED     Text for Respiratory NOT REPORTED     NOTIFICATION NOT REPORTED     NOTIFICATION TIME NOT REPORTED    Blood Gas, Venous   Result Value Ref Range    pH, Eriberto 7.148 (LL) 7.320 - 7.420    pCO2, Eriberto 21.1 (L) 39.0 - 55.0    pO2, Eriberto 47.9 30.0 - 50.0    HCO3, Venous 7.3 (L) 24.0 - 30.0 mmol/L    Positive Base Excess, Eriberto NOT REPORTED 0.0 - 2.0 mmol/L    Negative Base Excess, Eriberto 21.6 (H) 0.0 - 2.0 mmol/L    O2 Sat, Eriberto 83.1 %    Total Hb NOT REPORTED 12.0 - 16.0 g/dl    Oxyhemoglobin NOT REPORTED 95.0 - 98.0 %    Carboxyhemoglobin 1.7 %    Methemoglobin 0.7 %    Pt Temp 37.0     pH, Eriberto, Temp Adj NOT REPORTED 7.320 - 7.420    pCO2, Eriberto, Temp Adj NOT REPORTED 39.0 - 55.0 mmHg    pO2, Eriberto, Temp Adj NOT REPORTED 30.0 - 50.0 mmHg    O2 Device/Flow/% NOT REPORTED     Respiratory Rate NOT REPORTED     Harvinder Test NOT REPORTED     Sample Site NOT REPORTED     Pt.  Position NOT REPORTED     Mode NOT REPORTED     Set Rate NOT REPORTED     Total Rate NOT REPORTED     VT NOT REPORTED     FIO2 NOT REPORTED     Peep/Cpap NOT REPORTED     PSV NOT REPORTED     Text for Respiratory RESULTS TO INDU ISLAS     NOTIFICATION NOT REPORTED     NOTIFICATION TIME NOT REPORTED    Basic Metabolic Panel w/ Reflex to MG   Result Value Ref Range    Glucose 166 (H) 70 - 99 mg/dL    BUN 60 (H) 8 - 23 mg/dL    CREATININE 1.50 (H) 0.50 - 0.90 mg/dL    Bun/Cre Ratio NOT REPORTED 9 - 20    Calcium 6.8 (L) 8.6 - 10.4 mg/dL    Sodium 137 135 - 144 mmol/L    Potassium 4.4 3.7 - 5.3 mmol/L    Chloride 110 (H) 98 - 107 mmol/L    CO2 10 (L) 20 - 31 mmol/L    Anion Gap 17 9 - 17 mmol/L    GFR Non-African American 34 (L) >60 mL/min    GFR  41 (L) >60 mL/min    GFR Comment          GFR Staging NOT REPORTED    Magnesium   Result Value Ref Range    Magnesium 2.1 1.6 - 2.6 mg/dL   Phosphorus   Result Value Ref Range    Phosphorus 2.5 (L) 2.6 - 4.5 mg/dL   BLOOD GAS, VENOUS   Result Value Ref Range    pH, Eriberto 7.292 (L) 7.320 - 7.420    pCO2, Eriberto 18.0 (L) 39.0 - 55.0    pO2, Eriberto 127.0 (H) 30.0 - 50.0    HCO3, Venous 8.7 (L) 24.0 - 30.0 mmol/L    Positive Base Excess, Eriberto NOT REPORTED 0.0 - 2.0 mmol/L    Negative Base Excess, Eriberto 17.9 (H) 0.0 - 2.0 mmol/L    O2 Sat, Eriberto 96.4 %    Total Hb NOT REPORTED 12.0 - 16.0 g/dl    Oxyhemoglobin NOT REPORTED 95.0 - 98.0 %    Carboxyhemoglobin 2.1 % Methemoglobin 0.8 %    Pt Temp 37.0     pH, Eriberto, Temp Adj NOT REPORTED 7.320 - 7.420    pCO2, Eriberto, Temp Adj NOT REPORTED 39.0 - 55.0 mmHg    pO2, Eriberto, Temp Adj NOT REPORTED 30.0 - 50.0 mmHg    O2 Device/Flow/% NOT REPORTED     Respiratory Rate NOT REPORTED     Harvinder Test NOT REPORTED     Sample Site NOT REPORTED     Pt.  Position NOT REPORTED     Mode NOT REPORTED     Set Rate NOT REPORTED     Total Rate NOT REPORTED     VT NOT REPORTED     FIO2 NOT REPORTED     Peep/Cpap NOT REPORTED     PSV NOT REPORTED     Text for Respiratory NOT REPORTED     NOTIFICATION NOT REPORTED     NOTIFICATION TIME NOT REPORTED    Basic Metabolic Panel w/ Reflex to MG   Result Value Ref Range    Glucose 178 (H) 70 - 99 mg/dL    BUN 56 (H) 8 - 23 mg/dL    CREATININE 1.42 (H) 0.50 - 0.90 mg/dL    Bun/Cre Ratio NOT REPORTED 9 - 20    Calcium 6.8 (L) 8.6 - 10.4 mg/dL    Sodium 135 135 - 144 mmol/L    Potassium 4.8 3.7 - 5.3 mmol/L    Chloride 107 98 - 107 mmol/L    CO2 11 (L) 20 - 31 mmol/L    Anion Gap 17 9 - 17 mmol/L    GFR Non-African American 36 (L) >60 mL/min    GFR  44 (L) >60 mL/min    GFR Comment          GFR Staging NOT REPORTED    Magnesium   Result Value Ref Range    Magnesium 1.9 1.6 - 2.6 mg/dL   Phosphorus   Result Value Ref Range    Phosphorus 2.8 2.6 - 4.5 mg/dL   BLOOD GAS, VENOUS   Result Value Ref Range    pH, Eriberto 7.297 (L) 7.320 - 7.420    pCO2, Eriberto 23.9 (L) 39.0 - 55.0    pO2, Eriberto 79.6 (H) 30.0 - 50.0    HCO3, Venous 11.6 (L) 24.0 - 30.0 mmol/L    Positive Base Excess, Eriberto NOT REPORTED 0.0 - 2.0 mmol/L    Negative Base Excess, Eriberto 14.8 (H) 0.0 - 2.0 mmol/L    O2 Sat, Eriberto 94.8 %    Total Hb NOT REPORTED 12.0 - 16.0 g/dl    Oxyhemoglobin NOT REPORTED 95.0 - 98.0 %    Carboxyhemoglobin 1.5 %    Methemoglobin 0.7 %    Pt Temp 37.0     pH, Eriberto, Temp Adj NOT REPORTED 7.320 - 7.420    pCO2, Eriberto, Temp Adj NOT REPORTED 39.0 - 55.0 mmHg    pO2, Eriberto, Temp Adj NOT REPORTED 30.0 - 50.0 mmHg    O2 Device/Flow/% NOT Non- 40 (L) >60 mL/min    GFR  49 (L) >60 mL/min    GFR Comment          GFR Staging NOT REPORTED    Magnesium   Result Value Ref Range    Magnesium 1.8 1.6 - 2.6 mg/dL   Phosphorus   Result Value Ref Range    Phosphorus 3.1 2.6 - 4.5 mg/dL   BLOOD GAS, VENOUS   Result Value Ref Range    pH, Eriberto 7.435 (H) 7.320 - 7.420    pCO2, Eriberto 16.3 (L) 39.0 - 55.0    pO2, Eriberto 64.5 (H) 30.0 - 50.0    HCO3, Venous 10.9 (L) 24.0 - 30.0 mmol/L    Positive Base Excess, Eriberto NOT REPORTED 0.0 - 2.0 mmol/L    Negative Base Excess, Eriberto 13.3 (H) 0.0 - 2.0 mmol/L    O2 Sat, Eriberto 90.3 %    Total Hb NOT REPORTED 12.0 - 16.0 g/dl    Oxyhemoglobin NOT REPORTED 95.0 - 98.0 %    Carboxyhemoglobin 3.1 %    Methemoglobin 0.7 %    Pt Temp 37.0     pH, Eriberto, Temp Adj NOT REPORTED 7.320 - 7.420    pCO2, Eriberto, Temp Adj NOT REPORTED 39.0 - 55.0 mmHg    pO2, Eriberto, Temp Adj NOT REPORTED 30.0 - 50.0 mmHg    O2 Device/Flow/% Cannula     Respiratory Rate NOT REPORTED     Harvinder Test NOT REPORTED     Sample Site NOT REPORTED     Pt. Position SEMI-FOWLERS     Mode NOT REPORTED     Set Rate NOT REPORTED     Total Rate NOT REPORTED     VT NOT REPORTED     FIO2 NOT REPORTED     Peep/Cpap NOT REPORTED     PSV NOT REPORTED     Text for Respiratory NOT REPORTED     NOTIFICATION NOT REPORTED     NOTIFICATION TIME NOT REPORTED      *Note: Due to a large number of results and/or encounters for the requested time period, some results have not been displayed. A complete set of results can be found in Results Review.        Electronically signed by Min Murphy MD on 3/12/2018 at 11:19 AM

## 2018-03-12 NOTE — CARE COORDINATION
ONGOING DISCHARGE PLAN:    Spoke with patient regarding discharge plan and patient confirms that plan is to return home with daughter and vns, Vicky  Is currently on IV diflucan, flagyl and lasix  Per surgery; The patient is a 69 y.o. 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 She was admitted to ICU with a diagnosis of DKA and sepsis. She has moderate protein calorie malnutrition and is getting tube feeding through an NG tube. She was hypotensive initially. She developed a DVT and had an IVC filter during this admission. PLAN     1. Will alert Thusay to her need for PEG tube so he can get her on his schedule as soon as possible  2. Discussed risks, benefits, and alternatives with the POA    Will continue to follow for additional discharge needs.     Electronically signed by Julianna Jason RN on 3/12/2018 at 8:10 AM

## 2018-03-13 ENCOUNTER — ANESTHESIA EVENT (OUTPATIENT)
Dept: OPERATING ROOM | Age: 78
DRG: 853 | End: 2018-03-13
Payer: MEDICARE

## 2018-03-13 ENCOUNTER — ANESTHESIA (OUTPATIENT)
Dept: OPERATING ROOM | Age: 78
DRG: 853 | End: 2018-03-13
Payer: MEDICARE

## 2018-03-13 VITALS — DIASTOLIC BLOOD PRESSURE: 72 MMHG | OXYGEN SATURATION: 99 % | SYSTOLIC BLOOD PRESSURE: 152 MMHG

## 2018-03-13 LAB
-: ABNORMAL
ABO/RH: NORMAL
ABSOLUTE EOS #: 0.1 K/UL (ref 0–0.4)
ABSOLUTE IMMATURE GRANULOCYTE: ABNORMAL K/UL (ref 0–0.3)
ABSOLUTE LYMPH #: 1.4 K/UL (ref 1–4.8)
ABSOLUTE MONO #: 0.4 K/UL (ref 0.1–1.3)
AMORPHOUS: ABNORMAL
ANION GAP SERPL CALCULATED.3IONS-SCNC: 11 MMOL/L (ref 9–17)
ANTIBODY SCREEN: NEGATIVE
ARM BAND NUMBER: NORMAL
BACTERIA: ABNORMAL
BASOPHILS # BLD: 1 % (ref 0–2)
BASOPHILS ABSOLUTE: 0 K/UL (ref 0–0.2)
BILIRUBIN URINE: NEGATIVE
BUN BLDV-MCNC: 26 MG/DL (ref 8–23)
BUN/CREAT BLD: ABNORMAL (ref 9–20)
CALCIUM SERPL-MCNC: 8 MG/DL (ref 8.6–10.4)
CASTS UA: ABNORMAL /LPF
CHLORIDE BLD-SCNC: 103 MMOL/L (ref 98–107)
CO2: 28 MMOL/L (ref 20–31)
COLOR: ABNORMAL
COMMENT UA: ABNORMAL
CREAT SERPL-MCNC: 0.54 MG/DL (ref 0.5–0.9)
CRYSTALS, UA: ABNORMAL /HPF
DIFFERENTIAL TYPE: ABNORMAL
EOSINOPHILS RELATIVE PERCENT: 1 % (ref 0–4)
EPITHELIAL CELLS UA: ABNORMAL /HPF
EXPIRATION DATE: NORMAL
GFR AFRICAN AMERICAN: >60 ML/MIN
GFR NON-AFRICAN AMERICAN: >60 ML/MIN
GFR SERPL CREATININE-BSD FRML MDRD: ABNORMAL ML/MIN/{1.73_M2}
GFR SERPL CREATININE-BSD FRML MDRD: ABNORMAL ML/MIN/{1.73_M2}
GLUCOSE BLD-MCNC: 106 MG/DL (ref 65–105)
GLUCOSE BLD-MCNC: 108 MG/DL (ref 70–99)
GLUCOSE BLD-MCNC: 122 MG/DL (ref 65–105)
GLUCOSE BLD-MCNC: 134 MG/DL (ref 65–105)
GLUCOSE BLD-MCNC: 92 MG/DL (ref 65–105)
GLUCOSE URINE: NEGATIVE
HCT VFR BLD CALC: 26.1 % (ref 36–46)
HEMOGLOBIN: 8.3 G/DL (ref 12–16)
IMMATURE GRANULOCYTES: ABNORMAL %
KETONES, URINE: NEGATIVE
LEUKOCYTE ESTERASE, URINE: ABNORMAL
LYMPHOCYTES # BLD: 33 % (ref 24–44)
MAGNESIUM: 1.7 MG/DL (ref 1.6–2.6)
MCH RBC QN AUTO: 29 PG (ref 26–34)
MCHC RBC AUTO-ENTMCNC: 31.7 G/DL (ref 31–37)
MCV RBC AUTO: 91.5 FL (ref 80–100)
MONOCYTES # BLD: 9 % (ref 1–7)
MUCUS: ABNORMAL
NITRITE, URINE: POSITIVE
NRBC AUTOMATED: ABNORMAL PER 100 WBC
OTHER OBSERVATIONS UA: ABNORMAL
PARTIAL THROMBOPLASTIN TIME: 23 SEC (ref 23–31)
PDW BLD-RTO: 16.6 % (ref 11.5–14.9)
PH UA: 5.5 (ref 5–8)
PHOSPHORUS: 4.4 MG/DL (ref 2.6–4.5)
PLATELET # BLD: 201 K/UL (ref 150–450)
PLATELET # BLD: 43 K/UL (ref 150–450)
PLATELET ESTIMATE: ABNORMAL
PMV BLD AUTO: 9.4 FL (ref 6–12)
POTASSIUM SERPL-SCNC: 3.1 MMOL/L (ref 3.7–5.3)
PROTEIN UA: NEGATIVE
RBC # BLD: 2.86 M/UL (ref 4–5.2)
RBC # BLD: ABNORMAL 10*6/UL
RBC UA: ABNORMAL /HPF
RENAL EPITHELIAL, UA: ABNORMAL /HPF
SEG NEUTROPHILS: 56 % (ref 36–66)
SEGMENTED NEUTROPHILS ABSOLUTE COUNT: 2.5 K/UL (ref 1.3–9.1)
SODIUM BLD-SCNC: 142 MMOL/L (ref 135–144)
SPECIFIC GRAVITY UA: 1.01 (ref 1–1.03)
TRICHOMONAS: ABNORMAL
TURBIDITY: ABNORMAL
URINE HGB: ABNORMAL
UROBILINOGEN, URINE: NORMAL
WBC # BLD: 4.4 K/UL (ref 3.5–11)
WBC # BLD: ABNORMAL 10*3/UL
WBC UA: ABNORMAL /HPF
YEAST: ABNORMAL

## 2018-03-13 PROCEDURE — 0DH63UZ INSERTION OF FEEDING DEVICE INTO STOMACH, PERCUTANEOUS APPROACH: ICD-10-PCS | Performed by: SURGERY

## 2018-03-13 PROCEDURE — 86901 BLOOD TYPING SEROLOGIC RH(D): CPT

## 2018-03-13 PROCEDURE — 86850 RBC ANTIBODY SCREEN: CPT

## 2018-03-13 PROCEDURE — 87186 SC STD MICRODIL/AGAR DIL: CPT

## 2018-03-13 PROCEDURE — 7100000001 HC PACU RECOVERY - ADDTL 15 MIN: Performed by: SURGERY

## 2018-03-13 PROCEDURE — 85025 COMPLETE CBC W/AUTO DIFF WBC: CPT

## 2018-03-13 PROCEDURE — 6370000000 HC RX 637 (ALT 250 FOR IP): Performed by: NURSE PRACTITIONER

## 2018-03-13 PROCEDURE — 3700000000 HC ANESTHESIA ATTENDED CARE: Performed by: SURGERY

## 2018-03-13 PROCEDURE — 6360000002 HC RX W HCPCS: Performed by: NURSE PRACTITIONER

## 2018-03-13 PROCEDURE — 2060000000 HC ICU INTERMEDIATE R&B

## 2018-03-13 PROCEDURE — 7100000000 HC PACU RECOVERY - FIRST 15 MIN: Performed by: SURGERY

## 2018-03-13 PROCEDURE — 2500000003 HC RX 250 WO HCPCS: Performed by: RADIOLOGY

## 2018-03-13 PROCEDURE — 6370000000 HC RX 637 (ALT 250 FOR IP): Performed by: SURGERY

## 2018-03-13 PROCEDURE — 2580000003 HC RX 258: Performed by: INTERNAL MEDICINE

## 2018-03-13 PROCEDURE — 36592 COLLECT BLOOD FROM PICC: CPT

## 2018-03-13 PROCEDURE — 36415 COLL VENOUS BLD VENIPUNCTURE: CPT

## 2018-03-13 PROCEDURE — 3609018000 HC EGD ESOPHAGOGASTRODUODENOSCOPY PEG TUBE INSERTION: Performed by: SURGERY

## 2018-03-13 PROCEDURE — 6370000000 HC RX 637 (ALT 250 FOR IP): Performed by: RADIOLOGY

## 2018-03-13 PROCEDURE — 93970 EXTREMITY STUDY: CPT

## 2018-03-13 PROCEDURE — 6360000002 HC RX W HCPCS: Performed by: NURSE ANESTHETIST, CERTIFIED REGISTERED

## 2018-03-13 PROCEDURE — 85049 AUTOMATED PLATELET COUNT: CPT

## 2018-03-13 PROCEDURE — 6360000002 HC RX W HCPCS: Performed by: RADIOLOGY

## 2018-03-13 PROCEDURE — C9113 INJ PANTOPRAZOLE SODIUM, VIA: HCPCS | Performed by: INTERNAL MEDICINE

## 2018-03-13 PROCEDURE — 87088 URINE BACTERIA CULTURE: CPT

## 2018-03-13 PROCEDURE — C1889 IMPLANT/INSERT DEVICE, NOC: HCPCS | Performed by: SURGERY

## 2018-03-13 PROCEDURE — 81001 URINALYSIS AUTO W/SCOPE: CPT

## 2018-03-13 PROCEDURE — 2580000003 HC RX 258: Performed by: SURGERY

## 2018-03-13 PROCEDURE — 80048 BASIC METABOLIC PNL TOTAL CA: CPT

## 2018-03-13 PROCEDURE — 6360000002 HC RX W HCPCS: Performed by: INTERNAL MEDICINE

## 2018-03-13 PROCEDURE — 85730 THROMBOPLASTIN TIME PARTIAL: CPT

## 2018-03-13 PROCEDURE — 83735 ASSAY OF MAGNESIUM: CPT

## 2018-03-13 PROCEDURE — 2500000003 HC RX 250 WO HCPCS: Performed by: NURSE ANESTHETIST, CERTIFIED REGISTERED

## 2018-03-13 PROCEDURE — 84100 ASSAY OF PHOSPHORUS: CPT

## 2018-03-13 PROCEDURE — 87077 CULTURE AEROBIC IDENTIFY: CPT

## 2018-03-13 PROCEDURE — 6360000002 HC RX W HCPCS: Performed by: SURGERY

## 2018-03-13 PROCEDURE — 86900 BLOOD TYPING SEROLOGIC ABO: CPT

## 2018-03-13 PROCEDURE — P9046 ALBUMIN (HUMAN), 25%, 20 ML: HCPCS | Performed by: INTERNAL MEDICINE

## 2018-03-13 PROCEDURE — 3700000001 HC ADD 15 MINUTES (ANESTHESIA): Performed by: SURGERY

## 2018-03-13 PROCEDURE — 82947 ASSAY GLUCOSE BLOOD QUANT: CPT

## 2018-03-13 PROCEDURE — 87086 URINE CULTURE/COLONY COUNT: CPT

## 2018-03-13 PROCEDURE — 99233 SBSQ HOSP IP/OBS HIGH 50: CPT | Performed by: INTERNAL MEDICINE

## 2018-03-13 RX ORDER — PROPOFOL 10 MG/ML
INJECTION, EMULSION INTRAVENOUS CONTINUOUS PRN
Status: DISCONTINUED | OUTPATIENT
Start: 2018-03-13 | End: 2018-03-13 | Stop reason: SDUPTHER

## 2018-03-13 RX ORDER — PROMETHAZINE HYDROCHLORIDE 25 MG/ML
6.25 INJECTION, SOLUTION INTRAMUSCULAR; INTRAVENOUS
Status: DISCONTINUED | OUTPATIENT
Start: 2018-03-13 | End: 2018-03-13 | Stop reason: HOSPADM

## 2018-03-13 RX ORDER — LIDOCAINE HYDROCHLORIDE 10 MG/ML
INJECTION, SOLUTION EPIDURAL; INFILTRATION; INTRACAUDAL; PERINEURAL PRN
Status: DISCONTINUED | OUTPATIENT
Start: 2018-03-13 | End: 2018-03-13 | Stop reason: SDUPTHER

## 2018-03-13 RX ORDER — DIPHENHYDRAMINE HYDROCHLORIDE 50 MG/ML
12.5 INJECTION INTRAMUSCULAR; INTRAVENOUS
Status: DISCONTINUED | OUTPATIENT
Start: 2018-03-13 | End: 2018-03-13 | Stop reason: HOSPADM

## 2018-03-13 RX ORDER — FENTANYL CITRATE 50 UG/ML
25 INJECTION, SOLUTION INTRAMUSCULAR; INTRAVENOUS EVERY 5 MIN PRN
Status: DISCONTINUED | OUTPATIENT
Start: 2018-03-13 | End: 2018-03-13 | Stop reason: HOSPADM

## 2018-03-13 RX ORDER — FUROSEMIDE 10 MG/ML
40 INJECTION INTRAMUSCULAR; INTRAVENOUS 2 TIMES DAILY
Status: DISCONTINUED | OUTPATIENT
Start: 2018-03-13 | End: 2018-03-16

## 2018-03-13 RX ORDER — MEPERIDINE HYDROCHLORIDE 50 MG/ML
12.5 INJECTION INTRAMUSCULAR; INTRAVENOUS; SUBCUTANEOUS EVERY 5 MIN PRN
Status: DISCONTINUED | OUTPATIENT
Start: 2018-03-13 | End: 2018-03-13 | Stop reason: HOSPADM

## 2018-03-13 RX ORDER — OXYCODONE HYDROCHLORIDE AND ACETAMINOPHEN 5; 325 MG/1; MG/1
1 TABLET ORAL PRN
Status: DISCONTINUED | OUTPATIENT
Start: 2018-03-13 | End: 2018-03-13 | Stop reason: HOSPADM

## 2018-03-13 RX ORDER — 0.9 % SODIUM CHLORIDE 0.9 %
250 INTRAVENOUS SOLUTION INTRAVENOUS ONCE
Status: DISCONTINUED | OUTPATIENT
Start: 2018-03-13 | End: 2018-03-19 | Stop reason: HOSPADM

## 2018-03-13 RX ORDER — LORAZEPAM 2 MG/ML
0.5 INJECTION INTRAMUSCULAR ONCE
Status: COMPLETED | OUTPATIENT
Start: 2018-03-13 | End: 2018-03-13

## 2018-03-13 RX ORDER — ALBUMIN (HUMAN) 12.5 G/50ML
25 SOLUTION INTRAVENOUS 2 TIMES DAILY
Status: DISCONTINUED | OUTPATIENT
Start: 2018-03-13 | End: 2018-03-18

## 2018-03-13 RX ORDER — OXYCODONE HYDROCHLORIDE AND ACETAMINOPHEN 5; 325 MG/1; MG/1
2 TABLET ORAL PRN
Status: DISCONTINUED | OUTPATIENT
Start: 2018-03-13 | End: 2018-03-13 | Stop reason: HOSPADM

## 2018-03-13 RX ORDER — MORPHINE SULFATE 2 MG/ML
1 INJECTION, SOLUTION INTRAMUSCULAR; INTRAVENOUS EVERY 5 MIN PRN
Status: DISCONTINUED | OUTPATIENT
Start: 2018-03-13 | End: 2018-03-13 | Stop reason: HOSPADM

## 2018-03-13 RX ORDER — KETAMINE HYDROCHLORIDE 50 MG/ML
INJECTION, SOLUTION, CONCENTRATE INTRAMUSCULAR; INTRAVENOUS PRN
Status: DISCONTINUED | OUTPATIENT
Start: 2018-03-13 | End: 2018-03-13 | Stop reason: SDUPTHER

## 2018-03-13 RX ORDER — CIPROFLOXACIN 2 MG/ML
400 INJECTION, SOLUTION INTRAVENOUS EVERY 12 HOURS
Status: DISCONTINUED | OUTPATIENT
Start: 2018-03-13 | End: 2018-03-14

## 2018-03-13 RX ORDER — FUROSEMIDE 10 MG/ML
40 INJECTION INTRAMUSCULAR; INTRAVENOUS 2 TIMES DAILY
Status: DISCONTINUED | OUTPATIENT
Start: 2018-03-13 | End: 2018-03-13

## 2018-03-13 RX ADMIN — FLUCONAZOLE 200 MG: 2 INJECTION, SOLUTION INTRAVENOUS at 15:11

## 2018-03-13 RX ADMIN — QUETIAPINE FUMARATE 50 MG: 25 TABLET ORAL at 21:10

## 2018-03-13 RX ADMIN — CEFAZOLIN 1 G: 1 INJECTION, POWDER, FOR SOLUTION INTRAMUSCULAR; INTRAVENOUS at 14:09

## 2018-03-13 RX ADMIN — PANTOPRAZOLE SODIUM 40 MG: 40 INJECTION, POWDER, FOR SOLUTION INTRAVENOUS at 09:00

## 2018-03-13 RX ADMIN — Medication 10 ML: at 09:00

## 2018-03-13 RX ADMIN — ACETAMINOPHEN 650 MG: 650 SOLUTION ORAL at 21:11

## 2018-03-13 RX ADMIN — ALBUMIN (HUMAN) 25 G: 0.25 INJECTION, SOLUTION INTRAVENOUS at 21:01

## 2018-03-13 RX ADMIN — METRONIDAZOLE 500 MG: 500 INJECTION, SOLUTION INTRAVENOUS at 22:09

## 2018-03-13 RX ADMIN — CIPROFLOXACIN 400 MG: 2 INJECTION, SOLUTION INTRAVENOUS at 09:30

## 2018-03-13 RX ADMIN — FUROSEMIDE 40 MG: 10 INJECTION, SOLUTION INTRAMUSCULAR; INTRAVENOUS at 20:56

## 2018-03-13 RX ADMIN — LIDOCAINE HYDROCHLORIDE 80 MG: 10 INJECTION, SOLUTION EPIDURAL; INFILTRATION; INTRACAUDAL; PERINEURAL at 11:08

## 2018-03-13 RX ADMIN — Medication 10 ML: at 21:08

## 2018-03-13 RX ADMIN — PHENYLEPHRINE HYDROCHLORIDE 100 MCG: 10 INJECTION INTRAVENOUS at 11:20

## 2018-03-13 RX ADMIN — POTASSIUM CHLORIDE 40 MEQ: 40 SOLUTION ORAL at 15:05

## 2018-03-13 RX ADMIN — LORAZEPAM 0.5 MG: 2 INJECTION, SOLUTION INTRAMUSCULAR; INTRAVENOUS at 03:56

## 2018-03-13 RX ADMIN — KETAMINE HYDROCHLORIDE 20 MG: 50 INJECTION, SOLUTION INTRAMUSCULAR; INTRAVENOUS at 11:08

## 2018-03-13 RX ADMIN — METRONIDAZOLE 500 MG: 500 INJECTION, SOLUTION INTRAVENOUS at 12:34

## 2018-03-13 RX ADMIN — CEFAZOLIN 1 G: 1 INJECTION, POWDER, FOR SOLUTION INTRAMUSCULAR; INTRAVENOUS at 23:25

## 2018-03-13 RX ADMIN — DEXTROSE AND SODIUM CHLORIDE: 5; 450 INJECTION, SOLUTION INTRAVENOUS at 11:06

## 2018-03-13 RX ADMIN — PROPOFOL 50 MCG/KG/MIN: 10 INJECTION, EMULSION INTRAVENOUS at 11:08

## 2018-03-13 RX ADMIN — METRONIDAZOLE 500 MG: 500 INJECTION, SOLUTION INTRAVENOUS at 03:09

## 2018-03-13 RX ADMIN — FUROSEMIDE 40 MG: 10 INJECTION, SOLUTION INTRAVENOUS at 08:59

## 2018-03-13 RX ADMIN — PANTOPRAZOLE SODIUM 40 MG: 40 INJECTION, POWDER, FOR SOLUTION INTRAVENOUS at 21:07

## 2018-03-13 ASSESSMENT — PAIN SCALES - GENERAL
PAINLEVEL_OUTOF10: 4
PAINLEVEL_OUTOF10: 2

## 2018-03-13 ASSESSMENT — PULMONARY FUNCTION TESTS
PIF_VALUE: 1
PIF_VALUE: 43
PIF_VALUE: 1
PIF_VALUE: 1

## 2018-03-13 ASSESSMENT — PAIN SCALES - WONG BAKER
WONGBAKER_NUMERICALRESPONSE: 4

## 2018-03-13 ASSESSMENT — PAIN - FUNCTIONAL ASSESSMENT: PAIN_FUNCTIONAL_ASSESSMENT: 0-10

## 2018-03-13 NOTE — PROGRESS NOTES
Rn and Charge nurse attempted to start a peripheral IV on patient. Not able to start any at this time.

## 2018-03-13 NOTE — CARE COORDINATION
ONGOING DISCHARGE PLAN:     Spoke with patient regarding discharge plan and patient confirms that plan is to return home with daughter and vns, Vicky, was referred to [facility] to see if she meets there criteria,   Is currently on IV diflucan, flagyl and lasix  Per surgery; The patient is a 69 y. o. 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 She was admitted to ICU with a diagnosis of DKA and sepsis. She has moderate protein calorie malnutrition and is getting tube feeding through an NG tube.  She was hypotensive initially.  She developed a DVT and had an IVC filter during this admission. PLAN     1. Will alert Thusay to her need for PEG tube so he can get her on his schedule as soon as possible  2. Discussed risks, benefits, and alternatives with the POA     Will continue to follow for additional discharge needs.   Electronically signed by Loretta Ye RN on 3/13/2018 at 8:39 AM

## 2018-03-13 NOTE — ANESTHESIA PRE PROCEDURE
Department of Anesthesiology  Preprocedure Note       Name:  Patricia Dupree   Age:  69 y.o.  :  1940                                          MRN:  [id number]         Date:  3/13/2018      Surgeon: Priscilla Ferro):  Lai Valentine MD    Procedure: Procedure(s):  EGD ESOPHAGOGASTRODUODENOSCOPY PEG TUBE INSERTION    Medications prior to admission:   Prior to Admission medications    Medication Sig Start Date End Date Taking?  Authorizing Provider   atorvastatin (LIPITOR) 10 MG tablet Take 10 mg by mouth daily   Yes Historical Provider, MD   famotidine (PEPCID) 20 MG tablet Take 20 mg by mouth daily   Yes Historical Provider, MD   FLUoxetine (PROZAC) 10 MG capsule Take 1 capsule by mouth daily 18  Yes Franklin Correia MD   hydrALAZINE (APRESOLINE) 25 MG tablet Take 1 tablet by mouth every 8 hours 18  Yes Franklin Correia MD   amLODIPine (NORVASC) 10 MG tablet Take 1 tablet by mouth daily 18  Yes Franklin Correia MD   tamsulosin (FLOMAX) 0.4 MG capsule Take 1 capsule by mouth daily 18  Yes Franklin Correia MD   clopidogrel (PLAVIX) 75 MG tablet Take 1 tablet by mouth daily 18  Yes Franklin Correia MD   metFORMIN (GLUCOPHAGE) 1000 MG tablet Take 1,000 mg by mouth 2 times daily (with meals)   Yes Historical Provider, MD   losartan (COZAAR) 100 MG tablet Take 100 mg by mouth daily   Yes Historical Provider, MD   simvastatin (ZOCOR) 20 MG tablet Take 1 tablet by mouth nightly 18   Franklin Correia MD   metFORMIN (GLUCOPHAGE) 1000 MG tablet Take 1,000 mg by mouth 2 times daily (with meals)    Historical Provider, MD   losartan (COZAAR) 100 MG tablet Take 100 mg by mouth daily    Historical Provider, MD   Omega-3 Fatty Acids (FISH OIL) 1000 MG CAPS Take 3,000 mg by mouth 3 times daily    Historical Provider, MD   glimepiride (AMARYL) 4 MG tablet Take 4 mg by mouth 2 times daily    Historical Provider, MD   amLODIPine (NORVASC) 5 MG tablet Take 5 mg by mouth daily    Historical Provider, MD   fenofibrate 0409    [MAR Hold] furosemide (LASIX) injection 40 mg  40 mg Intravenous Daily Ricardo Arzate MD   40 mg at 03/13/18 0859    [MAR Hold] fluconazole (DIFLUCAN) 200 mg in 0.9 % NaCl 100 mL premix IVPB  200 mg Intravenous Q24H Ricardo Arzate  mL/hr at 03/12/18 2141 200 mg at 03/12/18 2141    [MAR Hold] QUEtiapine (SEROQUEL) tablet 50 mg  50 mg Oral Nightly Loyola Burkitt, MD   50 mg at 03/12/18 2140    [MAR Hold] midodrine (PROAMATINE) tablet 5 mg  5 mg Oral Q8H PRN [facility], MD        [facility] Hold] insulin glargine (LANTUS) injection vial 12 Units  12 Units Subcutaneous Nightly Loyola Burkitt, MD   12 Units at 03/12/18 2140    [MAR Hold] acetaminophen (TYLENOL) 160 MG/5ML solution 650 mg  650 mg Oral Q4H PRN Elkin Pop, CNP   650 mg at 03/12/18 1049    [MAR Hold] 0.9 % sodium chloride bolus  250 mL Intravenous Once Loyola Burkitt, MD        [facility] Hold] 0.9 % sodium chloride infusion   Intravenous Continuous [facility] MD GUANAKITO 10 mL/hr at 03/08/18 0419      [MAR Hold] Magic Mouthwash (Miracle Mouthwash) 5 mL  5 mL Swish & Spit 4x Daily PRN Elkin Pop, CNP   5 mL at 03/07/18 1951    [MAR Hold] insulin lispro (HUMALOG) injection vial 0-6 Units  0-6 Units Subcutaneous TID WC Loyola Burkitt, MD   2 Units at 03/12/18 1415    [MAR Hold] insulin lispro (HUMALOG) injection vial 0-3 Units  0-3 Units Subcutaneous Nightly Loyola Burkitt, MD   1 Units at 03/12/18 2140    [MAR Hold] clopidogrel (PLAVIX) tablet 75 mg  75 mg Oral Daily Loyola Burkitt, MD   Stopped at 03/13/18 0900    [MAR Hold] sodium chloride (PF) 0.9 % injection 10 mL  10 mL Intravenous Q12H Dayanara Pizarro MD   10 mL at 03/13/18 0900    And    [MAR Hold] pantoprazole (PROTONIX) injection 40 mg  40 mg Intravenous BID Dayanara Pizarro MD   40 mg at 03/13/18 0900    [MAR Hold] glucose (GLUTOSE) 40 % oral gel 15 g  15 g Oral PRN Sarah Christina MD        [facility] Hold] dextrose 50 % solution 12.5 g  12.5 g Intravenous JAQUELINE Apple MD   12.5 g at 03/08/18 1149    [MAR Hold] glucagon (rDNA) injection 1 mg  1 mg Intramuscular JAQUELINE Apple MD        [facility] Hold] dextrose 5 % solution  100 mL/hr Intravenous JAQUELINE Apple MD        [facility] Hold] dextrose 50 % solution 12.5 g  12.5 g Intravenous JAQUELINE Piper MD   12.5 g at 03/08/18 0624    [MAR Hold] magnesium sulfate 1 g in dextrose 5% 100 mL IVPB  1 g Intravenous JAQUELINE Piper MD   Stopped at 03/05/18 4549    [MAR Hold] sodium phosphate 10 mmol in dextrose 5 % 250 mL IVPB  10 mmol Intravenous JAQUELINE Piper MD   Stopped at 03/05/18 0420    Or    [MAR Hold] sodium phosphate 15 mmol in dextrose 5 % 250 mL IVPB  15 mmol Intravenous JAQUELINE Piper MD        Or   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 ([facility]) I63.311    Cerebrovascular accident (CVA) ([facility]) I63.9    Left arm weakness R29.898    Facial droop R29.810    Dysarthria R47.1    Cerebral edema ([facility]) G93.6    Left-sided weakness R53.1    Hyperglycemia R73.9    Controlled type 2 diabetes mellitus with hyperglycemia, without long-term current use of insulin (HCC) E11.65    DKA, type 2, not at goal [facility]) E13.10    Sepsis ([facility]) A41.9    UTI (urinary tract infection) N39.0    Leukocytosis D72.829    Increased anion gap metabolic acidosis A73.4    SAMINA (acute kidney injury) ([facility] [address] N17.9    Hypocalcemia E83.51    Hypokalemia E87.6    Suspected deep tissue injury Z04.9    Acute UTI N39.0    Thrombocytopenia ([facility]) D69.6    Acute deep vein thrombosis (DVT) of lower extremity ([facility]) I82.409    Chronic inflammatory

## 2018-03-13 NOTE — OP NOTE
Preoperative diagnosis: Malnutrition/dysphagia/history of CVA    Postoperative diagnosis: Same    Procedure: EGD with PEG tube placement    Surgeon: Dr. Manjinder Claros    Asst.: None    Anesthesia: Mac and local    Preparation: ChloraPrep    EBL: None    Specimen: None    Procedure: 20-year-old pleasant female with malnutrition dysphagia history of CVA was scheduled for PEG tube placement. Informed consent was obtained. Preoperative antibiotics were given. Patient was taken to the operating room. Intravenous anesthesia was given. Vital signs are monitored and remained stable. Timeout was done. Olympus gastroscope was advanced under direct utilization to the level of second portion of duodenum without any difficulty. Esophagus that is entire length revealed no active esophagitis no masses no obstruction. GE junction grossly appeared unremarkable. Scope was advanced into the stomach. Scope was retroflexed. No fundic lesions were seen. Gastric body antral portion were unremarkable. Gastric outlet was patent. Bulb and the beginning of the second portion of the duodenum were unremarkable. Scope was withdrawn back into the stomach. Stomach was distended with air. Light reflex was seen over the anterior abdominal wall. This area was prepped and draped usual sterile fashion. Local anesthetic was infiltrated. Incision was made using a #11 blade. Through this incision a needle was introduced through the intra-abdominal wall into the stomach. Needle was grasped with the help of a snare. Through the needle guidewire was introduced. Guidewire was grasped with the help of the snare. Guidewire was retrieved from the oral cavity. PEG tube was threaded over the guidewire. PEG tube was retrieved from the abdominal aspect. Bolster was applied. It was gastroscope was reintroduced. The mushroom of the PEG was found to be in satisfactory position. No bleeding noted.   Scope was removed after the air was decompressed. Clean dressing was applied. Patient tolerated procedure well and was transferred to the recovery room in a stable condition.     Recommendations: Findings are discussed with the family. Keep PEG tube gravity today. PEG tube can be used for medications today. Continue medical management.

## 2018-03-13 NOTE — PROGRESS NOTES
NEPHROLOGY PROGRESS NOTE    Patient :  Patricia Dupree; 69 y.o. MRN# [id number]  Location:    Attending:  Sukh Strong MD  Admit Date:  3/1/2018   Hospital Day: 15    Subjective: 69 y.o. 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 She was admitted to ICU with a diagnosis of DKA and sepsis. Initial laboratory studies were remarkable for serum bicarbonate 8 mmol/L, ketoacidosis and serum creatinine 2.7 mg/dL. She was hypotensive and required high dose Levophed which has since been titrated down to current level of 1 mcg/min. Patient bicarb was 8 anion gap was 20 on admission patient was started on DKA protocol insulin drip anion gap has improved but serum bicarb has not improved. Interval history/subjective:  Patient does not have any new complaints today. She however has bilat LE edema L>R  Had PEG tube placed today  Objective:  CURRENT TEMPERATURE:  Temp: 97.9 °F (36.6 °C)  MAXIMUM TEMPERATURE OVER 24HRS:  Temp (24hrs), Av.2 °F (36.8 °C), Min:96.8 °F (36 °C), Max:99.7 °F (37.6 °C)    CURRENT RESPIRATORY RATE:  Resp: 19  CURRENT PULSE:  Pulse: 67  CURRENT BLOOD PRESSURE:  BP: (!) 126/51  24HR BLOOD PRESSURE RANGE:  Systolic (97FVX), RHT:071 , Min:62 , FJF:892   ; Diastolic (34UFU), EEN:55, Min:35, Max:72    24HR INTAKE/OUTPUT:      Intake/Output Summary (Last 24 hours) at 18 1359  Last data filed at 18 1206   Gross per 24 hour   Intake           603.33 ml   Output                0 ml   Net           603.33 ml     Patient Vitals for the past 96 hrs (Last 3 readings):   Weight   18 0530 209 lb 14.1 oz (95.2 kg)       Physical Exam:  GENERAL APPEARANCE: More awake and alert responsive  HEAD: normocephalic  EYES:  Not pale, anicteric   NOSE:  No nasal discharge. THROAT:  Oral cavity and pharynx normal.  Dry  CARDIAC: Normal S1 and S2.  No

## 2018-03-13 NOTE — PROGRESS NOTES
3/2/2018 12:00 AM   Tosin-wound Assessment Dry; Intact 3/6/2018  4:00 PM   Number of days: 12       Wound 03/05/18 Abrasion(s) Head Left;Posterior lump with abrasion left posterior behind left ear (Active)   Number of days: 8                  Plan:     Treatment:          · Encourage/assist with repositioning every 2 hours  · Routine incontinence care with Alex barrier cloths and zinc oxide cream. Apply zinc oxide cream BID and prn incontinence.    · Covidien moisture wicking under pads vs cloth backed briefs  · Currently on pressure redistribution mattress (waffle)  · Offloading boots on feet          Electronically signed by Radha Alex NP on 3/13/2018 at 5:07 PM

## 2018-03-13 NOTE — PROGRESS NOTES
fissures suggesting age-indeterminate thrombus in the MCA branch. There is low-density and loss of differentiation in the right lentiform nucleus and lateral thalamic region. Focal low density in the right caudate head is noted suggesting prior infarct. ORBITS: The visualized portion of the orbits demonstrate no acute abnormality. SINUSES: The visualized paranasal sinuses and mastoid air cells demonstrate no acute abnormality. SOFT TISSUES/SKULL:  No acute abnormality of the visualized skull or soft tissues. 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 There is surrounding low density extending deep into the lentiform nucleus region and right lateral thalamic region. It is unclear whether this is gliosis from the prior ischemic event or a subtle superimposed area of new ischemia. MRI may be more helpful High density in the right MCA within the sylvian fissure. This is age indeterminate given the recent infarct. This may represent sequela of prior thrombus, however new thrombus not excluded. Again if the patient 8 current right hemispheric stroke symptoms, consider MRI     Ir Guided Ivc Filter Placement    Result Date: 3/12/2018  Radiology exam is complete. No Radiologist dictation. Please follow up with ordering provider. Us Renal Limited    Result Date: 3/2/2018  EXAMINATION: ULTRASOUND OF THE KIDNEYS 3/2/2018 2:21 pm COMPARISON: None. HISTORY: ORDERING SYSTEM PROVIDED HISTORY: RENAL FAILURE, ACUTE (KIDNEY INJURY) TECHNOLOGIST PROVIDED HISTORY: Ordering Physician Provided Reason for Exam: arf Acuity: Acute Type of Exam: Initial FINDINGS: The right kidney is less than optimally visualized due to surrounding bowel gas. The right kidney measures 12.8 x 5.8 x 5.6 cm and the left kidney 11.2 x 5.4 x 5.5 cm. The cortical thickness on the right is 17 mm and on the left is 18 mm. Kidneys demonstrate normal cortical echogenicity.   No

## 2018-03-13 NOTE — PROGRESS NOTES
PROGRESS NOTE    PCP: Carolyn Kraus MD  Referring Provider: No ref. provider found    IMPRESSION:   1.  Admitted with sepsis due to UTI,  resolving  2.  Diabetes mellitus with DKA on admission also resolved  3.  Cytopenias likely due to acute illness  4.  Prior CVA with possible extension this admission  5.  Chronic kidney disease  6.  Poor nutritional status excellent   7.  GI bleed  8.  Presence of IVC filter  9.  Chronic inflammatory demyelinating polyneuropathy         INTERVAL HISTORY:   For PEG tube today  Initial platelet count this morning low, however and repeat was normal.  Okay to proceed with PEG      PLAN:     1. Continue current treatment and support  2. Blood counts have essentially resolved, we will follow at a distance    No Follow-up on file. VISIT DIAGNOSIS:  The primary encounter diagnosis was Sepsis, due to unspecified organism ([facility] [address] Diagnoses of Hypernatremia, SAMINA (acute kidney injury) ([address] Acute UTI, and Diabetic ketoacidosis without coma associated with type 2 diabetes mellitus ([address] were also pertinent to this visit.     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) 01/10/2018    Chronic inflammatory demyelinating polyneuropathy (HCC)     CIDP (chronic inflammatory demyelinating polyneuropathy) (HCC)     Diabetes mellitus ([address]     Hyperlipidemia     Hypertension     Left arm weakness 01/10/2018    Left-sided weakness 01/10/2018       PAST SURGICAL HISTORY:      Procedure Laterality Date    CHOLECYSTECTOMY      HYSTERECTOMY      partial    HYSTERECTOMY      THROMBECTOMY  01/10/2018    EMERGENT WITH CEREBRAL ANGIOGRAM    TONSILLECTOMY      TRANSESOPHAGEAL ECHOCARDIOGRAM  01/16/2018       CURRENT MEDICATIONS:   Current Facility-Administered Medications   Medication Dose Route Frequency Provider Last Rate Last Dose    ciprofloxacin (CIPRO) IVPB 400 mg  400 mg who is recovering from a septic syndrome. ROS:  General: negative for fatigue, fever or night sweats  ENT: negative for headaches, hearing change, oral lesions or visual changes  Hematological and Lymphatic: negative for bleeding problems, blood clots, bruising, fatigue, night sweats or weight loss  Respiratory: negative for cough, shortness of breath or tachypnea  Cardiovascular: negative for chest pain, dyspnea on exertion, edema or paroxysmal nocturnal dyspnea  Gastrointestinal: negative for abdominal pain, appetite loss, change in bowel habits, constipation, diarrhea, melena or nausea/vomiting  Genito-Urinary: negative for dysuria, hematuria or incontinence  Musculoskeletal: negative for gait disturbance, joint pain, joint swelling or muscle pain  Neurological: negative for confusion, dizziness, headaches, seizures or tremors  Dermatological: negative for pruritus or rash      PHYSICAL EXAM:   Vitals: BP (!) 132/55   Pulse 65   Temp 98.9 °F (37.2 °C) (Axillary)   Resp 16   Ht 5' 9\" (1.753 m)   Wt 209 lb 14.1 oz (95.2 kg)   SpO2 94%   BMI 30.99 kg/m²   General appearance: Lethargic  Skin: Skin color, texture, turgor normal. No rashes or lesions  HEENT: Head: Normocephalic, no lesions, without obvious abnormality. Neck: no adenopathy  Lungs: clear to auscultation bilaterally  Heart: regular rate and rhythm, S1, S2 normal, no murmur, click, rub or gallop  Abdomen: soft, non-tender; bowel sounds normal; no masses,  no organomegaly  Extremities: extremities normal, atraumatic, no cyanosis or edema  Neurologic: Mental status: Lethargic no focal deficits      LABS:   Results for orders placed or performed during the hospital encounter of 03/01/18   Flu A/B Ag Detection   Result Value Ref Range    Specimen Description       . NASOPHARYNGEAL SWAB Performed at [facility]: SHAYLEE Francis [address]    Specimen Description  Maida Mccloud.  [facility], [address] [place] [address] [phone]     Special Requests NOT REPORTED     Direct Exam PRESUMPTIVE NEGATIVE for Influenza A + B antigens. Direct Exam       PCR testing to confirm this result is available upon request.  Specimen will be    Direct Exam        saved in the laboratory for 7 days. Please call [phone] if PCR testing is    Direct Exam  indicated. Status FINAL 03/01/2018    Cult,Blood #2   Result Value Ref Range    Specimen Description       . BLOOD LEFT FA 1ML EACH BOTTLE Performed at [facility]: SHAYLEE [address]AVAREZ [address]    Specimen Description  [facility] [place], [address] [phone]     Special Requests NOT REPORTED     Culture NO GROWTH 6 DAYS     Culture       Performed at [address] [phone]    Status FINAL 03/07/2018    CULTURE BLOOD #1   Result Value Ref Range    Specimen Description       . BLOOD 3ML BOTH BOTTLE RIGHT FA Performed at [facility]: SHAYLEE ROBERTSON    Specimen Description  [address] [phone]     Special Requests NOT REPORTED     Culture NO GROWTH 6 DAYS     Culture       Performed at [address] [phone]    Status FINAL 03/07/2018    Urine culture via catheter   Result Value Ref Range    Specimen Description       . URINE,STRAIGHT CATHETER Performed at [facility]: SH[address]LE[address]AV[address]    Specimen Description  [facility] [place], [address] [phone]     Special Requests NOT REPORTED     Culture MORGANELLA MORGANII >[id number] CFU/ML (A)     Culture GROUP D ENTEROCOCCUS >[id number] CFU/ML (A)     Culture       Performed at [address] [place] [address] [phone]    Status FINAL 03/02/2018     Organism CHALO     Organism EGD        Susceptibility    Morganella morganii - MARICRUZ     amikacin NOT REPORTED       ampicillin >=32 RESISTANT Resistant      ampicillin-sulbactam NOT REPORTED       aztreonam <=1 SUSCEPTIBLE Sensitive      ceFAZolin NOT REPORTED       cefepime NOT REPORTED       cefTRIAXone <=1 SUSCEPTIBLE Sensitive      ciprofloxacin <=0.25 SUSCEPTIBLE Sensitive      ertapenem NOT REPORTED       gentamicin <=1 SUSCEPTIBLE Sensitive      meropenem NOT REPORTED       nitrofurantoin 128 RESISTANT Resistant      tigecycline NOT REPORTED       tobramycin <=1 SUSCEPTIBLE Sensitive      trimethoprim-sulfamethoxazole <=20 SUSCEPTIBLE Sensitive      piperacillin-tazobactam <=4 SUSCEPTIBLE Sensitive     Group d enterococcus - MARICRUZ     ampicillin <=2 SUSCEPTIBLE Sensitive      penicillin NOT REPORTED       ciprofloxacin <=0.5 SUSCEPTIBLE Sensitive      erythromycin NOT REPORTED       Gentamicin, High Level NOT REPORTED       levofloxacin 1 SUSCEPTIBLE Sensitive      linezolid NOT REPORTED       nitrofurantoin <=16 SUSCEPTIBLE Sensitive      Synercid NOT REPORTED       Streptomycin, Hi Level NOT REPORTED       tetracycline >=16 RESISTANT Resistant      tigecycline NOT REPORTED       vancomycin 1 SUSCEPTIBLE Sensitive    Culture Stool   Result Value Ref Range    Specimen . FECES     Campylobacter PCR  CAMNEG     NEGATIVE: No Campylobacter spp. (jejuni or coli) DNA Detected    Salmonella PCR NEGATIVE: No Salmonella spp. DNA Detected SALNEG    Shigatoxin Gene PCR  STXNEG     NEGATIVE: No Shiga toxin-producing gene(s) Detected    Shigella Sp PCR NEGATIVE: No Shigella spp. / EIEC DNA Detected SHINEG   C Diff Toxin by RT PCR   Result Value Ref Range    Specimen Description . FECES     C Difficile tox, pcr  CDTNEG     NEGATIVE: C difficile tcdB nucleic acid not detected by RT-PCR. Urine culture via catheter   Result Value Ref Range    Specimen Description       . CATHETERIZED URINE Performed at [facility]: SHAYLEE [address]    Specimen Description  [address] [phone]     Special Requests NOT REPORTED     Culture (A)      YEAST, NOT CANDIDA ALBICANS OR CANDIDA DUBLINIENSIS >[id number] CFU/ML    Culture       Performed at [facility] [address] [facility], [address] [place] [address] [phone]    Status FINAL 03/08/2018    CBC Auto Differential   Result Value Ref Range    WBC 13.7 (H) 3.5 - 11.0 k/uL    RBC 5.20 4.0 - 5.2 m/uL    Hemoglobin 14.6 12.0 - 16.0 g/dL    Hematocrit 48.4 (H) 36 - 46 %    MCV 93.2 80 - 100 fL    MCH 28.1 26 - 34 pg    MCHC 30.2 (L) 31 - 37 g/dL    RDW 17.7 (H) 11.5 - 14.9 %    Platelets 98 (L) 097 - 450 k/uL    MPV 12.3 (H) 6.0 - 12.0 fL    NRBC Automated NOT REPORTED per 100 WBC    Differential Type NOT REPORTED     Immature Granulocytes NOT REPORTED 0 %    Absolute Immature Granulocyte NOT REPORTED 0.00 - 0.30 k/uL    WBC Morphology NOT REPORTED     RBC Morphology NOT REPORTED     Platelet Estimate NOT REPORTED     Seg Neutrophils 86 (H) 36 - 66 %    Lymphocytes 8 (L) 24 - 44 %    Monocytes 6 1 - 7 %    Eosinophils % 0 0 - 4 %    Basophils 0 0 - 2 %    Segs Absolute 11.78 (H) 1.3 - 9.1 k/uL    Absolute Lymph # 1.10 1.0 - 4.8 k/uL    Absolute Mono # 0.82 0.1 - 1.3 k/uL    Absolute Eos # 0.00 0.0 - 0.4 k/uL    Basophils # 0.00 0.0 - 0.2 k/uL    Morphology ANISOCYTOSIS PRESENT    Basic Metabolic Prof   Result Value Ref Range    Glucose 403 (HH) 70 - 99 mg/dL    BUN 85 (H) 8 - 23 mg/dL    CREATININE 1.79 (H) 0.50 - 0.90 mg/dL    Bun/Cre Ratio NOT REPORTED 9 - 20    Calcium 4.7 (LL) 8.6 - 10.4 mg/dL    Sodium 150 (H) 135 - 144 mmol/L    Potassium 3.6 (L) 3.7 - 5.3 mmol/L    Chloride 123 (H) 98 - 107 mmol/L    CO2 7 (LL) 20 - 31 mmol/L    Anion Gap 20 (H) 9 - 17 mmol/L    GFR Non-African American 28 (L) >60 mL/min    GFR  33 (L) >60 mL/min    GFR Comment          GFR Staging NOT REPORTED    Liver Profile   Result Value Ref Range    Alb 1.8 (L) 3.5 - 5.2 g/dL    Alkaline Phosphatase 38 35 - 104 U/L    ALT 37 (H) 5 - 33 U/L    AST 19 <32 U/L    Total Bilirubin 0.65 0.3 - 1.2 mg/dL    Bilirubin, Direct 0.39 (H) <0.31 mg/dL    Bilirubin, Indirect 0.26 0.00 - 1.00 mg/dL    Total Protein 3.3 (L) 6.4 - 8.3 g/dL    Globulin NOT REPORTED 1.5 - 3.8 g/dL    Albumin/Globulin Ratio NOT 23 mg/dL    CREATININE 2.21 (H) 0.50 - 0.90 mg/dL    Bun/Cre Ratio NOT REPORTED 9 - 20    Calcium 7.1 (L) 8.6 - 10.4 mg/dL    Sodium 139 135 - 144 mmol/L    Potassium 3.7 3.7 - 5.3 mmol/L    Chloride 109 (H) 98 - 107 mmol/L    CO2 9 (LL) 20 - 31 mmol/L    Anion Gap 21 (H) 9 - 17 mmol/L    GFR Non-African American 22 (L) >60 mL/min    GFR  26 (L) >60 mL/min    GFR Comment          GFR Staging NOT REPORTED    Magnesium   Result Value Ref Range    Magnesium 1.8 1.6 - 2.6 mg/dL   Magnesium   Result Value Ref Range    Magnesium 1.6 1.6 - 2.6 mg/dL   Magnesium   Result Value Ref Range    Magnesium 2.4 1.6 - 2.6 mg/dL   Phosphorus   Result Value Ref Range    Phosphorus 4.2 2.6 - 4.5 mg/dL   Phosphorus   Result Value Ref Range    Phosphorus 3.6 2.6 - 4.5 mg/dL   Phosphorus   Result Value Ref Range    Phosphorus 2.9 2.6 - 4.5 mg/dL   BLOOD GAS, VENOUS   Result Value Ref Range    pH, Eriberto 7.114 (LL) 7.320 - 7.420    pCO2, Eriberto 30.5 (L) 39.0 - 55.0    pO2, Eriberto 70.8 (H) 30.0 - 50.0    HCO3, Venous 9.8 (L) 24.0 - 30.0 mmol/L    Positive Base Excess, Eriberto NOT REPORTED 0.0 - 2.0 mmol/L    Negative Base Excess, Eriberto 19.7 (H) 0.0 - 2.0 mmol/L    O2 Sat, Eriberto 86.2 (H) 60.0 - 85.0 %    Total Hb NOT REPORTED 12.0 - 16.0 g/dl    Oxyhemoglobin NOT REPORTED 95.0 - 98.0 %    Carboxyhemoglobin 3.1 0 - 5 %    Methemoglobin 0.7 0.0 - 1.9 %    Pt Temp 37.0     pH, Eriberto, Temp Adj NOT REPORTED 7.320 - 7.420    pCO2, Eriberto, Temp Adj NOT REPORTED 39.0 - 55.0 mmHg    pO2, Eriberto, Temp Adj NOT REPORTED 30.0 - 50.0 mmHg    O2 Device/Flow/% NOT REPORTED     Respiratory Rate NOT REPORTED     Harvinder Test NOT REPORTED     Sample Site NOT REPORTED     Pt.  Position NOT REPORTED     Mode NOT REPORTED     Set Rate NOT REPORTED     Total Rate NOT REPORTED     VT NOT REPORTED     FIO2 NOT REPORTED     Peep/Cpap NOT REPORTED     PSV NOT REPORTED     Text for Respiratory NOT REPORTED     NOTIFICATION NOT REPORTED     NOTIFICATION TIME NOT REPORTED    BLOOD Range    Magnesium 2.1 1.6 - 2.6 mg/dL   Phosphorus   Result Value Ref Range    Phosphorus 2.5 (L) 2.6 - 4.5 mg/dL   BLOOD GAS, VENOUS   Result Value Ref Range    pH, Eriberto 7.173 (LL) 7.320 - 7.420    pCO2, Eriberto 23.0 (L) 39.0 - 55.0    pO2, Eriberto 56.3 (H) 30.0 - 50.0    HCO3, Venous 8.4 (L) 24.0 - 30.0 mmol/L    Positive Base Excess, Eriberto NOT REPORTED 0.0 - 2.0 mmol/L    Negative Base Excess, Eriberto 20.0 (H) 0.0 - 2.0 mmol/L    O2 Sat, Eriberto 87.8 %    Total Hb NOT REPORTED 12.0 - 16.0 g/dl    Oxyhemoglobin NOT REPORTED 95.0 - 98.0 %    Carboxyhemoglobin 1.5 %    Methemoglobin 0.8 %    Pt Temp 37.0     pH, Eriberto, Temp Adj NOT REPORTED 7.320 - 7.420    pCO2, Eriberto, Temp Adj NOT REPORTED 39.0 - 55.0 mmHg    pO2, Eriberto, Temp Adj NOT REPORTED 30.0 - 50.0 mmHg    O2 Device/Flow/% Cannula     Respiratory Rate NOT REPORTED     Harvinder Test NOT REPORTED     Sample Site NOT REPORTED     Pt. Position NOT REPORTED     Mode NOT REPORTED     Set Rate NOT REPORTED     Total Rate NOT REPORTED     VT NOT REPORTED     FIO2 2L     Peep/Cpap NOT REPORTED     PSV NOT REPORTED     Text for Respiratory RESULTS TO INDU ISLAS     NOTIFICATION NOT REPORTED     NOTIFICATION TIME NOT REPORTED    Giardia / Cryptosporidum antigens   Result Value Ref Range    Specimen Description       . FECES Performed at [facility]: SHAYLEE TAVAREZ [address]ARC[address] [place] [address]    Specimen Description  [id number] [phone]     Special Requests NOT REPORTED     Direct Exam Giardia Antigen Assay Negative     Direct Exam Cryptosporidium Antigen Assay Negative     Direct Exam       A Giardia/Cryptosporidium Screen has been performed.   A traditional Ova and    Direct Exam        Parasite exam, if collected in a preservative, may be requested by contacting    Direct Exam        the laboratory at [phone] within 72 hrs of collection for cases of    Direct Exam        persistant diarrhea or if the patient has visited an endemic area or traveled    Direct Exam  outside of the [place] Direct Exam       Performed at [address] [phone]    Status FINAL 03/04/2018    Fecal lactoferrin   Result Value Ref Range    Lactoferrin, Qual (A) NFLACT     POSITIVE for fecal lactoferrin, a marker for fecal leukocytes and an indicator   CBC auto differential   Result Value Ref Range    WBC 11.0 3.5 - 11.0 k/uL    RBC 4.99 4.0 - 5.2 m/uL    Hemoglobin 14.3 12.0 - 16.0 g/dL    Hematocrit 45.7 36 - 46 %    MCV 91.5 80 - 100 fL    MCH 28.6 26 - 34 pg    MCHC 31.2 31 - 37 g/dL    RDW 16.9 (H) 11.5 - 14.9 %    Platelets 217 (L) 416 - 450 k/uL    MPV 11.7 6.0 - 12.0 fL    NRBC Automated NOT REPORTED per 100 WBC    Differential Type NOT REPORTED     Immature Granulocytes NOT REPORTED 0 %    Absolute Immature Granulocyte NOT REPORTED 0.00 - 0.30 k/uL    WBC Morphology NOT REPORTED     RBC Morphology NOT REPORTED     Platelet Estimate NOT REPORTED     Seg Neutrophils 85 (H) 36 - 66 %    Lymphocytes 11 (L) 24 - 44 %    Monocytes 4 1 - 7 %    Eosinophils % 0 0 - 4 %    Basophils 0 0 - 2 %    Segs Absolute 9.30 (H) 1.3 - 9.1 k/uL    Absolute Lymph # 1.20 1.0 - 4.8 k/uL    Absolute Mono # 0.40 0.1 - 1.3 k/uL    Absolute Eos # 0.00 0.0 - 0.4 k/uL    Basophils # 0.00 0.0 - 0.2 k/uL   Lactic Acid   Result Value Ref Range    Lactic Acid 3.5 (H) 0.5 - 2.2 mmol/L   Basic Metabolic Panel w/ Reflex to MG   Result Value Ref Range    Glucose 205 (H) 70 - 99 mg/dL    BUN 85 (H) 8 - 23 mg/dL    CREATININE 2.03 (H) 0.50 - 0.90 mg/dL    Bun/Cre Ratio NOT REPORTED 9 - 20    Calcium 7.1 (L) 8.6 - 10.4 mg/dL    Sodium 135 135 - 144 mmol/L    Potassium 4.2 3.7 - 5.3 mmol/L    Chloride 107 98 - 107 mmol/L    CO2 9 (LL) 20 - 31 mmol/L    Anion Gap 19 (H) 9 - 17 mmol/L    GFR Non-African American 24 (L) >60 mL/min    GFR  29 (L) >60 mL/min    GFR Comment          GFR Staging NOT REPORTED    Magnesium   Result Value Ref Range    Magnesium 2.0 1.6 - 2.6 mg/dL   Phosphorus   Result Value Ref Range    Phosphorus 2.4 (L) 2.6 - 4.5 mg/dL   BLOOD GAS, VENOUS   Result Value Ref Range    pH, Eriberto 7.134 (LL) 7.320 - 7.420    pCO2, Eriberto 26.9 (L) 39.0 - 55.0    pO2, Eriberto 46.5 30.0 - 50.0    HCO3, Venous 9.0 (L) 24.0 - 30.0 mmol/L    Positive Base Excess, Eriberto NOT REPORTED 0.0 - 2.0 mmol/L    Negative Base Excess, Eriberto 20.1 (H) 0.0 - 2.0 mmol/L    O2 Sat, Eriberto 79.1 %    Total Hb NOT REPORTED 12.0 - 16.0 g/dl    Oxyhemoglobin NOT REPORTED 95.0 - 98.0 %    Carboxyhemoglobin 1.6 %    Methemoglobin 0.7 %    Pt Temp 37.0     pH, Eriberto, Temp Adj NOT REPORTED 7.320 - 7.420    pCO2, Eriberto, Temp Adj NOT REPORTED 39.0 - 55.0 mmHg    pO2, Eriberto, Temp Adj NOT REPORTED 30.0 - 50.0 mmHg    O2 Device/Flow/% NOT REPORTED     Respiratory Rate NOT REPORTED     Harvinder Test NA     Sample Site NA     Pt. Position NOT REPORTED     Mode NOT REPORTED     Set Rate NOT REPORTED     Total Rate NOT REPORTED     VT NOT REPORTED     FIO2 None     Peep/Cpap NOT REPORTED     PSV NOT REPORTED     Text for Respiratory NOT REPORTED     NOTIFICATION NOT REPORTED     NOTIFICATION TIME NOT REPORTED    Arterial Blood Gases   Result Value Ref Range    pH, Arterial 7.194     pCO2, Arterial 22.0 mmHg    pO2, Arterial 117.0 mmHg    HCO3, Arterial 8.5 mmol/L    Positive Base Excess, Art NOT REPORTED 0.0 - 2.0 mmol/L    Negative Base Excess, Art 19.7 (H) 0.0 - 2.0 mmol/L    O2 Sat, Arterial 97.3 %    Total Hb NOT REPORTED 12.0 - 16.0 g/dl    Oxyhemoglobin NOT REPORTED 95.0 - 98.0 %    Carboxyhemoglobin 0.4 %    Methemoglobin 0.8 %    Pt Temp 37.0     pH, Art, Temp Adj NOT REPORTED     pCO2, Art, Temp Adj NOT REPORTED     pO2, Art, Temp Adj NOT REPORTED mmHg    O2 Device/Flow/% Cannula     Respiratory Rate 24     Harvinder Test PASS     Sample Site Left Radial Artery     Pt.  Position SUPINE     Mode NOT REPORTED     Set Rate NOT REPORTED     Total Rate NOT REPORTED     VT NOT REPORTED     FIO2 2L     Peep/Cpap NOT REPORTED     PSV NOT REPORTED     Text for Respiratory RESULTS TO INDU ALMANZAR     NOTIFICATION NOT REPORTED     NOTIFICATION TIME NOT REPORTED    Basic Metabolic Panel w/ Reflex to MG   Result Value Ref Range    Glucose 169 (H) 70 - 99 mg/dL    BUN 79 (H) 8 - 23 mg/dL    CREATININE 1.86 (H) 0.50 - 0.90 mg/dL    Bun/Cre Ratio NOT REPORTED 9 - 20    Calcium 6.7 (L) 8.6 - 10.4 mg/dL    Sodium 137 135 - 144 mmol/L    Potassium 4.6 3.7 - 5.3 mmol/L    Chloride 110 (H) 98 - 107 mmol/L    CO2 8 (LL) 20 - 31 mmol/L    Anion Gap 19 (H) 9 - 17 mmol/L    GFR Non-African American 26 (L) >60 mL/min    GFR  32 (L) >60 mL/min    GFR Comment          GFR Staging NOT REPORTED    Magnesium   Result Value Ref Range    Magnesium 2.0 1.6 - 2.6 mg/dL   Phosphorus   Result Value Ref Range    Phosphorus 3.4 2.6 - 4.5 mg/dL   BLOOD GAS, VENOUS   Result Value Ref Range    pH, Eriberto 7.137 (LL) 7.320 - 7.420    pCO2, Eriberto 24.6 (L) 39.0 - 55.0    pO2, Eriberto 44.1 30.0 - 50.0    HCO3, Venous 8.3 (L) 24.0 - 30.0 mmol/L    Positive Base Excess, Eriberto NOT REPORTED 0.0 - 2.0 mmol/L    Negative Base Excess, Eriberto 20.7 (H) 0.0 - 2.0 mmol/L    O2 Sat, Eriberto 78.4 %    Total Hb NOT REPORTED 12.0 - 16.0 g/dl    Oxyhemoglobin NOT REPORTED 95.0 - 98.0 %    Carboxyhemoglobin 1.3 %    Methemoglobin 0.7 %    Pt Temp 37.0     pH, Eriberto, Temp Adj NOT REPORTED 7.320 - 7.420    pCO2, Eriberto, Temp Adj NOT REPORTED 39.0 - 55.0 mmHg    pO2, Eriberto, Temp Adj NOT REPORTED 30.0 - 50.0 mmHg    O2 Device/Flow/% ROOM AIR     Respiratory Rate NOT REPORTED     Harvinder Test NOT REPORTED     Sample Site NOT REPORTED     Pt.  Position NOT REPORTED     Mode NOT REPORTED     Set Rate NOT REPORTED     Total Rate NOT REPORTED     VT NOT REPORTED     FIO2 NOT REPORTED     Peep/Cpap NOT REPORTED     PSV NOT REPORTED     Text for Respiratory NOT REPORTED     NOTIFICATION NOT REPORTED     NOTIFICATION TIME NOT REPORTED    Lactic Acid   Result Value Ref Range    Lactic Acid 2.8 (H) 0.5 - 2.2 mmol/L   Basic Metabolic Panel w/ Reflex to MG   Result Value Ref Range    Glucose 126 (H) 70 - 99 mg/dL    BUN 73 (H) 8 - 23 mg/dL    CREATININE 1.76 (H) 0.50 - 0.90 mg/dL    Bun/Cre Ratio NOT REPORTED 9 - 20    Calcium 6.5 (L) 8.6 - 10.4 mg/dL    Sodium 139 135 - 144 mmol/L    Potassium 4.7 3.7 - 5.3 mmol/L    Chloride 113 (H) 98 - 107 mmol/L    CO2 8 (LL) 20 - 31 mmol/L    Anion Gap 18 (H) 9 - 17 mmol/L    GFR Non-African American 28 (L) >60 mL/min    GFR  34 (L) >60 mL/min    GFR Comment          GFR Staging NOT REPORTED    Magnesium   Result Value Ref Range    Magnesium 1.7 1.6 - 2.6 mg/dL   Phosphorus   Result Value Ref Range    Phosphorus 3.3 2.6 - 4.5 mg/dL   BLOOD GAS, VENOUS   Result Value Ref Range    pH, Eriberto 7.145 (LL) 7.320 - 7.420    pCO2, Eriberto 23.1 (L) 39.0 - 55.0    pO2, Eriberto 51.5 (H) 30.0 - 50.0    HCO3, Venous 8.0 (L) 24.0 - 30.0 mmol/L    Positive Base Excess, Eriberto NOT REPORTED 0.0 - 2.0 mmol/L    Negative Base Excess, Eriberto 21.0 (H) 0.0 - 2.0 mmol/L    O2 Sat, Eriberto 85.1 %    Total Hb NOT REPORTED 12.0 - 16.0 g/dl    Oxyhemoglobin NOT REPORTED 95.0 - 98.0 %    Carboxyhemoglobin 1.4 %    Methemoglobin 0.7 %    Pt Temp 37.0     pH, Eriberto, Temp Adj NOT REPORTED 7.320 - 7.420    pCO2, Eriberto, Temp Adj NOT REPORTED 39.0 - 55.0 mmHg    pO2, Eriberto, Temp Adj NOT REPORTED 30.0 - 50.0 mmHg    O2 Device/Flow/% NOT REPORTED     Respiratory Rate NOT REPORTED     Harvinder Test NA     Sample Site NA     Pt.  Position SEMI-FOWLERS     Mode NOT REPORTED     Set Rate NOT REPORTED     Total Rate NOT REPORTED     VT NOT REPORTED     FIO2 NOT REPORTED     Peep/Cpap NOT REPORTED     PSV NOT REPORTED     Text for Respiratory NOT REPORTED     NOTIFICATION NOT REPORTED     NOTIFICATION TIME NOT REPORTED    Troponin   Result Value Ref Range    Troponin T 0.07 (H) <0.03 ng/mL    Troponin Interp         Basic Metabolic Panel w/ Reflex to MG   Result Value Ref Range    Glucose 121 (H) 70 - 99 mg/dL    BUN 72 (H) 8 - 23 mg/dL    CREATININE 1.71 (H) 0.50 - 0.90 mg/dL Bun/Cre Ratio NOT REPORTED 9 - 20    Calcium 6.5 (L) 8.6 - 10.4 mg/dL    Sodium 136 135 - 144 mmol/L    Potassium 5.0 3.7 - 5.3 mmol/L    Chloride 111 (H) 98 - 107 mmol/L    CO2 8 (LL) 20 - 31 mmol/L    Anion Gap 17 9 - 17 mmol/L    GFR Non-African American 29 (L) >60 mL/min    GFR  35 (L) >60 mL/min    GFR Comment          GFR Staging NOT REPORTED    Magnesium   Result Value Ref Range    Magnesium 1.8 1.6 - 2.6 mg/dL   Phosphorus   Result Value Ref Range    Phosphorus 3.5 2.6 - 4.5 mg/dL   BLOOD GAS, VENOUS   Result Value Ref Range    pH, Eriberto 7.136 (LL) 7.320 - 7.420    pCO2, Eriberto 22.9 (L) 39.0 - 55.0    pO2, Eriberto 42.8 30.0 - 50.0    HCO3, Venous 7.7 (L) 24.0 - 30.0 mmol/L    Positive Base Excess, Eriberto NOT REPORTED 0.0 - 2.0 mmol/L    Negative Base Excess, Eriberto 21.4 (H) 0.0 - 2.0 mmol/L    O2 Sat, Eriberto 78.7 %    Total Hb NOT REPORTED 12.0 - 16.0 g/dl    Oxyhemoglobin NOT REPORTED 95.0 - 98.0 %    Carboxyhemoglobin 2.1 %    Methemoglobin 0.8 %    Pt Temp 37.0     pH, Eriberto, Temp Adj NOT REPORTED 7.320 - 7.420    pCO2, Eriberto, Temp Adj NOT REPORTED 39.0 - 55.0 mmHg    pO2, Eriberto, Temp Adj NOT REPORTED 30.0 - 50.0 mmHg    O2 Device/Flow/% NOT REPORTED     Respiratory Rate NOT REPORTED     Harvinder Test NOT REPORTED     Sample Site NOT REPORTED     Pt.  Position NOT REPORTED     Mode NOT REPORTED     Set Rate NOT REPORTED     Total Rate NOT REPORTED     VT NOT REPORTED     FIO2 NOT REPORTED     Peep/Cpap NOT REPORTED     PSV NOT REPORTED     Text for Respiratory RESULTS TO INDU ISLAS     NOTIFICATION NOT REPORTED     NOTIFICATION TIME NOT REPORTED    Basic Metabolic Panel w/ Reflex to MG   Result Value Ref Range    Glucose 211 (H) 70 - 99 mg/dL    BUN 69 (H) 8 - 23 mg/dL    CREATININE 1.70 (H) 0.50 - 0.90 mg/dL    Bun/Cre Ratio NOT REPORTED 9 - 20    Calcium 6.7 (L) 8.6 - 10.4 mg/dL    Sodium 137 135 - 144 mmol/L    Potassium 5.1 3.7 - 5.3 mmol/L    Chloride 113 (H) 98 - 107 mmol/L    CO2 7 (LL) 20 - 31 mmol/L Eriberto NOT REPORTED 0.0 - 2.0 mmol/L    Negative Base Excess, Eriberto 17.9 (H) 0.0 - 2.0 mmol/L    O2 Sat, Eriberto 96.4 %    Total Hb NOT REPORTED 12.0 - 16.0 g/dl    Oxyhemoglobin NOT REPORTED 95.0 - 98.0 %    Carboxyhemoglobin 2.1 %    Methemoglobin 0.8 %    Pt Temp 37.0     pH, Eriberto, Temp Adj NOT REPORTED 7.320 - 7.420    pCO2, Eriberto, Temp Adj NOT REPORTED 39.0 - 55.0 mmHg    pO2, Eriberto, Temp Adj NOT REPORTED 30.0 - 50.0 mmHg    O2 Device/Flow/% NOT REPORTED     Respiratory Rate NOT REPORTED     Harvinder Test NOT REPORTED     Sample Site NOT REPORTED     Pt.  Position NOT REPORTED     Mode NOT REPORTED     Set Rate NOT REPORTED     Total Rate NOT REPORTED     VT NOT REPORTED     FIO2 NOT REPORTED     Peep/Cpap NOT REPORTED     PSV NOT REPORTED     Text for Respiratory NOT REPORTED     NOTIFICATION NOT REPORTED     NOTIFICATION TIME NOT REPORTED    Basic Metabolic Panel w/ Reflex to MG   Result Value Ref Range    Glucose 178 (H) 70 - 99 mg/dL    BUN 56 (H) 8 - 23 mg/dL    CREATININE 1.42 (H) 0.50 - 0.90 mg/dL    Bun/Cre Ratio NOT REPORTED 9 - 20    Calcium 6.8 (L) 8.6 - 10.4 mg/dL    Sodium 135 135 - 144 mmol/L    Potassium 4.8 3.7 - 5.3 mmol/L    Chloride 107 98 - 107 mmol/L    CO2 11 (L) 20 - 31 mmol/L    Anion Gap 17 9 - 17 mmol/L    GFR Non-African American 36 (L) >60 mL/min    GFR  44 (L) >60 mL/min    GFR Comment          GFR Staging NOT REPORTED    Magnesium   Result Value Ref Range    Magnesium 1.9 1.6 - 2.6 mg/dL   Phosphorus   Result Value Ref Range    Phosphorus 2.8 2.6 - 4.5 mg/dL   BLOOD GAS, VENOUS   Result Value Ref Range    pH, Eriberto 7.297 (L) 7.320 - 7.420    pCO2, Eriberto 23.9 (L) 39.0 - 55.0    pO2, Eriberto 79.6 (H) 30.0 - 50.0    HCO3, Venous 11.6 (L) 24.0 - 30.0 mmol/L    Positive Base Excess, Eriberto NOT REPORTED 0.0 - 2.0 mmol/L    Negative Base Excess, Eriberto 14.8 (H) 0.0 - 2.0 mmol/L    O2 Sat, Eriberto 94.8 %    Total Hb NOT REPORTED 12.0 - 16.0 g/dl    Oxyhemoglobin NOT REPORTED 95.0 - 98.0 %    Carboxyhemoglobin REPORTED 9 - 20    Calcium 6.4 (L) 8.6 - 10.4 mg/dL    Sodium 134 (L) 135 - 144 mmol/L    Potassium 4.9 3.7 - 5.3 mmol/L    Chloride 106 98 - 107 mmol/L    CO2 11 (L) 20 - 31 mmol/L    Anion Gap 17 9 - 17 mmol/L    GFR Non-African American 40 (L) >60 mL/min    GFR  49 (L) >60 mL/min    GFR Comment          GFR Staging NOT REPORTED    Magnesium   Result Value Ref Range    Magnesium 1.8 1.6 - 2.6 mg/dL   Phosphorus   Result Value Ref Range    Phosphorus 3.1 2.6 - 4.5 mg/dL   BLOOD GAS, VENOUS   Result Value Ref Range    pH, Eriberto 7.435 (H) 7.320 - 7.420    pCO2, Eriberto 16.3 (L) 39.0 - 55.0    pO2, Eriberto 64.5 (H) 30.0 - 50.0    HCO3, Venous 10.9 (L) 24.0 - 30.0 mmol/L    Positive Base Excess, Eriberto NOT REPORTED 0.0 - 2.0 mmol/L    Negative Base Excess, Eriberto 13.3 (H) 0.0 - 2.0 mmol/L    O2 Sat, Eriberto 90.3 %    Total Hb NOT REPORTED 12.0 - 16.0 g/dl    Oxyhemoglobin NOT REPORTED 95.0 - 98.0 %    Carboxyhemoglobin 3.1 %    Methemoglobin 0.7 %    Pt Temp 37.0     pH, Eriberto, Temp Adj NOT REPORTED 7.320 - 7.420    pCO2, Eriberto, Temp Adj NOT REPORTED 39.0 - 55.0 mmHg    pO2, Eriberto, Temp Adj NOT REPORTED 30.0 - 50.0 mmHg    O2 Device/Flow/% Cannula     Respiratory Rate NOT REPORTED     Harvinder Test NOT REPORTED     Sample Site NOT REPORTED     Pt. Position SEMI-FOWLERS     Mode NOT REPORTED     Set Rate NOT REPORTED     Total Rate NOT REPORTED     VT NOT REPORTED     FIO2 NOT REPORTED     Peep/Cpap NOT REPORTED     PSV NOT REPORTED     Text for Respiratory NOT REPORTED     NOTIFICATION NOT REPORTED     NOTIFICATION TIME NOT REPORTED      *Note: Due to a large number of results and/or encounters for the requested time period, some results have not been displayed. A complete set of results can be found in Results Review.        Electronically signed by Moi Menjivar MD on 3/13/2018 at 9:18 AM

## 2018-03-13 NOTE — ANESTHESIA POSTPROCEDURE EVALUATION
POST- ANESTHESIA EVALUATION       Pt Name: Yoana Ndiaye  MRN: [id number]  YOB: 1940  Date of evaluation: 3/13/2018  Time:  1:35 PM      BP (!) 126/51   Pulse 67   Temp 97.9 °F (36.6 °C) (Axillary)   Resp 19   Ht 5' 9\" (1.753 m)   Wt 209 lb 14.1 oz (95.2 kg)   SpO2 99%   BMI 30.99 kg/m²      Consciousness Level  Awake  Cardiopulmonary Status  Stable  Pain Adequately Treated YES  Nausea / Vomiting  NO  Adequate Hydration  YES  Anesthesia Related Complications NONE      Electronically signed by Winter Moreira MD on 3/13/2018 at 1:35 PM       Department of Anesthesiology  Postprocedure Note    Patient: Yoana Ndiaye  MRN: [id number]  YOB: 1940  Date of evaluation: 3/13/2018  Time:  1:35 PM     Procedure Summary     Date:  03/13/18 Room / Location:  [address] [place] [address] Vishal Patrick 04 / [address] [place] [address] Vishal Patrick    Anesthesia Start:  1106 Anesthesia Stop:  1138    Procedure:  EGD ESOPHAGOGASTRODUODENOSCOPY PEG TUBE INSERTION (N/A Esophagus) Diagnosis:  (CVA FAILED SWALLOW STUDY )    Surgeon:  Jose Barker MD Responsible Provider:      Anesthesia Type:  MAC, general ASA Status:  4          Anesthesia Type: MAC, general    Segun Phase I: Segun Score: 7    Segun Phase II:      Last vitals: Reviewed and per EMR flowsheets.        Anesthesia Post Evaluation

## 2018-03-14 LAB
ABSOLUTE EOS #: 0.2 K/UL (ref 0–0.4)
ABSOLUTE IMMATURE GRANULOCYTE: ABNORMAL K/UL (ref 0–0.3)
ABSOLUTE LYMPH #: 1.4 K/UL (ref 1–4.8)
ABSOLUTE MONO #: 0.5 K/UL (ref 0.1–1.3)
ANION GAP SERPL CALCULATED.3IONS-SCNC: 9 MMOL/L (ref 9–17)
BASOPHILS # BLD: 1 % (ref 0–2)
BASOPHILS ABSOLUTE: 0 K/UL (ref 0–0.2)
BUN BLDV-MCNC: 22 MG/DL (ref 8–23)
BUN/CREAT BLD: ABNORMAL (ref 9–20)
CALCIUM SERPL-MCNC: 7.8 MG/DL (ref 8.6–10.4)
CHLORIDE BLD-SCNC: 106 MMOL/L (ref 98–107)
CO2: 29 MMOL/L (ref 20–31)
CREAT SERPL-MCNC: 0.58 MG/DL (ref 0.5–0.9)
DIFFERENTIAL TYPE: ABNORMAL
EOSINOPHILS RELATIVE PERCENT: 4 % (ref 0–4)
GFR AFRICAN AMERICAN: >60 ML/MIN
GFR NON-AFRICAN AMERICAN: >60 ML/MIN
GFR SERPL CREATININE-BSD FRML MDRD: ABNORMAL ML/MIN/{1.73_M2}
GFR SERPL CREATININE-BSD FRML MDRD: ABNORMAL ML/MIN/{1.73_M2}
GLUCOSE BLD-MCNC: 103 MG/DL (ref 65–105)
GLUCOSE BLD-MCNC: 122 MG/DL (ref 65–105)
GLUCOSE BLD-MCNC: 136 MG/DL (ref 65–105)
GLUCOSE BLD-MCNC: 137 MG/DL (ref 65–105)
GLUCOSE BLD-MCNC: 90 MG/DL (ref 70–99)
HCT VFR BLD CALC: 23.9 % (ref 36–46)
HEMOGLOBIN: 7.7 G/DL (ref 12–16)
IMMATURE GRANULOCYTES: ABNORMAL %
LYMPHOCYTES # BLD: 33 % (ref 24–44)
MAGNESIUM: 1.6 MG/DL (ref 1.6–2.6)
MCH RBC QN AUTO: 28.4 PG (ref 26–34)
MCHC RBC AUTO-ENTMCNC: 32.1 G/DL (ref 31–37)
MCV RBC AUTO: 88.6 FL (ref 80–100)
MONOCYTES # BLD: 11 % (ref 1–7)
NRBC AUTOMATED: ABNORMAL PER 100 WBC
PARTIAL THROMBOPLASTIN TIME: 28.3 SEC (ref 23–31)
PDW BLD-RTO: 16.6 % (ref 11.5–14.9)
PLATELET # BLD: 226 K/UL (ref 150–450)
PLATELET ESTIMATE: ABNORMAL
PMV BLD AUTO: 8.4 FL (ref 6–12)
POTASSIUM SERPL-SCNC: 3.1 MMOL/L (ref 3.7–5.3)
RBC # BLD: 2.7 M/UL (ref 4–5.2)
RBC # BLD: ABNORMAL 10*6/UL
SEG NEUTROPHILS: 51 % (ref 36–66)
SEGMENTED NEUTROPHILS ABSOLUTE COUNT: 2.2 K/UL (ref 1.3–9.1)
SODIUM BLD-SCNC: 144 MMOL/L (ref 135–144)
WBC # BLD: 4.3 K/UL (ref 3.5–11)
WBC # BLD: ABNORMAL 10*3/UL

## 2018-03-14 PROCEDURE — 2060000000 HC ICU INTERMEDIATE R&B

## 2018-03-14 PROCEDURE — 99233 SBSQ HOSP IP/OBS HIGH 50: CPT | Performed by: INTERNAL MEDICINE

## 2018-03-14 PROCEDURE — 6370000000 HC RX 637 (ALT 250 FOR IP): Performed by: RADIOLOGY

## 2018-03-14 PROCEDURE — P9046 ALBUMIN (HUMAN), 25%, 20 ML: HCPCS | Performed by: INTERNAL MEDICINE

## 2018-03-14 PROCEDURE — 2580000003 HC RX 258: Performed by: INTERNAL MEDICINE

## 2018-03-14 PROCEDURE — 80048 BASIC METABOLIC PNL TOTAL CA: CPT

## 2018-03-14 PROCEDURE — 85730 THROMBOPLASTIN TIME PARTIAL: CPT

## 2018-03-14 PROCEDURE — 6360000002 HC RX W HCPCS: Performed by: SURGERY

## 2018-03-14 PROCEDURE — 83735 ASSAY OF MAGNESIUM: CPT

## 2018-03-14 PROCEDURE — 85025 COMPLETE CBC W/AUTO DIFF WBC: CPT

## 2018-03-14 PROCEDURE — 2580000003 HC RX 258: Performed by: SURGERY

## 2018-03-14 PROCEDURE — 6370000000 HC RX 637 (ALT 250 FOR IP): Performed by: NURSE PRACTITIONER

## 2018-03-14 PROCEDURE — 2500000003 HC RX 250 WO HCPCS: Performed by: RADIOLOGY

## 2018-03-14 PROCEDURE — 92526 ORAL FUNCTION THERAPY: CPT

## 2018-03-14 PROCEDURE — 6360000002 HC RX W HCPCS: Performed by: NURSE PRACTITIONER

## 2018-03-14 PROCEDURE — 36415 COLL VENOUS BLD VENIPUNCTURE: CPT

## 2018-03-14 PROCEDURE — C9113 INJ PANTOPRAZOLE SODIUM, VIA: HCPCS | Performed by: INTERNAL MEDICINE

## 2018-03-14 PROCEDURE — 6360000002 HC RX W HCPCS: Performed by: INTERNAL MEDICINE

## 2018-03-14 PROCEDURE — 82947 ASSAY GLUCOSE BLOOD QUANT: CPT

## 2018-03-14 PROCEDURE — 6360000002 HC RX W HCPCS: Performed by: RADIOLOGY

## 2018-03-14 PROCEDURE — 97110 THERAPEUTIC EXERCISES: CPT

## 2018-03-14 RX ORDER — FLUCONAZOLE 100 MG/1
200 TABLET ORAL DAILY
Status: DISCONTINUED | OUTPATIENT
Start: 2018-03-14 | End: 2018-03-16

## 2018-03-14 RX ORDER — CIPROFLOXACIN 500 MG/1
500 TABLET, FILM COATED ORAL EVERY 12 HOURS SCHEDULED
Status: DISCONTINUED | OUTPATIENT
Start: 2018-03-14 | End: 2018-03-16

## 2018-03-14 RX ORDER — METRONIDAZOLE 500 MG/1
500 TABLET ORAL EVERY 8 HOURS SCHEDULED
Status: DISCONTINUED | OUTPATIENT
Start: 2018-03-14 | End: 2018-03-16

## 2018-03-14 RX ADMIN — POTASSIUM CHLORIDE 40 MEQ: 40 SOLUTION ORAL at 06:40

## 2018-03-14 RX ADMIN — ALTEPLASE 1 MG: 2.2 INJECTION, POWDER, LYOPHILIZED, FOR SOLUTION INTRAVENOUS at 02:19

## 2018-03-14 RX ADMIN — METRONIDAZOLE 500 MG: 500 INJECTION, SOLUTION INTRAVENOUS at 04:30

## 2018-03-14 RX ADMIN — Medication 12 UNITS: at 20:12

## 2018-03-14 RX ADMIN — MAGNESIUM SULFATE HEPTAHYDRATE 1 G: 1 INJECTION, SOLUTION INTRAVENOUS at 09:36

## 2018-03-14 RX ADMIN — FUROSEMIDE 40 MG: 10 INJECTION, SOLUTION INTRAMUSCULAR; INTRAVENOUS at 20:12

## 2018-03-14 RX ADMIN — FLUCONAZOLE 200 MG: 100 TABLET ORAL at 16:01

## 2018-03-14 RX ADMIN — Medication 10 ML: at 21:21

## 2018-03-14 RX ADMIN — METRONIDAZOLE 500 MG: 500 TABLET ORAL at 11:21

## 2018-03-14 RX ADMIN — ALBUMIN (HUMAN) 25 G: 0.25 INJECTION, SOLUTION INTRAVENOUS at 09:03

## 2018-03-14 RX ADMIN — CLOPIDOGREL BISULFATE 75 MG: 75 TABLET ORAL at 09:06

## 2018-03-14 RX ADMIN — CIPROFLOXACIN 400 MG: 2 INJECTION, SOLUTION INTRAVENOUS at 00:25

## 2018-03-14 RX ADMIN — Medication 10 ML: at 09:11

## 2018-03-14 RX ADMIN — CEFAZOLIN 1 G: 1 INJECTION, POWDER, FOR SOLUTION INTRAMUSCULAR; INTRAVENOUS at 06:44

## 2018-03-14 RX ADMIN — Medication 10 ML: at 09:06

## 2018-03-14 RX ADMIN — Medication 1 CAPSULE: at 09:06

## 2018-03-14 RX ADMIN — ALTEPLASE 1 MG: 2.2 INJECTION, POWDER, LYOPHILIZED, FOR SOLUTION INTRAVENOUS at 02:18

## 2018-03-14 RX ADMIN — ACETAMINOPHEN 650 MG: 650 SOLUTION ORAL at 22:28

## 2018-03-14 RX ADMIN — MAGNESIUM SULFATE HEPTAHYDRATE 1 G: 1 INJECTION, SOLUTION INTRAVENOUS at 11:10

## 2018-03-14 RX ADMIN — QUETIAPINE FUMARATE 50 MG: 25 TABLET ORAL at 21:21

## 2018-03-14 RX ADMIN — PANTOPRAZOLE SODIUM 40 MG: 40 INJECTION, POWDER, FOR SOLUTION INTRAVENOUS at 09:06

## 2018-03-14 RX ADMIN — CIPROFLOXACIN HYDROCHLORIDE 500 MG: 500 TABLET, FILM COATED ORAL at 11:21

## 2018-03-14 RX ADMIN — FUROSEMIDE 40 MG: 10 INJECTION, SOLUTION INTRAMUSCULAR; INTRAVENOUS at 09:06

## 2018-03-14 RX ADMIN — PANTOPRAZOLE SODIUM 40 MG: 40 INJECTION, POWDER, FOR SOLUTION INTRAVENOUS at 21:21

## 2018-03-14 RX ADMIN — ALBUMIN (HUMAN) 25 G: 0.25 INJECTION, SOLUTION INTRAVENOUS at 20:11

## 2018-03-14 RX ADMIN — METRONIDAZOLE 500 MG: 500 TABLET ORAL at 20:02

## 2018-03-14 ASSESSMENT — PAIN SCALES - GENERAL: PAINLEVEL_OUTOF10: 5

## 2018-03-14 NOTE — PLAN OF CARE
Problem: Gas Exchange - Impaired:  Goal: Levels of oxygenation will improve  Levels of oxygenation will improve   Outcome: Met This Shift  O2 sats within normal limits. Problem: Risk for Impaired Skin Integrity  Goal: Tissue integrity - skin and mucous membranes  Structural intactness and normal physiological function of skin and  mucous membranes. Outcome: Met This Shift  No new signs of injury. Problem: Falls - Risk of  Goal: Absence of falls  Outcome: Met This Shift  No falls this shift.

## 2018-03-14 NOTE — PROGRESS NOTES
[facility]: SHAYLEE ROBERTSON   Physical Therapy Progress Note    Date: 3/14/18  Patient Name: Marichuy Vale       Room:   MRN: [id number]   Account: [de-identified]   : 1940  (78 y.o.)   Gender: female           Restrictions/Precautions: Fall Risk   Past Medical History:  has a past medical history of Cerebral edema ([facility] 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 ([facility] Chronic inflammatory demyelinating polyneuropathy (HCC); CIDP (chronic inflammatory demyelinating polyneuropathy) ([facility] Diabetes mellitus ([facility] Hyperlipidemia; Hypertension; Left arm weakness; and Left-sided weakness. Past Surgical History:   has a past surgical history that includes Hysterectomy; Tonsillectomy; Hysterectomy; Cholecystectomy; transesophageal echocardiogram (2018); thrombectomy (01/10/2018); and pr egd percutaneous placement gastrostomy tube (N/A, 3/13/2018). Overall Orientation Status: Within Functional Limits  Orientation Level: Oriented X4  Restrictions/Precautions  Restrictions/Precautions: Fall Risk  Required Braces or Orthoses? :  (bilateral PRAFA)    Subjective: Pt reports no pain at this time; pt's daughter reports that pt will be going home at discharge; pt's daughter reports pt is getting rehab at home from [facility]  Comments: Pt is pleasant and agreeable to PT. Vital Signs  Patient Currently in Pain: Denies                   Bed Mobility:        Transfers:                Other exercises 1: Supine bilat LE therex, P/AAROM x15  Other exercises 2: B heelcord stretch, 30\" each           Activity Tolerance: Patient limited by fatigue  PT Equipment Recommendations  Equipment Needed: No       Assessment  Activity Tolerance: Patient limited by fatigue   Body structures, Functions, Activity limitations: Decreased functional mobility ; Decreased strength;Decreased cognition;Decreased endurance  Specific instructions for Next Treatment: 3-2-18 hx CVA w/ left hemiparesis, dep for

## 2018-03-14 NOTE — PROGRESS NOTES
Speech Language Pathology  Speech Language Pathology  [facility]    Dysphagia Treatment Note    Date: 3/14/2018  Patients Name: Armida Mohan  MRN: [id number]  Diagnosis: Dysphagia  Patient Active Problem List   Diagnosis Code    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 [facility]) I63.311    Cerebrovascular accident (CVA) ([facility] I63.9    Left arm weakness R29.898    Facial droop R29.810    Dysarthria R47.1    Cerebral edema ([facility]) G93.6    Left-sided weakness R53.1    Hyperglycemia R73.9    Controlled type 2 diabetes mellitus with hyperglycemia, without long-term current use of insulin ([facility]) E11.65    DKA, type 2, not at goal [facility]) E13.10    Sepsis ([facility] A41.9    UTI (urinary tract infection) N39.0    Leukocytosis D72.829    Increased anion gap metabolic acidosis S97.3    SAMINA (acute kidney injury) ([facility] N17.9    Hypocalcemia E83.51    Hypokalemia E87.6    Suspected deep tissue injury Z04.9    Acute UTI N39.0    Thrombocytopenia ([facility]) D69.6    Acute deep vein thrombosis (DVT) of lower extremity ([facility]) I82.409    Chronic inflammatory demyelinating neuropathy ([facility]) G61.81    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 Daughter present and provided list of home exercise program.   Pt. Needed max cues to complete exercises x5-10. Frequent repetition of directions given and encouragement needed as pt. Falling asleep.          Plan:  [x] Continue ST services    [] Discharge from ST:        Discharge recommendations: [] Inpatient Rehab   [] [place]   [] Outpatient Therapy  [] Follow up at trauma clinic   []

## 2018-03-14 NOTE — PROGRESS NOTES
Normal S1 and S2. No S3, S4 or murmurs. Rhythm is regular. LUNGS: Clear to auscultation and percussion without rales, rhonchi, wheezing or diminished breath sounds. NECK: Neck supple, non-tender without lymphadenopathy, masses or thyromegaly. MUSKULOSKELETAL: Adequately aligned spine. No joint erythema or tenderness. EXTREMITIES: 2+ edema. Peripheral pulses intact. NEURO: Moving upper extremities      Labs:   CBC:  Recent Labs      03/12/18   0531  03/13/18   0508  03/13/18   0847  03/14/18   0548   WBC  4.0  4.4   --   4.3   RBC  2.87*  2.86*   --   2.70*   HGB  8.1*  8.3*   --   7.7*   HCT  25.6*  26.1*   --   23.9*   MCV  89.2  91.5   --   88.6   MCH  28.4  29.0   --   28.4   MCHC  31.8  31.7   --   32.1   RDW  17.0*  16.6*   --   16.6*   PLT  140*  43*  201  226   MPV  9.2  9.4   --   8.4      BMP:   Recent Labs      03/12/18   0531  03/13/18   0508  03/14/18   0548   NA  143  142  144   K  3.8  3.1*  3.1*   CL  105  103  106   CO2  29  28  29   BUN  25*  26*  22   CREATININE  0.47*  0.54  0.58   GLUCOSE  144*  108*  90   CALCIUM  7.7*  8.0*  7.8*      Phosphorus:    Recent Labs      03/13/18   0508   PHOS  4.4     Magnesium:   Recent Labs      03/13/18   0508  03/14/18   0548   MG  1.7  1.6     Albumin:   No results for input(s): LABALBU in the last 72 hours. Assessment/plan:    1. Acute kidney injury - Secondary to ischemic ATN-resolved    2. Edema -  more pronounced on the left side. Ace wrap to  lower extremities. 3.  Vitamin D deficiencycontinue ergocalciferol 50,000 units p.o. once a week. 4.  Protein calorie malnutrition [moderate to severe with serum albumin 1.7 g/dL] - high protein diet. 5.  Hypokalemiasecondary to loop diuretic effect-sliding scale potassium  . plan   IV lasix 40 mg bid.   Replace electrolytes as tolersted      Franky Baldwin MD,  Attending nephrologist.

## 2018-03-14 NOTE — PROGRESS NOTES
anasarca. Relative enlargement of the left iliofemoral with increased swelling of the left upper thigh. The possibility of the presence of left DVT/thrombus could be entertained. Left lower extremity ultrasound Doppler could be obtained as clinically indicated. Otherwise, no acute process in the abdomen or pelvis. Xr Chest (single View Frontal)    Result Date: 3/1/2018  EXAMINATION: SINGLE VIEW OF THE CHEST 3/1/2018 5:05 pm COMPARISON: None. HISTORY: ORDERING SYSTEM PROVIDED HISTORY: Central Line Placement TECHNOLOGIST PROVIDED HISTORY: Reason for exam:->Central Line Placement Reason for exam:->Portable Ordering Physician Provided Reason for Exam: line placement Acuity: Acute Type of Exam: Initial FINDINGS: The right IJ central venous catheter in place terminating at cavoatrial junction. There is no pneumothorax. The radiograph is somewhat limited due to left-sided tilt. Cardiac and mediastinal contour are normal.  There is minimal atelectatic change in the left costophrenic angle. Bony structures are grossly unremarkable. Dextroscoliosis at lower thoracic spine present similar to previous examination. Uncomplicated interval insertion of right IJ catheter. Otherwise, no interval change     Xr Chest (single View Frontal)    Result Date: 3/1/2018  EXAMINATION: SINGLE VIEW OF THE CHEST 3/1/2018 10:48 am COMPARISON: Chest x-ray from 01/10/2018 HISTORY: ORDERING SYSTEM PROVIDED HISTORY: Altered mental status,  confused TECHNOLOGIST PROVIDED HISTORY: Reason for exam:->Altered mental status,  confused Ordering Physician Provided Reason for Exam: AMS Acuity: Unknown Type of Exam: Unknown FINDINGS: There is no focal airspace consolidation, pleural effusion or pneumothorax. The cardiac silhouette appears mildly enlarged, but this may at least in part related to technique. There is no pulmonary vascular congestion. There are calcifications of the aortic arch.   There are similar moderate hypertrophic Study, Venous Scan Lower Bilateral.  Indications for Study:Pain and swelling. Patient Status: In Patient. Technical Quality:Poor visualization. Limitation reason:Body habitus, swelling.   - Critical Result:INDU Hurley. Conclusions   Summary   Simultaneous real time imaging utilizing B-Mode, color doppler and  spectral waveform analysis was performed on the bilateral lower  extremities for venous examination of the deep and superficial systems. Findings are:   **Abnormal Study**   Right:  Technically limited study as stated above however in the visualized areas  no superficial or deep vein thrombosis was identified. Left:  Acute deep venous thrombosis extending from the popliteal vein into the  common femoral vein. Signature   ----------------------------------------------------------------  Electronically signed by Yaakov Coronel RVT(Sonographer) on  03/06/2018 03:00 PM  ----------------------------------------------------------------   ----------------------------------------------------------------  Electronically signed by Hong Love(Interpreting  physician) on 03/06/2018 05:02 PM  ----------------------------------------------------------------  Findings:   Right Impression:                          Left Impression:  The common femoral, femoral, popliteal and The popliteal to the common  tibial veins demonstrate normal            femoral veins are  compressibility and augmentation. non-compressible. Thrombus appears acute based on  Limited visualization of the lower thigh   B-Mode image evaluation. and calf veins. The saphenofemoral junction  Normal compressibility of the great        demonstrates no  saphenous vein. compressibility. Thrombus appears acute based on  Normal compressibility of the small        B-Mode image evaluation. tissue injury [Z04.9] 03/02/2018    DKA, type 2, not at goal [facility]) [E13.10] 03/01/2018    Sepsis ([facility] [A41.9] 03/01/2018    UTI (urinary tract infection) [N39.0] 03/01/2018    Leukocytosis [D72.829] 03/01/2018    Increased anion gap metabolic acidosis [Y21.1] 03/01/2018    SAMINA (acute kidney injury) ([facility] [N17.9] 03/01/2018    Hypocalcemia [E83.51] 03/01/2018    Hypokalemia [E87.6] 03/01/2018    Controlled type 2 diabetes mellitus with hyperglycemia, without long-term current use of insulin ([facility] [E11.65] 01/16/2018    Left-sided weakness [R53.1] 01/12/2018    Cerebrovascular accident (CVA) ([facility] [I63.9]     Dysarthria [R47.1]     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) ([facility] [G61.81] 07/29/2017    Hypertension [I10] 04/01/2017    Chronic inflammatory demyelinating neuropathy ([facility] [G61.81] 04/01/2017       Plan:        LLE DVT s/p IVC filter placement  - May be the cause of temp  - Platelets 148->45->13 -> 96 -> 140 -> 43->201->226   - yesterday 37 poss error, immediate recheck 201, platelet transfusion cancelled  - Hgb 9.5-> 8.8->8.9->9.1->8.3->8.1->8.3->7.7  - Hemeonc on board  - Will repeat FOBT    Anasarca  - s/p hydration from DKA and septic shock  - edematous extremities  - 40 lasix IV QD  - I/Os    UTI sepsis with metabolic encephalopathy, septic shock, resolving  - Urine culture showing morganella, group D entercoccus both sens to cipro   - Completed course cipro   - Repeat shows yeast > [id number]  - New onset temp, repeat UA positive Nitrite, small LE, many bacteria  - f/u repeat culture  - Restart cipro  - IV diflucan  - Transition to PO meds for PEG    Diarrhea w/ sacral wound  - poss 2/2 abx use  - lactobacillus, IV flagyl   - wound care consult  - repeat FOBT as above    Poss repeat CVA  - neuro on board  - EEG legible portions normal  - MRI when Pt stable  - Swallow eval rec pureed w/ thin liquids    Protein calorie malnutrition  - PEG 3/13  - TF  - Puree

## 2018-03-15 ENCOUNTER — APPOINTMENT (OUTPATIENT)
Dept: CT IMAGING | Age: 78
DRG: 853 | End: 2018-03-15
Payer: MEDICARE

## 2018-03-15 LAB
-: ABNORMAL
ABSOLUTE BANDS #: 0.04 K/UL (ref 0–1)
ABSOLUTE EOS #: 0.3 K/UL (ref 0–0.4)
ABSOLUTE IMMATURE GRANULOCYTE: ABNORMAL K/UL (ref 0–0.3)
ABSOLUTE LYMPH #: 1.22 K/UL (ref 1–4.8)
ABSOLUTE MONO #: 0.48 K/UL (ref 0.1–1.3)
ABSOLUTE RETIC #: 0.06 M/UL (ref 0.02–0.1)
AMORPHOUS: ABNORMAL
ANION GAP SERPL CALCULATED.3IONS-SCNC: 10 MMOL/L (ref 9–17)
ANION GAP SERPL CALCULATED.3IONS-SCNC: 10 MMOL/L (ref 9–17)
BACTERIA: ABNORMAL
BANDS: 1 % (ref 0–10)
BASOPHILS # BLD: 1 % (ref 0–2)
BASOPHILS ABSOLUTE: 0.04 K/UL (ref 0–0.2)
BILIRUBIN URINE: NEGATIVE
BUN BLDV-MCNC: 19 MG/DL (ref 8–23)
BUN/CREAT BLD: ABNORMAL (ref 9–20)
CALCIUM SERPL-MCNC: 7.9 MG/DL (ref 8.6–10.4)
CASTS UA: ABNORMAL /LPF
CHLORIDE BLD-SCNC: 103 MMOL/L (ref 98–107)
CHLORIDE BLD-SCNC: 103 MMOL/L (ref 98–107)
CO2: 29 MMOL/L (ref 20–31)
CO2: 30 MMOL/L (ref 20–31)
COLOR: YELLOW
COMMENT UA: ABNORMAL
CREAT SERPL-MCNC: 0.63 MG/DL (ref 0.5–0.9)
CRYSTALS, UA: ABNORMAL /HPF
DATE, STOOL #1: NORMAL
DATE, STOOL #2: NORMAL
DATE, STOOL #3: NORMAL
DIFFERENTIAL TYPE: ABNORMAL
EOSINOPHILS RELATIVE PERCENT: 8 % (ref 0–4)
EPITHELIAL CELLS UA: ABNORMAL /HPF
GFR AFRICAN AMERICAN: >60 ML/MIN
GFR NON-AFRICAN AMERICAN: >60 ML/MIN
GFR SERPL CREATININE-BSD FRML MDRD: ABNORMAL ML/MIN/{1.73_M2}
GFR SERPL CREATININE-BSD FRML MDRD: ABNORMAL ML/MIN/{1.73_M2}
GLUCOSE BLD-MCNC: 101 MG/DL (ref 70–99)
GLUCOSE BLD-MCNC: 117 MG/DL (ref 65–105)
GLUCOSE BLD-MCNC: 121 MG/DL (ref 65–105)
GLUCOSE BLD-MCNC: 93 MG/DL (ref 65–105)
GLUCOSE BLD-MCNC: 94 MG/DL (ref 65–105)
GLUCOSE URINE: NEGATIVE
HCT VFR BLD CALC: 22.9 % (ref 36–46)
HCT VFR BLD CALC: 24.6 % (ref 36–46)
HCT VFR BLD CALC: 25 % (ref 36–46)
HEMOCCULT SP1 STL QL: NEGATIVE
HEMOCCULT SP2 STL QL: NORMAL
HEMOCCULT SP3 STL QL: NORMAL
HEMOGLOBIN: 7.3 G/DL (ref 12–16)
HEMOGLOBIN: 7.8 G/DL (ref 12–16)
HEMOGLOBIN: 7.9 G/DL (ref 12–16)
IMMATURE GRANULOCYTES: ABNORMAL %
IMMATURE RETIC FRACT: ABNORMAL %
KETONES, URINE: NEGATIVE
LEUKOCYTE ESTERASE, URINE: ABNORMAL
LYMPHOCYTES # BLD: 33 % (ref 24–44)
MAGNESIUM: 1.8 MG/DL (ref 1.6–2.6)
MCH RBC QN AUTO: 27.8 PG (ref 26–34)
MCHC RBC AUTO-ENTMCNC: 31.7 G/DL (ref 31–37)
MCV RBC AUTO: 87.7 FL (ref 80–100)
METAMYELOCYTES ABSOLUTE COUNT: 0.04 K/UL
METAMYELOCYTES: 1 %
MONOCYTES # BLD: 13 % (ref 1–7)
MORPHOLOGY: ABNORMAL
MUCUS: ABNORMAL
NITRITE, URINE: NEGATIVE
NRBC AUTOMATED: ABNORMAL PER 100 WBC
OTHER OBSERVATIONS UA: ABNORMAL
PARTIAL THROMBOPLASTIN TIME: 30.9 SEC (ref 23–31)
PDW BLD-RTO: 16.6 % (ref 11.5–14.9)
PH UA: 5.5 (ref 5–8)
PHOSPHORUS: 3.9 MG/DL (ref 2.6–4.5)
PLATELET # BLD: 238 K/UL (ref 150–450)
PLATELET ESTIMATE: ABNORMAL
PMV BLD AUTO: 8.3 FL (ref 6–12)
POTASSIUM SERPL-SCNC: 2.8 MMOL/L (ref 3.7–5.3)
POTASSIUM SERPL-SCNC: 3.4 MMOL/L (ref 3.7–5.3)
PROTEIN UA: NEGATIVE
RBC # BLD: 2.61 M/UL (ref 4–5.2)
RBC # BLD: ABNORMAL 10*6/UL
RBC UA: ABNORMAL /HPF
RENAL EPITHELIAL, UA: ABNORMAL /HPF
RETIC %: 2.4 % (ref 0.5–2)
RETIC HEMOGLOBIN: ABNORMAL PG (ref 28.2–35.7)
SEG NEUTROPHILS: 43 % (ref 36–66)
SEGMENTED NEUTROPHILS ABSOLUTE COUNT: 1.58 K/UL (ref 1.3–9.1)
SODIUM BLD-SCNC: 142 MMOL/L (ref 135–144)
SODIUM BLD-SCNC: 143 MMOL/L (ref 135–144)
SPECIFIC GRAVITY UA: 1.01 (ref 1–1.03)
TIME, STOOL #1: NORMAL
TIME, STOOL #2: NORMAL
TIME, STOOL #3: NORMAL
TRICHOMONAS: ABNORMAL
TURBIDITY: CLEAR
URINE HGB: NEGATIVE
UROBILINOGEN, URINE: NORMAL
WBC # BLD: 3.7 K/UL (ref 3.5–11)
WBC # BLD: ABNORMAL 10*3/UL
WBC UA: ABNORMAL /HPF
YEAST: ABNORMAL

## 2018-03-15 PROCEDURE — 6370000000 HC RX 637 (ALT 250 FOR IP): Performed by: INTERNAL MEDICINE

## 2018-03-15 PROCEDURE — 80048 BASIC METABOLIC PNL TOTAL CA: CPT

## 2018-03-15 PROCEDURE — 2580000003 HC RX 258: Performed by: INTERNAL MEDICINE

## 2018-03-15 PROCEDURE — 82947 ASSAY GLUCOSE BLOOD QUANT: CPT

## 2018-03-15 PROCEDURE — 81001 URINALYSIS AUTO W/SCOPE: CPT

## 2018-03-15 PROCEDURE — 92526 ORAL FUNCTION THERAPY: CPT

## 2018-03-15 PROCEDURE — 84100 ASSAY OF PHOSPHORUS: CPT

## 2018-03-15 PROCEDURE — G0328 FECAL BLOOD SCRN IMMUNOASSAY: HCPCS

## 2018-03-15 PROCEDURE — 6370000000 HC RX 637 (ALT 250 FOR IP): Performed by: RADIOLOGY

## 2018-03-15 PROCEDURE — 97110 THERAPEUTIC EXERCISES: CPT

## 2018-03-15 PROCEDURE — 99233 SBSQ HOSP IP/OBS HIGH 50: CPT | Performed by: INTERNAL MEDICINE

## 2018-03-15 PROCEDURE — 85045 AUTOMATED RETICULOCYTE COUNT: CPT

## 2018-03-15 PROCEDURE — P9046 ALBUMIN (HUMAN), 25%, 20 ML: HCPCS | Performed by: INTERNAL MEDICINE

## 2018-03-15 PROCEDURE — 80051 ELECTROLYTE PANEL: CPT

## 2018-03-15 PROCEDURE — 2060000000 HC ICU INTERMEDIATE R&B

## 2018-03-15 PROCEDURE — C9113 INJ PANTOPRAZOLE SODIUM, VIA: HCPCS | Performed by: INTERNAL MEDICINE

## 2018-03-15 PROCEDURE — 6360000002 HC RX W HCPCS: Performed by: INTERNAL MEDICINE

## 2018-03-15 PROCEDURE — 85730 THROMBOPLASTIN TIME PARTIAL: CPT

## 2018-03-15 PROCEDURE — 85014 HEMATOCRIT: CPT

## 2018-03-15 PROCEDURE — 83735 ASSAY OF MAGNESIUM: CPT

## 2018-03-15 PROCEDURE — 85018 HEMOGLOBIN: CPT

## 2018-03-15 PROCEDURE — 6360000002 HC RX W HCPCS: Performed by: RADIOLOGY

## 2018-03-15 PROCEDURE — 36415 COLL VENOUS BLD VENIPUNCTURE: CPT

## 2018-03-15 PROCEDURE — 74176 CT ABD & PELVIS W/O CONTRAST: CPT

## 2018-03-15 PROCEDURE — 87086 URINE CULTURE/COLONY COUNT: CPT

## 2018-03-15 PROCEDURE — 85025 COMPLETE CBC W/AUTO DIFF WBC: CPT

## 2018-03-15 RX ORDER — POTASSIUM CHLORIDE 1.5 G/1.77G
20 POWDER, FOR SOLUTION ORAL 2 TIMES DAILY
Status: DISCONTINUED | OUTPATIENT
Start: 2018-03-15 | End: 2018-03-19 | Stop reason: HOSPADM

## 2018-03-15 RX ORDER — MORPHINE SULFATE 2 MG/ML
2 INJECTION, SOLUTION INTRAMUSCULAR; INTRAVENOUS EVERY 6 HOURS PRN
Status: DISCONTINUED | OUTPATIENT
Start: 2018-03-15 | End: 2018-03-19 | Stop reason: HOSPADM

## 2018-03-15 RX ORDER — FERROUS SULFATE 325(65) MG
162.5 TABLET ORAL 2 TIMES DAILY
Status: DISCONTINUED | OUTPATIENT
Start: 2018-03-15 | End: 2018-03-19 | Stop reason: HOSPADM

## 2018-03-15 RX ADMIN — PANTOPRAZOLE SODIUM 40 MG: 40 INJECTION, POWDER, FOR SOLUTION INTRAVENOUS at 22:05

## 2018-03-15 RX ADMIN — FLUCONAZOLE 200 MG: 100 TABLET ORAL at 07:48

## 2018-03-15 RX ADMIN — CIPROFLOXACIN HYDROCHLORIDE 500 MG: 500 TABLET, FILM COATED ORAL at 21:25

## 2018-03-15 RX ADMIN — FERROUS SULFATE TAB 325 MG (65 MG ELEMENTAL FE) 162.5 MG: 325 (65 FE) TAB at 21:25

## 2018-03-15 RX ADMIN — Medication 12 UNITS: at 21:24

## 2018-03-15 RX ADMIN — MORPHINE SULFATE 2 MG: 2 INJECTION, SOLUTION INTRAMUSCULAR; INTRAVENOUS at 22:05

## 2018-03-15 RX ADMIN — Medication 10 ML: at 11:37

## 2018-03-15 RX ADMIN — Medication 10 ML: at 22:27

## 2018-03-15 RX ADMIN — POTASSIUM CHLORIDE: 149 INJECTION, SOLUTION, CONCENTRATE INTRAVENOUS at 09:19

## 2018-03-15 RX ADMIN — SODIUM CHLORIDE: 9 INJECTION, SOLUTION INTRAVENOUS at 06:10

## 2018-03-15 RX ADMIN — Medication 400 MG: at 12:13

## 2018-03-15 RX ADMIN — ALBUMIN (HUMAN) 25 G: 0.25 INJECTION, SOLUTION INTRAVENOUS at 21:24

## 2018-03-15 RX ADMIN — FUROSEMIDE 40 MG: 10 INJECTION, SOLUTION INTRAMUSCULAR; INTRAVENOUS at 21:24

## 2018-03-15 RX ADMIN — METRONIDAZOLE 500 MG: 500 TABLET ORAL at 06:10

## 2018-03-15 RX ADMIN — ALBUMIN (HUMAN) 25 G: 0.25 INJECTION, SOLUTION INTRAVENOUS at 07:49

## 2018-03-15 RX ADMIN — Medication 1 CAPSULE: at 07:49

## 2018-03-15 RX ADMIN — FUROSEMIDE 40 MG: 10 INJECTION, SOLUTION INTRAMUSCULAR; INTRAVENOUS at 07:49

## 2018-03-15 RX ADMIN — CIPROFLOXACIN HYDROCHLORIDE 500 MG: 500 TABLET, FILM COATED ORAL at 07:49

## 2018-03-15 RX ADMIN — CLOPIDOGREL BISULFATE 75 MG: 75 TABLET ORAL at 07:48

## 2018-03-15 RX ADMIN — METRONIDAZOLE 500 MG: 500 TABLET ORAL at 21:25

## 2018-03-15 RX ADMIN — FERROUS SULFATE TAB 325 MG (65 MG ELEMENTAL FE) 162.5 MG: 325 (65 FE) TAB at 12:13

## 2018-03-15 RX ADMIN — METRONIDAZOLE 500 MG: 500 TABLET ORAL at 17:27

## 2018-03-15 RX ADMIN — POTASSIUM CHLORIDE 20 MEQ: 1.5 POWDER, FOR SOLUTION ORAL at 21:25

## 2018-03-15 RX ADMIN — PANTOPRAZOLE SODIUM 40 MG: 40 INJECTION, POWDER, FOR SOLUTION INTRAVENOUS at 11:37

## 2018-03-15 RX ADMIN — QUETIAPINE FUMARATE 50 MG: 25 TABLET ORAL at 21:25

## 2018-03-15 RX ADMIN — Medication 400 MG: at 21:25

## 2018-03-15 ASSESSMENT — PAIN SCALES - GENERAL
PAINLEVEL_OUTOF10: 4
PAINLEVEL_OUTOF10: 7

## 2018-03-15 NOTE — CONSULTS
This a 69 y.o. female   Patient Vitals for the past 24 hrs:   BP Temp Temp src Pulse Resp SpO2 Weight   03/15/18 1349 (!) 137/49 98.5 °F (36.9 °C) Axillary 64 16 98 % -   03/15/18 0638 (!) 121/47 98.1 °F (36.7 °C) Axillary 65 18 97 % -   03/15/18 0545 - - - - - - 212 lb 1.3 oz (96.2 kg)   03/14/18 2345 105/66 98.1 °F (36.7 °C) Axillary 65 16 96 % -   03/14/18 1926 (!) 111/55 99.1 °F (37.3 °C) Axillary 66 16 100 % -     Constitutional: Patient in no acute distress. Neuro: Alert and oriented to person, place and time. Psych: mood and affect normal  HEENT negative  Lungs: Respiratory effort is normal  Cardiovascular: Normal peripheral pulses  Abdomen: Soft, non-tender, non-distended with no CVA, flank pain or hepatosplenomegaly. No hernias. Kidneys normal.  Lymphatics: No palpable lymphadenopathy. Bladder non-tender and not distended. Pelvic exam: deferred  Rectal exam not indicated    LABS:   Recent Labs      03/13/18   0508  03/13/18   0847  03/14/18   0548  03/15/18   0448  03/15/18   1301   WBC  4.4   --   4.3  3.7   --    HGB  8.3*   --   7.7*  7.3*  7.8*   HCT  26.1*   --   23.9*  22.9*  24.6*   MCV  91.5   --   88.6  87.7   --    PLT  43*  201  226  238   --      Recent Labs      03/13/18   0508  03/14/18   0548  03/15/18   0448   NA  142  144  142   K  3.1*  3.1*  2.8*   CL  103  106  103   CO2  28  29  29   PHOS  4.4   --   3.9   BUN  26*  22  19   CREATININE  0.54  0.58  0.63       Additional Lab/culture results:    Urinalysis: Recent Labs      03/13/18   0451   COLORU  DARK YELLOW*   PHUR  5.5   WBCUA  5 TO 10   RBCUA  5 TO 10   MUCUS  NOT REPORTED   TRICHOMONAS  NOT REPORTED   YEAST  MANY*   BACTERIA  MANY*   SPECGRAV  1.015   LEUKOCYTESUR  SMALL*   UROBILINOGEN  Normal   BILIRUBINUR  NEGATIVE        -----------------------------------------------------------------  Imaging Results:    CT images reviewed.    Bladder distension noted  Bilateral hydro seen, secondary to retention      Assessment and Plan   Impression:    Patient Active Problem List   Diagnosis    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 ([address]    Cerebrovascular accident (CVA) ([address]    Left arm weakness    Facial droop    Dysarthria    Cerebral edema (HCC)    Left-sided weakness    Hyperglycemia    Controlled type 2 diabetes mellitus with hyperglycemia, without long-term current use of insulin ([facility]    DKA, type 2, not at goal [facility])    Sepsis (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 Large volume retention was noted. Retention likely due to DM and recent stroke. Hydro secondary to retention, should improve with iniguez decompression. Will repeat ultrasound in 48 to 72 hours. UCX shows GDE, susc pending. Cr normal.   Continue iniguez for now. Thank you.      Blas Nelson  4:07 PM 3/15/2018

## 2018-03-15 NOTE — PROGRESS NOTES
Physical Therapy  Facility/Department: [facility] PROGRESSIVE CARE  Daily Treatment Note  NAME: Kerri Garcia  : 1940  MRN: [id number]    Date of Service: 3/15/2018    Patient Diagnosis(es):   Patient Active Problem List    Diagnosis Date Noted    Moderate malnutrition ([address] 2018    Anemia 2018    Acute UTI     Thrombocytopenia (HCC)     Acute deep vein thrombosis (DVT) of lower extremity ([address]     Suspected deep tissue injury 2018    DKA, type 2, not at goal [facility]) 2018    Sepsis ([address] 2018    UTI (urinary tract infection) 2018    Leukocytosis 2018    Increased anion gap metabolic acidosis     SAMINA (acute kidney injury) ([address] 2018    Hypocalcemia 2018    Hypokalemia 2018    Controlled type 2 diabetes mellitus with hyperglycemia, without long-term current use of insulin ([address] 2018    Hyperglycemia     Left-sided weakness 2018    Cerebrovascular accident (CVA) ([address]     Left arm weakness     Facial droop     Dysarthria     Cerebral edema (HCC)     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 ([address] 01/10/2018    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) ([address] 2017    Foot deformity, bilateral 2017    Chronic inflammatory demyelinating neuropathy ([address] 2017    Hypertension 2017       Past Medical History:   Diagnosis Date    Cerebral edema ([address] 01/10/2018    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 ([address] 01/10/2018    Chronic inflammatory demyelinating polyneuropathy (HCC)     CIDP (chronic inflammatory demyelinating polyneuropathy) ([address]     Diabetes mellitus ([address]     Hyperlipidemia     Hypertension     Left arm weakness 01/10/2018    Left-sided weakness 01/10/2018     Past Surgical History:   Procedure Laterality Date    CHOLECYSTECTOMY      HYSTERECTOMY      partial    HYSTERECTOMY      [place] EGD

## 2018-03-15 NOTE — PROGRESS NOTES
and daughter verbalized understanding. Pt daughter reported practicing exercises in the room when pt was awake. Plan:  [x] Continue ST services    [] Discharge from ST:        Discharge recommendations: [] Inpatient Rehab   [] [place]   [] Outpatient Therapy  [] Follow up at trauma clinic   [] Other:         Treatment completed by: Shannan Velasquez,  Clinician  Cosigned by: Philip Ortega M.A., [employer]

## 2018-03-15 NOTE — PROGRESS NOTES
NEPHROLOGY PROGRESS NOTE    Patient :  Kerri Garcia; 69 y.o. MRN# [id number]  Location:    Attending:  Elliott Davenport MD  Admit Date:  3/1/2018   Hospital Day: 15    Subjective: 69 y.o. 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 She was admitted to ICU with a diagnosis of DKA and sepsis. Initial laboratory studies were remarkable for serum bicarbonate 8 mmol/L, ketoacidosis and serum creatinine 2.7 mg/dL. She was hypotensive and required high dose Levophed which has since been titrated down to current level of 1 mcg/min. Patient bicarb was 8 anion gap was 20 on admission patient was started on DKA protocol insulin drip anion gap has improved but serum bicarb has not improved. Interval history/subjective:  No  new complaints today. Hypokalemia noted  Had PEG tube placed , tolerating tube feeding. Good uop    Objective:  CURRENT TEMPERATURE:  Temp: 98.5 °F (36.9 °C)  MAXIMUM TEMPERATURE OVER 24HRS:  Temp (24hrs), Av.5 °F (36.9 °C), Min:98.1 °F (36.7 °C), Max:99.1 °F (37.3 °C)    CURRENT RESPIRATORY RATE:  Resp: 16  CURRENT PULSE:  Pulse: 64  CURRENT BLOOD PRESSURE:  BP: (!) 137/49  24HR BLOOD PRESSURE RANGE:  Systolic (63NMF), TMF:740 , Min:105 , RENATO:164   ; Diastolic (53BQH), TXP:95, Min:47, Max:66    24HR INTAKE/OUTPUT:      Intake/Output Summary (Last 24 hours) at 03/15/18 1803  Last data filed at 03/15/18 0555   Gross per 24 hour   Intake              802 ml   Output              950 ml   Net             -148 ml     Patient Vitals for the past 96 hrs (Last 3 readings):   Weight   03/15/18 0545 212 lb 1.3 oz (96.2 kg)   18 0530 209 lb 14.1 oz (95.2 kg)       Physical Exam:  GENERAL APPEARANCE: More awake and alert responsive  HEAD: normocephalic  EYES:  Not pale, anicteric   NOSE:  No nasal discharge.     THROAT:  Oral cavity and pharynx normal. Francisco Javier Mckeon MD,  Attending nephrologist.

## 2018-03-15 NOTE — PROGRESS NOTES
[facility]     Progress Note    3/15/2018    7:41 AM    Name:   Raji Charlton  MRN:     [id number]     Acct:      [de-identified]   Room:   2091/209101   Day:  15  Admit Date:  3/1/2018 10:21 AM    PCP:   William Ryan MD  Code Status:  Full Code    Subjective:     C/C:   Chief Complaint   Patient presents with    Altered Mental Status     Interval History Status: improved     Examined Pt at bedside this AM.  Family endorses good sleep overnight, improved mentation. Continued improvement in L arm swelling. Endorsing abd pain centered on RLQ. No other acute complaints. Hgb drop to 7.3, will follow H&H Q8H and f/u repeat FOBT. Will consider GI consult. Brief History:     The patient is a 69 y.o. Non-/non  female with hx DMII, CIDP, prior CVA with left hemiplegia who presents with Altered Mental Status   and she is admitted to the hospital for the management of DKA with UTI. Pt has AMS and is unable to provide hx, limited hx from family. Pt has had increased c/o HA over last day. Family noted increase in urine odor over last several days. Pt developed AMS in nursing home, presented to ED. Review of Systems:     ROS    CONSTITUTIONAL:  negative for fevers, chills, sweats, Positive for fatigue  HEENT:  negative for vision, hearing changes, runny nose, throat pain  RESPIRATORY:  negative for shortness of breath, cough, congestion, wheezing. CARDIOVASCULAR:  negative for chest pain, palpitations.   GASTROINTESTINAL:  negative for nausea, vomiting, diarrhea, constipation, positive poor appetite, abd pain  GENITOURINARY:  negative for difficulty of urination, burning with urination, frequency   INTEGUMENT:  negative for rash, skin lesions, easy bruising   HEMATOLOGIC/LYMPHATIC:  Diffuse swelling  ALLERGIC/IMMUNOLOGIC:  negative for urticaria , itching  ENDOCRINE:  negative increase in drinking, increase in urination, hot or cold intolerance  MUSCULOSKELETAL:  negative joint pains, muscle aches, swelling of joints  NEUROLOGICAL:  Positive for chronic L hemiplegia  BEHAVIOR/PSYCH:  negative for agitation, sleeplessness    Medications: Allergies: Allergies   Allergen Reactions    Demeclocycline Shortness Of Breath    Tetracyclines & Related Shortness Of Breath       Current Meds:   Scheduled Meds:    ciprofloxacin  500 mg Oral 2 times per day    fluconazole  200 mg Oral Daily    metroNIDAZOLE  500 mg Oral 3 times per day    sodium chloride  250 mL Intravenous Once    albumin human  25 g Intravenous BID    furosemide  40 mg Intravenous BID    LORazepam  0.5 mg Intravenous Once    ergocalciferol  50,000 Units Per NG tube Once per day on Sun    lactobacillus  1 capsule Oral Daily with breakfast    QUEtiapine  50 mg Oral Nightly    insulin glargine  12 Units Subcutaneous Nightly    sodium chloride  250 mL Intravenous Once    insulin lispro  0-6 Units Subcutaneous TID WC    insulin lispro  0-3 Units Subcutaneous Nightly    clopidogrel  75 mg Oral Daily    sodium chloride (PF)  10 mL Intravenous Q12H    And    pantoprazole  40 mg Intravenous BID     Continuous Infusions:    IV infusion builder      dextrose 5 % and 0.45 % NaCl 50 mL/hr at 03/12/18 2312    sodium chloride 10 mL/hr at 03/15/18 0610    dextrose       PRN Meds: zolpidem, potassium chloride **OR** potassium chloride **OR** potassium chloride, midodrine, acetaminophen, Magic Mouthwash, glucose, dextrose, glucagon (rDNA), dextrose, dextrose, magnesium sulfate, sodium phosphate IVPB **OR** sodium phosphate IVPB **OR** sodium phosphate IVPB    Data:     Past Medical History:   has a past medical history of Cerebral [facility] ([facility] 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 ([facility]  Chronic inflammatory demyelinating polyneuropathy ([facility] CIDP (chronic inflammatory demyelinating polyneuropathy) ([facility] Diabetes mellitus ([facility] Hyperlipidemia; Hypertension; Left arm weakness; and Left-sided weakness. Social History:   reports that she has never smoked. She has never used smokeless tobacco. She reports that she does not drink alcohol or use drugs. Family History: History reviewed. No pertinent family history. Vitals:  BP (!) 121/47   Pulse 65   Temp 98.1 °F (36.7 °C) (Axillary)   Resp 18   Ht 5' 9\" (1.753 m)   Wt 212 lb 1.3 oz (96.2 kg)   SpO2 97%   BMI 31.32 kg/m²    Temp (24hrs), Av.6 °F (37 °C), Min:98.1 °F (36.7 °C), Max:99.1 °F (37.3 °C)    Recent Labs      18   0731  18   1111  18   1637  18   2030   POCGLU  103  122*  136*  137*       I/O (24Hr):     Intake/Output Summary (Last 24 hours) at 03/15/18 0741  Last data filed at 03/15/18 0555   Gross per 24 hour   Intake             2546 ml   Output             2200 ml   Net              346 ml       Labs:    Recent Results (from the past 24 hour(s))   POC Glucose Fingerstick    Collection Time: 18 11:11 AM   Result Value Ref Range    POC Glucose 122 (H) 65 - 105 mg/dL   POC Glucose Fingerstick    Collection Time: 18  4:37 PM   Result Value Ref Range    POC Glucose 136 (H) 65 - 105 mg/dL   POC Glucose Fingerstick    Collection Time: 18  8:30 PM   Result Value Ref Range    POC Glucose 137 (H) 65 - 105 mg/dL   CBC auto differential    Collection Time: 03/15/18  4:48 AM   Result Value Ref Range    WBC 3.7 3.5 - 11.0 k/uL    RBC 2.61 (L) 4.0 - 5.2 m/uL    Hemoglobin 7.3 (L) 12.0 - 16.0 g/dL    Hematocrit 22.9 (L) 36 - 46 %    MCV 87.7 80 - 100 fL    MCH 27.8 26 - 34 pg    MCHC 31.7 31 - 37 g/dL    RDW 16.6 (H) 11.5 - 14.9 %    Platelets 180 065 - 309 k/uL    MPV 8.3 6.0 - 12.0 fL    NRBC Automated NOT REPORTED per 100 WBC    Differential Type NOT REPORTED     Immature Granulocytes NOT REPORTED 0 %    Absolute Immature Granulocyte NOT REPORTED 0.00 - 0.30 k/uL    WBC Morphology NOT REPORTED     RBC Morphology NOT REPORTED     Platelet Estimate NOT REPORTED     Seg Neutrophils 43 36 - 66 %    Lymphocytes 33 24 - 44 %    Monocytes 13 (H) 1 - 7 %    Eosinophils % 8 (H) 0 - 4 %    Basophils 1 0 - 2 %    Bands 1 0 - 10 %    Metamyelocytes 1 (H) 0 %    Segs Absolute 1.58 1.3 - 9.1 k/uL    Absolute Lymph # 1.22 1.0 - 4.8 k/uL    Absolute Mono # 0.48 0.1 - 1.3 k/uL    Absolute Eos # 0.30 0.0 - 0.4 k/uL    Basophils # 0.04 0.0 - 0.2 k/uL    Absolute Bands # 0.04 0.0 - 1.0 k/uL    Metamyelocytes Absolute 0.04 (H) 0 k/uL    Morphology ANISOCYTOSIS PRESENT    APTT    Collection Time: 03/15/18  4:48 AM   Result Value Ref Range    PTT 30.9 23.0 - 31.0 sec   Basic Metabolic Panel w/ Reflex to MG    Collection Time: 03/15/18  4:48 AM   Result Value Ref Range    Glucose 101 (H) 70 - 99 mg/dL    BUN 19 8 - 23 mg/dL    CREATININE 0.63 0.50 - 0.90 mg/dL    Bun/Cre Ratio NOT REPORTED 9 - 20    Calcium 7.9 (L) 8.6 - 10.4 mg/dL    Sodium 142 135 - 144 mmol/L    Potassium 2.8 (LL) 3.7 - 5.3 mmol/L    Chloride 103 98 - 107 mmol/L    CO2 29 20 - 31 mmol/L    Anion Gap 10 9 - 17 mmol/L    GFR Non-African American >60 >60 mL/min    GFR African American >60 >60 mL/min    GFR Comment          GFR Staging NOT REPORTED    Magnesium    Collection Time: 03/15/18  4:48 AM   Result Value Ref Range    Magnesium 1.8 1.6 - 2.6 mg/dL       Lab Results   Component Value Date/Time    SPECIAL NOT REPORTED 03/13/2018 07:04 AM     Lab Results   Component Value Date/Time    CULTURE CULTURE IN PROGRESS 03/13/2018 07:04 AM    CULTURE  03/13/2018 07:04 AM     Performed at [address] [place] [address] [phone]       Radiology:    Ct Abdomen Pelvis Wo Contrast Additional Contrast? Radiologist Recommendation    Result Date: 3/2/2018  EXAMINATION: CT OF THE ABDOMEN AND PELVIS WITHOUT CONTRAST 3/2/2018 5:11 pm TECHNIQUE: CT of the abdomen and pelvis was performed without the spine and shoulders. Visualized osseous structures appear mildly demineralized, but grossly intact, given the non dedicated imaging. No focal airspace consolidation or pulmonary vascular congestion. Ct Head Wo Contrast    Result Date: 3/1/2018  EXAMINATION: CT OF THE HEAD WITHOUT CONTRAST  3/1/2018 12:26 pm TECHNIQUE: CT of the head was performed without the administration of intravenous contrast. Dose modulation, iterative reconstruction, and/or weight based adjustment of the mA/kV was utilized to reduce the radiation dose to as low as reasonably achievable. COMPARISON: January 10 HISTORY: ORDERING SYSTEM PROVIDED HISTORY: AMS hx of CVA with Left sided deficits TECHNOLOGIST PROVIDED HISTORY: Has a \"code stroke\" or \"stroke alert\" been called? ->No Ordering Physician Provided Reason for Exam: AMS, HX OF CVA WITH LEFT SIDED DEFICITS Additional signs and symptoms: PATIENT UNABLE TO GIVE HISTORY. UNABLE TO STRAIGHTEN HAD FOR EXAM. FINDINGS: BRAIN/VENTRICLES: There is a large amount of volume loss in the right MCA distribution including in the perisylvian region. This is likely due to the patient's recent infarct. There is curvilinear high density in the right sylvian fissures suggesting age-indeterminate thrombus in the MCA branch. There is low-density and loss of differentiation in the right lentiform nucleus and lateral thalamic region. Focal low density in the right caudate head is noted suggesting prior infarct. ORBITS: The visualized portion of the orbits demonstrate no acute abnormality. SINUSES: The visualized paranasal sinuses and mastoid air cells demonstrate no acute abnormality. SOFT TISSUES/SKULL:  No acute abnormality of the visualized skull or soft tissues. 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 There is surrounding low density extending deep into the lentiform nucleus region and right lateral thalamic region.   It Procedure Type of Study:   Extremities Arteries: Lower Arterial Plethysmography, PVR Lower. Indications for Study:Pain, leg. Patient Status: In Patient. Technical Quality:Limited visualization. Limitation reason:Body habitus, bandages. Comments: Normal (0.95-1.3) Mild vascular insufficiency (0.7-0.94) Moderate vascular insufficiency (0.5-0.69) Severe vascular insufficiency (0.3-0.49) Critical Ischemia (0.1-0.29) Non-diagnostic due to calcification (>1.3) Toe pressure <40 mmHg poor wound healing potential  Conclusions   Summary   Right lower extremity ABIs and PVRs were performed for the evaluation of  peripheral vascular disease. Study demonstrates:   ANIKET suggests no vascular insufficiency at rest.   Signature   ----------------------------------------------------------------  Electronically signed by Leonid Call RVT(Sonographer) on  03/06/2018 03:49 PM  ----------------------------------------------------------------   ----------------------------------------------------------------  Electronically signed by Hong Garcia(Interpreting  physician) on 03/06/2018 04:58 PM  ----------------------------------------------------------------  Findings:   Right Impression:  Decreased amplitude, biphasic waveform in the thigh. Normal amplitude, biphasic waveform at the calf and ankle. ANIKET demonstrates no vascular insufficiency at rest.  Velocities are measured in cm/s ; Diameters are measured in mm Pressures +------------+----+------------+---------+--------+------------+-----------+ !            ! !Right       ! ! Left    !            !           ! +------------+----+------------+---------+--------+------------+-----------+ ! Location    ! !Pressure    ! Ratio    ! !Pressure    ! Ratio      ! +------------+----+------------+---------+--------+------------+-----------+ ! Calf        !    !133         !1.24     !        !            !           ! Study, Venous Scan Lower Bilateral.  Indications for Study:Pain and swelling. Patient Status: In Patient. Technical Quality:Poor visualization. Limitation reason:Body habitus, swelling.   - Critical Result:INDU Atkins. Conclusions   Summary   Simultaneous real time imaging utilizing B-Mode, color doppler and  spectral waveform analysis was performed on the bilateral lower  extremities for venous examination of the deep and superficial systems. Findings are:   **Abnormal Study**   Right:  Technically limited study as stated above however in the visualized areas  no superficial or deep vein thrombosis was identified. Left:  Acute deep venous thrombosis extending from the popliteal vein into the  common femoral vein. Signature   ----------------------------------------------------------------  Electronically signed by Nicolasa Clifton RVT(Sonographer) on  03/06/2018 03:00 PM  ----------------------------------------------------------------   ----------------------------------------------------------------  Electronically signed by Hong Key(Interpreting  physician) on 03/06/2018 05:02 PM  ----------------------------------------------------------------  Findings:   Right Impression:                          Left Impression:  The common femoral, femoral, popliteal and The popliteal to the common  tibial veins demonstrate normal            femoral veins are  compressibility and augmentation. non-compressible. Thrombus appears acute based on  Limited visualization of the lower thigh   B-Mode image evaluation. and calf veins. The saphenofemoral junction  Normal compressibility of the great        demonstrates no  saphenous vein. compressibility. Thrombus appears acute based on  Normal compressibility of the small        B-Mode image evaluation. saphenous vein. Limited visualization of the                                             calf veins. Normal compressibility of the                                             great saphenous vein. Normal compressibility of the                                             small saphenous vein. Vl Lower Extremity Venous Left    Result Date: 3/3/2018    [facility]  Vascular Lower Extremities DVT Study Procedure   Patient Name   Tre Marrero Date of Study           03/03/2018                 L   Date of Birth  1940  Gender                  Female   Age            69 year(s)  Race                       Room Number    2002   Corporate ID # [id number]   Patient Acct # [de-identified]   MR #           [id number]      Reinaldo Levin, [facility]   Accession #    [id number]   Interpreting Physician  Victorina Lainez   Referring                  Referring Physician     Lillian Louis  Nurse  Practitioner  Procedure Type of Study:   Veins: Lower Extremities DVT Study, Venous Scan Lower Left. Indications for Study:Swelling. Patient Status: In Patient. Technical Quality:Limited visualization. Limitation reason:body habitus and swelling.   - Critical Result:INDU Verduzco, INDU Moreno. Conclusions   Summary   Acute deep vein thrombosis of the left leg involving the common femoral,  superficial femoral, popliteal veins. Superficial thrombophlebitis of the lower extremity involving the left  greater saphenous vein, lesser saphenous vein, acute. Right common femoral vein has no DVT.    Signature   ----------------------------------------------------------------  Electronically signed by Eric Gregorio(Interpreting  physician) on 03/03/2018 02:46 PM ----------------------------------------------------------------  Findings:   Right Impression:              Left Impression:  The common femoral vein        The common femoral, femoral and popliteal  demonstrates normal            veins are dilated and non-compressible with  compressibility and            hypoechoic echoes. augmentation. Limited visualization of the calf veins. Great saphenous vein is dilated and                                 non-compressible with hypoechoic echoes                                 from proximal knee to saphenofemoral                                 junction. The small saphenous vein is dilated and                                 non-compressible with hypoechoic echoes. Physical Examination:        Physical Exam    General Appearance:  Resting comfortably in bed  Mental status: oriented to person only  Head:  NG in place for TF normocephalic, atraumatic  Eye: drooping L eye noted  Ear: normal external ear, no discharge, hearing intact  Nose:  no drainage noted  Mouth: MOM  Neck: supple  Lungs: Bilateral equal air entry, clear to ausculation, no wheezing, rales or rhonchi, normal effort  Cardiovascular: normal rate, regular rhythm, no murmur, gallop, rub.   Abdomen: Soft, nontender, nondistended, normal bowel sounds  Neurologic: left hemiplegia noted, A&Ox3  Skin: No gross lesions, rashes, bruising or bleeding on exposed skin area  Extremities:  LLE edema to hip, RLE edema to knee, upper extremity edema improved  Psych: flat affect    Assessment:        Primary Problem  DKA, type 2, not at goal [facility])    C/Josh Carvajal 110[address] Problems    Diagnosis Date Noted    Moderate malnutrition ([facility] [E44.0] 03/12/2018    Anemia [D64.9] 03/11/2018    Acute UTI [N39.0]     Thrombocytopenia (HCC) [D69.6]     Acute deep vein thrombosis (DVT) of lower extremity (HCC) [I82.409]     Suspected deep

## 2018-03-15 NOTE — PLAN OF CARE
Problem: Nutrition  Goal: Optimal nutrition therapy  Outcome: Ongoing  Nutrition Problem:  Moderate malnutrition  Intervention: Food and/or Nutrient Delivery: Continue NPO, Continue current Tube Feeding  Nutritional Goals: EN to meet >90% estimated needs

## 2018-03-16 LAB
ABSOLUTE EOS #: 0.04 K/UL (ref 0–0.4)
ABSOLUTE IMMATURE GRANULOCYTE: ABNORMAL K/UL (ref 0–0.3)
ABSOLUTE LYMPH #: 0.96 K/UL (ref 1–4.8)
ABSOLUTE MONO #: 0.44 K/UL (ref 0.1–1.3)
ANION GAP SERPL CALCULATED.3IONS-SCNC: 5 MMOL/L (ref 9–17)
ANION GAP SERPL CALCULATED.3IONS-SCNC: 9 MMOL/L (ref 9–17)
BASOPHILS # BLD: 0 % (ref 0–2)
BASOPHILS ABSOLUTE: 0 K/UL (ref 0–0.2)
BUN BLDV-MCNC: 16 MG/DL (ref 8–23)
BUN BLDV-MCNC: 16 MG/DL (ref 8–23)
BUN/CREAT BLD: ABNORMAL (ref 9–20)
BUN/CREAT BLD: ABNORMAL (ref 9–20)
CALCIUM SERPL-MCNC: 8 MG/DL (ref 8.6–10.4)
CALCIUM SERPL-MCNC: 8.7 MG/DL (ref 8.6–10.4)
CHLORIDE BLD-SCNC: 102 MMOL/L (ref 98–107)
CHLORIDE BLD-SCNC: 104 MMOL/L (ref 98–107)
CHLORIDE BLD-SCNC: 106 MMOL/L (ref 98–107)
CHLORIDE BLD-SCNC: 107 MMOL/L (ref 98–107)
CO2: 31 MMOL/L (ref 20–31)
CO2: 33 MMOL/L (ref 20–31)
CO2: 33 MMOL/L (ref 20–31)
CO2: 34 MMOL/L (ref 20–31)
CREAT SERPL-MCNC: 0.52 MG/DL (ref 0.5–0.9)
CREAT SERPL-MCNC: 0.52 MG/DL (ref 0.5–0.9)
CULTURE: ABNORMAL
CULTURE: NORMAL
CULTURE: NORMAL
DIFFERENTIAL TYPE: ABNORMAL
EOSINOPHILS RELATIVE PERCENT: 1 % (ref 0–4)
GFR AFRICAN AMERICAN: >60 ML/MIN
GFR AFRICAN AMERICAN: >60 ML/MIN
GFR NON-AFRICAN AMERICAN: >60 ML/MIN
GFR NON-AFRICAN AMERICAN: >60 ML/MIN
GFR SERPL CREATININE-BSD FRML MDRD: ABNORMAL ML/MIN/{1.73_M2}
GLUCOSE BLD-MCNC: 101 MG/DL (ref 70–99)
GLUCOSE BLD-MCNC: 104 MG/DL (ref 65–105)
GLUCOSE BLD-MCNC: 105 MG/DL (ref 65–105)
GLUCOSE BLD-MCNC: 128 MG/DL (ref 65–105)
GLUCOSE BLD-MCNC: 79 MG/DL (ref 70–99)
GLUCOSE BLD-MCNC: 84 MG/DL (ref 65–105)
HCT VFR BLD CALC: 22 % (ref 36–46)
HCT VFR BLD CALC: 23 % (ref 36–46)
HCT VFR BLD CALC: 23.1 % (ref 36–46)
HCT VFR BLD CALC: 23.9 % (ref 36–46)
HEMOGLOBIN: 7.1 G/DL (ref 12–16)
HEMOGLOBIN: 7.3 G/DL (ref 12–16)
HEMOGLOBIN: 7.5 G/DL (ref 12–16)
HEMOGLOBIN: 7.6 G/DL (ref 12–16)
IMMATURE GRANULOCYTES: ABNORMAL %
LYMPHOCYTES # BLD: 24 % (ref 24–44)
Lab: ABNORMAL
Lab: NORMAL
MAGNESIUM: 1.7 MG/DL (ref 1.6–2.6)
MCH RBC QN AUTO: 28.7 PG (ref 26–34)
MCHC RBC AUTO-ENTMCNC: 32.2 G/DL (ref 31–37)
MCV RBC AUTO: 89 FL (ref 80–100)
MONOCYTES # BLD: 11 % (ref 1–7)
MORPHOLOGY: ABNORMAL
NRBC AUTOMATED: ABNORMAL PER 100 WBC
ORGANISM: ABNORMAL
ORGANISM: ABNORMAL
PARTIAL THROMBOPLASTIN TIME: 30.3 SEC (ref 23–31)
PDW BLD-RTO: 16.7 % (ref 11.5–14.9)
PLATELET # BLD: 276 K/UL (ref 150–450)
PLATELET ESTIMATE: ABNORMAL
PMV BLD AUTO: 8.5 FL (ref 6–12)
POTASSIUM SERPL-SCNC: 3 MMOL/L (ref 3.7–5.3)
POTASSIUM SERPL-SCNC: 3.2 MMOL/L (ref 3.7–5.3)
POTASSIUM SERPL-SCNC: 3.5 MMOL/L (ref 3.7–5.3)
POTASSIUM SERPL-SCNC: 3.9 MMOL/L (ref 3.7–5.3)
RBC # BLD: 2.6 M/UL (ref 4–5.2)
RBC # BLD: ABNORMAL 10*6/UL
SEG NEUTROPHILS: 64 % (ref 36–66)
SEGMENTED NEUTROPHILS ABSOLUTE COUNT: 2.56 K/UL (ref 1.3–9.1)
SODIUM BLD-SCNC: 142 MMOL/L (ref 135–144)
SODIUM BLD-SCNC: 145 MMOL/L (ref 135–144)
SODIUM BLD-SCNC: 147 MMOL/L (ref 135–144)
SODIUM BLD-SCNC: 148 MMOL/L (ref 135–144)
SPECIMEN DESCRIPTION: ABNORMAL
SPECIMEN DESCRIPTION: ABNORMAL
SPECIMEN DESCRIPTION: NORMAL
SPECIMEN DESCRIPTION: NORMAL
STATUS: ABNORMAL
STATUS: NORMAL
WBC # BLD: 4 K/UL (ref 3.5–11)
WBC # BLD: ABNORMAL 10*3/UL

## 2018-03-16 PROCEDURE — 6370000000 HC RX 637 (ALT 250 FOR IP): Performed by: INTERNAL MEDICINE

## 2018-03-16 PROCEDURE — 2580000003 HC RX 258: Performed by: INTERNAL MEDICINE

## 2018-03-16 PROCEDURE — 85014 HEMATOCRIT: CPT

## 2018-03-16 PROCEDURE — 2060000000 HC ICU INTERMEDIATE R&B

## 2018-03-16 PROCEDURE — 82947 ASSAY GLUCOSE BLOOD QUANT: CPT

## 2018-03-16 PROCEDURE — 99233 SBSQ HOSP IP/OBS HIGH 50: CPT | Performed by: INTERNAL MEDICINE

## 2018-03-16 PROCEDURE — 85018 HEMOGLOBIN: CPT

## 2018-03-16 PROCEDURE — 6370000000 HC RX 637 (ALT 250 FOR IP): Performed by: RADIOLOGY

## 2018-03-16 PROCEDURE — 80048 BASIC METABOLIC PNL TOTAL CA: CPT

## 2018-03-16 PROCEDURE — 80051 ELECTROLYTE PANEL: CPT

## 2018-03-16 PROCEDURE — 36415 COLL VENOUS BLD VENIPUNCTURE: CPT

## 2018-03-16 PROCEDURE — 85730 THROMBOPLASTIN TIME PARTIAL: CPT

## 2018-03-16 PROCEDURE — 6360000002 HC RX W HCPCS: Performed by: INTERNAL MEDICINE

## 2018-03-16 PROCEDURE — 83735 ASSAY OF MAGNESIUM: CPT

## 2018-03-16 PROCEDURE — P9046 ALBUMIN (HUMAN), 25%, 20 ML: HCPCS | Performed by: INTERNAL MEDICINE

## 2018-03-16 PROCEDURE — 92526 ORAL FUNCTION THERAPY: CPT

## 2018-03-16 PROCEDURE — 51702 INSERT TEMP BLADDER CATH: CPT

## 2018-03-16 PROCEDURE — 36592 COLLECT BLOOD FROM PICC: CPT

## 2018-03-16 PROCEDURE — C9113 INJ PANTOPRAZOLE SODIUM, VIA: HCPCS | Performed by: INTERNAL MEDICINE

## 2018-03-16 PROCEDURE — 85025 COMPLETE CBC W/AUTO DIFF WBC: CPT

## 2018-03-16 RX ORDER — FUROSEMIDE 40 MG/1
40 TABLET ORAL 2 TIMES DAILY
Status: DISCONTINUED | OUTPATIENT
Start: 2018-03-16 | End: 2018-03-17

## 2018-03-16 RX ORDER — MAGNESIUM SULFATE HEPTAHYDRATE 500 MG/ML
2 INJECTION, SOLUTION INTRAMUSCULAR; INTRAVENOUS ONCE
Status: DISCONTINUED | OUTPATIENT
Start: 2018-03-16 | End: 2018-03-16 | Stop reason: CLARIF

## 2018-03-16 RX ORDER — CHOLESTYRAMINE LIGHT 4 G/5.7G
4 POWDER, FOR SUSPENSION ORAL DAILY
Status: DISCONTINUED | OUTPATIENT
Start: 2018-03-16 | End: 2018-03-19 | Stop reason: HOSPADM

## 2018-03-16 RX ORDER — ATORVASTATIN CALCIUM 10 MG/1
10 TABLET, FILM COATED ORAL DAILY
Status: DISCONTINUED | OUTPATIENT
Start: 2018-03-16 | End: 2018-03-19 | Stop reason: HOSPADM

## 2018-03-16 RX ORDER — LINEZOLID 600 MG/1
600 TABLET, FILM COATED ORAL EVERY 12 HOURS SCHEDULED
Status: DISCONTINUED | OUTPATIENT
Start: 2018-03-16 | End: 2018-03-19 | Stop reason: HOSPADM

## 2018-03-16 RX ADMIN — FUROSEMIDE 40 MG: 10 INJECTION, SOLUTION INTRAMUSCULAR; INTRAVENOUS at 08:38

## 2018-03-16 RX ADMIN — MAGNESIUM SULFATE HEPTAHYDRATE 2 G: 500 INJECTION, SOLUTION INTRAMUSCULAR; INTRAVENOUS at 10:59

## 2018-03-16 RX ADMIN — POTASSIUM CHLORIDE 20 MEQ: 1.5 POWDER, FOR SOLUTION ORAL at 21:04

## 2018-03-16 RX ADMIN — LINEZOLID 600 MG: 600 TABLET, FILM COATED ORAL at 21:04

## 2018-03-16 RX ADMIN — Medication 10 ML: at 22:09

## 2018-03-16 RX ADMIN — PANTOPRAZOLE SODIUM 40 MG: 40 INJECTION, POWDER, FOR SOLUTION INTRAVENOUS at 22:08

## 2018-03-16 RX ADMIN — FLUCONAZOLE 200 MG: 100 TABLET ORAL at 08:38

## 2018-03-16 RX ADMIN — Medication 400 MG: at 08:39

## 2018-03-16 RX ADMIN — FERROUS SULFATE TAB 325 MG (65 MG ELEMENTAL FE) 162.5 MG: 325 (65 FE) TAB at 08:39

## 2018-03-16 RX ADMIN — FUROSEMIDE 40 MG: 40 TABLET ORAL at 18:21

## 2018-03-16 RX ADMIN — PANTOPRAZOLE SODIUM 40 MG: 40 INJECTION, POWDER, FOR SOLUTION INTRAVENOUS at 14:36

## 2018-03-16 RX ADMIN — CLOPIDOGREL BISULFATE 75 MG: 75 TABLET ORAL at 08:39

## 2018-03-16 RX ADMIN — ALBUMIN (HUMAN) 25 G: 0.25 INJECTION, SOLUTION INTRAVENOUS at 08:38

## 2018-03-16 RX ADMIN — Medication 12 UNITS: at 21:06

## 2018-03-16 RX ADMIN — QUETIAPINE FUMARATE 50 MG: 25 TABLET ORAL at 21:04

## 2018-03-16 RX ADMIN — Medication 10 ML: at 14:37

## 2018-03-16 RX ADMIN — Medication 1 CAPSULE: at 08:39

## 2018-03-16 RX ADMIN — CHOLESTYRAMINE 4 G: 4 POWDER, FOR SUSPENSION ORAL at 14:36

## 2018-03-16 RX ADMIN — CIPROFLOXACIN HYDROCHLORIDE 500 MG: 500 TABLET, FILM COATED ORAL at 08:38

## 2018-03-16 RX ADMIN — ALBUMIN (HUMAN) 25 G: 0.25 INJECTION, SOLUTION INTRAVENOUS at 21:04

## 2018-03-16 RX ADMIN — ATORVASTATIN CALCIUM 10 MG: 10 TABLET, FILM COATED ORAL at 14:42

## 2018-03-16 RX ADMIN — POTASSIUM CHLORIDE 40 MEQ: 40 SOLUTION ORAL at 08:38

## 2018-03-16 RX ADMIN — Medication 400 MG: at 21:04

## 2018-03-16 RX ADMIN — POTASSIUM CHLORIDE 20 MEQ: 1.5 POWDER, FOR SOLUTION ORAL at 08:39

## 2018-03-16 RX ADMIN — FERROUS SULFATE TAB 325 MG (65 MG ELEMENTAL FE) 162.5 MG: 325 (65 FE) TAB at 21:04

## 2018-03-16 RX ADMIN — MIDODRINE HYDROCHLORIDE 5 MG: 5 TABLET ORAL at 08:38

## 2018-03-16 RX ADMIN — METRONIDAZOLE 500 MG: 500 TABLET ORAL at 05:42

## 2018-03-16 ASSESSMENT — PAIN SCALES - GENERAL
PAINLEVEL_OUTOF10: 0
PAINLEVEL_OUTOF10: 0

## 2018-03-16 NOTE — PROGRESS NOTES
HEMATOLOGIC/LYMPHATIC:  Diffuse swelling, worse on left  ALLERGIC/IMMUNOLOGIC:  negative for urticaria , itching  ENDOCRINE:  negative increase in drinking, increase in urination, hot or cold intolerance  MUSCULOSKELETAL:  negative joint pains, muscle aches, swelling of joints  NEUROLOGICAL:  Positive for chronic L hemiplegia  BEHAVIOR/PSYCH:  negative for agitation, sleeplessness    Medications: Allergies: Allergies   Allergen Reactions    Demeclocycline Shortness Of Breath    Tetracyclines & Related Shortness Of Breath       Current Meds:   Scheduled Meds:    ferrous sulfate  162.5 mg Oral BID    potassium chloride  20 mEq Per G Tube BID    magnesium oxide  400 mg Per G Tube BID    ciprofloxacin  500 mg Oral 2 times per day    fluconazole  200 mg Oral Daily    metroNIDAZOLE  500 mg Oral 3 times per day    sodium chloride  250 mL Intravenous Once    albumin human  25 g Intravenous BID    furosemide  40 mg Intravenous BID    LORazepam  0.5 mg Intravenous Once    ergocalciferol  50,000 Units Per NG tube Once per day on Sun    lactobacillus  1 capsule Oral Daily with breakfast    QUEtiapine  50 mg Oral Nightly    insulin glargine  12 Units Subcutaneous Nightly    sodium chloride  250 mL Intravenous Once    insulin lispro  0-6 Units Subcutaneous TID WC    insulin lispro  0-3 Units Subcutaneous Nightly    clopidogrel  75 mg Oral Daily    sodium chloride (PF)  10 mL Intravenous Q12H    And    pantoprazole  40 mg Intravenous BID     Continuous Infusions:    dextrose       PRN Meds: morphine, zolpidem, potassium chloride **OR** potassium chloride **OR** potassium chloride, midodrine, acetaminophen, Magic Mouthwash, glucose, dextrose, glucagon (rDNA), dextrose, dextrose, magnesium sulfate, sodium phosphate IVPB **OR** sodium phosphate IVPB **OR** sodium phosphate IVPB    Data:     Past Medical History:   has a past medical history of Cerebral edema ([facility] [address]  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 Ref Range    Hemoglobin 7.1 (L) 12.0 - 16.0 g/dL    Hematocrit 22.0 (L) 36 - 46 %   CBC auto differential    Collection Time: 03/16/18  5:24 AM   Result Value Ref Range    WBC 4.0 3.5 - 11.0 k/uL    RBC 2.60 (L) 4.0 - 5.2 m/uL    Hemoglobin 7.5 (L) 12.0 - 16.0 g/dL    Hematocrit 23.1 (L) 36 - 46 %    MCV 89.0 80 - 100 fL    MCH 28.7 26 - 34 pg    MCHC 32.2 31 - 37 g/dL    RDW 16.7 (H) 11.5 - 14.9 %    Platelets 725 023 - 503 k/uL    MPV 8.5 6.0 - 12.0 fL    NRBC Automated NOT REPORTED per 100 WBC    Differential Type NOT REPORTED     Immature Granulocytes NOT REPORTED 0 %    Absolute Immature Granulocyte NOT REPORTED 0.00 - 0.30 k/uL    WBC Morphology NOT REPORTED     RBC Morphology NOT REPORTED     Platelet Estimate NOT REPORTED     Seg Neutrophils 64 36 - 66 %    Lymphocytes 24 24 - 44 %    Monocytes 11 (H) 1 - 7 %    Eosinophils % 1 0 - 4 %    Basophils 0 0 - 2 %    Segs Absolute 2.56 1.3 - 9.1 k/uL    Absolute Lymph # 0.96 (L) 1.0 - 4.8 k/uL    Absolute Mono # 0.44 0.1 - 1.3 k/uL    Absolute Eos # 0.04 0.0 - 0.4 k/uL    Basophils # 0.00 0.0 - 0.2 k/uL    Morphology ANISOCYTOSIS PRESENT    APTT    Collection Time: 03/16/18  5:24 AM   Result Value Ref Range    PTT 30.3 23.0 - 31.0 sec   Basic Metabolic Panel w/ Reflex to MG    Collection Time: 03/16/18  5:24 AM   Result Value Ref Range    Glucose 79 70 - 99 mg/dL    BUN 16 8 - 23 mg/dL    CREATININE 0.52 0.50 - 0.90 mg/dL    Bun/Cre Ratio NOT REPORTED 9 - 20    Calcium 8.0 (L) 8.6 - 10.4 mg/dL    Sodium 147 (H) 135 - 144 mmol/L    Potassium 3.2 (L) 3.7 - 5.3 mmol/L    Chloride 107 98 - 107 mmol/L    CO2 31 20 - 31 mmol/L    Anion Gap 9 9 - 17 mmol/L    GFR Non-African American >60 >60 mL/min    GFR African American >60 >60 mL/min    GFR Comment          GFR Staging NOT REPORTED    Magnesium    Collection Time: 03/16/18  5:24 AM   Result Value Ref Range    Magnesium 1.7 1.6 - 2.6 mg/dL   POC Glucose Fingerstick    Collection Time: 03/16/18  6:55 AM   Result Value Visualized osseous structures appear mildly demineralized, but grossly intact, given the non dedicated imaging. No focal airspace consolidation or pulmonary vascular congestion. Ct Head Wo Contrast    Result Date: 3/1/2018  EXAMINATION: CT OF THE HEAD WITHOUT CONTRAST  3/1/2018 12:26 pm TECHNIQUE: CT of the head was performed without the administration of intravenous contrast. Dose modulation, iterative reconstruction, and/or weight based adjustment of the mA/kV was utilized to reduce the radiation dose to as low as reasonably achievable. COMPARISON: January 10 HISTORY: ORDERING SYSTEM PROVIDED HISTORY: AMS hx of CVA with Left sided deficits TECHNOLOGIST PROVIDED HISTORY: Has a \"code stroke\" or \"stroke alert\" been called? ->No Ordering Physician Provided Reason for Exam: AMS, HX OF CVA WITH LEFT SIDED DEFICITS Additional signs and symptoms: PATIENT UNABLE TO GIVE HISTORY. UNABLE TO STRAIGHTEN HAD FOR EXAM. FINDINGS: BRAIN/VENTRICLES: There is a large amount of volume loss in the right MCA distribution including in the perisylvian region. This is likely due to the patient's recent infarct. There is curvilinear high density in the right sylvian fissures suggesting age-indeterminate thrombus in the MCA branch. There is low-density and loss of differentiation in the right lentiform nucleus and lateral thalamic region. Focal low density in the right caudate head is noted suggesting prior infarct. ORBITS: The visualized portion of the orbits demonstrate no acute abnormality. SINUSES: The visualized paranasal sinuses and mastoid air cells demonstrate no acute abnormality. SOFT TISSUES/SKULL:  No acute abnormality of the visualized skull or soft tissues. 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 There is surrounding low density extending deep into the lentiform nucleus region and right lateral thalamic region.   It is unclear whether Study:   Extremities Arteries: Lower Arterial Plethysmography, PVR Lower. Indications for Study:Pain, leg. Patient Status: In Patient. Technical Quality:Limited visualization. Limitation reason:Body habitus, bandages. Comments: Normal (0.95-1.3) Mild vascular insufficiency (0.7-0.94) Moderate vascular insufficiency (0.5-0.69) Severe vascular insufficiency (0.3-0.49) Critical Ischemia (0.1-0.29) Non-diagnostic due to calcification (>1.3) Toe pressure <40 mmHg poor wound healing potential  Conclusions   Summary   Right lower extremity ABIs and PVRs were performed for the evaluation of  peripheral vascular disease. Study demonstrates:   ANIKET suggests no vascular insufficiency at rest.   Signature   ----------------------------------------------------------------  Electronically signed by Ashely Villagran RVT(Sonographer) on  03/06/2018 03:49 PM  ----------------------------------------------------------------   ----------------------------------------------------------------  Electronically signed by Hong Hackett(Interpreting  physician) on 03/06/2018 04:58 PM  ----------------------------------------------------------------  Findings:   Right Impression:  Decreased amplitude, biphasic waveform in the thigh. Normal amplitude, biphasic waveform at the calf and ankle.    ANIKET demonstrates no vascular insufficiency at rest.      Vl Upper Extremity Bilateral Venous Duplex    Result Date: 3/14/2018    [facility]  Vascular Upper Extremities Veins Procedure   Patient Name   Blanca Fernández Date of Study           03/13/2018                 L   Date of Birth  1940  Gender                  Female   Age            69 year(s)  Race                       Room Number    2091   Corporate ID # [id number]   Patient Acct # [de-identified]   MR #           [id number]      [facility]   Accession #    [id number]   Interpreting Physician  Rosibel Fraser Limited visualization of the                                             calf veins. Normal compressibility of the                                             great saphenous vein. Normal compressibility of the                                             small saphenous vein. Vl Lower Extremity Venous Left    Result Date: 3/3/2018    [facility]  Vascular Lower Extremities DVT Study Procedure   Patient Name   Gal Gibbons Date of Study           03/03/2018                 L   Date of Birth  1940  Gender                  Female   Age            69 year(s)  Race                       Room Number    2002   Corporate ID # [id number]   Patient Acct # [de-identified]   MR #           [id number]      Samir Dillard, [facility]   Accession #    [id number]   Interpreting Physician  Yelitza Lopez   Referring                  Referring Physician     Laurence Conrad  Nurse  Practitioner  Procedure Type of Study:   Veins: Lower Extremities DVT Study, Venous Scan Lower Left. Indications for Study:Swelling. Patient Status: In Patient. Technical Quality:Limited visualization. Limitation reason:body habitus and swelling.   - Critical Result:INDU Hammer, RN Classie Him. Conclusions   Summary   Acute deep vein thrombosis of the left leg involving the common femoral,  superficial femoral, popliteal veins. Superficial thrombophlebitis of the lower extremity involving the left  greater saphenous vein, lesser saphenous vein, acute. Right common femoral vein has no DVT.    Signature   ----------------------------------------------------------------  Electronically signed by Sarah Gregorio(Interpreting  physician) on 03/03/2018 02:46 PM  ----------------------------------------------------------------  Findings:   Right

## 2018-03-16 NOTE — PROGRESS NOTES
NEPHROLOGY PROGRESS NOTE    Patient :  Larisa Gomez; 69 y.o. MRN# [id number]  Location:    Attending:  Virgie Meza MD  Admit Date:  3/1/2018   Hospital Day: 13    Subjective: 69 y.o. 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 She was admitted to ICU with a diagnosis of DKA and sepsis. Initial laboratory studies were remarkable for serum bicarbonate 8 mmol/L, ketoacidosis and serum creatinine 2.7 mg/dL. She was hypotensive and required high dose Levophed which has since been titrated down to current level of 1 mcg/min. Patient bicarb was 8 anion gap was 20 on admission patient was started on DKA protocol insulin drip anion gap has improved but serum bicarb has not improved. Interval history/subjective:  CT scan showed bilateral hydronephrosis. Alegre catheter was placed to urology  Hypokalemia improving  Had PEG tube placed , tolerating tube feeding.   Having diarrhea  Good uop    Objective:  CURRENT TEMPERATURE:  Temp: 98.5 °F (36.9 °C)  MAXIMUM TEMPERATURE OVER 24HRS:  Temp (24hrs), Av.4 °F (36.9 °C), Min:98 °F (36.7 °C), Max:98.7 °F (37.1 °C)    CURRENT RESPIRATORY RATE:  Resp: 19  CURRENT PULSE:  Pulse: 70  CURRENT BLOOD PRESSURE:  BP: (!) 138/54  24HR BLOOD PRESSURE RANGE:  Systolic (96BRT), YKW:376 , Min:119 , RSV:374   ; Diastolic (96JFF), XZC:69, Min:49, Max:57    24HR INTAKE/OUTPUT:      Intake/Output Summary (Last 24 hours) at 18 1327  Last data filed at 18 1239   Gross per 24 hour   Intake              300 ml   Output             6200 ml   Net            -5900 ml     Patient Vitals for the past 96 hrs (Last 3 readings):   Weight   18 0515 199 lb 11.8 oz (90.6 kg)   03/15/18 0545 212 lb 1.3 oz (96.2 kg)       Physical Exam:  GENERAL APPEARANCE: More awake and alert responsive  HEAD: normocephalic  EYES:  Not pale, anicteric

## 2018-03-16 NOTE — PROGRESS NOTES
Vascular has been checking everyday on IV line in neck to see if carotid test can be done. Notified today by RN that line is still in place. Please let vascular know when line is removed.

## 2018-03-16 NOTE — PROGRESS NOTES
Dressing/Treatment Foam 3/16/2018  9:21 AM   Wound Cleansed Rinsed/Irrigated with saline 3/16/2018  9:21 AM   Dressing Change Due 03/19/18 3/16/2018  9:21 AM   Wound Length (cm) 4 cm 3/16/2018  9:21 AM   Wound Width (cm) 6 cm 3/16/2018  9:21 AM   Wound Depth (cm)  0.1 3/16/2018  9:21 AM   Calculated Wound Size (cm^2) (l*w) 24 cm^2 3/16/2018  9:21 AM   Change in Wound Size % (l*w) 33.33 3/16/2018  9:21 AM   Wound Assessment Clean;Red 3/16/2018  9:21 AM   Drainage Amount Small 3/16/2018  9:21 AM   Drainage Description Serosanguinous 3/16/2018  9:21 AM   Odor None 3/16/2018  9:21 AM   Tosin-wound Assessment Non-blanchable erythema 3/6/2018  4:00 PM   Red%Wound Bed 50 3/16/2018  9:21 AM   Purple%Wound Bed 50 3/16/2018  9:21 AM   Culture Taken No 3/15/2018  9:32 PM   Number of days: 14       Wound 03/01/18 Heel Right non-blanchable, purple unstageable wound (Active)   Wound Image   3/2/2018  8:00 AM   Wound Type Wound 3/16/2018  9:21 AM   Wound Deep tissue/Injury 3/16/2018  9:21 AM   Dressing Status Clean;Dry; Intact 3/16/2018  1:10 AM   Dressing Changed Changed/New 3/13/2018  3:00 AM   Dressing/Treatment Open to air; Other (Comment) 3/16/2018  9:21 AM   Wound Length (cm) 3 cm 3/16/2018  9:21 AM   Wound Width (cm) 2 cm 3/16/2018  9:21 AM   Calculated Wound Size (cm^2) (l*w) 6 cm^2 3/16/2018  9:21 AM   Change in Wound Size % (l*w) 25 3/16/2018  9:21 AM   Wound Assessment Clean;Dry;Light purple 3/16/2018  9:21 AM   Drainage Amount None 3/16/2018  9:21 AM   Odor None 3/16/2018  9:21 AM   Purple%Wound Bed 100 3/16/2018  9:21 AM   Culture Taken No 3/15/2018  8:24 AM   Number of days: 14       Wound 03/01/18 Toe (Comment  which one) Right dry scabbed wound (Active)   Wound Type Wound 3/7/2018  4:00 AM   Wound Other 3/4/2018  8:00 PM   Dressing/Treatment Open to air 3/7/2018  4:00 AM   Wound Assessment Dry; Intact 3/6/2018  4:00 PM   Drainage Amount None 3/2/2018 12:00 AM   Odor None 3/2/2018 12:00 AM   Tosin-wound Assessment Dry;Intact 3/6/2018  4:00 PM   Number of days: 14       Wound 03/05/18 Abrasion(s) Head Left;Posterior lump with abrasion left posterior behind left ear (Active)   Number of days: 10          Plan:     · Treatment:  Encourage/assist with repositioning every 2 hours  · Float heels off of bed surface with pillows or offloading boots  · Alex barrier wipes for incontinence care  · Clear zinc barrier cream BID and prn  · Mepilex sacrum dressing to sacrococcygeal area. Peel back dressing, inspect skin beneath, re-secure. Change every 72 hours and prn wrinkles, soilage. Discontinue Sacral Mepilex if repeatedly soiled by incontinence. · Routine incontinence care with Alex barrier cloths and zinc oxide cream. Apply zinc oxide cream BID and prn incontinence. · Covidien moisture wicking under pads  · Static air overlay. Check inflation each shift by sliding hand under the air overlay. Feel for the patient's heaviest/ most dependant body part. Ideally 1/2 to 1\" of air will be between your hand and the patient's body.  Add air prn.         Electronically signed by JOEL Rainey, CNP on 3/16/2018 at 9:45 AM

## 2018-03-17 LAB
ABSOLUTE EOS #: 0.18 K/UL (ref 0–0.4)
ABSOLUTE IMMATURE GRANULOCYTE: ABNORMAL K/UL (ref 0–0.3)
ABSOLUTE LYMPH #: 1.71 K/UL (ref 1–4.8)
ABSOLUTE MONO #: 0.36 K/UL (ref 0.1–1.3)
ANION GAP SERPL CALCULATED.3IONS-SCNC: 10 MMOL/L (ref 9–17)
ANION GAP SERPL CALCULATED.3IONS-SCNC: 4 MMOL/L (ref 9–17)
ANION GAP SERPL CALCULATED.3IONS-SCNC: 7 MMOL/L (ref 9–17)
BASOPHILS # BLD: 0 % (ref 0–2)
BASOPHILS ABSOLUTE: 0 K/UL (ref 0–0.2)
BUN BLDV-MCNC: 13 MG/DL (ref 8–23)
BUN/CREAT BLD: ABNORMAL (ref 9–20)
CALCIUM SERPL-MCNC: 8.6 MG/DL (ref 8.6–10.4)
CHLORIDE BLD-SCNC: 101 MMOL/L (ref 98–107)
CHLORIDE BLD-SCNC: 103 MMOL/L (ref 98–107)
CHLORIDE BLD-SCNC: 106 MMOL/L (ref 98–107)
CO2: 34 MMOL/L (ref 20–31)
CO2: 35 MMOL/L (ref 20–31)
CO2: 36 MMOL/L (ref 20–31)
CREAT SERPL-MCNC: 0.44 MG/DL (ref 0.5–0.9)
DIFFERENTIAL TYPE: ABNORMAL
EOSINOPHILS RELATIVE PERCENT: 4 % (ref 0–4)
GFR AFRICAN AMERICAN: >60 ML/MIN
GFR NON-AFRICAN AMERICAN: >60 ML/MIN
GFR SERPL CREATININE-BSD FRML MDRD: ABNORMAL ML/MIN/{1.73_M2}
GFR SERPL CREATININE-BSD FRML MDRD: ABNORMAL ML/MIN/{1.73_M2}
GLUCOSE BLD-MCNC: 117 MG/DL (ref 70–99)
GLUCOSE BLD-MCNC: 125 MG/DL (ref 65–105)
GLUCOSE BLD-MCNC: 145 MG/DL (ref 65–105)
GLUCOSE BLD-MCNC: 152 MG/DL (ref 65–105)
HCT VFR BLD CALC: 22.8 % (ref 36–46)
HCT VFR BLD CALC: 23.5 % (ref 36–46)
HCT VFR BLD CALC: 24.9 % (ref 36–46)
HEMOGLOBIN: 7.3 G/DL (ref 12–16)
HEMOGLOBIN: 7.5 G/DL (ref 12–16)
HEMOGLOBIN: 8.1 G/DL (ref 12–16)
IMMATURE GRANULOCYTES: ABNORMAL %
LYMPHOCYTES # BLD: 38 % (ref 24–44)
MAGNESIUM: 1.8 MG/DL (ref 1.6–2.6)
MCH RBC QN AUTO: 28.3 PG (ref 26–34)
MCHC RBC AUTO-ENTMCNC: 32.2 G/DL (ref 31–37)
MCV RBC AUTO: 87.8 FL (ref 80–100)
MONOCYTES # BLD: 8 % (ref 1–7)
MORPHOLOGY: ABNORMAL
NRBC AUTOMATED: ABNORMAL PER 100 WBC
PARTIAL THROMBOPLASTIN TIME: 29.6 SEC (ref 23–31)
PDW BLD-RTO: 16.5 % (ref 11.5–14.9)
PLATELET # BLD: 326 K/UL (ref 150–450)
PLATELET ESTIMATE: ABNORMAL
PMV BLD AUTO: 8.3 FL (ref 6–12)
POTASSIUM SERPL-SCNC: 3.4 MMOL/L (ref 3.7–5.3)
POTASSIUM SERPL-SCNC: 3.4 MMOL/L (ref 3.7–5.3)
POTASSIUM SERPL-SCNC: 4.2 MMOL/L (ref 3.7–5.3)
RBC # BLD: 2.59 M/UL (ref 4–5.2)
RBC # BLD: ABNORMAL 10*6/UL
SEG NEUTROPHILS: 50 % (ref 36–66)
SEGMENTED NEUTROPHILS ABSOLUTE COUNT: 2.25 K/UL (ref 1.3–9.1)
SODIUM BLD-SCNC: 144 MMOL/L (ref 135–144)
SODIUM BLD-SCNC: 145 MMOL/L (ref 135–144)
SODIUM BLD-SCNC: 147 MMOL/L (ref 135–144)
WBC # BLD: 4.5 K/UL (ref 3.5–11)
WBC # BLD: ABNORMAL 10*3/UL

## 2018-03-17 PROCEDURE — 80051 ELECTROLYTE PANEL: CPT

## 2018-03-17 PROCEDURE — 6360000002 HC RX W HCPCS: Performed by: INTERNAL MEDICINE

## 2018-03-17 PROCEDURE — 97110 THERAPEUTIC EXERCISES: CPT

## 2018-03-17 PROCEDURE — 2580000003 HC RX 258: Performed by: INTERNAL MEDICINE

## 2018-03-17 PROCEDURE — 6370000000 HC RX 637 (ALT 250 FOR IP): Performed by: NURSE PRACTITIONER

## 2018-03-17 PROCEDURE — 6370000000 HC RX 637 (ALT 250 FOR IP): Performed by: INTERNAL MEDICINE

## 2018-03-17 PROCEDURE — 85014 HEMATOCRIT: CPT

## 2018-03-17 PROCEDURE — 99239 HOSP IP/OBS DSCHRG MGMT >30: CPT | Performed by: INTERNAL MEDICINE

## 2018-03-17 PROCEDURE — 83735 ASSAY OF MAGNESIUM: CPT

## 2018-03-17 PROCEDURE — C9113 INJ PANTOPRAZOLE SODIUM, VIA: HCPCS | Performed by: INTERNAL MEDICINE

## 2018-03-17 PROCEDURE — 85018 HEMOGLOBIN: CPT

## 2018-03-17 PROCEDURE — 36415 COLL VENOUS BLD VENIPUNCTURE: CPT

## 2018-03-17 PROCEDURE — P9046 ALBUMIN (HUMAN), 25%, 20 ML: HCPCS | Performed by: INTERNAL MEDICINE

## 2018-03-17 PROCEDURE — 85730 THROMBOPLASTIN TIME PARTIAL: CPT

## 2018-03-17 PROCEDURE — 6370000000 HC RX 637 (ALT 250 FOR IP): Performed by: RADIOLOGY

## 2018-03-17 PROCEDURE — 2060000000 HC ICU INTERMEDIATE R&B

## 2018-03-17 PROCEDURE — 82947 ASSAY GLUCOSE BLOOD QUANT: CPT

## 2018-03-17 PROCEDURE — 80048 BASIC METABOLIC PNL TOTAL CA: CPT

## 2018-03-17 PROCEDURE — 36592 COLLECT BLOOD FROM PICC: CPT

## 2018-03-17 PROCEDURE — 85025 COMPLETE CBC W/AUTO DIFF WBC: CPT

## 2018-03-17 RX ORDER — LINEZOLID 600 MG/1
600 TABLET, FILM COATED ORAL EVERY 12 HOURS SCHEDULED
Qty: 28 TABLET | Refills: 0 | Status: SHIPPED | OUTPATIENT
Start: 2018-03-17 | End: 2018-03-19

## 2018-03-17 RX ORDER — FUROSEMIDE 40 MG/1
40 TABLET ORAL 2 TIMES DAILY
Status: DISCONTINUED | OUTPATIENT
Start: 2018-03-17 | End: 2018-03-18

## 2018-03-17 RX ORDER — CHOLESTYRAMINE LIGHT 4 G/5.7G
4 POWDER, FOR SUSPENSION ORAL DAILY
Qty: 60 PACKET | Refills: 3 | Status: SHIPPED | OUTPATIENT
Start: 2018-03-18

## 2018-03-17 RX ORDER — FUROSEMIDE 20 MG/1
20 TABLET ORAL DAILY
Qty: 30 TABLET | Refills: 0 | Status: SHIPPED | OUTPATIENT
Start: 2018-03-17 | End: 2018-03-19 | Stop reason: HOSPADM

## 2018-03-17 RX ORDER — LACTOBACILLUS RHAMNOSUS GG 10B CELL
1 CAPSULE ORAL
Qty: 30 CAPSULE | Refills: 3 | Status: SHIPPED | OUTPATIENT
Start: 2018-03-18

## 2018-03-17 RX ORDER — INSULIN GLARGINE 100 [IU]/ML
12 INJECTION, SOLUTION SUBCUTANEOUS NIGHTLY
Qty: 1 VIAL | Refills: 3 | Status: SHIPPED | OUTPATIENT
Start: 2018-03-17

## 2018-03-17 RX ORDER — QUETIAPINE FUMARATE 50 MG/1
50 TABLET, FILM COATED ORAL NIGHTLY
Qty: 30 TABLET | Refills: 0 | Status: SHIPPED | OUTPATIENT
Start: 2018-03-17

## 2018-03-17 RX ORDER — FERROUS SULFATE 325(65) MG
162.5 TABLET ORAL 2 TIMES DAILY
Qty: 30 TABLET | Refills: 3 | Status: SHIPPED | OUTPATIENT
Start: 2018-03-17

## 2018-03-17 RX ADMIN — CLOPIDOGREL BISULFATE 75 MG: 75 TABLET ORAL at 08:18

## 2018-03-17 RX ADMIN — FUROSEMIDE 40 MG: 40 TABLET ORAL at 08:18

## 2018-03-17 RX ADMIN — LINEZOLID 600 MG: 600 TABLET, FILM COATED ORAL at 08:18

## 2018-03-17 RX ADMIN — POTASSIUM CHLORIDE 20 MEQ: 1.5 POWDER, FOR SOLUTION ORAL at 21:25

## 2018-03-17 RX ADMIN — PANTOPRAZOLE SODIUM 40 MG: 40 INJECTION, POWDER, FOR SOLUTION INTRAVENOUS at 21:58

## 2018-03-17 RX ADMIN — FUROSEMIDE 40 MG: 40 TABLET ORAL at 21:25

## 2018-03-17 RX ADMIN — Medication 1 CAPSULE: at 08:18

## 2018-03-17 RX ADMIN — Medication 10 ML: at 21:58

## 2018-03-17 RX ADMIN — ALBUMIN (HUMAN) 25 G: 0.25 INJECTION, SOLUTION INTRAVENOUS at 21:25

## 2018-03-17 RX ADMIN — PANTOPRAZOLE SODIUM 40 MG: 40 INJECTION, POWDER, FOR SOLUTION INTRAVENOUS at 09:59

## 2018-03-17 RX ADMIN — QUETIAPINE FUMARATE 50 MG: 25 TABLET ORAL at 21:58

## 2018-03-17 RX ADMIN — ACETAMINOPHEN 650 MG: 650 SOLUTION ORAL at 17:21

## 2018-03-17 RX ADMIN — Medication 12 UNITS: at 21:28

## 2018-03-17 RX ADMIN — CHOLESTYRAMINE 4 G: 4 POWDER, FOR SUSPENSION ORAL at 08:21

## 2018-03-17 RX ADMIN — FERROUS SULFATE TAB 325 MG (65 MG ELEMENTAL FE) 162.5 MG: 325 (65 FE) TAB at 08:18

## 2018-03-17 RX ADMIN — Medication 400 MG: at 08:18

## 2018-03-17 RX ADMIN — ATORVASTATIN CALCIUM 10 MG: 10 TABLET, FILM COATED ORAL at 08:18

## 2018-03-17 RX ADMIN — Medication 400 MG: at 21:25

## 2018-03-17 RX ADMIN — COLLAGENASE SANTYL: 250 OINTMENT TOPICAL at 08:21

## 2018-03-17 RX ADMIN — INSULIN LISPRO 1 UNITS: 100 INJECTION, SOLUTION INTRAVENOUS; SUBCUTANEOUS at 21:29

## 2018-03-17 RX ADMIN — Medication 10 ML: at 09:59

## 2018-03-17 RX ADMIN — POTASSIUM CHLORIDE 20 MEQ: 1.5 POWDER, FOR SOLUTION ORAL at 08:18

## 2018-03-17 RX ADMIN — ALBUMIN (HUMAN) 25 G: 0.25 INJECTION, SOLUTION INTRAVENOUS at 08:17

## 2018-03-17 RX ADMIN — LINEZOLID 600 MG: 600 TABLET, FILM COATED ORAL at 21:25

## 2018-03-17 RX ADMIN — FERROUS SULFATE TAB 325 MG (65 MG ELEMENTAL FE) 162.5 MG: 325 (65 FE) TAB at 21:25

## 2018-03-17 RX ADMIN — POTASSIUM CHLORIDE 40 MEQ: 40 SOLUTION ORAL at 08:17

## 2018-03-17 ASSESSMENT — PAIN SCALES - GENERAL
PAINLEVEL_OUTOF10: 0
PAINLEVEL_OUTOF10: 4
PAINLEVEL_OUTOF10: 0

## 2018-03-17 NOTE — DISCHARGE SUMMARY
sepsis 2/2 UTI, DKA, SAMINA. Initially acidotic, started on insulin drip, required pressors and bicarb to slowly recover from acidosis. Developed thrombocytopenia, found to have large LLE DVT, received 2 units platelets, had IVC filter placed when could not tolerate AC. First urine culture grew Morganella and Enterococcus, both sensitive to cipro, completed full course and recovered from septic condition. Had loose stools throughout admission, lactoferrin initially positive, Cdiff and stool culture negative. Found to have poor PO intake, swallow eval rec puree and thin liquids. NG with TF initially, then PEG placed for TF when apparent Pt unable to maintain PO intake. Pt  volume overloaded 2/2 fluid resuscitation, given lasix and albumin per Nephrology, discharged on HCTZ. Found to have b/l hydronephrosis 2/2 urinary tract obstruction, uro consult placed iniguez, Pt to follow outpatient. Repeat urine culture performed due to recurrent fever, found to be growing VRE, started on linezolid. Discharged with linezolid to finish 14 day course. Started on questran and PRN immodium for TF related diarrhea at discharge.         Significant therapeutic interventions: Swallow study, PEG placement, IVC filter placed    Significant Diagnostic Studies:   Labs / Micro:  CBC:   Lab Results   Component Value Date    WBC 4.9 03/19/2018    RBC 2.61 03/19/2018    HGB 7.7 03/19/2018    HCT 23.4 03/19/2018    MCV 89.8 03/19/2018    MCH 29.6 03/19/2018    MCHC 32.9 03/19/2018    RDW 16.6 03/19/2018     03/19/2018     CMP:    Lab Results   Component Value Date    GLUCOSE 168 03/19/2018     03/19/2018    K 4.2 03/19/2018     03/19/2018    CO2 33 03/19/2018    BUN 15 03/19/2018    CREATININE 0.43 03/19/2018    ANIONGAP 9 03/19/2018    ALKPHOS 38 03/01/2018    ALT 37 03/01/2018    AST 19 03/01/2018    BILITOT 0.65 03/01/2018    LABALBU 3.0 03/19/2018    ALBUMIN NOT REPORTED 03/01/2018    LABGLOM >60 03/19/2018 There is moderate bilateral hydronephrosis and mild diffuse hydroureter. No obstructing calculi. There is mild bilateral perinephric stranding, similar to the previous study. Assessment of renal parenchyma is limited without IV contrast. GI/Bowel: New PEG tube is in place. Air-fluid level is noted in the rectum. There is moderate sigmoid diverticulosis without evidence of acute diverticulitis. No abnormal bowel distention or focal pericolonic inflammation. Air-fluid levels are also scattered throughout the remainder of the colon. No abnormal bowel wall thickening appreciated. No free intraperitoneal air or fluid. Appendix is normal. Pelvis: There is prominent distention of the urinary bladder. No abnormal urinary bladder wall thickening. Uterus appears to be surgically absent. No evidence of pelvic lymphadenopathy. Peritoneum/Retroperitoneum: Inferior vena cava filter is noted. The abdominal aorta is mildly tortuous without evidence of aneurysm. There is moderate scattered calcified plaque in the abdominal aorta and iliac arteries. No retroperitoneal lymphadenopathy or hematoma. Bones/Soft Tissues: Diffuse body wall edema is again noted. There is asymmetric increased strandy density in the subcutaneous fat of the left abdomen. There is moderate scoliosis. No acute or suspicious osseous abnormality identified. 1. Distention of the urinary bladder with moderate bilateral hydronephrosis and mild hydroureter, suggesting urinary bladder outlet obstruction, new from the previous study dated 03/02/2018 (Alegre catheter was in place on the previous study). 2. Normal appendix. No bowel obstruction. 3. Interval placement of a PEG tube and IVC filter. 4. Air-fluid levels throughout the colon, suggesting diarrheal disease. No abnormal bowel wall thickening is seen. 5. Diffuse body wall edema, asymmetric in the left abdomen, which could be positional or related to superimposed contusion.  6. Small pleural effusions and bibasilar atelectasis, slightly increased from the previous study. Ct Abdomen Pelvis Wo Contrast Additional Contrast? Radiologist Recommendation    Result Date: 3/2/2018  EXAMINATION: CT OF THE ABDOMEN AND PELVIS WITHOUT CONTRAST 3/2/2018 5:11 pm TECHNIQUE: CT of the abdomen and pelvis was performed without the administration of intravenous contrast. Multiplanar reformatted images are provided for review. Dose modulation, iterative reconstruction, and/or weight based adjustment of the mA/kV was utilized to reduce the radiation dose to as low as reasonably achievable. COMPARISON: None. HISTORY: ORDERING SYSTEM PROVIDED HISTORY: New onset abd pain in DKA, check for distension, perf TECHNOLOGIST PROVIDED HISTORY: Additional Contrast?->Radiologist Recommendation Ordering Physician Provided Reason for Exam: PATIENT STATES ABDOMINAL PAIN FINDINGS: Lower Chest: There are trace bilateral pleural effusions and bilateral lower lobe subsegmental atelectasis. There is mild global cardiomegaly. Tiny hiatus hernia is present. Organs: Exam is limited by the absence of intravenous contrast. No focal liver lesion. The gallbladder is surgically absent. No significant biliary ductal dilatation. The spleen is normal in size without focal lesion. There is moderate fatty atrophy of the pancreas without focal lesion or ductal dilatation. The adrenal glands are unremarkable. No urinary tract calculus or collecting system dilatation. No focal liver lesion. GI/Bowel: The appendix is normal.  No bowel obstruction. There is moderate colonic diverticulosis without evidence for diverticulitis. Pelvis: There is a Alegre catheter and air in a decompressed bladder. Remote hysterectomy. Peritoneum/Retroperitoneum: There is moderate presacral edema. No abdominal lymphadenopathy or ascites. Bones/Soft Tissues: There is mild diffuse subcutaneous edema/generalized anasarca.   Moderate-severe thoracolumbar scoliosis with moderate lumbar levoscoliosis. Small fat containing umbilical hernia. There is relative enlargement of the left iliofemoral vein compared to the right with increased soft tissue swelling of the left upper thigh. The possibility of left DVT/thrombus could be entertained. There is mild generalized anasarca. Relative enlargement of the left iliofemoral with increased swelling of the left upper thigh. The possibility of the presence of left DVT/thrombus could be entertained. Left lower extremity ultrasound Doppler could be obtained as clinically indicated. Otherwise, no acute process in the abdomen or pelvis. Xr Chest (single View Frontal)    Result Date: 3/1/2018  EXAMINATION: SINGLE VIEW OF THE CHEST 3/1/2018 5:05 pm COMPARISON: None. HISTORY: ORDERING SYSTEM PROVIDED HISTORY: Central Line Placement TECHNOLOGIST PROVIDED HISTORY: Reason for exam:->Central Line Placement Reason for exam:->Portable Ordering Physician Provided Reason for Exam: line placement Acuity: Acute Type of Exam: Initial FINDINGS: The right IJ central venous catheter in place terminating at cavoatrial junction. There is no pneumothorax. The radiograph is somewhat limited due to left-sided tilt. Cardiac and mediastinal contour are normal.  There is minimal atelectatic change in the left costophrenic angle. Bony structures are grossly unremarkable. Dextroscoliosis at lower thoracic spine present similar to previous examination. Uncomplicated interval insertion of right IJ catheter.   Otherwise, no interval change     Xr Chest (single View Frontal)    Result Date: 3/1/2018  EXAMINATION: SINGLE VIEW OF THE CHEST 3/1/2018 10:48 am COMPARISON: Chest x-ray from 01/10/2018 HISTORY: ORDERING SYSTEM PROVIDED HISTORY: Altered mental status,  confused TECHNOLOGIST PROVIDED HISTORY: Reason for exam:->Altered mental status,  confused Ordering Physician Provided Reason for Exam: AMS Acuity: Unknown Type of Exam: Unknown FINDINGS: There is no +------------+----+------------+---------+--------+------------+-----------+ ! Location    ! !Pressure    ! Ratio    ! !Pressure    ! Ratio      ! +------------+----+------------+---------+--------+------------+-----------+ ! Calf        !    !133         !1.24     !        !            !           ! +------------+----+------------+---------+--------+------------+-----------+ ! Ankle PT    !    !106         !0.99     !        !            !           ! +------------+----+------------+---------+--------+------------+-----------+ ! Ankle DP    !    !112         ! 1.05     !        !            !           ! +------------+----+------------+---------+--------+------------+-----------+   - Brachial Pressure:Right: 107. - ANIKET:Right: 1.05. Right Plethysmographic Results +---------------+---------------+------------+------------+----------------+ ! Right          !               !            !            !                ! +---------------+---------------+------------+------------+----------------+ ! Location       ! Pressure       ! Ratio       ! Notch       ! Amplitude       ! +---------------+---------------+------------+------------+----------------+ ! Calf           !133            !1.24        !            !                ! +---------------+---------------+------------+------------+----------------+ ! Ankle PT       !106            !0.99        !            !                ! +---------------+---------------+------------+------------+----------------+ ! Ankle DP       !112            ! 1.05        !            !                ! +---------------+---------------+------------+------------+----------------+   - Right Brachial Pressure:107.   - Right ANIKET:1.05.     Vl Upper Extremity Bilateral Venous Duplex    Result Date: 3/14/2018    [facility]  Vascular Upper Extremities Veins Procedure   Patient Name   Viridiana Jenkins Date of Study           03/13/2018                 L   Date of Birth  1940  Gender [id number]   MR #           [id number]      Sonographer             Valentin Glez RVT   Accession #    [id number]   Interpreting Physician  Martin Marcus   Referring                  Referring Physician     Fernando Self  Nurse  Practitioner  Procedure Type of Study:   Veins: Lower Extremities DVT Study, Venous Scan Lower Bilateral.  Indications for Study:Pain and swelling. Patient Status: In Patient. Technical Quality:Poor visualization. Limitation reason:Body habitus, swelling.   - Critical Result:INDU Rubio. Conclusions   Summary   Simultaneous real time imaging utilizing B-Mode, color doppler and  spectral waveform analysis was performed on the bilateral lower  extremities for venous examination of the deep and superficial systems. Findings are:   **Abnormal Study**   Right:  Technically limited study as stated above however in the visualized areas  no superficial or deep vein thrombosis was identified. Left:  Acute deep venous thrombosis extending from the popliteal vein into the  common femoral vein. Signature   ----------------------------------------------------------------  Electronically signed by Valentin Glez RVT(Sonographer) on  03/06/2018 03:00 PM  ----------------------------------------------------------------   ----------------------------------------------------------------  Electronically signed by Hong Reed(Interpreting  physician) on 03/06/2018 05:02 PM  ----------------------------------------------------------------  Findings:   Right Impression:                          Left Impression:  The common femoral, femoral, popliteal and The popliteal to the common  tibial veins demonstrate normal            femoral veins are  compressibility and augmentation. non-compressible. Thrombus appears acute based on  Limited visualization of the lower thigh   B-Mode image evaluation. and calf veins. medications    atorvastatin 10 MG tablet  Commonly known as:  LIPITOR     clopidogrel 75 MG tablet  Commonly known as:  PLAVIX  Take 1 tablet by mouth daily     famotidine 20 MG tablet  Commonly known as:  PEPCID     fenofibrate 48 MG tablet  Commonly known as:  TRICOR     glimepiride 4 MG tablet  Commonly known as:  AMARYL     hydrALAZINE 25 MG tablet  Commonly known as:  APRESOLINE  Take 1 tablet by mouth every 8 hours     * metFORMIN 1000 MG tablet  Commonly known as:  GLUCOPHAGE     * metFORMIN 1000 MG tablet  Commonly known as:  GLUCOPHAGE     simvastatin 20 MG tablet  Commonly known as:  ZOCOR  Take 1 tablet by mouth nightly     tamsulosin 0.4 MG capsule  Commonly known as:  FLOMAX  Take 1 capsule by mouth daily        * This list has 2 medication(s) that are the same as other medications prescribed for you. Read the directions carefully, and ask your doctor or other care provider to review them with you.             STOP taking these medications    amLODIPine 10 MG tablet  Commonly known as:  NORVASC     amLODIPine 5 MG tablet  Commonly known as:  NORVASC     aspirin 81 MG tablet     atenolol 50 MG tablet  Commonly known as:  TENORMIN     FLUoxetine 10 MG capsule  Commonly known as:  PROZAC     losartan 100 MG tablet  Commonly known as:  COZAAR           Where to Get Your Medications      [facility] [address] [place], [place] [address]. [address] [facility], [address] [place] [address]    Phone:  [phone]   · cholestyramine light 4 g packet  · darbepoetin benson-polysorbate 25 MCG/0.42ML Sosy injection  · ergocalciferol 8000 UNIT/ML drops  · ferrous sulfate 325 (65 Fe) MG tablet  · hydrochlorothiazide 25 MG tablet  · insulin glargine 100 UNIT/ML injection vial  · lactobacillus capsule  · linezolid 600 MG tablet  · loperamide 1 MG/5ML solution  · QUEtiapine 50 MG tablet           Time Spent on discharge is  31 mins in patient examination,

## 2018-03-17 NOTE — PROGRESS NOTES
Patient was seen and examined. She is awake alert in no distress. Tolerating tube feeds at 40 mL per hour. Abdomen is benign. Her PEG tube site is clean. Extremity mild edema. Continue tube feeds as tolerated. Medical management.

## 2018-03-17 NOTE — PROGRESS NOTES
alert responsive  HEAD: normocephalic  EYES:  Not pale, anicteric   NOSE:  No nasal discharge. THROAT:  Oral cavity and pharynx normal.  Dry  CARDIAC: Normal S1 and S2. No S3, S4 or murmurs. Rhythm is regular. LUNGS: Clear to auscultation and percussion without rales, rhonchi, wheezing or diminished breath sounds. NECK: Neck supple, non-tender without lymphadenopathy, masses or thyromegaly. MUSKULOSKELETAL: Adequately aligned spine. No joint erythema or tenderness. EXTREMITIES: 2+ edema. Peripheral pulses intact. NEURO: Moving upper extremities      Labs:   CBC:  Recent Labs      03/15/18   0448   03/16/18   0524   03/16/18   2344  03/17/18   0512  03/17/18   0813   WBC  3.7   --   4.0   --    --   4.5   --    RBC  2.61*   --   2.60*   --    --   2.59*   --    HGB  7.3*   < >  7.5*   < >  8.1*  7.3*  7.5*   HCT  22.9*   < >  23.1*   < >  24.9*  22.8*  23.5*   MCV  87.7   --   89.0   --    --   87.8   --    MCH  27.8   --   28.7   --    --   28.3   --    MCHC  31.7   --   32.2   --    --   32.2   --    RDW  16.6*   --   16.7*   --    --   16.5*   --    PLT  238   --   276   --    --   326   --    MPV  8.3   --   8.5   --    --   8.3   --     < > = values in this interval not displayed. BMP:   Recent Labs      03/16/18   0524   03/16/18   1236  03/16/18   1953  03/17/18   0512  03/17/18   0813   NA  147*   < >  148*  145*  147*  144   K  3.2*   < >  3.9  3.5*  3.4*  3.4*   CL  107   < >  106  102  103  101   CO2  31   < >  33*  34*  34*  36*   BUN  16   --   16   --   13   --    CREATININE  0.52   --   0.52   --   0.44*   --    GLUCOSE  79   --   101*   --   117*   --    CALCIUM  8.0*   --   8.7   --   8.6   --     < > = values in this interval not displayed.       Phosphorus:    Recent Labs      03/15/18   0448   PHOS  3.9     Magnesium:   Recent Labs      03/15/18   0448  03/16/18   0524  03/17/18   0512   MG  1.8  1.7  1.8     Albumin:   No results for input(s): LABALBU in the last 72

## 2018-03-17 NOTE — PROGRESS NOTES
and Left-sided weakness. Social History:   reports that she has never smoked. She has never used smokeless tobacco. She reports that she does not drink alcohol or use drugs. Family History: History reviewed. No pertinent family history. Vitals:  BP (!) 125/53   Pulse 72   Temp 98.2 °F (36.8 °C) (Axillary)   Resp 16   Ht 5' 9\" (1.753 m)   Wt 199 lb 11.8 oz (90.6 kg)   SpO2 97%   BMI 29.50 kg/m²    Temp (24hrs), Av.4 °F (36.9 °C), Min:98.2 °F (36.8 °C), Max:98.6 °F (37 °C)    Recent Labs      18   0655  18   1037  18   1622  18   POCGLU  84  104  105  128*       I/O (24Hr):     Intake/Output Summary (Last 24 hours) at 18 0926  Last data filed at 18 0603   Gross per 24 hour   Intake              500 ml   Output             7050 ml   Net            -6550 ml       Labs:    Recent Results (from the past 24 hour(s))   POC Glucose Fingerstick    Collection Time: 18 10:37 AM   Result Value Ref Range    POC Glucose 104 65 - 105 mg/dL   Basic Metabolic Panel w/ Reflex to MG    Collection Time: 18 12:36 PM   Result Value Ref Range    Glucose 101 (H) 70 - 99 mg/dL    BUN 16 8 - 23 mg/dL    CREATININE 0.52 0.50 - 0.90 mg/dL    Bun/Cre Ratio NOT REPORTED 9 - 20    Calcium 8.7 8.6 - 10.4 mg/dL    Sodium 148 (H) 135 - 144 mmol/L    Potassium 3.9 3.7 - 5.3 mmol/L    Chloride 106 98 - 107 mmol/L    CO2 33 (H) 20 - 31 mmol/L    Anion Gap 9 9 - 17 mmol/L    GFR Non-African American >60 >60 mL/min    GFR African American >60 >60 mL/min    GFR Comment          GFR Staging NOT REPORTED    Hgb/Hct    Collection Time: 18  3:39 PM   Result Value Ref Range    Hemoglobin 7.6 (L) 12.0 - 16.0 g/dL    Hematocrit 23.9 (L) 36 - 46 %   POC Glucose Fingerstick    Collection Time: 18  4:22 PM   Result Value Ref Range    POC Glucose 105 65 - 105 mg/dL   Electrolytes    Collection Time: 18  7:53 PM   Result Value Ref Range    Sodium 145 (H) 135 - 144 mmol/L Potassium 3.5 (L) 3.7 - 5.3 mmol/L    Chloride 102 98 - 107 mmol/L    CO2 34 (H) 20 - 31 mmol/L    Anion Gap 9 9 - 17 mmol/L   POC Glucose Fingerstick    Collection Time: 03/16/18  8:52 PM   Result Value Ref Range    POC Glucose 128 (H) 65 - 105 mg/dL   Hgb/Hct    Collection Time: 03/16/18 11:44 PM   Result Value Ref Range    Hemoglobin 8.1 (L) 12.0 - 16.0 g/dL    Hematocrit 24.9 (L) 36 - 46 %   CBC auto differential    Collection Time: 03/17/18  5:12 AM   Result Value Ref Range    WBC 4.5 3.5 - 11.0 k/uL    RBC 2.59 (L) 4.0 - 5.2 m/uL    Hemoglobin 7.3 (L) 12.0 - 16.0 g/dL    Hematocrit 22.8 (L) 36 - 46 %    MCV 87.8 80 - 100 fL    MCH 28.3 26 - 34 pg    MCHC 32.2 31 - 37 g/dL    RDW 16.5 (H) 11.5 - 14.9 %    Platelets 210 124 - 280 k/uL    MPV 8.3 6.0 - 12.0 fL    NRBC Automated NOT REPORTED per 100 WBC    Differential Type NOT REPORTED     Immature Granulocytes NOT REPORTED 0 %    Absolute Immature Granulocyte NOT REPORTED 0.00 - 0.30 k/uL    WBC Morphology NOT REPORTED     RBC Morphology NOT REPORTED     Platelet Estimate NOT REPORTED     Seg Neutrophils 50 36 - 66 %    Lymphocytes 38 24 - 44 %    Monocytes 8 (H) 1 - 7 %    Eosinophils % 4 0 - 4 %    Basophils 0 0 - 2 %    Segs Absolute 2.25 1.3 - 9.1 k/uL    Absolute Lymph # 1.71 1.0 - 4.8 k/uL    Absolute Mono # 0.36 0.1 - 1.3 k/uL    Absolute Eos # 0.18 0.0 - 0.4 k/uL    Basophils # 0.00 0.0 - 0.2 k/uL    Morphology HYPOCHROMIA PRESENT    APTT    Collection Time: 03/17/18  5:12 AM   Result Value Ref Range    PTT 29.6 23.0 - 31.0 sec   Basic Metabolic Panel w/ Reflex to MG    Collection Time: 03/17/18  5:12 AM   Result Value Ref Range    Glucose 117 (H) 70 - 99 mg/dL    BUN 13 8 - 23 mg/dL    CREATININE 0.44 (L) 0.50 - 0.90 mg/dL    Bun/Cre Ratio NOT REPORTED 9 - 20    Calcium 8.6 8.6 - 10.4 mg/dL    Sodium 147 (H) 135 - 144 mmol/L    Potassium 3.4 (L) 3.7 - 5.3 mmol/L    Chloride 103 98 - 107 mmol/L    CO2 34 (H) 20 - 31 mmol/L    Anion Gap 10 9 - 17 mmol/L GFR Non-African American >60 >60 mL/min    GFR African American >60 >60 mL/min    GFR Comment          GFR Staging NOT REPORTED    Magnesium    Collection Time: 03/17/18  5:12 AM   Result Value Ref Range    Magnesium 1.8 1.6 - 2.6 mg/dL   Hgb/Hct    Collection Time: 03/17/18  8:13 AM   Result Value Ref Range    Hemoglobin 7.5 (L) 12.0 - 16.0 g/dL    Hematocrit 23.5 (L) 36 - 46 %   Electrolytes    Collection Time: 03/17/18  8:13 AM   Result Value Ref Range    Sodium 144 135 - 144 mmol/L    Potassium 3.4 (L) 3.7 - 5.3 mmol/L    Chloride 101 98 - 107 mmol/L    CO2 36 (H) 20 - 31 mmol/L    Anion Gap 7 (L) 9 - 17 mmol/L       Lab Results   Component Value Date/Time    SPECIAL NOT REPORTED 03/15/2018 05:07 PM     Lab Results   Component Value Date/Time    CULTURE NO SIGNIFICANT GROWTH 03/15/2018 05:07 PM    CULTURE  03/15/2018 05:07 PM     Performed at [address] [place] [address] [phone]       Radiology:    Ct Abdomen Pelvis Wo Contrast Additional Contrast? None    Result Date: 3/15/2018  EXAMINATION: CT OF THE ABDOMEN AND PELVIS WITHOUT CONTRAST, 3/15/2018 11:18 am TECHNIQUE: CT of the abdomen and pelvis was performed without the administration of intravenous contrast. Multiplanar reformatted images are provided for review. Dose modulation, iterative reconstruction, and/or weight based adjustment of the mA/kV was utilized to reduce the radiation dose to as low as reasonably achievable. COMPARISON: CT abdomen and pelvis dated 03/02/2018 HISTORY: ORDERING SYSTEM PROVIDED HISTORY: ABDOMINAL PAIN, RLQ TECHNOLOGIST PROVIDED HISTORY: Additional Contrast?->None Ordering Physician Provided Reason for Exam: ABDOMINAL PAIN, RLQ Acuity: Unknown Type of Exam:  Unknown FINDINGS: Lower Chest: There are small bilateral pleural effusions with bibasilar atelectasis, slightly increased from the previous study. Organs: Limited unenhanced assessment of the liver is within normal limits.  Gallbladder is [facility]  Vascular Lower Arterial Plethysmography Procedure   Patient Name   Amy Rajput Date of Study           03/06/2018                 L   Date of Birth  1940  Gender                  Female   Age            69 year(s)  Race                       Room Number    2002   Corporate ID # [id number]   Patient Acct # [de-identified]   MR #           [id number]      Han Brice, RVT   Accession #    [id number]   Interpreting Physician  Aleah Shaw   Referring                  Referring Physician     Mayra Rivas  Nurse  Practitioner  Procedure Type of Study:   Extremities Arteries: Lower Arterial Plethysmography, PVR Lower. Indications for Study:Pain, leg. Patient Status: In Patient. Technical Quality:Limited visualization. Limitation reason:Body habitus, bandages. Comments: Normal (0.95-1.3) Mild vascular insufficiency (0.7-0.94) Moderate vascular insufficiency (0.5-0.69) Severe vascular insufficiency (0.3-0.49) Critical Ischemia (0.1-0.29) Non-diagnostic due to calcification (>1.3) Toe pressure <40 mmHg poor wound healing potential  Conclusions   Summary   Right lower extremity ABIs and PVRs were performed for the evaluation of  peripheral vascular disease. Study demonstrates:   ANIKET suggests no vascular insufficiency at rest.   Signature   ----------------------------------------------------------------  Electronically signed by Mikel Alvarez RVT(Sonographer) on  03/06/2018 03:49 PM  ----------------------------------------------------------------   ----------------------------------------------------------------  Electronically signed by Thea Meckel, Mohammed(Interpreting  physician) on 03/06/2018 04:58 PM  ----------------------------------------------------------------  Findings:   Right Impression:  Decreased amplitude, biphasic waveform in the thigh. Normal amplitude, biphasic waveform at the calf and ankle.    ANIKET demonstrates no vascular insufficiency at rest.      Vl Upper compressibility and augmentation. non-compressible. Thrombus appears acute based on  Limited visualization of the lower thigh   B-Mode image evaluation. and calf veins. The saphenofemoral junction  Normal compressibility of the great        demonstrates no  saphenous vein. compressibility. Thrombus appears acute based on  Normal compressibility of the small        B-Mode image evaluation. saphenous vein. Limited visualization of the                                             calf veins. Normal compressibility of the                                             great saphenous vein. Normal compressibility of the                                             small saphenous vein. Vl Lower Extremity Venous Left    Result Date: 3/3/2018    [facility]  Vascular Lower Extremities DVT Study Procedure   Patient Name   Jason Humphrey Date of Study           03/03/2018                 L   Date of Birth  1940  Gender                  Female   Age            69 year(s)  Race                       Room Number    2002   Corporate ID # [id number]   Patient Acct # [de-identified]   MR #           [id number]      Erin Varghese, [facility]   Accession #    [id number]   Interpreting Physician  Gardenia Mckeon   Referring                  Referring Physician     Sandra Trinh  Nurse  Practitioner  Procedure Type of Study:   Veins: Lower Extremities DVT Study, Venous Scan Lower Left. Indications for Study:Swelling. Patient Status: In Patient. Technical Quality:Limited visualization.  Limitation reason:body habitus and swelling.   - Critical Result:INDU Grijalva Obey. Conclusions   Summary   Acute deep vein thrombosis of the left leg involving the common femoral,  superficial femoral, popliteal veins. Superficial thrombophlebitis of the lower extremity involving the left  greater saphenous vein, lesser saphenous vein, acute. Right common femoral vein has no DVT. Signature   ----------------------------------------------------------------  Electronically signed by Zofia Gregorio(Interpreting  physician) on 03/03/2018 02:46 PM  ----------------------------------------------------------------  Findings:   Right Impression:              Left Impression:  The common femoral vein        The common femoral, femoral and popliteal  demonstrates normal            veins are dilated and non-compressible with  compressibility and            hypoechoic echoes. augmentation. Limited visualization of the calf veins. Great saphenous vein is dilated and                                 non-compressible with hypoechoic echoes                                 from proximal knee to saphenofemoral                                 junction. The small saphenous vein is dilated and                                 non-compressible with hypoechoic echoes. Physical Examination:        Physical Exam    General Appearance:  Resting comfortably in bed  Mental status: oriented to person only  Head:  Normocephalic, atraumatic  Eye: drooping L eye noted  Ear: normal external ear, no discharge, hearing intact  Nose:  no drainage noted  Mouth: MOM  Neck: supple  Lungs: Bilateral equal air entry, clear to ausculation, no wheezing, rales or rhonchi, normal effort  Cardiovascular: normal rate, regular rhythm, no murmur, gallop, rub.   Abdomen: Soft, nontender, nondistended, normal bowel sounds, PEG in place  Neurologic: left hemiplegia noted, A&Ox3  Skin: No gross lesions, rashes, bruising or bleeding on exposed skin area  Extremities:  LLE edema to hip, RLE edema to knee, upper extremity edema improved  Psych: flat affect    Assessment:        Primary Problem  DKA, type 2, not at goal [facility])    ERIC/Josh Carvajal 110[address] Problems    Diagnosis Date Noted    Moderate malnutrition ([facility] [E44.0] 03/12/2018    Anemia [D64.9] 03/11/2018    Acute UTI [N39.0]     Thrombocytopenia (HCC) [D69.6]     Acute deep vein thrombosis (DVT) of lower extremity (HCC) [I82.409]     Suspected deep tissue injury [Z04.9] 03/02/2018    DKA, type 2, not at goal [facility]) [E13.10] 03/01/2018    Sepsis ([facility] [A41.9] 03/01/2018    UTI (urinary tract infection) [N39.0] 03/01/2018    Leukocytosis [D72.829] 03/01/2018    Increased anion gap metabolic acidosis [O13.7] 03/01/2018    SAMINA (acute kidney injury) ([facility] [N17.9] 03/01/2018    Hypocalcemia [E83.51] 03/01/2018    Hypokalemia [E87.6] 03/01/2018    Controlled type 2 diabetes mellitus with hyperglycemia, without long-term current use of insulin ([facility] [E11.65] 01/16/2018    Left-sided weakness [R53.1] 01/12/2018    Cerebrovascular accident (CVA) ([facility] [I63.9]     Dysarthria [R47.1]     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) ([facility] [G61.81] 07/29/2017    Hypertension [I10] 04/01/2017    Chronic inflammatory demyelinating neuropathy ([facility] [G61.81] 04/01/2017       Plan:        LLE DVT s/p IVC filter placement; normocytic mixed anemia stable  - Platelets 879  - Hgb stable at 7.5  - from 3/3 ferritin high, TIBC low, Fe sat low  - pos FOBT 3/07  - new onset c/o abd pain  - repeat CT showing urinary obstruction, diarrheal disease  - Hemeonc on board  - Repeat FOBT neg  - Retic count 2..4%, H&H Q8H  - Add iron supp    Anasarca; urinary obstruction  - s/p hydration from DKA and septic shock  - edematous extremities  - CT abd showing b/l hydronephrosis, urinary obstruction  -Neph on board   - Albumin BID   - 40 lasix IV BID  - Uro consult   - Iniguez placed, 1.7 liters urine out   - obstruction likely 2/2 stroke, DM   - cont iniguez  - I/Os  - Discharge, will f/u outpatient    UTI sepsis with metabolic encephalopathy, septic shock, resolving  - Urine culture showing morganella, group D entercoccus both sens to cipro   - Completed course cipro   - Repeat shows yeast > [id number]  - New onset temp, repeat UA positive Nitrite, small LE, many bacteria  - second repeat UA showing small LE, few bact, yeast  - f/u repeat culture grew VRE  - DC dipro -> start linezolid  - DC diflucan  - Transition to PO meds for PEG  - Discharge today with linezolid    Diarrhea w/ sacral wound  - poss 2/2 abx use, TF  - Air fluid levels on CT abd  - lactobacillus  - wound care consult  - DC flagyl, DC diflucan  - Add questran    Poss repeat CVA  - neuro on board  - EEG legible portions normal  - MRI when Pt stable  - Swallow eval rec pureed w/ thin liquids    Protein calorie malnutrition  - PEG 3/13  - TF  - Puree with thin liquids as tolerated  - 250mL free water Q6H    Delirium, icu psychosis, improving   - Family instructed to reorient patient  - Seroquel 50 HS    DKA with hx DM II, hypovolemic shock, resolved  -  on admission, anion gap 20, K 3.6, betahydroxy 2.05  - last vbg 7.426/34.5/56.3/22.7  - Lantus 12units HS, SSI    Adal Castanon MD  3/17/2018  9:26 AM   Attending Physician Statement  Patient seen and examined  I have discussed the care of the patient, including pertinent history and exam findings,  with the resident. I have reviewed the key elements of all parts of the encounter with the resident. I agree with the assessment, plan and orders as documented by the resident.     Maria Teresa Pizarro

## 2018-03-17 NOTE — PROGRESS NOTES
Physical Therapy  Facility/Department: [facility] PROGRESSIVE CARE  Daily Treatment Note  NAME: Alana Moon  : 1940  MRN: [id number]    Date of Service: 3/17/2018    Patient Diagnosis(es):   Patient Active Problem List    Diagnosis Date Noted    Moderate malnutrition ([address] 2018    Anemia 2018    Acute UTI     Thrombocytopenia (HCC)     Acute deep vein thrombosis (DVT) of lower extremity ([address]     Suspected deep tissue injury 2018    DKA, type 2, not at goal [facility]) 2018    Sepsis ([address] 2018    UTI (urinary tract infection) 2018    Leukocytosis 2018    Increased anion gap metabolic acidosis     SAMINA (acute kidney injury) ([address] 2018    Hypocalcemia 2018    Hypokalemia 2018    Controlled type 2 diabetes mellitus with hyperglycemia, without long-term current use of insulin ([address] 2018    Hyperglycemia     Left-sided weakness 2018    Cerebrovascular accident (CVA) ([address]     Left arm weakness     Facial droop     Dysarthria     Cerebral edema (HCC)     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 ([address] 01/10/2018    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) ([address] 2017    Foot deformity, bilateral 2017    Chronic inflammatory demyelinating neuropathy ([address] 2017    Hypertension 2017       Past Medical History:   Diagnosis Date    Cerebral edema ([address] 01/10/2018    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 ([address] 01/10/2018    Chronic inflammatory demyelinating polyneuropathy (HCC)     CIDP (chronic inflammatory demyelinating polyneuropathy) ([address]     Diabetes mellitus ([address]     Hyperlipidemia     Hypertension     Left arm weakness 01/10/2018    Left-sided weakness 01/10/2018     Past Surgical History:   Procedure Laterality Date    CHOLECYSTECTOMY      HYSTERECTOMY      partial    HYSTERECTOMY      [place] EGD PERCUTANEOUS PLACEMENT GASTROSTOMY TUBE N/A 3/13/2018    EGD ESOPHAGOGASTRODUODENOSCOPY PEG TUBE INSERTION performed by Alisa Echols MD at [place] [address]  01/10/2018    EMERGENT WITH CEREBRAL ANGIOGRAM    TONSILLECTOMY      TRANSESOPHAGEAL ECHOCARDIOGRAM  01/16/2018       Restrictions  Restrictions/Precautions  Restrictions/Precautions: Fall Risk  Required Braces or Orthoses? :  (bilateral PRAFA)  Position Activity Restriction  Other position/activity restrictions: PEG insertion on 3/13/18.   Subjective   Subjective  Subjective: no complains of pain  Pain Screening  Patient Currently in Pain: No  Vital Signs  Patient Currently in Pain: No       Orientation  Orientation  Overall Orientation Status: Within Functional Limits  Objective   Bed mobility  Supine to Sit: Unable to assess  Sit to Supine: Unable to assess  Transfers  Sit to Stand: Unable to assess  Stand to sit: Unable to assess  Ambulation  Ambulation?: No  Stairs/Curb  Stairs?: No        Exercises  Comments: PROM ex to LUE/LLE 10x [facility] RLE 10x      Assessment   REQUIRES PT FOLLOW UP: Yes  Activity Tolerance  Activity Tolerance: Patient Tolerated treatment well;Treatment limited secondary to medical complications (free text)       Discharge Recommendations:  ECF with PT (Per chart, family is taking pt home.)    Goals  Short term goals  Time Frame for Short term goals: 2-3 x / week  Short term goal 1: pt to tolerate exercise program for ROM x 10 reps  Short term goal 2: pt to tolerate 1/2 hour of therapuetic exercise and activity  Short term goal 3: pt to assist rolling in bed w/ right UE and max x 2  Patient Goals   Patient goals : unable to state    Plan    Plan  Times per week: 2-3x/ week  Times per day:  (2-3x/ week)  Specific instructions for Next Treatment: 3-2-18 hx CVA w/ left hemiparesis, dep for care, P to [facility] as tolerated  Current Treatment Recommendations: Strengthening, Balance Training, Transfer Training, Functional Mobility

## 2018-03-18 LAB
ABSOLUTE EOS #: 0.09 K/UL (ref 0–0.4)
ABSOLUTE IMMATURE GRANULOCYTE: ABNORMAL K/UL (ref 0–0.3)
ABSOLUTE LYMPH #: 1.67 K/UL (ref 1–4.8)
ABSOLUTE MONO #: 0.41 K/UL (ref 0.1–1.3)
ANION GAP SERPL CALCULATED.3IONS-SCNC: 5 MMOL/L (ref 9–17)
ANION GAP SERPL CALCULATED.3IONS-SCNC: 7 MMOL/L (ref 9–17)
ATYPICAL LYMPHOCYTE ABSOLUTE COUNT: 0.05 K/UL
ATYPICAL LYMPHOCYTES: 1 %
BASOPHILS # BLD: 1 % (ref 0–2)
BASOPHILS ABSOLUTE: 0.05 K/UL (ref 0–0.2)
BUN BLDV-MCNC: 13 MG/DL (ref 8–23)
BUN/CREAT BLD: ABNORMAL (ref 9–20)
CALCIUM SERPL-MCNC: 8.5 MG/DL (ref 8.6–10.4)
CHLORIDE BLD-SCNC: 105 MMOL/L (ref 98–107)
CHLORIDE BLD-SCNC: 106 MMOL/L (ref 98–107)
CO2: 35 MMOL/L (ref 20–31)
CO2: 35 MMOL/L (ref 20–31)
CREAT SERPL-MCNC: 0.41 MG/DL (ref 0.5–0.9)
DIFFERENTIAL TYPE: ABNORMAL
EOSINOPHILS RELATIVE PERCENT: 2 % (ref 0–4)
GFR AFRICAN AMERICAN: >60 ML/MIN
GFR NON-AFRICAN AMERICAN: >60 ML/MIN
GFR SERPL CREATININE-BSD FRML MDRD: ABNORMAL ML/MIN/{1.73_M2}
GFR SERPL CREATININE-BSD FRML MDRD: ABNORMAL ML/MIN/{1.73_M2}
GLUCOSE BLD-MCNC: 121 MG/DL (ref 65–105)
GLUCOSE BLD-MCNC: 128 MG/DL (ref 65–105)
GLUCOSE BLD-MCNC: 134 MG/DL (ref 70–99)
GLUCOSE BLD-MCNC: 142 MG/DL (ref 65–105)
GLUCOSE BLD-MCNC: 161 MG/DL (ref 65–105)
HCT VFR BLD CALC: 22 % (ref 36–46)
HCT VFR BLD CALC: 23.1 % (ref 36–46)
HEMOGLOBIN: 7.1 G/DL (ref 12–16)
HEMOGLOBIN: 7.5 G/DL (ref 12–16)
IMMATURE GRANULOCYTES: ABNORMAL %
LYMPHOCYTES # BLD: 37 % (ref 24–44)
MCH RBC QN AUTO: 28.6 PG (ref 26–34)
MCHC RBC AUTO-ENTMCNC: 32.5 G/DL (ref 31–37)
MCV RBC AUTO: 88.1 FL (ref 80–100)
MONOCYTES # BLD: 9 % (ref 1–7)
MORPHOLOGY: ABNORMAL
NRBC AUTOMATED: ABNORMAL PER 100 WBC
PARTIAL THROMBOPLASTIN TIME: 27.2 SEC (ref 23–31)
PDW BLD-RTO: 16.2 % (ref 11.5–14.9)
PLATELET # BLD: 321 K/UL (ref 150–450)
PLATELET ESTIMATE: ABNORMAL
PMV BLD AUTO: 8 FL (ref 6–12)
POTASSIUM SERPL-SCNC: 4 MMOL/L (ref 3.7–5.3)
POTASSIUM SERPL-SCNC: 4.3 MMOL/L (ref 3.7–5.3)
RBC # BLD: 2.63 M/UL (ref 4–5.2)
RBC # BLD: ABNORMAL 10*6/UL
SEG NEUTROPHILS: 50 % (ref 36–66)
SEGMENTED NEUTROPHILS ABSOLUTE COUNT: 2.23 K/UL (ref 1.3–9.1)
SODIUM BLD-SCNC: 146 MMOL/L (ref 135–144)
SODIUM BLD-SCNC: 147 MMOL/L (ref 135–144)
WBC # BLD: 4.5 K/UL (ref 3.5–11)
WBC # BLD: ABNORMAL 10*3/UL

## 2018-03-18 PROCEDURE — 80051 ELECTROLYTE PANEL: CPT

## 2018-03-18 PROCEDURE — 6360000002 HC RX W HCPCS: Performed by: INTERNAL MEDICINE

## 2018-03-18 PROCEDURE — 2060000000 HC ICU INTERMEDIATE R&B

## 2018-03-18 PROCEDURE — 6370000000 HC RX 637 (ALT 250 FOR IP): Performed by: RADIOLOGY

## 2018-03-18 PROCEDURE — 80048 BASIC METABOLIC PNL TOTAL CA: CPT

## 2018-03-18 PROCEDURE — 82947 ASSAY GLUCOSE BLOOD QUANT: CPT

## 2018-03-18 PROCEDURE — 6370000000 HC RX 637 (ALT 250 FOR IP): Performed by: INTERNAL MEDICINE

## 2018-03-18 PROCEDURE — 6370000000 HC RX 637 (ALT 250 FOR IP): Performed by: NURSE PRACTITIONER

## 2018-03-18 PROCEDURE — 36592 COLLECT BLOOD FROM PICC: CPT

## 2018-03-18 PROCEDURE — 85025 COMPLETE CBC W/AUTO DIFF WBC: CPT

## 2018-03-18 PROCEDURE — 85730 THROMBOPLASTIN TIME PARTIAL: CPT

## 2018-03-18 PROCEDURE — 99233 SBSQ HOSP IP/OBS HIGH 50: CPT | Performed by: INTERNAL MEDICINE

## 2018-03-18 PROCEDURE — 2580000003 HC RX 258: Performed by: INTERNAL MEDICINE

## 2018-03-18 PROCEDURE — 85014 HEMATOCRIT: CPT

## 2018-03-18 PROCEDURE — C9113 INJ PANTOPRAZOLE SODIUM, VIA: HCPCS | Performed by: INTERNAL MEDICINE

## 2018-03-18 PROCEDURE — 85018 HEMOGLOBIN: CPT

## 2018-03-18 PROCEDURE — P9046 ALBUMIN (HUMAN), 25%, 20 ML: HCPCS | Performed by: INTERNAL MEDICINE

## 2018-03-18 RX ORDER — FUROSEMIDE 40 MG/1
40 TABLET ORAL DAILY
Status: DISCONTINUED | OUTPATIENT
Start: 2018-03-19 | End: 2018-03-18

## 2018-03-18 RX ORDER — ALBUMIN (HUMAN) 12.5 G/50ML
25 SOLUTION INTRAVENOUS DAILY
Status: DISCONTINUED | OUTPATIENT
Start: 2018-03-19 | End: 2018-03-19 | Stop reason: HOSPADM

## 2018-03-18 RX ORDER — FUROSEMIDE 40 MG/1
40 TABLET ORAL 2 TIMES DAILY
Status: DISCONTINUED | OUTPATIENT
Start: 2018-03-18 | End: 2018-03-19

## 2018-03-18 RX ADMIN — FUROSEMIDE 40 MG: 40 TABLET ORAL at 16:48

## 2018-03-18 RX ADMIN — CHOLESTYRAMINE 4 G: 4 POWDER, FOR SUSPENSION ORAL at 10:02

## 2018-03-18 RX ADMIN — ALBUMIN (HUMAN) 25 G: 0.25 INJECTION, SOLUTION INTRAVENOUS at 09:14

## 2018-03-18 RX ADMIN — FUROSEMIDE 40 MG: 40 TABLET ORAL at 09:15

## 2018-03-18 RX ADMIN — INSULIN LISPRO 1 UNITS: 100 INJECTION, SOLUTION INTRAVENOUS; SUBCUTANEOUS at 20:45

## 2018-03-18 RX ADMIN — QUETIAPINE FUMARATE 50 MG: 25 TABLET ORAL at 20:45

## 2018-03-18 RX ADMIN — POTASSIUM CHLORIDE 20 MEQ: 1.5 POWDER, FOR SOLUTION ORAL at 20:45

## 2018-03-18 RX ADMIN — CLOPIDOGREL BISULFATE 75 MG: 75 TABLET ORAL at 09:15

## 2018-03-18 RX ADMIN — Medication 1 CAPSULE: at 09:16

## 2018-03-18 RX ADMIN — Medication 50000 UNITS: at 10:02

## 2018-03-18 RX ADMIN — LOPERAMIDE HYDROCHLORIDE 2 MG: 1 SOLUTION ORAL at 16:48

## 2018-03-18 RX ADMIN — Medication 400 MG: at 20:45

## 2018-03-18 RX ADMIN — FERROUS SULFATE TAB 325 MG (65 MG ELEMENTAL FE) 162.5 MG: 325 (65 FE) TAB at 09:15

## 2018-03-18 RX ADMIN — ACETAMINOPHEN 650 MG: 650 SOLUTION ORAL at 17:59

## 2018-03-18 RX ADMIN — COLLAGENASE SANTYL: 250 OINTMENT TOPICAL at 10:01

## 2018-03-18 RX ADMIN — PANTOPRAZOLE SODIUM 40 MG: 40 INJECTION, POWDER, FOR SOLUTION INTRAVENOUS at 10:01

## 2018-03-18 RX ADMIN — Medication 400 MG: at 09:15

## 2018-03-18 RX ADMIN — Medication 10 ML: at 21:33

## 2018-03-18 RX ADMIN — LINEZOLID 600 MG: 600 TABLET, FILM COATED ORAL at 20:45

## 2018-03-18 RX ADMIN — FERROUS SULFATE TAB 325 MG (65 MG ELEMENTAL FE) 162.5 MG: 325 (65 FE) TAB at 20:45

## 2018-03-18 RX ADMIN — ATORVASTATIN CALCIUM 10 MG: 10 TABLET, FILM COATED ORAL at 09:15

## 2018-03-18 RX ADMIN — PANTOPRAZOLE SODIUM 40 MG: 40 INJECTION, POWDER, FOR SOLUTION INTRAVENOUS at 21:33

## 2018-03-18 RX ADMIN — Medication 10 ML: at 10:01

## 2018-03-18 RX ADMIN — Medication 12 UNITS: at 20:46

## 2018-03-18 RX ADMIN — POTASSIUM CHLORIDE 20 MEQ: 1.5 POWDER, FOR SOLUTION ORAL at 09:15

## 2018-03-18 RX ADMIN — LINEZOLID 600 MG: 600 TABLET, FILM COATED ORAL at 09:15

## 2018-03-18 ASSESSMENT — PAIN SCALES - GENERAL: PAINLEVEL_OUTOF10: 3

## 2018-03-18 NOTE — PROGRESS NOTES
negative increase in drinking, increase in urination, hot or cold intolerance  MUSCULOSKELETAL:  negative joint pains, muscle aches, swelling of joints  NEUROLOGICAL:  Positive for chronic L hemiplegia  BEHAVIOR/PSYCH:  negative for agitation, sleeplessness    Medications: Allergies: Allergies   Allergen Reactions    Demeclocycline Shortness Of Breath    Tetracyclines & Related Shortness Of Breath       Current Meds:   Scheduled Meds:    furosemide  40 mg PEG Tube BID    collagenase   Topical Daily    cholestyramine light  4 g Per G Tube Daily    linezolid  600 mg Oral 2 times per day    atorvastatin  10 mg Oral Daily    ferrous sulfate  162.5 mg Oral BID    potassium chloride  20 mEq Per G Tube BID    magnesium oxide  400 mg Per G Tube BID    sodium chloride  250 mL Intravenous Once    albumin human  25 g Intravenous BID    LORazepam  0.5 mg Intravenous Once    ergocalciferol  50,000 Units Per NG tube Once per day on Sun    lactobacillus  1 capsule Oral Daily with breakfast    QUEtiapine  50 mg Oral Nightly    insulin glargine  12 Units Subcutaneous Nightly    sodium chloride  250 mL Intravenous Once    insulin lispro  0-6 Units Subcutaneous TID WC    insulin lispro  0-3 Units Subcutaneous Nightly    clopidogrel  75 mg Oral Daily    sodium chloride (PF)  10 mL Intravenous Q12H    And    pantoprazole  40 mg Intravenous BID     Continuous Infusions:    dextrose       PRN Meds: morphine, zolpidem, potassium chloride **OR** potassium chloride **OR** potassium chloride, midodrine, acetaminophen, Magic Mouthwash, glucose, dextrose, glucagon (rDNA), dextrose, magnesium sulfate, sodium phosphate IVPB **OR** sodium phosphate IVPB **OR** sodium phosphate IVPB    Data:     Past Medical History:   has a past medical history of Cerebral edema ([facility] [address] 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 ([facility] [address]  Chronic inflammatory demyelinating polyneuropathy (HCC); CIDP (chronic GFR Staging NOT REPORTED    POC Glucose Fingerstick    Collection Time: 03/18/18  7:22 AM   Result Value Ref Range    POC Glucose 128 (H) 65 - 105 mg/dL       Lab Results   Component Value Date/Time    SPECIAL NOT REPORTED 03/15/2018 05:07 PM     Lab Results   Component Value Date/Time    CULTURE NO SIGNIFICANT GROWTH 03/15/2018 05:07 PM    CULTURE  03/15/2018 05:07 PM     Performed at [address] [place] [address] [phone]       Radiology:    Ct Abdomen Pelvis Wo Contrast Additional Contrast? None    Result Date: 3/15/2018  EXAMINATION: CT OF THE ABDOMEN AND PELVIS WITHOUT CONTRAST, 3/15/2018 11:18 am TECHNIQUE: CT of the abdomen and pelvis was performed without the administration of intravenous contrast. Multiplanar reformatted images are provided for review. Dose modulation, iterative reconstruction, and/or weight based adjustment of the mA/kV was utilized to reduce the radiation dose to as low as reasonably achievable. COMPARISON: CT abdomen and pelvis dated 03/02/2018 HISTORY: ORDERING SYSTEM PROVIDED HISTORY: ABDOMINAL PAIN, RLQ TECHNOLOGIST PROVIDED HISTORY: Additional Contrast?->None Ordering Physician Provided Reason for Exam: ABDOMINAL PAIN, RLQ Acuity: Unknown Type of Exam:  Unknown FINDINGS: Lower Chest: There are small bilateral pleural effusions with bibasilar atelectasis, slightly increased from the previous study. Organs: Limited unenhanced assessment of the liver is within normal limits. Gallbladder is surgically absent. No significant biliary dilatation. Spleen is unremarkable. Pancreas is diffusely atrophied without evidence of acute process on these limited unenhanced images. Adrenal glands are within normal limits. There is moderate bilateral hydronephrosis and mild diffuse hydroureter. No obstructing calculi. There is mild bilateral perinephric stranding, similar to the previous study.   Assessment of renal parenchyma is limited without IV contrast. GI/Bowel: New are similar moderate hypertrophic degenerative changes of the visualized spine and shoulders. Visualized osseous structures appear mildly demineralized, but grossly intact, given the non dedicated imaging. No focal airspace consolidation or pulmonary vascular congestion. Ct Head Wo Contrast    Result Date: 3/1/2018  EXAMINATION: CT OF THE HEAD WITHOUT CONTRAST  3/1/2018 12:26 pm TECHNIQUE: CT of the head was performed without the administration of intravenous contrast. Dose modulation, iterative reconstruction, and/or weight based adjustment of the mA/kV was utilized to reduce the radiation dose to as low as reasonably achievable. COMPARISON: January 10 HISTORY: ORDERING SYSTEM PROVIDED HISTORY: AMS hx of CVA with Left sided deficits TECHNOLOGIST PROVIDED HISTORY: Has a \"code stroke\" or \"stroke alert\" been called? ->No Ordering Physician Provided Reason for Exam: AMS, HX OF CVA WITH LEFT SIDED DEFICITS Additional signs and symptoms: PATIENT UNABLE TO GIVE HISTORY. UNABLE TO STRAIGHTEN HAD FOR EXAM. FINDINGS: BRAIN/VENTRICLES: There is a large amount of volume loss in the right MCA distribution including in the perisylvian region. This is likely due to the patient's recent infarct. There is curvilinear high density in the right sylvian fissures suggesting age-indeterminate thrombus in the MCA branch. There is low-density and loss of differentiation in the right lentiform nucleus and lateral thalamic region. Focal low density in the right caudate head is noted suggesting prior infarct. ORBITS: The visualized portion of the orbits demonstrate no acute abnormality. SINUSES: The visualized paranasal sinuses and mastoid air cells demonstrate no acute abnormality. SOFT TISSUES/SKULL:  No acute abnormality of the visualized skull or soft tissues. 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   There is surrounding low density extending deep into the lentiform nucleus region and right lateral thalamic region. It is unclear whether this is gliosis from the prior ischemic event or a subtle superimposed area of new ischemia. MRI may be more helpful High density in the right MCA within the sylvian fissure. This is age indeterminate given the recent infarct. This may represent sequela of prior thrombus, however new thrombus not excluded. Again if the patient 8 current right hemispheric stroke symptoms, consider MRI     Ir Guided Ivc Filter Placement    Result Date: 3/12/2018  Radiology exam is complete. No Radiologist dictation. Please follow up with ordering provider. Us Renal Limited    Result Date: 3/2/2018  EXAMINATION: ULTRASOUND OF THE KIDNEYS 3/2/2018 2:21 pm COMPARISON: None. HISTORY: ORDERING SYSTEM PROVIDED HISTORY: RENAL FAILURE, ACUTE (KIDNEY INJURY) TECHNOLOGIST PROVIDED HISTORY: Ordering Physician Provided Reason for Exam: arf Acuity: Acute Type of Exam: Initial FINDINGS: The right kidney is less than optimally visualized due to surrounding bowel gas. The right kidney measures 12.8 x 5.8 x 5.6 cm and the left kidney 11.2 x 5.4 x 5.5 cm. The cortical thickness on the right is 17 mm and on the left is 18 mm. Kidneys demonstrate normal cortical echogenicity. No hydronephrosis or intrarenal stones. No focal lesions. Unremarkable ultrasound of the kidneys.      Vl Lower Extremity Arteries Right    Result Date: 3/6/2018    [facility]  [facility]ascular Lower Arterial Plethysmography Procedure   Patient Name   Jason Barber Date of Study           03/06/2018                 L   Date of Birth  1940  Gender                  Female   Age            69 year(s)  Race                       Room Number    2002   Corporate ID # [id number]   Patient Acct # [de-identified]   MR #           [id number]      Meenakshi Langley ELBA   Accession #    [id number]   Interpreting Physician  Zac Smith   Referring compressibility of the small        B-Mode image evaluation. saphenous vein. Limited visualization of the                                             calf veins. Normal compressibility of the                                             great saphenous vein. Normal compressibility of the                                             small saphenous vein. Vl Lower Extremity Venous Left    Result Date: 3/3/2018    [facility]  Vascular Lower Extremities DVT Study Procedure   Patient Name   Eric Cota Date of Study           03/03/2018                 L   Date of Birth  1940  Gender                  Female   Age            69 year(s)  Race                       Room Number    2002   Corporate ID # [id number]   Patient Acct # [de-identified]   MR #           [id number]      Davi Barrios [facility]   Accession #    [id number]   Interpreting Physician  Haroon Heard   Referring                  Referring Physician     Carin Newman  Nurse  Practitioner  Procedure Type of Study:   Veins: Lower Extremities DVT Study, Venous Scan Lower Left. Indications for Study:Swelling. Patient Status: In Patient. Technical Quality:Limited visualization. Limitation reason:body habitus and swelling.   - Critical Result:INDU Joaquin, INDU Palmer Conception. Conclusions   Summary   Acute deep vein thrombosis of the left leg involving the common femoral,  superficial femoral, popliteal veins. Superficial thrombophlebitis of the lower extremity involving the left  greater saphenous vein, lesser saphenous vein, acute. Right common femoral vein has no DVT.    Signature   ----------------------------------------------------------------  Electronically signed by Tevin Gregorio(Interpreting  physician) on 03/03/2018 02:46 PM  ----------------------------------------------------------------  Findings:   Right Impression:              Left Impression:  The common femoral vein        The common femoral, femoral and popliteal  demonstrates normal            veins are dilated and non-compressible with  compressibility and            hypoechoic echoes. augmentation. Limited visualization of the calf veins. Great saphenous vein is dilated and                                 non-compressible with hypoechoic echoes                                 from proximal knee to saphenofemoral                                 junction. The small saphenous vein is dilated and                                 non-compressible with hypoechoic echoes. Physical Examination:        Physical Exam    General Appearance:  Resting comfortably in bed  Mental status: oriented to person only  Head:  Normocephalic, atraumatic  Eye: drooping L eye noted  Ear: normal external ear, no discharge, hearing intact  Nose:  no drainage noted  Mouth: MOM  Neck: supple  Lungs: Bilateral equal air entry, clear to ausculation, no wheezing, rales or rhonchi, normal effort  Cardiovascular: normal rate, regular rhythm, no murmur, gallop, rub.   Abdomen: Soft, nontender, nondistended, normal bowel sounds, PEG in place  Neurologic: left hemiplegia noted, A&Ox3  Skin: No gross lesions, rashes, bruising or bleeding on exposed skin area  Extremities: B/L LE edema greatly improved to below knee, LUE edema stable  Psych: flat affect    Assessment:        Primary Problem  DKA, type 2, not at goal [facility])    ERIC/Josh Carvajal 1106 Problems    Diagnosis Date Noted    Moderate malnutrition ([facility] [address] [E44.0] 03/12/2018    Anemia [D64.9] 03/11/2018    Acute UTI [N39.0]     Thrombocytopenia (HCC) [D69.6]     Acute deep vein thrombosis (DVT) of lower extremity (HCC) [I82.409]     Suspected deep

## 2018-03-18 NOTE — PROGRESS NOTES
NEPHROLOGY PROGRESS NOTE    Patient :  Ken Raza; 69 y.o. MRN# [id number]  Location:    Attending:  Mai Maldonado MD  Admit Date:  3/1/2018   Hospital Day: 16    Subjective: 69 y.o. 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 She was admitted to ICU with a diagnosis of DKA and sepsis. Initial laboratory studies were remarkable for serum bicarbonate 8 mmol/L, ketoacidosis and serum creatinine 2.7 mg/dL. She was hypotensive and required high dose Levophed. Recently had iniguez catheter placed for bilat hydronephrosis seen on ABD ct SCAN after pt complained of abdominal pain. Today she has no SOB and left leg and UE swelling are stable. Sh is tolerating tube feeding. Objective:  CURRENT TEMPERATURE:  Temp: 98.5 °F (36.9 °C)  MAXIMUM TEMPERATURE OVER 24HRS:  Temp (24hrs), Av.2 °F (37.3 °C), Min:98.5 °F (36.9 °C), Max:100.2 °F (37.9 °C)    CURRENT RESPIRATORY RATE:  Resp: 18  CURRENT PULSE:  Pulse: 73  CURRENT BLOOD PRESSURE:  BP: (!) 129/57  24HR BLOOD PRESSURE RANGE:  Systolic (98HVA), WXI:679 , Min:129 , QKI:731   ; Diastolic (28AVN), IHH:86, Min:57, Max:71    24HR INTAKE/OUTPUT:      Intake/Output Summary (Last 24 hours) at 18 1513  Last data filed at 18 0543   Gross per 24 hour   Intake             1738 ml   Output             1600 ml   Net              138 ml     Patient Vitals for the past 96 hrs (Last 3 readings):   Weight   18 0515 199 lb 11.8 oz (90.6 kg)   03/15/18 0545 212 lb 1.3 oz (96.2 kg)       Physical Exam:  GENERAL APPEARANCE: More awake and alert responsive  HEAD: normocephalic  EYES:  Not pale, anicteric   NOSE:  No nasal discharge. THROAT:  Oral cavity and pharynx normal.  Dry  CARDIAC: Normal S1 and S2. No S3, S4 or murmurs. Rhythm is regular.   LUNGS: Clear to auscultation and percussion without rales, rhonchi,

## 2018-03-19 VITALS
BODY MASS INDEX: 29.58 KG/M2 | HEIGHT: 69 IN | DIASTOLIC BLOOD PRESSURE: 60 MMHG | RESPIRATION RATE: 20 BRPM | HEART RATE: 79 BPM | SYSTOLIC BLOOD PRESSURE: 142 MMHG | WEIGHT: 199.74 LBS | OXYGEN SATURATION: 99 % | TEMPERATURE: 99.7 F

## 2018-03-19 LAB
ABSOLUTE BANDS #: 0.15 K/UL (ref 0–1)
ABSOLUTE EOS #: 0.05 K/UL (ref 0–0.4)
ABSOLUTE IMMATURE GRANULOCYTE: ABNORMAL K/UL (ref 0–0.3)
ABSOLUTE LYMPH #: 1.13 K/UL (ref 1–4.8)
ABSOLUTE MONO #: 0.2 K/UL (ref 0.1–1.3)
ALBUMIN SERPL-MCNC: 3 G/DL (ref 3.5–5.2)
ANION GAP SERPL CALCULATED.3IONS-SCNC: 9 MMOL/L (ref 9–17)
BANDS: 3 % (ref 0–10)
BASOPHILS # BLD: 1 % (ref 0–2)
BASOPHILS ABSOLUTE: 0.05 K/UL (ref 0–0.2)
BUN BLDV-MCNC: 15 MG/DL (ref 8–23)
BUN/CREAT BLD: ABNORMAL (ref 9–20)
CALCIUM SERPL-MCNC: 8.3 MG/DL (ref 8.6–10.4)
CHLORIDE BLD-SCNC: 105 MMOL/L (ref 98–107)
CO2: 33 MMOL/L (ref 20–31)
CREAT SERPL-MCNC: 0.43 MG/DL (ref 0.5–0.9)
DIFFERENTIAL TYPE: ABNORMAL
EOSINOPHILS RELATIVE PERCENT: 1 % (ref 0–4)
GFR AFRICAN AMERICAN: >60 ML/MIN
GFR NON-AFRICAN AMERICAN: >60 ML/MIN
GFR SERPL CREATININE-BSD FRML MDRD: ABNORMAL ML/MIN/{1.73_M2}
GFR SERPL CREATININE-BSD FRML MDRD: ABNORMAL ML/MIN/{1.73_M2}
GLUCOSE BLD-MCNC: 138 MG/DL (ref 65–105)
GLUCOSE BLD-MCNC: 143 MG/DL (ref 65–105)
GLUCOSE BLD-MCNC: 156 MG/DL (ref 65–105)
GLUCOSE BLD-MCNC: 168 MG/DL (ref 70–99)
HCT VFR BLD CALC: 23.4 % (ref 36–46)
HEMOGLOBIN: 7.7 G/DL (ref 12–16)
IMMATURE GRANULOCYTES: ABNORMAL %
LYMPHOCYTES # BLD: 23 % (ref 24–44)
MCH RBC QN AUTO: 29.6 PG (ref 26–34)
MCHC RBC AUTO-ENTMCNC: 32.9 G/DL (ref 31–37)
MCV RBC AUTO: 89.8 FL (ref 80–100)
MONOCYTES # BLD: 4 % (ref 1–7)
MORPHOLOGY: ABNORMAL
NRBC AUTOMATED: ABNORMAL PER 100 WBC
PARTIAL THROMBOPLASTIN TIME: 25.9 SEC (ref 23–31)
PDW BLD-RTO: 16.6 % (ref 11.5–14.9)
PLATELET # BLD: 345 K/UL (ref 150–450)
PLATELET ESTIMATE: ABNORMAL
PMV BLD AUTO: 8 FL (ref 6–12)
POTASSIUM SERPL-SCNC: 4.2 MMOL/L (ref 3.7–5.3)
RBC # BLD: 2.61 M/UL (ref 4–5.2)
RBC # BLD: ABNORMAL 10*6/UL
SEG NEUTROPHILS: 68 % (ref 36–66)
SEGMENTED NEUTROPHILS ABSOLUTE COUNT: 3.32 K/UL (ref 1.3–9.1)
SODIUM BLD-SCNC: 147 MMOL/L (ref 135–144)
WBC # BLD: 4.9 K/UL (ref 3.5–11)
WBC # BLD: ABNORMAL 10*3/UL

## 2018-03-19 PROCEDURE — 82947 ASSAY GLUCOSE BLOOD QUANT: CPT

## 2018-03-19 PROCEDURE — 97110 THERAPEUTIC EXERCISES: CPT

## 2018-03-19 PROCEDURE — 85025 COMPLETE CBC W/AUTO DIFF WBC: CPT

## 2018-03-19 PROCEDURE — 6360000002 HC RX W HCPCS: Performed by: RADIOLOGY

## 2018-03-19 PROCEDURE — 6370000000 HC RX 637 (ALT 250 FOR IP): Performed by: RADIOLOGY

## 2018-03-19 PROCEDURE — 85730 THROMBOPLASTIN TIME PARTIAL: CPT

## 2018-03-19 PROCEDURE — 6370000000 HC RX 637 (ALT 250 FOR IP): Performed by: INTERNAL MEDICINE

## 2018-03-19 PROCEDURE — 82040 ASSAY OF SERUM ALBUMIN: CPT

## 2018-03-19 PROCEDURE — P9046 ALBUMIN (HUMAN), 25%, 20 ML: HCPCS | Performed by: RADIOLOGY

## 2018-03-19 PROCEDURE — 92526 ORAL FUNCTION THERAPY: CPT

## 2018-03-19 PROCEDURE — 99239 HOSP IP/OBS DSCHRG MGMT >30: CPT | Performed by: INTERNAL MEDICINE

## 2018-03-19 PROCEDURE — 6360000002 HC RX W HCPCS: Performed by: INTERNAL MEDICINE

## 2018-03-19 PROCEDURE — 80048 BASIC METABOLIC PNL TOTAL CA: CPT

## 2018-03-19 RX ORDER — LINEZOLID 600 MG/1
600 TABLET, FILM COATED ORAL EVERY 12 HOURS SCHEDULED
Qty: 22 TABLET | Refills: 0 | Status: SHIPPED | OUTPATIENT
Start: 2018-03-19 | End: 2018-03-30

## 2018-03-19 RX ORDER — HYDROCHLOROTHIAZIDE 25 MG/1
25 TABLET ORAL DAILY
Qty: 30 TABLET | Refills: 3 | Status: SHIPPED | OUTPATIENT
Start: 2018-03-19

## 2018-03-19 RX ORDER — HYDROCHLOROTHIAZIDE 25 MG/1
25 TABLET ORAL DAILY
Status: DISCONTINUED | OUTPATIENT
Start: 2018-03-19 | End: 2018-03-19 | Stop reason: HOSPADM

## 2018-03-19 RX ADMIN — LINEZOLID 600 MG: 600 TABLET, FILM COATED ORAL at 10:08

## 2018-03-19 RX ADMIN — FERROUS SULFATE TAB 325 MG (65 MG ELEMENTAL FE) 162.5 MG: 325 (65 FE) TAB at 10:08

## 2018-03-19 RX ADMIN — ALBUMIN (HUMAN) 25 G: 0.25 INJECTION, SOLUTION INTRAVENOUS at 10:07

## 2018-03-19 RX ADMIN — HYDROCHLOROTHIAZIDE 25 MG: 25 TABLET ORAL at 15:32

## 2018-03-19 RX ADMIN — Medication 400 MG: at 10:08

## 2018-03-19 RX ADMIN — LOPERAMIDE HYDROCHLORIDE 2 MG: 1 SOLUTION ORAL at 10:07

## 2018-03-19 RX ADMIN — FUROSEMIDE 40 MG: 40 TABLET ORAL at 12:58

## 2018-03-19 RX ADMIN — DARBEPOETIN ALFA 25 MCG: 25 INJECTION, SOLUTION INTRAVENOUS; SUBCUTANEOUS at 15:32

## 2018-03-19 RX ADMIN — POTASSIUM CHLORIDE 20 MEQ: 1.5 POWDER, FOR SOLUTION ORAL at 10:07

## 2018-03-19 RX ADMIN — INSULIN LISPRO 1 UNITS: 100 INJECTION, SOLUTION INTRAVENOUS; SUBCUTANEOUS at 09:52

## 2018-03-19 RX ADMIN — CHOLESTYRAMINE 4 G: 4 POWDER, FOR SUSPENSION ORAL at 10:07

## 2018-03-19 RX ADMIN — Medication 1 CAPSULE: at 12:58

## 2018-03-19 RX ADMIN — ATORVASTATIN CALCIUM 10 MG: 10 TABLET, FILM COATED ORAL at 10:08

## 2018-03-19 RX ADMIN — CLOPIDOGREL BISULFATE 75 MG: 75 TABLET ORAL at 10:08

## 2018-03-19 ASSESSMENT — PAIN DESCRIPTION - ORIENTATION
ORIENTATION: LEFT
ORIENTATION: LEFT

## 2018-03-19 ASSESSMENT — PAIN SCALES - GENERAL
PAINLEVEL_OUTOF10: 3
PAINLEVEL_OUTOF10: 1

## 2018-03-19 ASSESSMENT — PAIN DESCRIPTION - PAIN TYPE
TYPE: CHRONIC PAIN
TYPE: CHRONIC PAIN

## 2018-03-19 ASSESSMENT — PAIN DESCRIPTION - LOCATION
LOCATION: LEG
LOCATION: LEG

## 2018-03-19 NOTE — FLOWSHEET NOTE
SC visit with patient's daughter; patient busy receiving care; patient to be discharged today; hopeful;     03/19/18 1630   Encounter Summary   Services provided to: Family   Referral/Consult From: Rounding   Continue Visiting (3/19/18)   Complexity of Encounter Low   Length of Encounter 15 minutes   Spiritual Assessment Completed Yes   Routine   Type Follow up   Assessment Approachable; Hopeful;Coping   Intervention [place]; Discussed illness/injury and it's impact;Sustaining presence/ Ministry of presence   Outcome Expressed gratitude;Engaged in conversation;Coping;Encouraged; Hopeful;Receptive

## 2018-03-19 NOTE — PROGRESS NOTES
OR    THROMBECTOMY  01/10/2018    EMERGENT WITH CEREBRAL ANGIOGRAM    TONSILLECTOMY      TRANSESOPHAGEAL ECHOCARDIOGRAM  01/16/2018       FAMILY HISTORY    History reviewed. No pertinent family history. SOCIAL HISTORY    Social History   Substance Use Topics    Smoking status: Never Smoker    Smokeless tobacco: Never Used    Alcohol use No       ALLERGIES    Allergies   Allergen Reactions    Demeclocycline Shortness Of Breath    Tetracyclines & Related Shortness Of Breath       MEDICATIONS    No current facility-administered medications on file prior to encounter.       Current Outpatient Prescriptions on File Prior to Encounter   Medication Sig Dispense Refill    hydrALAZINE (APRESOLINE) 25 MG tablet Take 1 tablet by mouth every 8 hours 90 tablet 3    tamsulosin (FLOMAX) 0.4 MG capsule Take 1 capsule by mouth daily 30 capsule 3    clopidogrel (PLAVIX) 75 MG tablet Take 1 tablet by mouth daily 30 tablet 3    metFORMIN (GLUCOPHAGE) 1000 MG tablet Take 1,000 mg by mouth 2 times daily (with meals)      simvastatin (ZOCOR) 20 MG tablet Take 1 tablet by mouth nightly 30 tablet 3    metFORMIN (GLUCOPHAGE) 1000 MG tablet Take 1,000 mg by mouth 2 times daily (with meals)      glimepiride (AMARYL) 4 MG tablet Take 4 mg by mouth 2 times daily      fenofibrate (TRICOR) 48 MG tablet Take 48 mg by mouth daily      Omega-3 Fatty Acids (FISH OIL) 1000 MG CAPS Take 3,000 mg by mouth daily         Objective    /76   Pulse 80   Temp 98.4 °F (36.9 °C) (Oral)   Resp 18   Ht 5' 9\" (1.753 m)   Wt 199 lb 11.8 oz (90.6 kg)   SpO2 93%   BMI 29.50 kg/m²     LABS:  WBC:    Lab Results   Component Value Date    WBC 4.9 03/19/2018     H/H:    Lab Results   Component Value Date    HGB 7.7 03/19/2018    HCT 23.4 03/19/2018     PTT:    Lab Results   Component Value Date    APTT 25.9 03/19/2018   [APTT}  PT/INR:    Lab Results   Component Value Date    PROTIME 11.4 03/03/2018    INR 1.1 03/03/2018     HgBA1c:    Lab Results   Component Value Date    LABA1C 7.1 01/12/2018       Assessment   Louie Risk Score: Louie Scale Score: 14    Patient Active Problem List   Diagnosis Code    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 ([facility]) I63.311    Cerebrovascular accident (CVA) ([facility]) I63.9    Left arm weakness R29.898    Facial droop R29.810    Dysarthria R47.1    Cerebral edema ([facility]) G93.6    Left-sided weakness R53.1    Hyperglycemia R73.9    Controlled type 2 diabetes mellitus with hyperglycemia, without long-term current use of insulin ([facility]) E11.65    DKA, type 2, not at goal [facility]) E13.10    Sepsis ([facility] [address] A41.9    UTI (urinary tract infection) N39.0    Leukocytosis D72.829    Increased anion gap metabolic acidosis L02.2    SAMINA (acute kidney injury) ([facility] [address] N17.9    Hypocalcemia E83.51    Hypokalemia E87.6    Suspected deep tissue injury Z04.9    Acute UTI N39.0    Thrombocytopenia ([facility]) D69.6    Acute deep vein thrombosis (DVT) of lower extremity ([facility]) I82.409    Chronic inflammatory demyelinating neuropathy ([facility]) G61.81    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) ([facility]) G61.81    Foot deformity, bilateral M21.961, M21.962    Hypertension I10    Anemia D64.9    Moderate malnutrition ([facility]) E44.0       Measurements:  Wound 03/01/18 Coccyx non-blanchable purple, unstageable pressure ulcer (Active)   Wound Image   3/2/2018  8:00 AM   Wound Type Wound 3/19/2018 12:30 PM   Wound Deep tissue/Injury 3/19/2018 12:30 PM   Dressing Status Clean;Dry; Intact 3/19/2018  1:10 AM   Dressing Changed Changed/New 3/19/2018 12:30 PM   Dressing/Treatment Moisture barrier 3/19/2018 12:30 PM   Wound Cleansed Rinsed/Irrigated with saline 3/16/2018  9:21 AM   Dressing Change Due 03/19/18 3/16/2018  9:21 AM   Wound Length (cm) 4 cm 3/19/2018 12:30 PM   Wound Width (cm) 4 cm 3/19/2018 12:30 PM   Wound Depth (cm)  0.1 3/16/2018  9:21 AM   Calculated Wound Size (cm^2) (l*w) 16 cm^2

## 2018-03-19 NOTE — PROGRESS NOTES
PT      Goals  Short term goals  Time Frame for Short term goals: 2-3 x / week  Short term goal 1: pt to tolerate exercise program for ROM x 10 reps  Short term goal 2: pt to tolerate 1/2 hour of therapuetic exercise and activity  Short term goal 3: pt to assist rolling in bed w/ right UE and max x 2  Patient Goals   Patient goals : unable to state    Plan    Plan  Times per week: 2-3x/ week  Times per day:  (2-3x/ week)  Specific instructions for Next Treatment: 3-2-18 hx CVA w/ left hemiparesis, dep for care, P to [facility] as tolerated  Current Treatment Recommendations: Strengthening, Balance Training, Transfer Training, Functional Mobility Training, Endurance Training, Home Exercise Program, Positioning, Neuromuscular Re-education  Plan Comment: to continue PT per POC  Safety Devices  Type of devices: Call light within reach, Left in bed  Restraints  Initially in place: No     Therapy Time   Individual Concurrent Group Co-treatment   Time In 1105         Time Out 1128         Minutes AUBRIE Briceño

## 2018-03-19 NOTE — PROGRESS NOTES
or murmurs. Rhythm is regular. LUNGS: Clear to auscultation and percussion without rales, rhonchi, wheezing or diminished breath sounds. NECK: Neck supple, non-tender without lymphadenopathy, masses or thyromegaly. MUSKULOSKELETAL: Adequately aligned spine. No joint erythema or tenderness. EXTREMITIES: 2+ left upper extremity edema. Peripheral pulses are intact. NEURO: Moving upper extremities      Labs:   CBC:  Recent Labs      03/17/18   0512   03/18/18   0032  03/18/18   0521  03/19/18   0517   WBC  4.5   --    --   4.5  4.9   RBC  2.59*   --    --   2.63*  2.61*   HGB  7.3*   < >  7.1*  7.5*  7.7*   HCT  22.8*   < >  22.0*  23.1*  23.4*   MCV  87.8   --    --   88.1  89.8   MCH  28.3   --    --   28.6  29.6   MCHC  32.2   --    --   32.5  32.9   RDW  16.5*   --    --   16.2*  16.6*   PLT  326   --    --   321  345   MPV  8.3   --    --   8.0  8.0    < > = values in this interval not displayed. BMP:   Recent Labs      03/17/18   0512   03/18/18   0521  03/18/18   1345  03/19/18   0517   NA  147*   < >  147*  146*  147*   K  3.4*   < >  4.0  4.3  4.2   CL  103   < >  105  106  105   CO2  34*   < >  35*  35*  33*   BUN  13   --   13   --   15   CREATININE  0.44*   --   0.41*   --   0.43*   GLUCOSE  117*   --   134*   --   168*   CALCIUM  8.6   --   8.5*   --   8.3*    < > = values in this interval not displayed. Phosphorus:    No results for input(s): PHOS in the last 72 hours. Magnesium:   Recent Labs      03/17/18   0512   MG  1.8     Albumin:   Recent Labs      03/19/18 0517   LABALBU  3.0*       Assessment/plan:    1. Acute kidney injury - secondary to ischemic ATN. Renal function is back to normal and stable. She is nonoliguric. 2.  Moderate bilateral hydronephrosis - secondary to urinary retention retention. Continue indwelling Alegre catheter. 3.  Hypernatremia - Consequent to dehydration and diarrhea.  Continue free water 250 every 6 hours  Discontinue loop diuretic and start HCTZ.    4.  Edema -  more pronounced on the left side. Ace wrap to  lower extremities. Doppler of left upper extremity. 5.  Vitamin D deficiencycontinue ergocalciferol 50,000 units p.o. once a week. 6.  Normocytic anemia - start Aranesp. Discussed with patient's daughter. Shilpa Carrillo MD,FACP.   Attending nephrologist.

## 2018-03-19 NOTE — PROGRESS NOTES
itching  ENDOCRINE:  negative increase in drinking, increase in urination, hot or cold intolerance  MUSCULOSKELETAL:  Positive for leg cramping pain  NEUROLOGICAL:  Positive for chronic L hemiplegia  BEHAVIOR/PSYCH:  negative for agitation, sleeplessness    Medications: Allergies: Allergies   Allergen Reactions    Demeclocycline Shortness Of Breath    Tetracyclines & Related Shortness Of Breath       Current Meds:   Scheduled Meds:    albumin human  25 g Intravenous Daily    furosemide  40 mg PEG Tube BID    collagenase   Topical Daily    cholestyramine light  4 g Per G Tube Daily    linezolid  600 mg Oral 2 times per day    atorvastatin  10 mg Oral Daily    ferrous sulfate  162.5 mg Oral BID    potassium chloride  20 mEq Per G Tube BID    magnesium oxide  400 mg Per G Tube BID    sodium chloride  250 mL Intravenous Once    LORazepam  0.5 mg Intravenous Once    ergocalciferol  50,000 Units Per NG tube Once per day on Sun    lactobacillus  1 capsule Oral Daily with breakfast    QUEtiapine  50 mg Oral Nightly    insulin glargine  12 Units Subcutaneous Nightly    sodium chloride  250 mL Intravenous Once    insulin lispro  0-6 Units Subcutaneous TID WC    insulin lispro  0-3 Units Subcutaneous Nightly    clopidogrel  75 mg Oral Daily    sodium chloride (PF)  10 mL Intravenous Q12H    And    pantoprazole  40 mg Intravenous BID     Continuous Infusions:    dextrose       PRN Meds: loperamide, morphine, zolpidem, potassium chloride **OR** potassium chloride **OR** potassium chloride, midodrine, acetaminophen, Magic Mouthwash, glucose, dextrose, glucagon (rDNA), dextrose, magnesium sulfate, sodium phosphate IVPB **OR** sodium phosphate IVPB **OR** sodium phosphate IVPB    Data:     Past Medical History:   has a past medical history of Cerebral edema ([facility] 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 ([facility]  Chronic inflammatory demyelinating polyneuropathy ([facility] CIDP NOT REPORTED     Immature Granulocytes NOT REPORTED 0 %    Absolute Immature Granulocyte NOT REPORTED 0.00 - 0.30 k/uL    WBC Morphology NOT REPORTED     RBC Morphology NOT REPORTED     Platelet Estimate NOT REPORTED     Seg Neutrophils 68 (H) 36 - 66 %    Lymphocytes 23 (L) 24 - 44 %    Monocytes 4 1 - 7 %    Eosinophils % 1 0 - 4 %    Basophils 1 0 - 2 %    Bands 3 0 - 10 %    Segs Absolute 3.32 1.3 - 9.1 k/uL    Absolute Lymph # 1.13 1.0 - 4.8 k/uL    Absolute Mono # 0.20 0.1 - 1.3 k/uL    Absolute Eos # 0.05 0.0 - 0.4 k/uL    Basophils # 0.05 0.0 - 0.2 k/uL    Absolute Bands # 0.15 0.0 - 1.0 k/uL    Morphology HYPOCHROMIA PRESENT    APTT    Collection Time: 03/19/18  5:17 AM   Result Value Ref Range    PTT 25.9 23.0 - 31.0 sec   Basic Metabolic Panel w/ Reflex to MG    Collection Time: 03/19/18  5:17 AM   Result Value Ref Range    Glucose 168 (H) 70 - 99 mg/dL    BUN 15 8 - 23 mg/dL    CREATININE 0.43 (L) 0.50 - 0.90 mg/dL    Bun/Cre Ratio NOT REPORTED 9 - 20    Calcium 8.3 (L) 8.6 - 10.4 mg/dL    Sodium 147 (H) 135 - 144 mmol/L    Potassium 4.2 3.7 - 5.3 mmol/L    Chloride 105 98 - 107 mmol/L    CO2 33 (H) 20 - 31 mmol/L    Anion Gap 9 9 - 17 mmol/L    GFR Non-African American >60 >60 mL/min    GFR African American >60 >60 mL/min    GFR Comment          GFR Staging NOT REPORTED    POC Glucose Fingerstick    Collection Time: 03/19/18  7:37 AM   Result Value Ref Range    POC Glucose 156 (H) 65 - 105 mg/dL       Lab Results   Component Value Date/Time    SPECIAL NOT REPORTED 03/15/2018 05:07 PM     Lab Results   Component Value Date/Time    CULTURE NO SIGNIFICANT GROWTH 03/15/2018 05:07 PM    CULTURE  03/15/2018 05:07 PM     Performed at [address] [place] [address] [phone]       Radiology:    Ct Abdomen Pelvis Wo Contrast Additional Contrast? None    Result Date: 3/15/2018  EXAMINATION: CT OF THE ABDOMEN AND PELVIS WITHOUT CONTRAST, 3/15/2018 11:18 am TECHNIQUE: CT of the abdomen and pelvis was performed without the administration of intravenous contrast. Multiplanar reformatted images are provided for review. Dose modulation, iterative reconstruction, and/or weight based adjustment of the mA/kV was utilized to reduce the radiation dose to as low as reasonably achievable. COMPARISON: CT abdomen and pelvis dated 03/02/2018 HISTORY: ORDERING SYSTEM PROVIDED HISTORY: ABDOMINAL PAIN, RLQ TECHNOLOGIST PROVIDED HISTORY: Additional Contrast?->None Ordering Physician Provided Reason for Exam: ABDOMINAL PAIN, RLQ Acuity: Unknown Type of Exam:  Unknown FINDINGS: Lower Chest: There are small bilateral pleural effusions with bibasilar atelectasis, slightly increased from the previous study. Organs: Limited unenhanced assessment of the liver is within normal limits. Gallbladder is surgically absent. No significant biliary dilatation. Spleen is unremarkable. Pancreas is diffusely atrophied without evidence of acute process on these limited unenhanced images. Adrenal glands are within normal limits. There is moderate bilateral hydronephrosis and mild diffuse hydroureter. No obstructing calculi. There is mild bilateral perinephric stranding, similar to the previous study. Assessment of renal parenchyma is limited without IV contrast. GI/Bowel: New PEG tube is in place. Air-fluid level is noted in the rectum. There is moderate sigmoid diverticulosis without evidence of acute diverticulitis. No abnormal bowel distention or focal pericolonic inflammation. Air-fluid levels are also scattered throughout the remainder of the colon. No abnormal bowel wall thickening appreciated. No free intraperitoneal air or fluid. Appendix is normal. Pelvis: There is prominent distention of the urinary bladder. No abnormal urinary bladder wall thickening. Uterus appears to be surgically absent. No evidence of pelvic lymphadenopathy. Peritoneum/Retroperitoneum: Inferior vena cava filter is noted.   The abdominal aorta Ordering Physician Provided Reason for Exam: line placement Acuity: Acute Type of Exam: Initial FINDINGS: The right IJ central venous catheter in place terminating at cavoatrial junction. There is no pneumothorax. The radiograph is somewhat limited due to left-sided tilt. Cardiac and mediastinal contour are normal.  There is minimal atelectatic change in the left costophrenic angle. Bony structures are grossly unremarkable. Dextroscoliosis at lower thoracic spine present similar to previous examination. Uncomplicated interval insertion of right IJ catheter. Otherwise, no interval change     Xr Chest (single View Frontal)    Result Date: 3/1/2018  EXAMINATION: SINGLE VIEW OF THE CHEST 3/1/2018 10:48 am COMPARISON: Chest x-ray from 01/10/2018 HISTORY: ORDERING SYSTEM PROVIDED HISTORY: Altered mental status,  confused TECHNOLOGIST PROVIDED HISTORY: Reason for exam:->Altered mental status,  confused Ordering Physician Provided Reason for Exam: AMS Acuity: Unknown Type of Exam: Unknown FINDINGS: There is no focal airspace consolidation, pleural effusion or pneumothorax. The cardiac silhouette appears mildly enlarged, but this may at least in part related to technique. There is no pulmonary vascular congestion. There are calcifications of the aortic arch. There are similar moderate hypertrophic degenerative changes of the visualized spine and shoulders. Visualized osseous structures appear mildly demineralized, but grossly intact, given the non dedicated imaging. No focal airspace consolidation or pulmonary vascular congestion. Ct Head Wo Contrast    Result Date: 3/1/2018  EXAMINATION: CT OF THE HEAD WITHOUT CONTRAST  3/1/2018 12:26 pm TECHNIQUE: CT of the head was performed without the administration of intravenous contrast. Dose modulation, iterative reconstruction, and/or weight based adjustment of the mA/kV was utilized to reduce the radiation dose to as low as reasonably achievable.  COMPARISON: January 10 HISTORY: ORDERING SYSTEM PROVIDED HISTORY: AMS hx of CVA with Left sided deficits TECHNOLOGIST PROVIDED HISTORY: Has a \"code stroke\" or \"stroke alert\" been called? ->No Ordering Physician Provided Reason for Exam: AMS, HX OF CVA WITH LEFT SIDED DEFICITS Additional signs and symptoms: PATIENT UNABLE TO GIVE HISTORY. UNABLE TO STRAIGHTEN HAD FOR EXAM. FINDINGS: BRAIN/VENTRICLES: There is a large amount of volume loss in the right MCA distribution including in the perisylvian region. This is likely due to the patient's recent infarct. There is curvilinear high density in the right sylvian fissures suggesting age-indeterminate thrombus in the MCA branch. There is low-density and loss of differentiation in the right lentiform nucleus and lateral thalamic region. Focal low density in the right caudate head is noted suggesting prior infarct. ORBITS: The visualized portion of the orbits demonstrate no acute abnormality. SINUSES: The visualized paranasal sinuses and mastoid air cells demonstrate no acute abnormality. SOFT TISSUES/SKULL:  No acute abnormality of the visualized skull or soft tissues. 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 There is surrounding low density extending deep into the lentiform nucleus region and right lateral thalamic region. It is unclear whether this is gliosis from the prior ischemic event or a subtle superimposed area of new ischemia. MRI may be more helpful High density in the right MCA within the sylvian fissure. This is age indeterminate given the recent infarct. This may represent sequela of prior thrombus, however new thrombus not excluded. Again if the patient 8 current right hemispheric stroke symptoms, consider MRI     Ir Guided Ivc Filter Placement    Result Date: 3/12/2018  Radiology exam is complete. No Radiologist dictation. Please follow up with ordering provider.      Us Renal Limited    Result Date: 3/2/2018  EXAMINATION: ULTRASOUND OF THE KIDNEYS 3/2/2018 2:21 pm COMPARISON: None. HISTORY: ORDERING SYSTEM PROVIDED HISTORY: RENAL FAILURE, ACUTE (KIDNEY INJURY) TECHNOLOGIST PROVIDED HISTORY: Ordering Physician Provided Reason for Exam: arf Acuity: Acute Type of Exam: Initial FINDINGS: The right kidney is less than optimally visualized due to surrounding bowel gas. The right kidney measures 12.8 x 5.8 x 5.6 cm and the left kidney 11.2 x 5.4 x 5.5 cm. The cortical thickness on the right is 17 mm and on the left is 18 mm. Kidneys demonstrate normal cortical echogenicity. No hydronephrosis or intrarenal stones. No focal lesions. Unremarkable ultrasound of the kidneys. Vl Lower Extremity Arteries Right    Result Date: 3/6/2018    [facility]  Vascular Lower Arterial Plethysmography Procedure   Patient Name   Stacey Angelucci Date of Study           03/06/2018                 L   Date of Birth  1940  Gender                  Female   Age            69 year(s)  Race                       Room Number    2002   Corporate ID # [id number]   Patient Acct # [de-identified]   MR #           [id number]      Clint Cardozo, [facility]   Accession #    [id number]   Interpreting Physician  Gaila Rubinstein   Referring                  Referring Physician     Rona Price  Nurse  Practitioner  Procedure Type of Study:   Extremities Arteries: Lower Arterial Plethysmography, PVR Lower. Indications for Study:Pain, leg. Patient Status: In Patient. Technical Quality:Limited visualization. Limitation reason:Body habitus, bandages.  Comments: Normal (0.95-1.3) Mild vascular insufficiency (0.7-0.94) Moderate vascular insufficiency (0.5-0.69) Severe vascular insufficiency (0.3-0.49) Critical Ischemia (0.1-0.29) Non-diagnostic due to calcification (>1.3) Toe pressure <40 mmHg poor wound healing potential  Conclusions   Summary   Right lower extremity ABIs and PVRs were junction. The small saphenous vein is dilated and                                 non-compressible with hypoechoic echoes. Physical Examination:        Physical Exam    General Appearance:  Resting comfortably in bed  Mental status: oriented to person only  Head:  Normocephalic, atraumatic  Eye: drooping L eye noted  Ear: normal external ear, no discharge, hearing intact  Nose:  no drainage noted  Mouth: MOM  Neck: supple  Lungs: Bilateral equal air entry, clear to ausculation, no wheezing, rales or rhonchi, normal effort  Cardiovascular: normal rate, regular rhythm, no murmur, gallop, rub.   Abdomen: Soft, nontender, nondistended, normal bowel sounds, PEG in place  Neurologic: left hemiplegia noted, A&Ox3  Skin: No gross lesions, rashes, bruising or bleeding on exposed skin area  Extremities: B/L LE edema greatly improved to below knee, LUE edema stable  Psych: flat affect    Assessment:        Primary Problem  DKA, type 2, not at goal [facility])    C/[address] Problems    Diagnosis Date Noted    Moderate malnutrition ([facility] [address] [E44.0] 03/12/2018    Anemia [D64.9] 03/11/2018    Acute UTI [N39.0]     Thrombocytopenia (HCC) [D69.6]     Acute deep vein thrombosis (DVT) of lower extremity (HCC) [I82.409]     Suspected deep tissue injury [Z04.9] 03/02/2018    DKA, type 2, not at goal [facility]) [E13.10] 03/01/2018    Sepsis ([facility] [A41.9] 03/01/2018    UTI (urinary tract infection) [N39.0] 03/01/2018    Leukocytosis [D72.829] 03/01/2018    Increased anion gap metabolic acidosis [X51.3] 03/01/2018    SAMINA (acute kidney injury) ([facility] [address] [N17.9] 03/01/2018    Hypocalcemia [E83.51] 03/01/2018    Hypokalemia [E87.6] 03/01/2018    Controlled type 2 diabetes mellitus with hyperglycemia, without long-term current use of insulin ([facility] [address] [E11.65] 01/16/2018    Left-sided weakness [R53.1] 01/12/2018    Cerebrovascular accident (CVA) ([facility] [I63.9]     Dysarthria [R47.1]     CIDP

## 2018-03-20 NOTE — CARE COORDINATION
Encounters:   03/01/18 167 lb 9.6 oz (76 kg)     Mental Status:  oriented and alert     IV Access:  - None    Nursing Mobility/ADLs:  Walking   Dependent  Transfer  Dependent  Bathing  Dependent  Dressing  Dependent  Toileting  Dependent  Feeding  Assisted  Med Admin  Assisted  Med Delivery   crushed    Wound Care Documentation and Therapy:  Wound 01/16/18 Coccyx maceration denuded (Active)   Number of days: 45       Wound 03/01/18 Coccyx non-blanchable purple, unstageable pressure ulcer (Active)   Wound Type Wound 3/2/2018  8:00 AM   Wound Unstageable 3/2/2018  8:00 AM   Dressing Status Clean;Dry; Intact 3/2/2018  8:00 AM   Dressing Changed Changed/New 3/2/2018  4:00 AM   Dressing/Treatment Foam 3/2/2018  8:00 AM   Wound Cleansed Other (Comment) 3/1/2018  3:00 PM   Dressing Change Due 03/04/18 3/2/2018  8:00 AM   Wound Assessment Other (Comment) 3/2/2018 12:00 AM   Drainage Amount None 3/2/2018 12:00 AM   Tosin-wound Assessment Red;Purple 3/2/2018 12:00 AM   Number of days: 0       Wound 03/01/18 Heel Right non-blanchable, purple unstageable wound (Active)   Wound Type Wound 3/2/2018 12:00 PM   Wound Unstageable 3/2/2018 12:00 PM   Dressing Status Clean;Dry; Intact 3/2/2018 12:00 PM   Dressing Changed Changed/New 3/2/2018  6:17 AM   Dressing/Treatment Foam 3/2/2018  8:00 AM   Wound Assessment Other (Comment) 3/2/2018 12:00 AM   Drainage Amount None 3/1/2018  3:00 PM   Number of days: 0       Wound 03/01/18 Toe (Comment  which one) Right dry scabbed wound (Active)   Wound Type Wound 3/2/2018 12:00 PM   Wound Other 3/2/2018 12:00 PM   Dressing/Treatment Open to air 3/2/2018 12:00 PM   Wound Assessment Dry; Intact 3/2/2018 12:00 AM   Drainage Amount None 3/2/2018 12:00 AM   Odor None 3/2/2018 12:00 AM   Tosin-wound Assessment Dry; Intact 3/2/2018 12:00 AM   Number of days: 0        Elimination:  Continence:   · Bowel: No  · Bladder: No  Urinary Catheter: Yes, inserted 3/15/18  Colostomy/Ileostomy/Ileal Conduit: No  [REMOVED] Rectal Tube Without balloon-Stool Appearance: Loose, Watery  [REMOVED] Rectal Tube Without balloon-Stool Color: Brown  [REMOVED] Rectal Tube Without balloon-Stool Amount: Smear    Date of Last BM: 3/19    Intake/Output Summary (Last 24 hours) at 03/02/18 1402  Last data filed at 03/02/18 0420   Gross per 24 hour   Intake             5755 ml   Output              225 ml   Net             5530 ml     I/O last 3 completed shifts: In: 0753 [I.V.:5317; IV Piggyback:438]  Out: 225 [Urine:225]    Safety Concerns: At Risk for Falls    Impairments/Disabilities:      Vision    Nutrition Therapy:  Current Nutrition Therapy:   Tube feedings, Glucerna 1.2, bolus 1.5 cans 4 x a day, bolus additional 1/2 can once per day   flush with 60 ml water, before and after each feeding    Routes of Feeding: Gastrostomy Tube , New Tube 3/13/18  Liquids: NPO  Daily Fluid Restriction: no  Last Modified Barium Swallow with Video (Video Swallowing Test): not done    Treatments at the Time of Hospital Discharge:   Respiratory Treatments: Respiratory per protocol. Oxygen Therapy:  is not on home oxygen therapy. Ventilator:    - No ventilator support    Rehab Therapies: Physical Therapy and Occupational Therapy  Weight Bearing Status/Restrictions: No weight bearing restirctions  Other Medical Equipment (for information only, NOT a DME order):  hospital bed, Ba Lift  Other Treatments: Skilled Nursing Assessment Per Protocol. Medication Education & Monitoring. HHA 3 X a week. Resume previous services.      Patient's personal belongings (please select all that are sent with patient):  None    RN SIGNATURE:  Electronically signed by Kristian Moon RN on 3/19/18 at 1:55 PM    CASE MANAGEMENT/SOCIAL WORK SECTION    Inpatient Status Date: 3/10/18    [facility] Readmission Risk Assessment Score:  Risk Score: 9.5   (Score > 14= high risk for readmission)    Discharging to Facility/ [address]  [address] losartan (COZAAR) 100 MG tablet 100 mg           (STOP TAKING) amLODIPine (NORVASC) 5 MG tablet 5 mg           (STOP TAKING) aspirin 81 MG tablet 81 mg           (STOP TAKING) atenolol (TENORMIN) 50 MG tablet 50 mg           Printing Report     Report ID Report Name Print   [id number] [facility] Meds Print

## 2018-03-20 NOTE — CARE COORDINATION
Writer faxed, Completed Les/DC med List to [facility], at [address] [facility]. Instructed to call writer w/ any questions.

## 2018-04-10 PROBLEM — N39.0 UTI (URINARY TRACT INFECTION): Status: RESOLVED | Noted: 2018-03-01 | Resolved: 2018-04-10

## (undated) DEVICE — AIRLIFE™ NASAL OXYGEN CANNULA CURVED, FLARED TIP, WITH 7 FEET (2.1 M) CRUSH RESISTANT TUBING, OVER-THE-EAR STYLE: Brand: AIRLIFE™

## (undated) DEVICE — MIC* SAFETY PERCUTANEOUS ENDOSCOPIC GASTROSTOMY PEG KIT - 20 FR - PUSH OTW: Brand: MIC PEG TUBE

## (undated) DEVICE — BITEBLOCK 54FR W/ DENT RIM BLOX

## (undated) DEVICE — GLOVE ORANGE PI 7 1/2   MSG9075